# Patient Record
Sex: MALE | Race: WHITE | NOT HISPANIC OR LATINO | Employment: FULL TIME | URBAN - METROPOLITAN AREA
[De-identification: names, ages, dates, MRNs, and addresses within clinical notes are randomized per-mention and may not be internally consistent; named-entity substitution may affect disease eponyms.]

---

## 2019-08-11 ENCOUNTER — COMMUNICATION - HEALTHEAST (OUTPATIENT)
Dept: TELEHEALTH | Facility: CLINIC | Age: 69
End: 2019-08-11

## 2019-08-11 ENCOUNTER — OFFICE VISIT - HEALTHEAST (OUTPATIENT)
Dept: FAMILY MEDICINE | Facility: CLINIC | Age: 69
End: 2019-08-11

## 2019-08-11 DIAGNOSIS — R68.84 JAW PAIN: ICD-10-CM

## 2019-08-11 DIAGNOSIS — I10 ESSENTIAL HYPERTENSION: ICD-10-CM

## 2019-08-11 DIAGNOSIS — Z91.89 AT RISK FOR ACUTE ISCHEMIC CARDIAC EVENT: ICD-10-CM

## 2019-08-11 DIAGNOSIS — E66.9 OBESITY, UNSPECIFIED CLASSIFICATION, UNSPECIFIED OBESITY TYPE, UNSPECIFIED WHETHER SERIOUS COMORBIDITY PRESENT: ICD-10-CM

## 2019-08-11 DIAGNOSIS — E78.5 HYPERLIPIDEMIA, UNSPECIFIED HYPERLIPIDEMIA TYPE: ICD-10-CM

## 2019-08-11 LAB
ATRIAL RATE - MUSE: 80 BPM
DIASTOLIC BLOOD PRESSURE - MUSE: NORMAL MMHG
INTERPRETATION ECG - MUSE: NORMAL
P AXIS - MUSE: 27 DEGREES
PR INTERVAL - MUSE: 180 MS
QRS DURATION - MUSE: 88 MS
QT - MUSE: 376 MS
QTC - MUSE: 433 MS
R AXIS - MUSE: -3 DEGREES
SYSTOLIC BLOOD PRESSURE - MUSE: NORMAL MMHG
T AXIS - MUSE: 21 DEGREES
VENTRICULAR RATE- MUSE: 80 BPM

## 2020-06-19 ENCOUNTER — COMMUNICATION - HEALTHEAST (OUTPATIENT)
Dept: FAMILY MEDICINE | Facility: CLINIC | Age: 70
End: 2020-06-19

## 2020-06-22 ENCOUNTER — COMMUNICATION - HEALTHEAST (OUTPATIENT)
Dept: FAMILY MEDICINE | Facility: CLINIC | Age: 70
End: 2020-06-22

## 2020-06-22 ENCOUNTER — OFFICE VISIT - HEALTHEAST (OUTPATIENT)
Dept: FAMILY MEDICINE | Facility: CLINIC | Age: 70
End: 2020-06-22

## 2020-06-22 DIAGNOSIS — N30.01 ACUTE CYSTITIS WITH HEMATURIA: ICD-10-CM

## 2020-06-22 DIAGNOSIS — R39.11 BENIGN PROSTATIC HYPERPLASIA WITH URINARY HESITANCY: ICD-10-CM

## 2020-06-22 DIAGNOSIS — N40.1 BENIGN PROSTATIC HYPERPLASIA WITH URINARY HESITANCY: ICD-10-CM

## 2020-06-22 DIAGNOSIS — R30.0 DYSURIA: ICD-10-CM

## 2020-06-22 DIAGNOSIS — I21.29 ST ELEVATION MYOCARDIAL INFARCTION (STEMI) INVOLVING OTHER CORONARY ARTERY (H): ICD-10-CM

## 2020-06-22 LAB
ALBUMIN SERPL-MCNC: 3.5 G/DL (ref 3.5–5)
ALBUMIN UR-MCNC: ABNORMAL MG/DL
ALP SERPL-CCNC: 99 U/L (ref 45–120)
ALT SERPL W P-5'-P-CCNC: 19 U/L (ref 0–45)
ANION GAP SERPL CALCULATED.3IONS-SCNC: 11 MMOL/L (ref 5–18)
APPEARANCE UR: CLEAR
AST SERPL W P-5'-P-CCNC: 17 U/L (ref 0–40)
BACTERIA #/AREA URNS HPF: ABNORMAL HPF
BILIRUB SERPL-MCNC: 0.3 MG/DL (ref 0–1)
BILIRUB UR QL STRIP: NEGATIVE
BUN SERPL-MCNC: 22 MG/DL (ref 8–22)
CALCIUM SERPL-MCNC: 8.9 MG/DL (ref 8.5–10.5)
CHLORIDE BLD-SCNC: 105 MMOL/L (ref 98–107)
CHOLEST SERPL-MCNC: 136 MG/DL
CO2 SERPL-SCNC: 25 MMOL/L (ref 22–31)
COLOR UR AUTO: YELLOW
CREAT SERPL-MCNC: 1.3 MG/DL (ref 0.7–1.3)
FASTING STATUS PATIENT QL REPORTED: NO
GFR SERPL CREATININE-BSD FRML MDRD: 55 ML/MIN/1.73M2
GLUCOSE BLD-MCNC: 94 MG/DL (ref 70–125)
GLUCOSE UR STRIP-MCNC: NEGATIVE MG/DL
HDLC SERPL-MCNC: 28 MG/DL
HGB UR QL STRIP: ABNORMAL
KETONES UR STRIP-MCNC: NEGATIVE MG/DL
LDLC SERPL CALC-MCNC: 66 MG/DL
LEUKOCYTE ESTERASE UR QL STRIP: ABNORMAL
NITRATE UR QL: NEGATIVE
PH UR STRIP: 5.5 [PH] (ref 5–8)
POTASSIUM BLD-SCNC: 4.5 MMOL/L (ref 3.5–5)
PROT SERPL-MCNC: 6.5 G/DL (ref 6–8)
RBC #/AREA URNS AUTO: ABNORMAL HPF
SODIUM SERPL-SCNC: 141 MMOL/L (ref 136–145)
SP GR UR STRIP: 1.01 (ref 1–1.03)
SQUAMOUS #/AREA URNS AUTO: ABNORMAL LPF
TRIGL SERPL-MCNC: 211 MG/DL
UROBILINOGEN UR STRIP-ACNC: ABNORMAL
WBC #/AREA URNS AUTO: ABNORMAL HPF

## 2020-06-24 LAB — BACTERIA SPEC CULT: ABNORMAL

## 2021-06-03 VITALS — WEIGHT: 269.13 LBS

## 2021-06-04 VITALS
WEIGHT: 252 LBS | SYSTOLIC BLOOD PRESSURE: 114 MMHG | DIASTOLIC BLOOD PRESSURE: 60 MMHG | HEART RATE: 78 BPM | OXYGEN SATURATION: 97 %

## 2021-06-09 NOTE — TELEPHONE ENCOUNTER
Medication Question or Clarification  Who is calling: kar Lilly  What medication are you calling about (include dose and sig)?: sulfamethoxazole-trimethoprim (BACTRIM DS) 800-160 mg per tablet  Who prescribed the medication?: John Gloria MD  What is your question/concern?: interacts with lisinopril, would you like to prescribe something else or are the potassium levels being monitored? Please advise  Requested [facility]acy: [facility] to leave a detailed message?: Yes

## 2021-06-09 NOTE — PROGRESS NOTES
Assessment/Plan:        1. Dysuria  - Urinalysis-UC if Indicated  - Culture, Urine    2. Acute cystitis with hematuria  UA findings were discussed with patient  Plan:  - sulfamethoxazole-trimethoprim (BACTRIM DS) 800-160 mg per tablet; Take 1 tablet by mouth 2 (two) times a day for 7 days.  Dispense: 14 tablet; Refill: 0    3. Benign prostatic hyperplasia with urinary hesitancy  Discussed is the likely culprit urinary tract infection.     Start:  - tamsulosin (FLOMAX) 0.4 mg cap; Take 1 capsule (0.4 mg total) by mouth Daily after breakfast.  Dispense: 30 capsule; Refill: 0  Close follow up if no significant change or improvement as anticipated.     Consider referral to urology    4. ST elevation myocardial infarction (STEMI) involving other coronary artery (H)  Refill  - atenoloL (TENORMIN) 100 MG tablet; Take 1 tablet (100 mg total) by mouth daily.  Dispense: 90 tablet; Refill: 0  - atorvastatin (LIPITOR) 20 MG tablet; Take 1 tablet (20 mg total) by mouth daily.  Dispense: 90 tablet; Refill: 0    Recheck following labs:  - lisinopriL (PRINIVIL,ZESTRIL) 10 MG tablet; Take 1 tablet (10 mg total) by mouth daily.  Dispense: 90 tablet; Refill: 0  - Lipid Profile  - Comprehensive Metabolic Panel        At the conclusion of the encounter the plan of care, disposition and all questions were answered and reviewed, and the patient acknowledged understanding and was involved in the decision making regarding the overall care plan.           Subjective:    Patient ID:   Bret Fleming is a 69 y.o. male with a history of MI, and difficulty passing urine over the past several months presenting with a week of worsening symptoms with burning on urination the past 7 days.  Denies any fevers chills back or abdominal pain.  No previous history of BPH or evaluation of    Review of Systems  Allergy: reviewed  General : negative  A complete 5 point review of systems was obtained and is negative other than what is stated in the HPI.        The following patient's history were reviewed and updated as appropriate:   He  has no past medical history on file..      Outpatient Encounter Medications as of 6/22/2020   Medication Sig Dispense Refill     aspirin 81 mg chewable tablet Chew 81 mg.       atenoloL (TENORMIN) 100 MG tablet        atorvastatin (LIPITOR) 20 MG tablet Take 20 mg by mouth.       lisinopril (PRINIVIL,ZESTRIL) 10 MG tablet        atenolol (TENORMIN) 100 MG tablet Take 100 mg by mouth.       No facility-administered encounter medications on file as of 6/22/2020.          Objective:   /60 (Patient Site: Right Arm, Patient Position: Sitting, Cuff Size: Adult Regular)   Pulse 78   Wt (!) 252 lb (114.3 kg)   SpO2 97%       Physical Exam  General exam: Apparent distress and well-hydrated  Abdomen: Soft and nontender, normoactive bowel sounds

## 2021-06-09 NOTE — TELEPHONE ENCOUNTER
New Appointment Needed  What is the reason for the visit:    New patient to [facility] care. Requests to be seen in Family care, having some issues with urination. Would also be interested in a complete physical as it has been more than a year. Is requesting an in-office appointment. Does not have video capability. COVID-screened 6/19/20  Provider Preference: Any available Dr Gloria  How soon do you need to be seen?: as soon as can be arranged, regaring the issues with urination.  Waitlist offered?: No  Okay to leave a detailed message:  Yes

## 2021-06-16 PROBLEM — R07.9 CHEST PAIN: Status: ACTIVE | Noted: 2019-08-11

## 2021-06-16 PROBLEM — I21.3 ST ELEVATION MYOCARDIAL INFARCTION (STEMI) (H): Status: ACTIVE | Noted: 2020-06-22

## 2021-06-17 NOTE — PATIENT INSTRUCTIONS - HE
Patient Instructions by Beck Stinson PA-C at 8/11/2019 10:50 AM     Author: Beck Stinson PA-C Service: -- Author Type: Physician Assistant    Filed: 8/11/2019 11:29 AM Encounter Date: 8/11/2019 Status: Addendum    : Beck Stinson PA-C (Physician Assistant)    Related Notes: Original Note by Beck Stinson PA-C (Physician Assistant) filed at 8/11/2019 11:27 AM       Go to the ER for evaluation of your jaw and face pain      Patient Education     Uncertain Causes of Chest Pain    Chest pain can happen for a number of reasons. Sometimes the cause can't be determined. If your condition does not seem serious, and your pain does not appear to be coming from your heart, your healthcare provider may recommend watching it closely. Sometimes the signs of a serious problem take more time to appear. Many problems not related to your heart can cause chest pain.These include:    Musculoskeletal. Costochondritis, an inflammation of the tissues around the ribs that can occur from trauma or overuse injuries    Respiratory. Pneumonia, pneumothorax, or pneumonitis (inflammation of the lining of the chest and lungs)    Gastrointestinal. Esophageal reflux, heartburn, or gallbladder disease    Anxiety and panic disorders    Nerve compression and neuritis    Miscellaneous problems such as aortic aneurysm or pulmonary embolism (a blood clot in the lungs)  Home care  After your visit, follow these recommendations:    Rest today and avoid strenuous activity.    Take any prescribed medicine as directed.    Be aware of any recurrent chest pain and notice any changes  Follow-up care  Follow up with your healthcare provider if you do not start to feel better within 24 hours, or as advised.  Call 911  Call 911 if any of these occur:    A change in the type of pain: if it feels different, becomes more severe, lasts longer, or begins to spread into your shoulder, arm, neck, jaw or back    Shortness of breath or increased pain with  breathing    Weakness, dizziness, or fainting    Rapid heart beat    Crushing sensation in your chest  When to seek medical advice  Call your healthcare provider right away if any of the following occur:    Cough with dark colored sputum (phlegm) or blood    Fever of 100.4 F (38 C) or higher, or as directed by your healthcare provider    Swelling, pain or redness in one leg    Shortness of breath  Date Last Reviewed: 12/30/2015 2000-2017 [employer]. [address] [place] [address]. All rights reserved. This information is not intended as a substitute for professional medical care. Always follow your healthcare professional's instructions.

## 2021-07-03 NOTE — ADDENDUM NOTE
Addendum Note by John Gloria MD at 6/22/2020  1:15 PM     Author: John Gloria MD Service: -- Author Type: Physician    Filed: 6/24/2020  3:01 PM Encounter Date: 6/22/2020 Status: Signed    : John Gloria MD (Physician)    Addended by: JOHN GLORIA on: 6/24/2020 03:01 PM        Modules accepted: Orders

## 2021-08-08 ENCOUNTER — HEALTH MAINTENANCE LETTER (OUTPATIENT)
Age: 71
End: 2021-08-08

## 2021-08-12 RX ORDER — LISINOPRIL 10 MG/1
TABLET ORAL
Refills: 3 | Status: CANCELLED | OUTPATIENT
Start: 2021-08-12

## 2021-08-12 NOTE — TELEPHONE ENCOUNTER
Pending Prescriptions:                       Disp   Refills    lisinopril (ZESTRIL) 10 MG tablet                3            Last filled 6/8/2021

## 2021-08-15 NOTE — TELEPHONE ENCOUNTER
"Routing refill request to provider for review/approval because:  Labs not current:  Cr, K+  Patient needs to be seen because it has been more than 1 year since last office visit.  No established PCP      Last office visit provider:  6/22/20     Requested Prescriptions   Pending Prescriptions Disp Refills     lisinopril (ZESTRIL) 10 MG tablet  3       ACE Inhibitors (Including Combos) Protocol Failed - 8/12/2021  3:55 PM        Failed - Blood pressure under 140/90 in past 12 months     BP Readings from Last 3 Encounters:   06/22/20 114/60                 Failed - Recent (12 mo) or future (30 days) visit within the authorizing provider's specialty     Patient has had an office visit with the authorizing provider or a provider within the authorizing providers department within the previous 12 mos or has a future within next 30 days. See \"Patient Info\" tab in inbasket, or \"Choose Columns\" in Meds & Orders section of the refill encounter.              Failed - Normal serum creatinine on file in past 12 months     Recent Labs   Lab Test 06/22/20  1405   CR 1.30       Ok to refill medication if creatinine is low          Failed - Normal serum potassium on file in past 12 months     Recent Labs   Lab Test 06/22/20  1405   POTASSIUM 4.5             Passed - Medication is active on med list        Passed - Patient is age 18 or older             bravo eckert RN 08/14/21 9:07 PM  "

## 2021-08-18 ENCOUNTER — OFFICE VISIT (OUTPATIENT)
Dept: FAMILY MEDICINE | Facility: CLINIC | Age: 71
End: 2021-08-18
Payer: COMMERCIAL

## 2021-08-18 VITALS
BODY MASS INDEX: 35.16 KG/M2 | WEIGHT: 259.6 LBS | TEMPERATURE: 98.1 F | OXYGEN SATURATION: 97 % | SYSTOLIC BLOOD PRESSURE: 130 MMHG | HEIGHT: 72 IN | DIASTOLIC BLOOD PRESSURE: 80 MMHG | HEART RATE: 65 BPM | RESPIRATION RATE: 16 BRPM

## 2021-08-18 DIAGNOSIS — M65.30 TRIGGER FINGER, ACQUIRED: ICD-10-CM

## 2021-08-18 DIAGNOSIS — M25.50 MULTIPLE JOINT PAIN: ICD-10-CM

## 2021-08-18 DIAGNOSIS — E78.2 MIXED HYPERLIPIDEMIA: ICD-10-CM

## 2021-08-18 DIAGNOSIS — Z12.11 SPECIAL SCREENING FOR MALIGNANT NEOPLASMS, COLON: ICD-10-CM

## 2021-08-18 DIAGNOSIS — I10 ESSENTIAL HYPERTENSION: Primary | ICD-10-CM

## 2021-08-18 LAB
ALBUMIN SERPL-MCNC: 3.7 G/DL (ref 3.5–5)
ALP SERPL-CCNC: 98 U/L (ref 45–120)
ALT SERPL W P-5'-P-CCNC: 21 U/L (ref 0–45)
ANION GAP SERPL CALCULATED.3IONS-SCNC: 12 MMOL/L (ref 5–18)
AST SERPL W P-5'-P-CCNC: 20 U/L (ref 0–40)
BILIRUB SERPL-MCNC: 0.5 MG/DL (ref 0–1)
BUN SERPL-MCNC: 23 MG/DL (ref 8–28)
CALCIUM SERPL-MCNC: 9.2 MG/DL (ref 8.5–10.5)
CHLORIDE BLD-SCNC: 106 MMOL/L (ref 98–107)
CHOLEST SERPL-MCNC: 210 MG/DL
CO2 SERPL-SCNC: 23 MMOL/L (ref 22–31)
CREAT SERPL-MCNC: 1.19 MG/DL (ref 0.6–1.3)
FASTING STATUS PATIENT QL REPORTED: NO
GFR SERPL CREATININE-BSD FRML MDRD: 61 ML/MIN/1.73M2
GLUCOSE BLD-MCNC: 90 MG/DL (ref 70–125)
HDLC SERPL-MCNC: 32 MG/DL
LDLC SERPL CALC-MCNC: 135 MG/DL
POTASSIUM BLD-SCNC: 4.4 MMOL/L (ref 3.5–5)
PROT SERPL-MCNC: 6.9 G/DL (ref 6–8)
SODIUM SERPL-SCNC: 141 MMOL/L (ref 136–145)
TRIGL SERPL-MCNC: 217 MG/DL

## 2021-08-18 PROCEDURE — 36415 COLL VENOUS BLD VENIPUNCTURE: CPT | Performed by: FAMILY MEDICINE

## 2021-08-18 PROCEDURE — 80053 COMPREHEN METABOLIC PANEL: CPT | Performed by: FAMILY MEDICINE

## 2021-08-18 PROCEDURE — 99214 OFFICE O/P EST MOD 30 MIN: CPT | Performed by: FAMILY MEDICINE

## 2021-08-18 PROCEDURE — 80061 LIPID PANEL: CPT | Performed by: FAMILY MEDICINE

## 2021-08-18 RX ORDER — ATENOLOL 100 MG/1
100 TABLET ORAL DAILY
Qty: 90 TABLET | Refills: 3 | Status: SHIPPED | OUTPATIENT
Start: 2021-08-18 | End: 2022-08-25

## 2021-08-18 RX ORDER — LISINOPRIL 10 MG/1
10 TABLET ORAL DAILY
Qty: 90 TABLET | Refills: 3 | Status: SHIPPED | OUTPATIENT
Start: 2021-08-18 | End: 2022-05-27

## 2021-08-18 ASSESSMENT — MIFFLIN-ST. JEOR: SCORE: 1970.54

## 2021-08-18 NOTE — PROGRESS NOTES
Assessment & Plan     Essential hypertension  Normotensive  Continue current management  - atenolol (TENORMIN) 100 MG tablet; Take 1 tablet (100 mg) by mouth daily  - lisinopril (ZESTRIL) 10 MG tablet; Take 1 tablet (10 mg) by mouth daily    Recheck labs  - Comprehensive metabolic panel (BMP + Alb, Alk Phos, ALT, AST, Total. Bili, TP); Future    Mixed hyperlipidemia  Recheck lab    - Lipid panel reflex to direct LDL Fasting;  LDL Cholesterol Calculated <=129 mg/dL 135High     Higher statin dosage is recommended to mitigate the cardiovascular risk  Recommending atorvastatin 80 mg. If agreed will send a new prescription to pharmacy      Multiple joint pain  Exam findings were discussed  Patient rating the pain mild.   Discussed NSAIDs for intermittent use.       Trigger finger, acquired  Exam findings and treatment options were discussed  - Orthopedic  Referral; Future    Special screening for malignant neoplasms, colon  Negative Cologuard in 2019                     No follow-ups on file.        Subjective   Bret Fleming is a 71 year old adult here for med check and follow up of hypertension and hyperlipidemia, requesting refills on his meds.     He is also been experiencing joint pain, and motion restrictions particularly of the left index finger unable to making a fist with a pulling sensation across the palm.     Knee pain: started a year ago and worse in the past few months        Review of Systems         Objective    /80   Pulse 65   Temp 98.1  F (36.7  C)   Resp 16   Ht 1.829 m (6')   Wt 117.8 kg (259 lb 9.6 oz)   SpO2 97%   BMI 35.21 kg/m    Body mass index is 35.21 kg/m .     Physical Exam   GENERAL: healthy, alert and no distress  RESP: lungs clear to auscultation - no rales, rhonchi or wheezes  CV: regular rate and rhythm, normal S1 S2, no S3 or S4, no murmur, click or rub, no peripheral edema and peripheral pulses strong  MS: left hand trigger finger of the middle finger, mild  synovial thickening and osteoarthritic appearance of the hands            Alisha Gloria MD  [facility]

## 2021-08-23 ENCOUNTER — TRANSFERRED RECORDS (OUTPATIENT)
Dept: HEALTH INFORMATION MANAGEMENT | Facility: CLINIC | Age: 71
End: 2021-08-23

## 2021-08-24 ENCOUNTER — TELEPHONE (OUTPATIENT)
Dept: FAMILY MEDICINE | Facility: CLINIC | Age: 71
End: 2021-08-24

## 2021-08-24 DIAGNOSIS — E78.2 MIXED HYPERLIPIDEMIA: Primary | ICD-10-CM

## 2021-08-24 RX ORDER — ATORVASTATIN CALCIUM 80 MG/1
80 TABLET, FILM COATED ORAL DAILY
Qty: 90 TABLET | Refills: 0 | Status: SHIPPED | OUTPATIENT
Start: 2021-08-24 | End: 2022-08-26

## 2021-08-24 RX ORDER — ATORVASTATIN CALCIUM 80 MG/1
80 TABLET, FILM COATED ORAL EVERY EVENING
Qty: 90 TABLET | Refills: 0 | Status: CANCELLED | OUTPATIENT
Start: 2021-08-24

## 2021-08-24 NOTE — TELEPHONE ENCOUNTER
Higher statin dosage is recommended to mitigate the cardiovascular risk  Recommending atorvastatin 80 mg.  If agreed will send a new prescription to pharmacy   Written by Alisha Gloria MD on 8/22/2021  9:14 PM CDT

## 2021-08-24 NOTE — TELEPHONE ENCOUNTER
Contacted patient and relayed message.  He is willing to start increased statin dose.  T'd up below for provider review.

## 2021-08-25 DIAGNOSIS — E78.2 MIXED HYPERLIPIDEMIA: ICD-10-CM

## 2021-10-04 ENCOUNTER — HEALTH MAINTENANCE LETTER (OUTPATIENT)
Age: 71
End: 2021-10-04

## 2022-02-05 ENCOUNTER — HOSPITAL ENCOUNTER (EMERGENCY)
Facility: HOSPITAL | Age: 72
Discharge: HOME | End: 2022-02-05
Attending: EMERGENCY MEDICINE | Admitting: EMERGENCY MEDICINE
Payer: COMMERCIAL

## 2022-02-05 ENCOUNTER — OFFICE VISIT (OUTPATIENT)
Dept: FAMILY MEDICINE | Facility: CLINIC | Age: 72
End: 2022-02-05
Payer: COMMERCIAL

## 2022-02-05 ENCOUNTER — APPOINTMENT (OUTPATIENT)
Dept: CT IMAGING | Facility: HOSPITAL | Age: 72
End: 2022-02-05
Attending: EMERGENCY MEDICINE
Payer: COMMERCIAL

## 2022-02-05 VITALS
SYSTOLIC BLOOD PRESSURE: 105 MMHG | TEMPERATURE: 97.5 F | OXYGEN SATURATION: 98 % | HEART RATE: 95 BPM | BODY MASS INDEX: 31.8 KG/M2 | HEIGHT: 72 IN | RESPIRATION RATE: 18 BRPM | DIASTOLIC BLOOD PRESSURE: 54 MMHG | WEIGHT: 234.8 LBS

## 2022-02-05 VITALS
TEMPERATURE: 97.6 F | SYSTOLIC BLOOD PRESSURE: 99 MMHG | OXYGEN SATURATION: 97 % | DIASTOLIC BLOOD PRESSURE: 63 MMHG | HEART RATE: 86 BPM

## 2022-02-05 DIAGNOSIS — N28.9 RENAL INSUFFICIENCY: ICD-10-CM

## 2022-02-05 DIAGNOSIS — R19.7 DIARRHEA, UNSPECIFIED TYPE: Primary | ICD-10-CM

## 2022-02-05 DIAGNOSIS — N39.0 URINARY TRACT INFECTION WITHOUT HEMATURIA, SITE UNSPECIFIED: ICD-10-CM

## 2022-02-05 LAB
ALBUMIN SERPL-MCNC: 2.9 G/DL (ref 3.5–5)
ALBUMIN SERPL-MCNC: 3 G/DL (ref 3.5–5)
ALBUMIN UR-MCNC: 50 MG/DL
ALP SERPL-CCNC: 75 U/L (ref 45–120)
ALP SERPL-CCNC: 78 U/L (ref 45–120)
ALT SERPL W P-5'-P-CCNC: 15 U/L (ref 0–45)
ALT SERPL W P-5'-P-CCNC: 18 U/L (ref 0–45)
ANION GAP SERPL CALCULATED.3IONS-SCNC: 11 MMOL/L (ref 5–18)
ANION GAP SERPL CALCULATED.3IONS-SCNC: 11 MMOL/L (ref 5–18)
APPEARANCE UR: ABNORMAL
AST SERPL W P-5'-P-CCNC: 13 U/L (ref 0–40)
AST SERPL W P-5'-P-CCNC: 13 U/L (ref 0–40)
BACTERIA #/AREA URNS HPF: ABNORMAL /HPF
BASOPHILS # BLD AUTO: 0 10E3/UL (ref 0–0.2)
BASOPHILS # BLD AUTO: 0.1 10E3/UL (ref 0–0.2)
BASOPHILS NFR BLD AUTO: 1 %
BASOPHILS NFR BLD AUTO: 1 %
BILIRUB DIRECT SERPL-MCNC: 0.2 MG/DL
BILIRUB SERPL-MCNC: 0.4 MG/DL (ref 0–1)
BILIRUB SERPL-MCNC: 0.5 MG/DL (ref 0–1)
BILIRUB UR QL STRIP: NEGATIVE
BUN SERPL-MCNC: 43 MG/DL (ref 8–28)
BUN SERPL-MCNC: 43 MG/DL (ref 8–28)
C REACTIVE PROTEIN LHE: 13.7 MG/DL (ref 0–0.8)
CALCIUM SERPL-MCNC: 9.4 MG/DL (ref 8.5–10.5)
CALCIUM SERPL-MCNC: 9.4 MG/DL (ref 8.5–10.5)
CHLORIDE BLD-SCNC: 107 MMOL/L (ref 98–107)
CHLORIDE BLD-SCNC: 107 MMOL/L (ref 98–107)
CK SERPL-CCNC: 99 U/L (ref 30–190)
CO2 SERPL-SCNC: 20 MMOL/L (ref 22–31)
CO2 SERPL-SCNC: 22 MMOL/L (ref 22–31)
COLOR UR AUTO: YELLOW
CREAT SERPL-MCNC: 3.07 MG/DL (ref 0.6–1.3)
CREAT SERPL-MCNC: 3.22 MG/DL (ref 0.6–1.3)
EOSINOPHIL # BLD AUTO: 0.4 10E3/UL (ref 0–0.7)
EOSINOPHIL # BLD AUTO: 0.4 10E3/UL (ref 0–0.7)
EOSINOPHIL NFR BLD AUTO: 4 %
EOSINOPHIL NFR BLD AUTO: 5 %
ERYTHROCYTE [DISTWIDTH] IN BLOOD BY AUTOMATED COUNT: 12.4 % (ref 10–15)
ERYTHROCYTE [DISTWIDTH] IN BLOOD BY AUTOMATED COUNT: 12.4 % (ref 10–15)
ERYTHROCYTE [SEDIMENTATION RATE] IN BLOOD BY WESTERGREN METHOD: 109 MM/HR (ref 0–20)
GFR SERPL CREATININE-BSD FRML MDRD: 20 ML/MIN/1.73M2
GFR SERPL CREATININE-BSD FRML MDRD: 21 ML/MIN/1.73M2
GLUCOSE BLD-MCNC: 105 MG/DL (ref 70–125)
GLUCOSE BLD-MCNC: 106 MG/DL (ref 70–125)
GLUCOSE UR STRIP-MCNC: NEGATIVE MG/DL
HCT VFR BLD AUTO: 33.9 % (ref 35–53)
HCT VFR BLD AUTO: 34.1 % (ref 35–53)
HGB BLD-MCNC: 10.8 G/DL (ref 11.7–17.7)
HGB BLD-MCNC: 10.8 G/DL (ref 11.7–17.7)
HGB UR QL STRIP: ABNORMAL
HYALINE CASTS: 32 /LPF
IMM GRANULOCYTES # BLD: 0 10E3/UL
IMM GRANULOCYTES # BLD: 0 10E3/UL
IMM GRANULOCYTES NFR BLD: 0 %
IMM GRANULOCYTES NFR BLD: 0 %
KETONES UR STRIP-MCNC: NEGATIVE MG/DL
LEUKOCYTE ESTERASE UR QL STRIP: ABNORMAL
LIPASE SERPL-CCNC: 52 U/L (ref 0–52)
LYMPHOCYTES # BLD AUTO: 1.2 10E3/UL (ref 0.8–5.3)
LYMPHOCYTES # BLD AUTO: 1.2 10E3/UL (ref 0.8–5.3)
LYMPHOCYTES NFR BLD AUTO: 13 %
LYMPHOCYTES NFR BLD AUTO: 14 %
MAGNESIUM SERPL-MCNC: 2.3 MG/DL (ref 1.8–2.6)
MCH RBC QN AUTO: 31.4 PG (ref 26.5–33)
MCH RBC QN AUTO: 31.6 PG (ref 26.5–33)
MCHC RBC AUTO-ENTMCNC: 31.7 G/DL (ref 31.5–36.5)
MCHC RBC AUTO-ENTMCNC: 31.9 G/DL (ref 31.5–36.5)
MCV RBC AUTO: 99 FL (ref 78–100)
MCV RBC AUTO: 99 FL (ref 78–100)
MONOCYTES # BLD AUTO: 1 10E3/UL (ref 0–1.3)
MONOCYTES # BLD AUTO: 1.1 10E3/UL (ref 0–1.3)
MONOCYTES NFR BLD AUTO: 11 %
MONOCYTES NFR BLD AUTO: 14 %
MUCOUS THREADS #/AREA URNS LPF: PRESENT /LPF
NEUTROPHILS # BLD AUTO: 5.6 10E3/UL (ref 1.6–8.3)
NEUTROPHILS # BLD AUTO: 6.3 10E3/UL (ref 1.6–8.3)
NEUTROPHILS NFR BLD AUTO: 67 %
NEUTROPHILS NFR BLD AUTO: 71 %
NITRATE UR QL: POSITIVE
NRBC # BLD AUTO: 0 10E3/UL
NRBC BLD AUTO-RTO: 0 /100
PH UR STRIP: 6 [PH] (ref 5–7)
PLATELET # BLD AUTO: 252 10E3/UL (ref 150–450)
PLATELET # BLD AUTO: 268 10E3/UL (ref 150–450)
POTASSIUM BLD-SCNC: 4.8 MMOL/L (ref 3.5–5)
POTASSIUM BLD-SCNC: 5 MMOL/L (ref 3.5–5)
PROT SERPL-MCNC: 7.1 G/DL (ref 6–8)
PROT SERPL-MCNC: 7.3 G/DL (ref 6–8)
RBC # BLD AUTO: 3.42 10E6/UL (ref 3.8–5.9)
RBC # BLD AUTO: 3.44 10E6/UL (ref 3.8–5.9)
RBC URINE: 0 /HPF
SODIUM SERPL-SCNC: 138 MMOL/L (ref 136–145)
SODIUM SERPL-SCNC: 140 MMOL/L (ref 136–145)
SP GR UR STRIP: 1.01 (ref 1–1.03)
SQUAMOUS EPITHELIAL: 2 /HPF
UROBILINOGEN UR STRIP-MCNC: <2 MG/DL
WBC # BLD AUTO: 8.3 10E3/UL (ref 4–11)
WBC # BLD AUTO: 8.9 10E3/UL (ref 4–11)
WBC CLUMPS #/AREA URNS HPF: PRESENT /HPF
WBC URINE: >182 /HPF

## 2022-02-05 PROCEDURE — 87086 URINE CULTURE/COLONY COUNT: CPT | Performed by: EMERGENCY MEDICINE

## 2022-02-05 PROCEDURE — 99284 EMERGENCY DEPT VISIT MOD MDM: CPT | Mod: 25

## 2022-02-05 PROCEDURE — 82550 ASSAY OF CK (CPK): CPT | Performed by: EMERGENCY MEDICINE

## 2022-02-05 PROCEDURE — 36415 COLL VENOUS BLD VENIPUNCTURE: CPT | Performed by: EMERGENCY MEDICINE

## 2022-02-05 PROCEDURE — 85025 COMPLETE CBC W/AUTO DIFF WBC: CPT | Performed by: EMERGENCY MEDICINE

## 2022-02-05 PROCEDURE — 85025 COMPLETE CBC W/AUTO DIFF WBC: CPT | Performed by: FAMILY MEDICINE

## 2022-02-05 PROCEDURE — 83690 ASSAY OF LIPASE: CPT | Performed by: EMERGENCY MEDICINE

## 2022-02-05 PROCEDURE — 82248 BILIRUBIN DIRECT: CPT | Performed by: EMERGENCY MEDICINE

## 2022-02-05 PROCEDURE — 96365 THER/PROPH/DIAG IV INF INIT: CPT

## 2022-02-05 PROCEDURE — 85652 RBC SED RATE AUTOMATED: CPT | Performed by: FAMILY MEDICINE

## 2022-02-05 PROCEDURE — 250N000011 HC RX IP 250 OP 636: Performed by: EMERGENCY MEDICINE

## 2022-02-05 PROCEDURE — 80053 COMPREHEN METABOLIC PANEL: CPT | Performed by: FAMILY MEDICINE

## 2022-02-05 PROCEDURE — 258N000003 HC RX IP 258 OP 636: Performed by: EMERGENCY MEDICINE

## 2022-02-05 PROCEDURE — 86140 C-REACTIVE PROTEIN: CPT | Performed by: FAMILY MEDICINE

## 2022-02-05 PROCEDURE — 99214 OFFICE O/P EST MOD 30 MIN: CPT | Performed by: FAMILY MEDICINE

## 2022-02-05 PROCEDURE — 51798 US URINE CAPACITY MEASURE: CPT

## 2022-02-05 PROCEDURE — 84155 ASSAY OF PROTEIN SERUM: CPT | Performed by: EMERGENCY MEDICINE

## 2022-02-05 PROCEDURE — 81001 URINALYSIS AUTO W/SCOPE: CPT | Performed by: EMERGENCY MEDICINE

## 2022-02-05 PROCEDURE — 36415 COLL VENOUS BLD VENIPUNCTURE: CPT | Performed by: FAMILY MEDICINE

## 2022-02-05 PROCEDURE — 83735 ASSAY OF MAGNESIUM: CPT | Performed by: EMERGENCY MEDICINE

## 2022-02-05 PROCEDURE — 74176 CT ABD & PELVIS W/O CONTRAST: CPT

## 2022-02-05 PROCEDURE — 96361 HYDRATE IV INFUSION ADD-ON: CPT

## 2022-02-05 RX ORDER — CEPHALEXIN 500 MG/1
500 CAPSULE ORAL 2 TIMES DAILY
Qty: 28 CAPSULE | Refills: 0 | Status: SHIPPED | OUTPATIENT
Start: 2022-02-05 | End: 2022-02-16

## 2022-02-05 RX ORDER — CEFTRIAXONE 1 G/1
1 INJECTION, POWDER, FOR SOLUTION INTRAMUSCULAR; INTRAVENOUS ONCE
Status: COMPLETED | OUTPATIENT
Start: 2022-02-05 | End: 2022-02-05

## 2022-02-05 RX ADMIN — SODIUM CHLORIDE 1000 ML: 9 INJECTION, SOLUTION INTRAVENOUS at 19:05

## 2022-02-05 RX ADMIN — SODIUM CHLORIDE 1000 ML: 9 INJECTION, SOLUTION INTRAVENOUS at 20:36

## 2022-02-05 RX ADMIN — CEFTRIAXONE SODIUM 1 G: 1 INJECTION, POWDER, FOR SOLUTION INTRAMUSCULAR; INTRAVENOUS at 21:28

## 2022-02-05 ASSESSMENT — MIFFLIN-ST. JEOR: SCORE: 1858.05

## 2022-02-05 NOTE — PROGRESS NOTES
Assessment:       Diarrhea, unspecified type  - CBC with platelets differential  - Comprehensive metabolic panel  - ESR: Erythrocyte sedimentation rate  - CRP, inflammation  - Cryptosporidium and Giardia antigens  - Enteric Bacteria and Virus Panel by HORACIO Stool  - Clostridium difficile Toxin B PCR  - Ova and Parasite Exam Routine    Acute renal failure  Normocytic anemia         Plan:     Patient with 1 month history of nonbloody diarrhea of unknown etiology.  Labs ordered as noted above.  We will notify him of the results of his blood work and stool studies when we get it back.  Further evaluation and treatment pending the results of his labs.  If all labs are normal or unremarkable and symptoms are continuing recommend follow-up with his PCP as he may need further evaluation and/or referral to gastroenterology.  Patient is agreeable with this plan.    Addendum: Got results back regarding patient's CBC, CRP, and complete metabolic panel.  Patient with normocytic anemia with hemoglobin at 10.8, decreased from 13.9  2 years ago.  White blood count is otherwise normal.  His CRP significantly elevated at 13.7.  Patient with evidence of acute renal failure with creatinine increased significantly from 5 months ago to 3.07 with a GFR of 21.  Patient with previously normal kidney function.  Spoke with patient over the phone regarding these results and discussed need for IV fluids and further work-up.  Patient will go to [facility] emergency department this evening for further evaluation and treatment.      Subjective:       71 year old adult presents for evaluation of a 1 month history of watery diarrhea associated with a lot of gas.  He states that he usually has one bout of very watery diarrhea daily associated with a small amount of cramping.  He has not had any recent hospitalizations or antibiotics and has not traveled anywhere.  No other sick contacts.  He has not had any associated fevers, abdominal pain, blood in  his stool, nausea, or vomiting.  He did take a COVID-19 PCR test 2 days ago which was negative.  His atorvastatin was increased in 2021 but has not otherwise had any other medication changes.  He does not feel lightheaded or dizzy and has not had any significant weight loss.    Patient Active Problem List   Diagnosis     Chest pain     ST elevation myocardial infarction (STEMI) (H)     Special screening for malignant neoplasms, colon       Past Medical History:   Diagnosis Date     Hypertension        No past surgical history on file.    Current Outpatient Medications   Medication     ASPIRIN PO     atenolol (TENORMIN) 100 MG tablet     atorvastatin (LIPITOR) 80 MG tablet     lisinopril (ZESTRIL) 10 MG tablet     atorvastatin (LIPITOR) 20 MG tablet     No current facility-administered medications for this visit.       Allergies   Allergen Reactions     Ciprofloxacin Rash     Other Drug Allergy (See Comments)      Cough Syrup Abstracted from [facility] Chart( Hydrobromide)       Family History   Problem Relation Age of Onset     Cancer Father      Cancer Paternal Grandfather      Diabetes Paternal Grandfather      Hypertension No family hx of      Cerebrovascular Disease No family hx of      Thyroid Disease No family hx of      Glaucoma No family hx of      Macular Degeneration No family hx of        Social History     Socioeconomic History     Marital status:      Spouse name: None     Number of children: None     Years of education: None     Highest education level: None   Occupational History     None   Tobacco Use     Smoking status: Former Smoker     Quit date: 1995     Years since quittin.7     Smokeless tobacco: Never Used   Substance and Sexual Activity     Alcohol use: None     Drug use: None     Sexual activity: None   Other Topics Concern     None   Social History Narrative     None     Social Determinants of Health     Financial Resource Strain: Not on file   Food Insecurity: Not on  file   Transportation Needs: Not on file   Physical Activity: Not on file   Stress: Not on file   Social Connections: Not on file   Intimate Partner Violence: Not on file   Housing Stability: Not on file         Review of Systems  Pertinent items are noted in HPI.      Objective:     BP 99/63 (BP Location: Right arm, Patient Position: Sitting, Cuff Size: Adult Large)   Pulse 86   Temp 97.6  F (36.4  C) (Tympanic)   SpO2 97%      General appearance: alert, appears stated age and cooperative  Throat: lips, mucosa, and tongue normal; teeth and gums normal  Neck: no adenopathy  Lungs: clear to auscultation bilaterally  Heart: Regular rate and rhythm  Abdomen: soft, non-tender; bowel sounds normal; no masses,  no organomegaly  Extremities: Perfused  Skin: Skin color, texture, turgor normal. No rashes or lesions           This note has been dictated using voice recognition software. Any grammatical or context distortions are unintentional and inherent to the software

## 2022-02-05 NOTE — PATIENT INSTRUCTIONS
We will let you know the results of your blood work and stool studies when we get them back.  Given the duration of the diarrhea you have been having I do think you could try taking some Imodium to see if this will help with your symptoms.  You may get this over-the-counter.    If all of your lab work and stool studies come back normal and your symptoms are continuing, I would recommend follow-up with your primary care provider or with gastroenterology for further evaluation.

## 2022-02-06 NOTE — ED TRIAGE NOTES
"Pt arrives via private car- drove self. Pt states he has had 1 month of diarrhea. Pt to clinic today and found to have abnormal labs- \"Patient with evidence of acute renal failure with creatinine increased significantly from 5 months ago to 3.07 with a GFR of 21\". CRP was also elevated .  "

## 2022-02-06 NOTE — DISCHARGE INSTRUCTIONS
Please follow-up with your Primary Care Provider on Monday or Tuesday; call to arrange appointment.  You will need further tests to determine the cause of your renal failure.    Return to the ER for abdominal pain, back / flank pain, persistent nausea / vomiting, if you are unable to empty your bladder or stop making urine, fever or other concerns.    Complete the full course of antibiotics, unless otherwise guided by the results of your urine culture.

## 2022-02-06 NOTE — ED PROVIDER NOTES
"  Emergency Department Encounter     Evaluation Date & Time:   2022  6:38 PM    CHIEF COMPLAINT:  Diarrhea and Abnormal Labs      Triage Note:Pt states he has lost 20lbs over the last month.     Pt arrives via private car- drove self. Pt states he has had 1 month of diarrhea. Pt to clinic today and found to have abnormal labs- \"Patient with evidence of acute renal failure with creatinine increased significantly from 5 months ago to 3.07 with a GFR of 21\". CRP was also elevated .        Impression and Plan       FINAL IMPRESSION:    ICD-10-CM    1. Renal insufficiency  N28.9    2. Urinary tract infection without hematuria, site unspecified  N39.0          ED COURSE & MEDICAL DECISION MAKIN:51 PM I met with the patient, obtained history, performed an initial exam, and discussed options and plan for diagnostics and treatment here in the ED. PPE worn including N95 mask, surgical cap, surgical gloves, surgical gown.  8:13 PM I rechecked and updated the patient on results. Patient would rather not be admitted to the hospital if possible. Plan for a bladder scan and urology consult.  9:39 PM I spoke with Dr. Christianson from [facility] who recommends admission.  9:44 PM I rechecked and updated the patient. Patient is refusing admission.    71 year old male, history of CAD with STEMI (age 45 years), HTN and HLD, who presents for evaluation of acute renal insufficiency and diarrhea. He has been having an episode of non-bloody diarrhea daily for the past month without associated nausea / vomiting, fevers. He has lower abdominal pain with the BM, but otherwise denies pain. He reports decreased urine output. No recent antibiotic use, hospitalization or travel.    He was evaluated for this today at clinic and referred him to the ED for acute renal insufficiency with creatinine ~3.     On exam, abdomen is soft and non-tender to palpation.    IV access established, blood sent for labs and IV fluids " initiated.    Creatinine returned elevated to 3.22 with GFR of 20; no associated significant electrolyte derangements.    Total CK WNL (99).    Non-contrast CT abdomen / pelvis was performed to evaluate for any obstructive process and demonstrated:  1.  Abnormal bladder wall thickening with trabeculation diffusely and soft tissue stranding in the perivesical fat. Findings worrisome for cystitis. There are 2 small bladder stones in the dependent portion.  2.  Chronic cortical atrophy right kidney with bilateral renal cysts. No kidney or ureteral stones and no hydronephrosis.  3.  Cholelithiasis.    UA is consistent with infection with positive nitrites, 500 LE, >182 WBCs with WBC clumps.  Urine culture pending and patient given a dose of IV ceftriaxone.    Labs otherwise remarkable for no leukocytosis (WBC 8.9) with anemia (Hb 10.8).  No laboratory evidence of hepatitis, biliary obstruction or pancreatitis.    I recommended admission, however the patient prefers discharge to home.    I spoke with nephrology who agreed with admission, however if the patient is adamant about discharge, he will need close outpatient follow-up.    I informed the patient that nephrology also recommends admission, however he is still adamant about discharge stating finances as his primary concern.  I stressed the importance of close outpatient follow-up for further evaluation of his kidney failure.  He was given a prescription for Keflex to treat UTI; I discussed renal dosing with the pharmacist.  Return precautions provided.  Patient stable throughout ED course.        At the conclusion of the encounter I discussed the results of all the tests and the disposition. The questions were answered. The patient acknowledged understanding and was agreeable with the care plan.      MEDICATIONS GIVEN IN THE EMERGENCY DEPARTMENT:  Medications   0.9% sodium chloride BOLUS (0 mLs Intravenous Stopped 2/5/22 2004)   0.9% sodium chloride BOLUS (0 mLs  "Intravenous Stopped 2/5/22 2148)   cefTRIAXone (ROCEPHIN) 1 g vial to attach to  mL bag for ADULTS or NS 50 mL bag for PEDS (1 g Intravenous New Bag 2/5/22 2128)       NEW PRESCRIPTIONS STARTED AT TODAY'S ED VISIT:  New Prescriptions    No medications on file       HPI     HPI     Bret Fleming is a 71 year old male, history of CAD with STEMI (age 45 years), hypertension and hyperlipidemia, who presents to this ED by private vehicle for evaluation of acute renal insufficiency and diarrhea. Patient reports that he has been having an episode of diarrhea every 24 hours for the past month. He describes his diarrhea as very watery and \"orangish-brown\" in color. No hematochezia or melena. He notes associated lower abdominal pain while he is having the diarrhea, which resolves after BM. He also has had increased flatus. No associated nausea / vomiting, fevers. He reports decreased urine output. No recent antibiotic use, hospitalization or travel.    He was evaluated for this today at clinic and referred him to the ED for acute renal insufficiency.     He otherwise denies chest pain, SOB, cough or other concerns.    He just had a negative PCR COVID-19 test which was negative.    Per chart review, patient was seen at [facility] on 02/05/2021 (earlier today) for evaluation of diarrhea. Patient found to have normocytic anemia with a hgb of 10.8. Prior Hgb of 13.9 2 years ago. WBC count normal. CRP of 13.7. Creatinine 3.07, a significant increase from prior 5 months ago. GFR 21.    REVIEW OF SYSTEMS:  All other systems reviewed and are negative.      Medical History     Past Medical History:   Diagnosis Date     Hypertension        History reviewed. No pertinent surgical history.    Family History   Problem Relation Age of Onset     Cancer Father      Cancer Paternal Grandfather      Diabetes Paternal Grandfather      Hypertension No family hx of      Cerebrovascular Disease No family hx of      " Thyroid Disease No family hx of      Glaucoma No family hx of      Macular Degeneration No family hx of        Social History     Tobacco Use     Smoking status: Former Smoker     Quit date: 1995     Years since quittin.7     Smokeless tobacco: Never Used   Substance Use Topics     Alcohol use: None     Drug use: None       ASPIRIN PO  atenolol (TENORMIN) 100 MG tablet  atorvastatin (LIPITOR) 20 MG tablet  atorvastatin (LIPITOR) 80 MG tablet  lisinopril (ZESTRIL) 10 MG tablet        Physical Exam     First Vitals:  Patient Vitals for the past 24 hrs:   BP Temp Temp src Pulse Resp SpO2 Height Weight   22 105/58 -- -- 83 -- 96 % -- --   22 106/55 -- -- 88 -- 97 % -- --   22 (!) 82/51 -- -- 87 -- 97 % -- --   22 1930 96/54 -- -- 86 -- 96 % -- --   22 1900 102/54 -- -- 87 -- 96 % -- --   22 1837 101/56 97.5  F (36.4  C) Temporal 91 18 99 % 1.829 m (6') 106.5 kg (234 lb 12.8 oz)       PHYSICAL EXAM:   Physical Exam    GENERAL: Awake, alert.  In no acute distress.   HEENT: Normocephalic, atraumatic. Pupils equal, round and reactive. Conjunctiva normal.  NECK: No stridor.  PULMONARY: Symmetrical breath sounds without distress.  Lungs clear to auscultation bilaterally without wheezes, rhonchi or rales.  CARDIO: Regular rate and rhythm.  No significant murmur, rub or gallop.  Radial pulses strong and symmetrical.  ABDOMINAL: Abdomen soft, non-distended and non-tender to palpation.  No CVAT, BL.  EXTREMITIES: No lower extremity swelling or edema.      NEURO: Alert and oriented to person, place and time.  Cranial nerves grossly intact.  No focal motor deficit.  PSYCH: Normal mood and affect.  SKIN: No rashes.     Results     LAB:  All pertinent labs reviewed and interpreted  Labs Ordered and Resulted from Time of ED Arrival to Time of ED Departure   BASIC METABOLIC PANEL - Abnormal       Result Value    Sodium 140      Potassium 5.0      Chloride 107      Carbon  Dioxide (CO2) 22      Anion Gap 11      Urea Nitrogen 43 (*)     Creatinine 3.22 (*)     Calcium 9.4      Glucose 106      GFR Estimate 20 (*)    HEPATIC FUNCTION PANEL - Abnormal    Bilirubin Total 0.4      Bilirubin Direct 0.2      Protein Total 7.3      Albumin 3.0 (*)     Alkaline Phosphatase 75      AST 13      ALT 18     CBC WITH PLATELETS AND DIFFERENTIAL - Abnormal    WBC Count 8.9      RBC Count 3.44 (*)     Hemoglobin 10.8 (*)     Hematocrit 34.1 (*)     MCV 99      MCH 31.4      MCHC 31.7      RDW 12.4      Platelet Count 268      % Neutrophils 71      % Lymphocytes 13      % Monocytes 11      % Eosinophils 4      % Basophils 1      % Immature Granulocytes 0      NRBCs per 100 WBC 0      Absolute Neutrophils 6.3      Absolute Lymphocytes 1.2      Absolute Monocytes 1.0      Absolute Eosinophils 0.4      Absolute Basophils 0.1      Absolute Immature Granulocytes 0.0      Absolute NRBCs 0.0     ROUTINE UA WITH MICROSCOPIC REFLEX TO CULTURE - Abnormal    Color Urine Yellow      Appearance Urine Cloudy (*)     Glucose Urine Negative      Bilirubin Urine Negative      Ketones Urine Negative      Specific Gravity Urine 1.015      Blood Urine 0.1 mg/dL (*)     pH Urine 6.0      Protein Albumin Urine 50  (*)     Urobilinogen Urine <2.0      Nitrite Urine Positive (*)     Leukocyte Esterase Urine 500 Maria Alejandra/uL (*)     Bacteria Urine Few (*)     WBC Clumps Urine Present (*)     Mucus Urine Present (*)     RBC Urine 0      WBC Urine >182 (*)     Squamous Epithelials Urine 2 (*)     Hyaline Casts Urine 32 (*)    LIPASE - Normal    Lipase 52     MAGNESIUM - Normal    Magnesium 2.3     CK TOTAL - Normal    CK 99     URINE CULTURE       RADIOLOGY:  CT Abdomen Pelvis w/o Contrast   Final Result   IMPRESSION:    1.  Abnormal bladder wall thickening with trabeculation diffusely and soft tissue stranding in the perivesical fat. Findings worrisome for cystitis. There are 2 small bladder stones in the dependent portion.   2.   Chronic cortical atrophy right kidney with bilateral renal cysts. No kidney or ureteral stones and no hydronephrosis.   3.  Cholelithiasis.             I, Gerardo Parker, am serving as a scribe to document services personally performed by Maliha Christopher MD based on my observation and the provider's statements to me. I, Maliha Christopher MD attest that Gerardo Parker is acting in a scribe capacity, has observed my performance of the services and has documented them in accordance with my direction.    Maliha Christopher MD  Emergency Medicine  [facility] EMERGENCY DEPARTMENT         Maliha Christopher MD  02/06/22 0807

## 2022-02-07 ENCOUNTER — APPOINTMENT (OUTPATIENT)
Dept: LAB | Facility: CLINIC | Age: 72
End: 2022-02-07
Payer: COMMERCIAL

## 2022-02-07 LAB
BACTERIA UR CULT: ABNORMAL
C DIFF TOX B STL QL: NEGATIVE

## 2022-02-07 PROCEDURE — 87493 C DIFF AMPLIFIED PROBE: CPT | Mod: 59 | Performed by: FAMILY MEDICINE

## 2022-02-07 PROCEDURE — 87329 GIARDIA AG IA: CPT | Mod: 59 | Performed by: FAMILY MEDICINE

## 2022-02-07 PROCEDURE — 87506 IADNA-DNA/RNA PROBE TQ 6-11: CPT | Performed by: FAMILY MEDICINE

## 2022-02-07 PROCEDURE — 87328 CRYPTOSPORIDIUM AG IA: CPT | Performed by: FAMILY MEDICINE

## 2022-02-07 PROCEDURE — 87177 OVA AND PARASITES SMEARS: CPT | Performed by: FAMILY MEDICINE

## 2022-02-07 PROCEDURE — 87209 SMEAR COMPLEX STAIN: CPT | Performed by: FAMILY MEDICINE

## 2022-02-08 LAB
C COLI+JEJUNI+LARI FUSA STL QL NAA+PROBE: NOT DETECTED
C PARVUM AG STL QL IA: NEGATIVE
EC STX1 GENE STL QL NAA+PROBE: NOT DETECTED
EC STX2 GENE STL QL NAA+PROBE: NOT DETECTED
G LAMBLIA AG STL QL IA: NEGATIVE
NOROV GI+II ORF1-ORF2 JNC STL QL NAA+PR: NOT DETECTED
O+P STL MICRO: NEGATIVE
RVA NSP5 STL QL NAA+PROBE: NOT DETECTED
SALMONELLA SP RPOD STL QL NAA+PROBE: NOT DETECTED
SHIGELLA SP+EIEC IPAH STL QL NAA+PROBE: NOT DETECTED
V CHOL+PARA RFBL+TRKH+TNAA STL QL NAA+PR: NOT DETECTED
Y ENTERO RECN STL QL NAA+PROBE: NOT DETECTED

## 2022-02-08 RX ORDER — CEFDINIR 300 MG/1
300 CAPSULE ORAL DAILY
Qty: 5 CAPSULE | Refills: 0 | Status: SHIPPED | OUTPATIENT
Start: 2022-02-08 | End: 2022-02-13

## 2022-02-16 ENCOUNTER — VIRTUAL VISIT (OUTPATIENT)
Dept: FAMILY MEDICINE | Facility: CLINIC | Age: 72
End: 2022-02-16
Payer: COMMERCIAL

## 2022-02-16 DIAGNOSIS — N28.9 ACUTE RENAL INSUFFICIENCY: Primary | ICD-10-CM

## 2022-02-16 DIAGNOSIS — R79.89 ELEVATED SERUM CREATININE: ICD-10-CM

## 2022-02-16 PROCEDURE — 99214 OFFICE O/P EST MOD 30 MIN: CPT | Mod: 95 | Performed by: FAMILY MEDICINE

## 2022-02-16 NOTE — PROGRESS NOTES
Shilpi is a 71 year old who is being evaluated via a billable video visit.      How would you like to obtain your AVS? MyChart  If the video visit is dropped, the invitation should be resent by: Text to cell phone: [phone]   Will anyone else be joining your video visit? No      Video Start Time: 5:27 PM    Assessment & Plan     Acute renal insufficiency  Elevated serum creatinine    Plan:   > avoid nephrotoxic agents, D/C lisinopril   Refrain from the NSAIDs   Keep hydrated     Recheck level in a week   - Basic metabolic panel  (Ca, Cl, CO2, Creat, Gluc, K, Na, BUN); Future                   No follow-ups on file.    Alisha Gloria MD  [facility]   Shilpi is a 71 year old who is following up on abnormal labs and found to have renal insufficiency based on recent evaluation emergency room on 2/5/2022, with a creatinine of 3.22 GFR of 20.  Last evaluation 6 months ago showed a normal creatinine level of 1.19 and renal function.  He has recently been treated for a urinary tract infection and diarrhea given the oral antibiotics with improvement in symptoms.  Is also on atenolol 100 mg daily and lisinopril 10 mg daily for the management of hypertension with no other new medications were being prescribed    In review of his chart had abdominal CT which showed:  1. Abnormal bladder wall thickening with trabeculation diffusely and soft tissue stranding in the perivesical fat. Findings worrisome for cystitis. There are 2 small bladder stones in the dependent portion.  2. Chronic cortical atrophy right kidney with bilateral renal cysts. No kidney or ureteral stones and no hydronephrosis.  3. Cholelithiasis.        Review of Systems         Objective           Vitals:  No vitals were obtained today due to virtual visit.    Physical Exam   GENERAL: Healthy, alert and no distress                Video-Visit Details    Type of service:  Video Visit    Video End Time:5:33  PM    Originating Location (pt. Location): Home    [facility]     Platform used for Video Visit: Abraham  Answers for HPI/ROS submitted by the patient on 2/16/2022  Do you take any over the counter pain medicine?  : Yes  What over the counter medicine are you taking for your pain?  : Acetaminophen  How often do you take this medicine?: two times daily  How many servings of fruits and vegetables do you eat daily?: 2-3  On average, how many sweetened beverages do you drink each day (Examples: soda, juice, sweet tea, etc.  Do NOT count diet or artificially sweetened beverages)?: 4  How many minutes a day do you exercise enough to make your heart beat faster?: 9 or less  How many days a week do you exercise enough to make your heart beat faster?: 3 or less  How many days per week do you miss taking your medication?: 0

## 2022-03-03 ENCOUNTER — LAB (OUTPATIENT)
Dept: LAB | Facility: CLINIC | Age: 72
End: 2022-03-03
Payer: COMMERCIAL

## 2022-03-03 DIAGNOSIS — R79.89 ELEVATED SERUM CREATININE: ICD-10-CM

## 2022-03-03 LAB
ANION GAP SERPL CALCULATED.3IONS-SCNC: 12 MMOL/L (ref 5–18)
BUN SERPL-MCNC: 25 MG/DL (ref 8–28)
CALCIUM SERPL-MCNC: 8.9 MG/DL (ref 8.5–10.5)
CHLORIDE BLD-SCNC: 109 MMOL/L (ref 98–107)
CO2 SERPL-SCNC: 22 MMOL/L (ref 22–31)
CREAT SERPL-MCNC: 1.82 MG/DL (ref 0.6–1.3)
GFR SERPL CREATININE-BSD FRML MDRD: 39 ML/MIN/1.73M2
GLUCOSE BLD-MCNC: 99 MG/DL (ref 70–125)
POTASSIUM BLD-SCNC: 4.4 MMOL/L (ref 3.5–5)
SODIUM SERPL-SCNC: 143 MMOL/L (ref 136–145)

## 2022-03-03 PROCEDURE — 80048 BASIC METABOLIC PNL TOTAL CA: CPT

## 2022-03-03 PROCEDURE — 36415 COLL VENOUS BLD VENIPUNCTURE: CPT

## 2022-05-27 ENCOUNTER — OFFICE VISIT (OUTPATIENT)
Dept: FAMILY MEDICINE | Facility: CLINIC | Age: 72
End: 2022-05-27
Payer: COMMERCIAL

## 2022-05-27 VITALS
BODY MASS INDEX: 31.09 KG/M2 | DIASTOLIC BLOOD PRESSURE: 70 MMHG | WEIGHT: 229.2 LBS | SYSTOLIC BLOOD PRESSURE: 131 MMHG | HEART RATE: 109 BPM | OXYGEN SATURATION: 98 % | TEMPERATURE: 100.2 F | RESPIRATION RATE: 19 BRPM

## 2022-05-27 DIAGNOSIS — N39.0 URINARY TRACT INFECTION WITH HEMATURIA, SITE UNSPECIFIED: ICD-10-CM

## 2022-05-27 DIAGNOSIS — R09.82 POSTNASAL DRIP: ICD-10-CM

## 2022-05-27 DIAGNOSIS — R31.9 URINARY TRACT INFECTION WITH HEMATURIA, SITE UNSPECIFIED: ICD-10-CM

## 2022-05-27 DIAGNOSIS — R79.89 ELEVATED SERUM CREATININE: ICD-10-CM

## 2022-05-27 DIAGNOSIS — R82.90 CLOUDY URINE: Primary | ICD-10-CM

## 2022-05-27 LAB
ALBUMIN UR-MCNC: 100 MG/DL
ANION GAP SERPL CALCULATED.3IONS-SCNC: 12 MMOL/L (ref 5–18)
APPEARANCE UR: ABNORMAL
BACTERIA #/AREA URNS HPF: ABNORMAL /HPF
BILIRUB UR QL STRIP: NEGATIVE
BUN SERPL-MCNC: 49 MG/DL (ref 8–28)
CALCIUM SERPL-MCNC: 9.3 MG/DL (ref 8.5–10.5)
CHLORIDE BLD-SCNC: 107 MMOL/L (ref 98–107)
CO2 SERPL-SCNC: 21 MMOL/L (ref 22–31)
COLOR UR AUTO: YELLOW
CREAT SERPL-MCNC: 3.31 MG/DL (ref 0.6–1.3)
GFR SERPL CREATININE-BSD FRML MDRD: 19 ML/MIN/1.73M2
GLUCOSE BLD-MCNC: 129 MG/DL (ref 70–125)
GLUCOSE UR STRIP-MCNC: NEGATIVE MG/DL
HGB UR QL STRIP: ABNORMAL
KETONES UR STRIP-MCNC: NEGATIVE MG/DL
LEUKOCYTE ESTERASE UR QL STRIP: ABNORMAL
NITRATE UR QL: POSITIVE
PH UR STRIP: 5.5 [PH] (ref 5–8)
POTASSIUM BLD-SCNC: 4.4 MMOL/L (ref 3.5–5)
RBC #/AREA URNS AUTO: >100 /HPF
SODIUM SERPL-SCNC: 140 MMOL/L (ref 136–145)
SP GR UR STRIP: 1.01 (ref 1–1.03)
SQUAMOUS #/AREA URNS AUTO: ABNORMAL /LPF
UROBILINOGEN UR STRIP-ACNC: 0.2 E.U./DL
WBC #/AREA URNS AUTO: >100 /HPF

## 2022-05-27 PROCEDURE — 96372 THER/PROPH/DIAG INJ SC/IM: CPT | Performed by: STUDENT IN AN ORGANIZED HEALTH CARE EDUCATION/TRAINING PROGRAM

## 2022-05-27 PROCEDURE — 99214 OFFICE O/P EST MOD 30 MIN: CPT | Mod: 25 | Performed by: STUDENT IN AN ORGANIZED HEALTH CARE EDUCATION/TRAINING PROGRAM

## 2022-05-27 PROCEDURE — 87086 URINE CULTURE/COLONY COUNT: CPT | Performed by: STUDENT IN AN ORGANIZED HEALTH CARE EDUCATION/TRAINING PROGRAM

## 2022-05-27 PROCEDURE — 81001 URINALYSIS AUTO W/SCOPE: CPT | Performed by: STUDENT IN AN ORGANIZED HEALTH CARE EDUCATION/TRAINING PROGRAM

## 2022-05-27 PROCEDURE — 87186 SC STD MICRODIL/AGAR DIL: CPT | Performed by: STUDENT IN AN ORGANIZED HEALTH CARE EDUCATION/TRAINING PROGRAM

## 2022-05-27 PROCEDURE — 80048 BASIC METABOLIC PNL TOTAL CA: CPT | Performed by: STUDENT IN AN ORGANIZED HEALTH CARE EDUCATION/TRAINING PROGRAM

## 2022-05-27 PROCEDURE — 36415 COLL VENOUS BLD VENIPUNCTURE: CPT | Performed by: STUDENT IN AN ORGANIZED HEALTH CARE EDUCATION/TRAINING PROGRAM

## 2022-05-27 RX ORDER — CEFDINIR 300 MG/1
300 CAPSULE ORAL 2 TIMES DAILY
Qty: 20 CAPSULE | Refills: 0 | Status: SHIPPED | OUTPATIENT
Start: 2022-05-27 | End: 2022-06-06

## 2022-05-27 RX ORDER — CEFTRIAXONE SODIUM 1 G
1 VIAL (EA) INJECTION ONCE
Status: COMPLETED | OUTPATIENT
Start: 2022-05-27 | End: 2022-05-27

## 2022-05-27 RX ORDER — FLUTICASONE PROPIONATE 50 MCG
1 SPRAY, SUSPENSION (ML) NASAL DAILY
Qty: 15.8 ML | Refills: 0 | Status: ON HOLD | OUTPATIENT
Start: 2022-05-27 | End: 2023-10-13

## 2022-05-27 RX ADMIN — Medication 1 G: at 19:40

## 2022-05-28 NOTE — PROGRESS NOTES
ASSESSMENT/PLAN:  (N39.0,  R31.9) Urinary tract infection with hematuria, site unspecified  (R82.90) Cloudy urine  (primary encounter diagnosis)  Comment: UTI symptoms in setting of hypospadias.  Recently had E. coli UTI with resistance to several antibiotics.  Given resistance and allergies, recommended ceftriaxone IM with course of cefdinir followed.  Based on most recent GFR cefdinir by daily dosing is appropriate however if GFR returns less than 30 would need to have dose reduced to once daily.  No CVA tenderness or true fever but is having chills on and off also with pulse of 109. Discussed risks of potential for advancement of infection and if sx worsening at all that he should absolutely be reassessed.   Plan:   -UA macro with reflex to Microscopic and Culture - Clinc Collect, Urine Microscopic Exam, Urine Culture  -Ceftriaxone 1g IM now, followed by 10 day course cefdinir 300mg BID for 10 days  -Recommended telephone or in person reassessment in 48 hours to ensure improvement  -Counseled on strict follow-up precautions particularly if developing fevers or experiencing worsening symptoms     (R79.89) Elevated serum creatinine  Comment: Had RICHARD back in February in setting of UTI.  Repeat BMP a month later showed that creatinine is improving but still elevated 1.8.  Has since been taken off lisinopril and recommended not be on NSAIDs.  Would benefit from having BMP rechecked to ensure that he is not being overdosed with cefdinir.  Plan:   -BMP  -If GFR comes back as less than 30, recommend cefdinir dosing be decreased to just once daily    (R09.82) Postnasal drip  Comment: Symptoms of rhinorrhea with postnasal drip feeling and nasal congestion.  Suspect allergies versus viral,  doubt bacterial.  Exam fairly benign.  Breath sounds clear throughout.  Plan:   -fluticasone (FLONASE) 50 MCG/ACT nasal spray  -Counseled on supportive cares      Appropriate PPE worn.    Options for treatment and follow-up care were  reviewed with the patient and/or guardian. Bret Fleming and/or guardian engaged in the decision making process and verbalized understanding of the options discussed and agreed with the final plan.    See [facility] for orders, medications, letters, patient instructions  This note was dictated with Mitro.      Jassi Sanchez MD    SUBJECTIVE:  Bret Fleming is an 71 year old adult w/ history of CAD with STEMI (age 45 years), HTN and HLD, who presents for the below.    Sx started 1.5-2 days ago.  Issues with urgency and frequency. Last night had some urinary incontinence. Urine cloudy and having burning with urination. Hasn't noticed any blood in urine. Not endorsing any pelvic pain or flank pain.  Denies fevers at home. Did have short bout of shivering yesterday, resolved until this afternoon when it started again for a short period.  Endorses UTI 3 months ago, e coli. Got abx and fully resolved.     Feeling head congestion and slightly dizzy. Chest feels a little congested.  No difficulty breathing.   Drinking good amount of fluids last couple days.     On baby aspirin, atenolol, lipitor.  Had episode of elevated Cr during prior UTI, on followup Cr was coming down but not into normal range. Had lisinopril discontinued 2 months ago and since has not taken. Not taking any NSAIDs.  Denies hx of known nephrolithiasis.      PMH:   has a past medical history of Hypertension.  Patient Active Problem List   Diagnosis     Chest pain     ST elevation myocardial infarction (STEMI) (H)     Special screening for malignant neoplasms, colon     Social History     Socioeconomic History     Marital status:      Spouse name: None     Number of children: None     Years of education: None     Highest education level: None   Tobacco Use     Smoking status: Former Smoker     Quit date: 1995     Years since quittin.0     Smokeless tobacco: Never Used     Family History   Problem Relation Age of Onset      Cancer Father      Cancer Paternal Grandfather      Diabetes Paternal Grandfather      Hypertension No family hx of      Cerebrovascular Disease No family hx of      Thyroid Disease No family hx of      Glaucoma No family hx of      Macular Degeneration No family hx of        ALLERGIES:  Ciprofloxacin and Other drug allergy (see comments)    Current Outpatient Medications   Medication     ASPIRIN PO     atenolol (TENORMIN) 100 MG tablet     atorvastatin (LIPITOR) 80 MG tablet     cefdinir (OMNICEF) 300 MG capsule     fluticasone (FLONASE) 50 MCG/ACT nasal spray     lisinopril (ZESTRIL) 10 MG tablet     No current facility-administered medications for this visit.         ROS:  ROS is done and is negative for general/constitutional, eye, ENT, Respiratory, cardiovascular, GI, , Skin, musculoskeletal except as noted elsewhere.  All other review of systems negative except as noted elsewhere.    OBJECTIVE:  /70 (BP Location: Right arm, Patient Position: Sitting, Cuff Size: Adult Large)   Pulse 109   Temp 100.2  F (37.9  C) (Oral)   Resp 19   Wt 104 kg (229 lb 3.2 oz)   SpO2 98%   BMI 31.09 kg/m    General: Sitting comfortably. No acute distress and very nontoxic appearing.   HEENT: Conjunctivae are clear without discharge or erythema.  Clear rhinorrhea present in bilateral nares with some septal erythema.  Posterior pharynx unremarkable.  Neck: No masses. No obvious adenopathy.  Respiratory: No respiratory distress. Lung sounds are clear without rales, ronchi, or wheezes.   Cardiac: RRR.  Warm and well perfused. Brisk cap refill.  Abdominal: Abdomen is soft and non-tender without distention.  No CVA tenderness.  No suprapubic tenderness.   Extremities: Upper and lower extremities grossly normal.  Skin: Skin warm without rashes.  Neurological: Motor function is grossly normal.  Normal sensation throughout.  Normal gait.  Psychiatric: Good insight and judgement.      RESULTS  Results for orders placed or  performed in visit on 05/27/22   UA macro with reflex to Microscopic and Culture - Clinc Collect     Status: Abnormal    Specimen: Urine, Clean Catch   Result Value Ref Range    Color Urine Yellow Colorless, Straw, Light Yellow, Yellow    Appearance Urine Cloudy (A) Clear    Glucose Urine Negative Negative mg/dL    Bilirubin Urine Negative Negative    Ketones Urine Negative Negative mg/dL    Specific Gravity Urine 1.010 1.005 - 1.030    Blood Urine Moderate (A) Negative    pH Urine 5.5 5.0 - 8.0    Protein Albumin Urine 100  (A) Negative mg/dL    Urobilinogen Urine 0.2 0.2, 1.0 E.U./dL    Nitrite Urine Positive (A) Negative    Leukocyte Esterase Urine Large (A) Negative   Urine Microscopic Exam     Status: Abnormal   Result Value Ref Range    Bacteria Urine Many (A) None Seen /HPF    RBC Urine >100 (A) 0-2 /HPF /HPF    WBC Urine >100 (A) 0-5 /HPF /HPF    Squamous Epithelials Urine Few (A) None Seen /LPF     No results found for this or any previous visit (from the past 48 hour(s)).

## 2022-05-30 LAB — BACTERIA UR CULT: ABNORMAL

## 2022-08-23 DIAGNOSIS — I10 ESSENTIAL HYPERTENSION: ICD-10-CM

## 2022-08-23 DIAGNOSIS — E78.2 MIXED HYPERLIPIDEMIA: ICD-10-CM

## 2022-08-26 RX ORDER — ATENOLOL 100 MG/1
100 TABLET ORAL DAILY
Qty: 90 TABLET | Refills: 0 | Status: ON HOLD | OUTPATIENT
Start: 2022-08-26 | End: 2023-10-13

## 2022-08-26 RX ORDER — ATORVASTATIN CALCIUM 80 MG/1
80 TABLET, FILM COATED ORAL DAILY
Qty: 90 TABLET | Refills: 0 | Status: ON HOLD | OUTPATIENT
Start: 2022-08-26 | End: 2023-10-13

## 2022-09-11 ENCOUNTER — HEALTH MAINTENANCE LETTER (OUTPATIENT)
Age: 72
End: 2022-09-11

## 2023-09-27 ENCOUNTER — HOSPITAL ENCOUNTER (EMERGENCY)
Facility: HOSPITAL | Age: 73
Discharge: ANOTHER HOSPITAL | End: 2023-09-27
Attending: FAMILY MEDICINE | Admitting: FAMILY MEDICINE
Payer: COMMERCIAL

## 2023-09-27 ENCOUNTER — HOSPITAL ENCOUNTER (INPATIENT)
Facility: CLINIC | Age: 73
LOS: 16 days | Discharge: HOME HEALTH | DRG: 871 | End: 2023-10-13
Attending: INTERNAL MEDICINE | Admitting: INTERNAL MEDICINE
Payer: COMMERCIAL

## 2023-09-27 ENCOUNTER — APPOINTMENT (OUTPATIENT)
Dept: CT IMAGING | Facility: CLINIC | Age: 73
DRG: 871 | End: 2023-09-27
Attending: STUDENT IN AN ORGANIZED HEALTH CARE EDUCATION/TRAINING PROGRAM
Payer: COMMERCIAL

## 2023-09-27 ENCOUNTER — APPOINTMENT (OUTPATIENT)
Dept: INTERVENTIONAL RADIOLOGY/VASCULAR | Facility: HOSPITAL | Age: 73
End: 2023-09-27
Attending: RADIOLOGY
Payer: COMMERCIAL

## 2023-09-27 VITALS
HEART RATE: 106 BPM | DIASTOLIC BLOOD PRESSURE: 57 MMHG | WEIGHT: 200 LBS | RESPIRATION RATE: 26 BRPM | BODY MASS INDEX: 27.12 KG/M2 | SYSTOLIC BLOOD PRESSURE: 122 MMHG | OXYGEN SATURATION: 100 % | TEMPERATURE: 97.6 F

## 2023-09-27 DIAGNOSIS — E87.5 HYPERKALEMIA: ICD-10-CM

## 2023-09-27 DIAGNOSIS — A41.9 SEPSIS, DUE TO UNSPECIFIED ORGANISM, UNSPECIFIED WHETHER ACUTE ORGAN DYSFUNCTION PRESENT (H): ICD-10-CM

## 2023-09-27 DIAGNOSIS — N17.9 ACUTE RENAL FAILURE, UNSPECIFIED ACUTE RENAL FAILURE TYPE (H): ICD-10-CM

## 2023-09-27 DIAGNOSIS — R23.9 ALTERATION IN SKIN INTEGRITY: ICD-10-CM

## 2023-09-27 DIAGNOSIS — N12 PYELONEPHRITIS: ICD-10-CM

## 2023-09-27 DIAGNOSIS — G89.4 CHRONIC PAIN SYNDROME: ICD-10-CM

## 2023-09-27 DIAGNOSIS — E87.20 METABOLIC ACIDOSIS: ICD-10-CM

## 2023-09-27 DIAGNOSIS — S31.20XD OPEN WOUND OF PENIS, SUBSEQUENT ENCOUNTER: ICD-10-CM

## 2023-09-27 DIAGNOSIS — N17.0 ACUTE KIDNEY FAILURE WITH TUBULAR NECROSIS (H): ICD-10-CM

## 2023-09-27 DIAGNOSIS — I21.4 NSTEMI (NON-ST ELEVATED MYOCARDIAL INFARCTION) (H): ICD-10-CM

## 2023-09-27 DIAGNOSIS — I25.5 ISCHEMIC CARDIOMYOPATHY: Primary | ICD-10-CM

## 2023-09-27 LAB
ABO/RH(D): NORMAL
ALBUMIN SERPL BCG-MCNC: 4.5 G/DL (ref 3.5–5.2)
ALBUMIN UR-MCNC: 100 MG/DL
ALP SERPL-CCNC: 84 U/L (ref 35–129)
ALT SERPL W P-5'-P-CCNC: 100 U/L (ref 0–70)
ANION GAP SERPL CALCULATED.3IONS-SCNC: 33 MMOL/L (ref 7–15)
ANTIBODY SCREEN: NEGATIVE
APPEARANCE UR: ABNORMAL
AST SERPL W P-5'-P-CCNC: 182 U/L (ref 0–45)
BACTERIA #/AREA URNS HPF: ABNORMAL /HPF
BASE EXCESS BLDV CALC-SCNC: -12.3 MMOL/L (ref -7.7–1.9)
BASE EXCESS BLDV CALC-SCNC: -28.6 MMOL/L
BASOPHILS # BLD AUTO: 0 10E3/UL (ref 0–0.2)
BASOPHILS NFR BLD AUTO: 0 %
BILIRUB DIRECT SERPL-MCNC: 0.24 MG/DL (ref 0–0.3)
BILIRUB SERPL-MCNC: 0.5 MG/DL
BILIRUB UR QL STRIP: NEGATIVE
BUN SERPL-MCNC: 213.4 MG/DL (ref 8–23)
CALCIUM SERPL-MCNC: 8.5 MG/DL (ref 8.8–10.2)
CHLORIDE SERPL-SCNC: 90 MMOL/L (ref 98–107)
CK SERPL-CCNC: 7316 U/L (ref 26–308)
COLOR UR AUTO: ABNORMAL
CREAT SERPL-MCNC: 14.61 MG/DL (ref 0.51–1.17)
DEPRECATED HCO3 PLAS-SCNC: 4 MMOL/L (ref 22–29)
EGFRCR SERPLBLD CKD-EPI 2021: 3 ML/MIN/1.73M2
EOSINOPHIL # BLD AUTO: 0 10E3/UL (ref 0–0.7)
EOSINOPHIL NFR BLD AUTO: 0 %
ERYTHROCYTE [DISTWIDTH] IN BLOOD BY AUTOMATED COUNT: 12.8 % (ref 10–15)
FLUAV RNA SPEC QL NAA+PROBE: NEGATIVE
FLUBV RNA RESP QL NAA+PROBE: NEGATIVE
GLUCOSE BLDC GLUCOMTR-MCNC: 104 MG/DL (ref 70–99)
GLUCOSE BLDC GLUCOMTR-MCNC: 114 MG/DL (ref 70–99)
GLUCOSE BLDC GLUCOMTR-MCNC: 131 MG/DL (ref 70–99)
GLUCOSE BLDC GLUCOMTR-MCNC: 76 MG/DL (ref 70–99)
GLUCOSE SERPL-MCNC: 131 MG/DL (ref 70–99)
GLUCOSE UR STRIP-MCNC: NEGATIVE MG/DL
HCO3 BLDV-SCNC: 12 MMOL/L (ref 21–28)
HCO3 BLDV-SCNC: 4 MMOL/L (ref 24–30)
HCT VFR BLD AUTO: 26.8 % (ref 35–53)
HGB BLD-MCNC: 8.7 G/DL (ref 11.7–17.7)
HGB UR QL STRIP: ABNORMAL
HOLD SPECIMEN: NORMAL
IMM GRANULOCYTES # BLD: 0.2 10E3/UL
IMM GRANULOCYTES NFR BLD: 2 %
INR PPP: 1.81 (ref 0.85–1.15)
IRON BINDING CAPACITY (ROCHE): 226 UG/DL (ref 240–430)
IRON SATN MFR SERPL: 53 % (ref 15–46)
IRON SERPL-MCNC: 120 UG/DL (ref 37–157)
KETONES UR STRIP-MCNC: 5 MG/DL
LACTATE SERPL-SCNC: 2.6 MMOL/L (ref 0.7–2)
LACTATE SERPL-SCNC: 3.7 MMOL/L (ref 0.7–2)
LEUKOCYTE ESTERASE UR QL STRIP: ABNORMAL
LIPASE SERPL-CCNC: 405 U/L (ref 13–60)
LYMPHOCYTES # BLD AUTO: 0.2 10E3/UL (ref 0.8–5.3)
LYMPHOCYTES NFR BLD AUTO: 1 %
MAGNESIUM SERPL-MCNC: 2.8 MG/DL (ref 1.7–2.3)
MCH RBC QN AUTO: 32 PG (ref 26.5–33)
MCHC RBC AUTO-ENTMCNC: 32.5 G/DL (ref 31.5–36.5)
MCV RBC AUTO: 99 FL (ref 78–100)
MONOCYTES # BLD AUTO: 0.9 10E3/UL (ref 0–1.3)
MONOCYTES NFR BLD AUTO: 6 %
NEUTROPHILS # BLD AUTO: 13.3 10E3/UL (ref 1.6–8.3)
NEUTROPHILS NFR BLD AUTO: 91 %
NITRATE UR QL: NEGATIVE
NRBC # BLD AUTO: 0.5 10E3/UL
NRBC BLD AUTO-RTO: 3 /100
NT-PROBNP SERPL-MCNC: ABNORMAL PG/ML (ref 0–900)
O2/TOTAL GAS SETTING VFR VENT: 33 %
OXYHGB MFR BLDV: 60 % (ref 70–75)
PCO2 BLDV: 18 MM HG (ref 35–50)
PCO2 BLDV: 24 MM HG (ref 40–50)
PH BLDV: 6.93 [PH] (ref 7.35–7.45)
PH BLDV: 7.33 [PH] (ref 7.32–7.43)
PH UR STRIP: 6 [PH] (ref 5–7)
PHOSPHATE SERPL-MCNC: 12.3 MG/DL (ref 2.5–4.5)
PLATELET # BLD AUTO: 153 10E3/UL (ref 150–450)
PO2 BLDV: 37 MM HG (ref 25–47)
PO2 BLDV: 43 MM HG (ref 25–47)
POTASSIUM SERPL-SCNC: 3.1 MMOL/L (ref 3.4–5.3)
POTASSIUM SERPL-SCNC: 5.2 MMOL/L (ref 3.4–5.3)
POTASSIUM SERPL-SCNC: 7 MMOL/L (ref 3.4–5.3)
PROCALCITONIN SERPL IA-MCNC: 1.4 NG/ML
PROT SERPL-MCNC: 7.7 G/DL (ref 6.4–8.3)
RBC # BLD AUTO: 2.72 10E6/UL (ref 3.8–5.9)
RBC URINE: 0 /HPF
RSV RNA SPEC NAA+PROBE: NEGATIVE
SAO2 % BLDV: 61 % (ref 70–75)
SARS-COV-2 RNA RESP QL NAA+PROBE: NEGATIVE
SARS-COV-2 RNA RESP QL NAA+PROBE: NEGATIVE
SODIUM SERPL-SCNC: 127 MMOL/L (ref 135–145)
SP GR UR STRIP: 1.02 (ref 1–1.03)
SPECIMEN EXPIRATION DATE: NORMAL
TROPONIN T SERPL HS-MCNC: 1475 NG/L
TSH SERPL DL<=0.005 MIU/L-ACNC: 2.7 UIU/ML (ref 0.3–4.2)
UROBILINOGEN UR STRIP-MCNC: NORMAL MG/DL
WBC # BLD AUTO: 14.6 10E3/UL (ref 4–11)
WBC CLUMPS #/AREA URNS HPF: PRESENT /HPF
WBC URINE: >182 /HPF

## 2023-09-27 PROCEDURE — 83880 ASSAY OF NATRIURETIC PEPTIDE: CPT | Performed by: FAMILY MEDICINE

## 2023-09-27 PROCEDURE — 99223 1ST HOSP IP/OBS HIGH 75: CPT | Performed by: INTERNAL MEDICINE

## 2023-09-27 PROCEDURE — 85610 PROTHROMBIN TIME: CPT | Performed by: FAMILY MEDICINE

## 2023-09-27 PROCEDURE — 94640 AIRWAY INHALATION TREATMENT: CPT

## 2023-09-27 PROCEDURE — 83735 ASSAY OF MAGNESIUM: CPT | Performed by: STUDENT IN AN ORGANIZED HEALTH CARE EDUCATION/TRAINING PROGRAM

## 2023-09-27 PROCEDURE — 82330 ASSAY OF CALCIUM: CPT | Performed by: STUDENT IN AN ORGANIZED HEALTH CARE EDUCATION/TRAINING PROGRAM

## 2023-09-27 PROCEDURE — 82962 GLUCOSE BLOOD TEST: CPT

## 2023-09-27 PROCEDURE — 83605 ASSAY OF LACTIC ACID: CPT | Performed by: STUDENT IN AN ORGANIZED HEALTH CARE EDUCATION/TRAINING PROGRAM

## 2023-09-27 PROCEDURE — 250N000012 HC RX MED GY IP 250 OP 636 PS 637: Performed by: FAMILY MEDICINE

## 2023-09-27 PROCEDURE — 250N000009 HC RX 250: Performed by: RADIOLOGY

## 2023-09-27 PROCEDURE — 87086 URINE CULTURE/COLONY COUNT: CPT | Performed by: FAMILY MEDICINE

## 2023-09-27 PROCEDURE — 86901 BLOOD TYPING SEROLOGIC RH(D): CPT | Performed by: FAMILY MEDICINE

## 2023-09-27 PROCEDURE — 85730 THROMBOPLASTIN TIME PARTIAL: CPT | Performed by: STUDENT IN AN ORGANIZED HEALTH CARE EDUCATION/TRAINING PROGRAM

## 2023-09-27 PROCEDURE — 85045 AUTOMATED RETICULOCYTE COUNT: CPT | Performed by: INTERNAL MEDICINE

## 2023-09-27 PROCEDURE — 250N000009 HC RX 250: Performed by: FAMILY MEDICINE

## 2023-09-27 PROCEDURE — 272N000452 HC KIT SHRLOCK 5FR POWER PICC TRIPLE LUMEN

## 2023-09-27 PROCEDURE — 250N000011 HC RX IP 250 OP 636: Performed by: INTERNAL MEDICINE

## 2023-09-27 PROCEDURE — 87637 SARSCOV2&INF A&B&RSV AMP PRB: CPT | Performed by: FAMILY MEDICINE

## 2023-09-27 PROCEDURE — 96375 TX/PRO/DX INJ NEW DRUG ADDON: CPT | Mod: 59

## 2023-09-27 PROCEDURE — 82607 VITAMIN B-12: CPT | Performed by: INTERNAL MEDICINE

## 2023-09-27 PROCEDURE — C1752 CATH,HEMODIALYSIS,SHORT-TERM: HCPCS

## 2023-09-27 PROCEDURE — 82550 ASSAY OF CK (CPK): CPT | Performed by: INTERNAL MEDICINE

## 2023-09-27 PROCEDURE — 84166 PROTEIN E-PHORESIS/URINE/CSF: CPT | Performed by: PATHOLOGY

## 2023-09-27 PROCEDURE — G1010 CDSM STANSON: HCPCS

## 2023-09-27 PROCEDURE — 87040 BLOOD CULTURE FOR BACTERIA: CPT | Performed by: FAMILY MEDICINE

## 2023-09-27 PROCEDURE — 83605 ASSAY OF LACTIC ACID: CPT | Performed by: FAMILY MEDICINE

## 2023-09-27 PROCEDURE — 90935 HEMODIALYSIS ONE EVALUATION: CPT

## 2023-09-27 PROCEDURE — 93005 ELECTROCARDIOGRAM TRACING: CPT | Performed by: FAMILY MEDICINE

## 2023-09-27 PROCEDURE — 85025 COMPLETE CBC W/AUTO DIFF WBC: CPT | Performed by: FAMILY MEDICINE

## 2023-09-27 PROCEDURE — 84100 ASSAY OF PHOSPHORUS: CPT | Performed by: STUDENT IN AN ORGANIZED HEALTH CARE EDUCATION/TRAINING PROGRAM

## 2023-09-27 PROCEDURE — 258N000003 HC RX IP 258 OP 636: Performed by: FAMILY MEDICINE

## 2023-09-27 PROCEDURE — 84484 ASSAY OF TROPONIN QUANT: CPT | Performed by: STUDENT IN AN ORGANIZED HEALTH CARE EDUCATION/TRAINING PROGRAM

## 2023-09-27 PROCEDURE — 96366 THER/PROPH/DIAG IV INF ADDON: CPT

## 2023-09-27 PROCEDURE — 258N000001 HC RX 258: Performed by: FAMILY MEDICINE

## 2023-09-27 PROCEDURE — 82550 ASSAY OF CK (CPK): CPT | Performed by: STUDENT IN AN ORGANIZED HEALTH CARE EDUCATION/TRAINING PROGRAM

## 2023-09-27 PROCEDURE — 80053 COMPREHEN METABOLIC PANEL: CPT | Performed by: FAMILY MEDICINE

## 2023-09-27 PROCEDURE — 85610 PROTHROMBIN TIME: CPT | Performed by: STUDENT IN AN ORGANIZED HEALTH CARE EDUCATION/TRAINING PROGRAM

## 2023-09-27 PROCEDURE — 36569 INSJ PICC 5 YR+ W/O IMAGING: CPT | Mod: 59

## 2023-09-27 PROCEDURE — 36415 COLL VENOUS BLD VENIPUNCTURE: CPT | Performed by: FAMILY MEDICINE

## 2023-09-27 PROCEDURE — 83690 ASSAY OF LIPASE: CPT | Performed by: FAMILY MEDICINE

## 2023-09-27 PROCEDURE — 258N000003 HC RX IP 258 OP 636: Performed by: INTERNAL MEDICINE

## 2023-09-27 PROCEDURE — 999N000157 HC STATISTIC RCP TIME EA 10 MIN

## 2023-09-27 PROCEDURE — 87635 SARS-COV-2 COVID-19 AMP PRB: CPT | Performed by: STUDENT IN AN ORGANIZED HEALTH CARE EDUCATION/TRAINING PROGRAM

## 2023-09-27 PROCEDURE — 96368 THER/DIAG CONCURRENT INF: CPT | Mod: 59

## 2023-09-27 PROCEDURE — 83930 ASSAY OF BLOOD OSMOLALITY: CPT | Performed by: STUDENT IN AN ORGANIZED HEALTH CARE EDUCATION/TRAINING PROGRAM

## 2023-09-27 PROCEDURE — 96361 HYDRATE IV INFUSION ADD-ON: CPT

## 2023-09-27 PROCEDURE — 84484 ASSAY OF TROPONIN QUANT: CPT | Performed by: FAMILY MEDICINE

## 2023-09-27 PROCEDURE — 96365 THER/PROPH/DIAG IV INF INIT: CPT

## 2023-09-27 PROCEDURE — 85027 COMPLETE CBC AUTOMATED: CPT | Performed by: STUDENT IN AN ORGANIZED HEALTH CARE EDUCATION/TRAINING PROGRAM

## 2023-09-27 PROCEDURE — 84155 ASSAY OF PROTEIN SERUM: CPT | Performed by: INTERNAL MEDICINE

## 2023-09-27 PROCEDURE — C1769 GUIDE WIRE: HCPCS

## 2023-09-27 PROCEDURE — 96376 TX/PRO/DX INJ SAME DRUG ADON: CPT

## 2023-09-27 PROCEDURE — 85384 FIBRINOGEN ACTIVITY: CPT | Performed by: STUDENT IN AN ORGANIZED HEALTH CARE EDUCATION/TRAINING PROGRAM

## 2023-09-27 PROCEDURE — 84100 ASSAY OF PHOSPHORUS: CPT | Performed by: INTERNAL MEDICINE

## 2023-09-27 PROCEDURE — 82803 BLOOD GASES ANY COMBINATION: CPT | Performed by: STUDENT IN AN ORGANIZED HEALTH CARE EDUCATION/TRAINING PROGRAM

## 2023-09-27 PROCEDURE — 83550 IRON BINDING TEST: CPT | Performed by: INTERNAL MEDICINE

## 2023-09-27 PROCEDURE — 83970 ASSAY OF PARATHORMONE: CPT | Performed by: INTERNAL MEDICINE

## 2023-09-27 PROCEDURE — 84132 ASSAY OF SERUM POTASSIUM: CPT | Performed by: STUDENT IN AN ORGANIZED HEALTH CARE EDUCATION/TRAINING PROGRAM

## 2023-09-27 PROCEDURE — 99291 CRITICAL CARE FIRST HOUR: CPT | Mod: 25

## 2023-09-27 PROCEDURE — 272N000500 HC NEEDLE CR2

## 2023-09-27 PROCEDURE — 250N000011 HC RX IP 250 OP 636: Mod: JZ | Performed by: FAMILY MEDICINE

## 2023-09-27 PROCEDURE — 200N000001 HC R&B ICU

## 2023-09-27 PROCEDURE — 82805 BLOOD GASES W/O2 SATURATION: CPT | Performed by: FAMILY MEDICINE

## 2023-09-27 PROCEDURE — G0257 UNSCHED DIALYSIS ESRD PT HOS: HCPCS

## 2023-09-27 PROCEDURE — 99291 CRITICAL CARE FIRST HOUR: CPT | Performed by: INTERNAL MEDICINE

## 2023-09-27 PROCEDURE — 82728 ASSAY OF FERRITIN: CPT | Performed by: INTERNAL MEDICINE

## 2023-09-27 PROCEDURE — 84132 ASSAY OF SERUM POTASSIUM: CPT | Performed by: FAMILY MEDICINE

## 2023-09-27 PROCEDURE — 83735 ASSAY OF MAGNESIUM: CPT | Performed by: FAMILY MEDICINE

## 2023-09-27 PROCEDURE — 81001 URINALYSIS AUTO W/SCOPE: CPT | Performed by: FAMILY MEDICINE

## 2023-09-27 PROCEDURE — 250N000011 HC RX IP 250 OP 636: Performed by: RADIOLOGY

## 2023-09-27 PROCEDURE — 84443 ASSAY THYROID STIM HORMONE: CPT | Performed by: INTERNAL MEDICINE

## 2023-09-27 PROCEDURE — 51702 INSERT TEMP BLADDER CATH: CPT | Mod: 59

## 2023-09-27 PROCEDURE — 84145 PROCALCITONIN (PCT): CPT | Performed by: FAMILY MEDICINE

## 2023-09-27 PROCEDURE — 93005 ELECTROCARDIOGRAM TRACING: CPT | Performed by: STUDENT IN AN ORGANIZED HEALTH CARE EDUCATION/TRAINING PROGRAM

## 2023-09-27 PROCEDURE — 76937 US GUIDE VASCULAR ACCESS: CPT | Mod: XU

## 2023-09-27 RX ORDER — CALCIUM GLUCONATE 94 MG/ML
1 INJECTION, SOLUTION INTRAVENOUS ONCE
Status: COMPLETED | OUTPATIENT
Start: 2023-09-27 | End: 2023-09-27

## 2023-09-27 RX ORDER — HEPARIN SODIUM 10000 [USP'U]/100ML
0-5000 INJECTION, SOLUTION INTRAVENOUS CONTINUOUS
Status: DISCONTINUED | OUTPATIENT
Start: 2023-09-27 | End: 2023-09-27

## 2023-09-27 RX ORDER — LIDOCAINE 40 MG/G
CREAM TOPICAL
Status: DISCONTINUED | OUTPATIENT
Start: 2023-09-27 | End: 2023-09-27 | Stop reason: HOSPADM

## 2023-09-27 RX ORDER — NICOTINE POLACRILEX 4 MG
15-30 LOZENGE BUCCAL
Status: DISCONTINUED | OUTPATIENT
Start: 2023-09-27 | End: 2023-10-13 | Stop reason: HOSPADM

## 2023-09-27 RX ORDER — PIPERACILLIN SODIUM, TAZOBACTAM SODIUM 3; .375 G/15ML; G/15ML
3.38 INJECTION, POWDER, LYOPHILIZED, FOR SOLUTION INTRAVENOUS ONCE
Status: COMPLETED | OUTPATIENT
Start: 2023-09-27 | End: 2023-09-27

## 2023-09-27 RX ORDER — DEXTROSE MONOHYDRATE 25 G/50ML
25-50 INJECTION, SOLUTION INTRAVENOUS
Status: DISCONTINUED | OUTPATIENT
Start: 2023-09-27 | End: 2023-09-27 | Stop reason: HOSPADM

## 2023-09-27 RX ORDER — ALBUMIN (HUMAN) 12.5 G/50ML
50 SOLUTION INTRAVENOUS
Status: DISCONTINUED | OUTPATIENT
Start: 2023-09-27 | End: 2023-09-27 | Stop reason: HOSPADM

## 2023-09-27 RX ORDER — CEFAZOLIN SODIUM 1 G/50ML
2000 SOLUTION INTRAVENOUS ONCE
Status: COMPLETED | OUTPATIENT
Start: 2023-09-27 | End: 2023-09-27

## 2023-09-27 RX ORDER — DEXTROSE MONOHYDRATE 25 G/50ML
25-50 INJECTION, SOLUTION INTRAVENOUS
Status: DISCONTINUED | OUTPATIENT
Start: 2023-09-27 | End: 2023-10-13 | Stop reason: HOSPADM

## 2023-09-27 RX ORDER — NICOTINE POLACRILEX 4 MG
15-30 LOZENGE BUCCAL
Status: DISCONTINUED | OUTPATIENT
Start: 2023-09-27 | End: 2023-09-27 | Stop reason: HOSPADM

## 2023-09-27 RX ORDER — AMOXICILLIN 250 MG
1 CAPSULE ORAL 2 TIMES DAILY PRN
Status: DISCONTINUED | OUTPATIENT
Start: 2023-09-27 | End: 2023-10-13 | Stop reason: HOSPADM

## 2023-09-27 RX ORDER — AMOXICILLIN 250 MG
2 CAPSULE ORAL 2 TIMES DAILY PRN
Status: DISCONTINUED | OUTPATIENT
Start: 2023-09-27 | End: 2023-10-13 | Stop reason: HOSPADM

## 2023-09-27 RX ORDER — LIDOCAINE HYDROCHLORIDE 20 MG/ML
10 JELLY TOPICAL ONCE
Status: DISCONTINUED | OUTPATIENT
Start: 2023-09-27 | End: 2023-09-27 | Stop reason: HOSPADM

## 2023-09-27 RX ORDER — CEFTRIAXONE 2 G/1
2 INJECTION, POWDER, FOR SOLUTION INTRAMUSCULAR; INTRAVENOUS EVERY 24 HOURS
Status: DISCONTINUED | OUTPATIENT
Start: 2023-09-27 | End: 2023-09-30

## 2023-09-27 RX ORDER — HEPARIN SODIUM 1000 [USP'U]/ML
1000-5000 INJECTION, SOLUTION INTRAVENOUS; SUBCUTANEOUS ONCE
Status: COMPLETED | OUTPATIENT
Start: 2023-09-27 | End: 2023-09-27

## 2023-09-27 RX ORDER — ALBUTEROL SULFATE 5 MG/ML
10 SOLUTION RESPIRATORY (INHALATION) ONCE
Status: COMPLETED | OUTPATIENT
Start: 2023-09-27 | End: 2023-09-27

## 2023-09-27 RX ORDER — ALBUMIN (HUMAN) 12.5 G/50ML
50 SOLUTION INTRAVENOUS
Status: CANCELLED | OUTPATIENT
Start: 2023-09-27

## 2023-09-27 RX ADMIN — SODIUM CHLORIDE 1000 ML: 9 INJECTION, SOLUTION INTRAVENOUS at 17:36

## 2023-09-27 RX ADMIN — LIDOCAINE HYDROCHLORIDE 2.5 ML: 10 INJECTION, SOLUTION EPIDURAL; INFILTRATION; INTRACAUDAL; PERINEURAL at 15:03

## 2023-09-27 RX ADMIN — ALBUTEROL SULFATE 10 MG: 2.5 SOLUTION RESPIRATORY (INHALATION) at 17:07

## 2023-09-27 RX ADMIN — SODIUM CHLORIDE 1000 ML: 9 INJECTION, SOLUTION INTRAVENOUS at 14:50

## 2023-09-27 RX ADMIN — HEPARIN SODIUM 2500 UNITS: 1000 INJECTION, SOLUTION INTRAVENOUS; SUBCUTANEOUS at 16:49

## 2023-09-27 RX ADMIN — DEXTROSE MONOHYDRATE 25 ML: 25 INJECTION, SOLUTION INTRAVENOUS at 17:58

## 2023-09-27 RX ADMIN — VANCOMYCIN HYDROCHLORIDE 2000 MG: 500 INJECTION, POWDER, LYOPHILIZED, FOR SOLUTION INTRAVENOUS at 17:33

## 2023-09-27 RX ADMIN — SODIUM BICARBONATE 50 MEQ: 84 INJECTION, SOLUTION INTRAVENOUS at 15:50

## 2023-09-27 RX ADMIN — SODIUM CHLORIDE 300 ML: 9 INJECTION, SOLUTION INTRAVENOUS at 19:47

## 2023-09-27 RX ADMIN — HEPARIN SODIUM 1300 UNITS: 1000 INJECTION INTRAVENOUS; SUBCUTANEOUS at 21:09

## 2023-09-27 RX ADMIN — SODIUM CHLORIDE 250 ML: 9 INJECTION, SOLUTION INTRAVENOUS at 19:46

## 2023-09-27 RX ADMIN — HEPARIN SODIUM 1400 UNITS: 1000 INJECTION INTRAVENOUS; SUBCUTANEOUS at 21:08

## 2023-09-27 RX ADMIN — PIPERACILLIN AND TAZOBACTAM 3.38 G: 3; .375 INJECTION, POWDER, LYOPHILIZED, FOR SOLUTION INTRAVENOUS at 16:10

## 2023-09-27 RX ADMIN — LIDOCAINE HYDROCHLORIDE 5 ML: 10 INJECTION, SOLUTION INFILTRATION; PERINEURAL at 16:44

## 2023-09-27 RX ADMIN — CALCIUM GLUCONATE 1 G: 98 INJECTION, SOLUTION INTRAVENOUS at 14:49

## 2023-09-27 RX ADMIN — SODIUM CHLORIDE 9.1 UNITS: 9 INJECTION, SOLUTION INTRAVENOUS at 16:08

## 2023-09-27 RX ADMIN — SODIUM BICARBONATE 100 MEQ: 84 INJECTION, SOLUTION INTRAVENOUS at 17:24

## 2023-09-27 RX ADMIN — DEXTROSE MONOHYDRATE 300 ML: 100 INJECTION, SOLUTION INTRAVENOUS at 15:57

## 2023-09-27 ASSESSMENT — ACTIVITIES OF DAILY LIVING (ADL)
ADLS_ACUITY_SCORE: 37
ADLS_ACUITY_SCORE: 37
ADLS_ACUITY_SCORE: 35
ADLS_ACUITY_SCORE: 35

## 2023-09-27 ASSESSMENT — ENCOUNTER SYMPTOMS
DIARRHEA: 0
ABDOMINAL PAIN: 0
SHORTNESS OF BREATH: 1
COUGH: 0
NAUSEA: 1

## 2023-09-27 NOTE — IR NOTE
Patient Name: Bret Fleming  Medical Record Number: [id number]  Today's Date: 9/27/2023    Procedure: non-tunnelled CVC catheter placement  Proceduralist: Dr. Clemens      Sedation medications administered: none    Report given to: FRANCES Candelaria  : n/a    Other Notes: Pt arrived to IR room 1 from  ED18 . Consent reviewed. Pt denies any questions or concerns regarding procedure. Pt positioned supine and monitored per protocol. Pt tolerated procedure without any noted complications. Pt transferred back to  ED18 .    Successful placement of right internal jugular NON-tunnelled CVC.  Heparin locked.

## 2023-09-27 NOTE — PHARMACY-VANCOMYCIN DOSING SERVICE
Pharmacy Vancomycin Initial Note  Date of Service 2023  Patient's  1950  73 year old, adult    Indication: Sepsis    Current estimated CrCl = CrCl cannot be calculated (Unknown ideal weight.).    Creatinine for last 3 days  2023:  2:46 PM Creatinine 14.61 mg/dL    Recent Vancomycin Level(s) for last 3 days  No results found for requested labs within last 3 days.      Vancomycin IV Administrations (past 72 hours)        No vancomycin orders with administrations in past 72 hours.                    Nephrotoxins and other renal medications (From now, onward)      Start     Dose/Rate Route Frequency Ordered Stop    23 1530  piperacillin-tazobactam (ZOSYN) 3.375 g vial to attach to  mL bag         3.375 g  over 30 Minutes Intravenous ONCE 23 1522      23 1530  vancomycin (VANCOCIN) 2,000 mg in sodium chloride 0.9 % 500 mL intermittent infusion         2,000 mg  over 2 Hours Intravenous ONCE 23 1522              Contrast Orders - past 72 hours (72h ago, onward)      None                Plan:  Agree with initial loading dose of 2000 mg (~22 mg/kg) IV x1 dose.  Please reconsult pharmacy for continued vancomycin dosing if needed.      Nicollette McMann, [facility]

## 2023-09-27 NOTE — PROCEDURES
Interventional Radiology Post-Procedure Note   ?   Brief Procedure Note:   Patient name: Bret Fleming  Pt MRN:[id number]   Date of procedure: 9/27/2023     Procedure(s): Nontunneled hemodialysis catheter placement  Sedation method: Local lidocaine only. The patient was monitored by an interventional radiology nurse at all times throughout the procedure under my direct guidance.  Pre Procedure Diagnosis: Renal failure  Post Procedure Diagnosis: Same  Indications: Need for short-term central venous access for hemodialysis.   ?   Attending: Jassi Clemens M.D.  Specimen(s) removed: None   Additional studies ordered: None  Drains: None   Lines: 20 cm nontunneled hemodialysis catheter  Estimated Blood Loss: Minimal  Complications: None  Vascular closure method: N/A    Findings/Notes/Comments: Uncomplicated placement of right internal jugular hemodialysis catheter. Catheter tip lies in the mid right atrium. Catheter is ready for immediate use.   ?   Please see dictation in PACS or under the Imaging tab in [facility] for detailed procedure note.     Jassi Clemens M.D.   Vascular and Interventional Radiology   Pager: [phone]   After Hours / Scheduling: [phone]     9/27/2023  4:50 PM

## 2023-09-27 NOTE — CONSULTS
NEPHROLOGY CONSULTATION    CC:shortness of breath.    REASON FOR CONSULTATION: We are asked to see pt by .    HISTORY OF PRESENT ILLNESS:73 year old adult with PMHx significant for HTN, stage IV CKD and hyperlipidemia who presented for evaluation of shortness of breath, found to have hypothermia of 96.5F , elevated BP of 158/70 mmHg. Laboratory work up showed severe hyperkalemia of 7.0 with ECG changes, severe metabolic acidosis pH 6.98, elevated serum creatinine of 14.6 mg/dl, normocytic acnemia with HGb of 8.7 g/dl, elevated troponin of 1.475, elevated BNP of 70,000, elevated lipase of 405, elevated lactic acid of 3.4, leukocytosis of 14.6K, elevated ALT of 100, elevated AST of 182.  Nephrology service consulted.  At the time of physical exam the patient is very somnolent. He opens eyes to name but constantly falling asleep during my interview.Patient accompanied by spouse at the bedside who helps with history.  Patient spouse report, patient developed flulike symptoms last week Thursday.  He has been experiencing decreased appetite and generalized weakness.  He has been complaining of nausea.  He had a episode of vomiting.  He has been taking over-the-counter ibuprofen alternating with Tylenol for last 6 days.  He has been complaining of shortness of breath which got progressively worse.  He has been taking all of his prescribed medications.  He does have history of urinary retention.  The patient has been complaining of weak stream and hesitancy.  He has been incontinent of stool.  No family history of kidney disease.  Regarding patient's renal function. As per patient's chart review in care everywhere, patient's serum creatinine has been ranging between 1.0-1.2 mg/dl and correlating EGFR greater than 60 mils per minute until January 2019.  Follow-up labs on August 2019 shows serum creatinine of 1.22 milligrams per deciliter and correlating EGFR 59 mils per minute.  Follow-up labs in fibber 2022  "shows serum creatinine of 3.1 to 3.2 mg/dL, then follow-up labs in 2002 serum creatinine 1.8 mg/dL, then follow-up labs in May 2022 shows serum creatinine of 3.3 mg/dL.  Patient did not have any follow-up labs since May 2022.  He does have history of hematuria.  Urinalysis in 2021 showed 3-5 red blood cells per hpf and trace proteinuria.  UA in February and May 2020 was consistent with urinary tract infection, urine cultures grew greater than 100 colonies of E. coli.  CT abdomen pelvis without contrast on 2022 showed left kidney and a few small cyst in the right kidney.  Chronic focal cortical atrophy of right kidney or ureteral stones\" is.  Of note.  His to the bladder with diffuse bladder wall thickening and trabeculation there are 2 to 4 mm stones within the dependent bladder.    REVIEW OF SYSTEMS:  ROS was completely reviewed and otherwise negative and non-contributory    Past Medical History:   Diagnosis Date    Hypertension        Social History     Socioeconomic History    Marital status:      Spouse name: Not on file    Number of children: Not on file    Years of education: Not on file    Highest education level: Not on file   Occupational History    Not on file   Tobacco Use    Smoking status: Former     Types: Cigarettes     Quit date: 1995     Years since quittin.4    Smokeless tobacco: Never   Substance and Sexual Activity    Alcohol use: Not on file    Drug use: Not on file    Sexual activity: Not on file   Other Topics Concern    Not on file   Social History Narrative    Not on file     Social Determinants of Health     Financial Resource Strain: Not on file   Food Insecurity: Not on file   Transportation Needs: Not on file   Physical Activity: Not on file   Stress: Not on file   Social Connections: Not on file   Interpersonal Safety: Not on file   Housing Stability: Not on file       Family History   Problem Relation Age of Onset    Cancer Father     Cancer " Paternal Grandfather     Diabetes Paternal Grandfather     Hypertension No family hx of     Cerebrovascular Disease No family hx of     Thyroid Disease No family hx of     Glaucoma No family hx of     Macular Degeneration No family hx of        Allergies   Allergen Reactions    Ciprofloxacin Rash    Other Drug Allergy (See Comments)      Cough Syrup Abstracted from [facility] Chart( Hydrobromide)       MEDICATIONS:   albuterol  10 mg Nebulization Once    dextrose 10%  300 mL Intravenous Once    lidocaine  10 mL Urethral Once    piperacillin-tazobactam  3.375 g Intravenous Once    sodium bicarbonate  100 mEq Intravenous Once    sodium chloride (PF)  10-40 mL Intracatheter Q7 Days    vancomycin  2,000 mg Intravenous Once         PHYSICAL EXAM    BP (!) 158/70   Pulse 70   Temp (!) 96.5  F (35.8  C) (Tympanic)   Resp 30   Wt 90.7 kg (200 lb)   SpO2 100%   BMI 27.12 kg/m      No intake or output data in the 24 hours ending 09/27/23 1622    Gen: NAD, acutely ill looking.  HEENT NC/AT; perrla; OP clear without lesions; mmm, cyanotic lips, on 4L via NC.  Neck supple without LAD, TM  CV; RRR without rub or murmur  Lung: clear and equal; no extra sounds  Ab: soft ; not distended; normal bs, tender to palpation in suprapubic area.  Ext: no edema and well perfused, cyanotic digits of both feet.   Skin; no rash  Neuro; somnolent, opens eyes to name, moves all extremities spontaneously, + asterixis.     LABORATORIES    Recent Labs   Lab 09/27/23  1446   WBC 14.6*   HGB 8.7*   HCT 26.8*        Recent Labs   Lab 09/27/23  1446   *   CO2 4*   .4*   ALKPHOS 84   *   *     No results for input(s): INR, PTT in the last 168 hours.    Invalid input(s): APTT  Invalid input(s): FERRITIN  No results for input(s): IRON in the last 168 hours.    Invalid input(s): TIBC    I reviewed all labs    ASSESSMENT/PLAN:  73 year old adult with PMHx significant for HTN, stage IV CKD and hyperlipidemia who presented  for evaluation of shortness of breath, found to have hypothermia of 96.5F , elevated BP of 158/70 mmHg. Laboratory work up showed severe hyperkalemia of 7.0 with ECG changes, hyponatremia of 127, severe metabolic acidosis pH 6.98, elevated urea of 213, elevated serum creatinine of 14.6 mg/dl, normocytic anemia with HGb of 8.7 g/dl, elevated troponin of 1.475, elevated BNP of 70,000, elevated lipase of 405, elevated lactic acid of 3.4, elevated ALT of 100, elevated AST of 182.  Nephrology service consulted.    Renal failure-Acute vs progressive CKD. Time of onset unknown.  As per patient's chart review in care everywhere, patient's serum creatinine has been ranging between 1.0-1.2 mg/dl and correlating EGFR greater than 60 mils per minute until January 2019.  Follow-up labs on August 2019 shows serum creatinine of 1.22 milligrams per deciliter and correlating EGFR 59 mils per minute.  Follow-up labs in fibber 2022 shows serum creatinine of 3.1 to 3.2 mg/dL, then follow-up labs in March 2002 serum creatinine 1.8 mg/dL, then follow-up labs in May 2022 shows serum creatinine of 3.3 mg/dL.  Patient did not have any follow-up labs since May 2022.  He does have history of hematuria.  Urinalysis in June 2021 showed 3-5 red blood cells per hpf and trace proteinuria.  UA in February and May 2020 was consistent with urinary tract infection, urine cultures grew greater than 100 colonies of E. coli.  CT abdomen pelvis without contrast on February 5, 2022 showed left kidney and a few small cyst in the right kidney.  Chronic focal cortical atrophy of right kidney or ureteral stones. No hydronephrosis. Bladder with diffuse bladder wall thickening and trabeculation there are 2 to 4 mm stones within the dependent bladder.  Etiology unclear, needs further investigation. Could be secondary to obstructive uropathy given h/o urinary retention.  Recs:  The patient needs hemodialysis given presence of severe hyperkalemia, severe metabolic  acidosis, signs and symptoms of uremia. Risks and benefits of hemodialysis were discussed in length with the patient and his spouse. The patient is not decisional at the time of physical exam. The patient's spouse decided to proceed with hemodialysis. Consent was obtained.  We will do STAT dialysis tonight for 2.5 hrs,  ml/min,  ml/min. IR consulted by ED for urgent nontunneled dialysis catheter placement.   We will plan second dialysis treatment tomorrow for 3 hours,  ml/min,  ml/min.  I notified on-call dialysis RN Vida.  Check UA, SPEP/IF/UPEP, Total CK, TSH  Obtain Renal US to r/o obstruction  Agree with Culp catheter placement  Monitor renal panel daily  Monitor UOP  Avoid nephrotoxins  Renally dose medications    Severe Hyperkalemia with ECG changes-secondary to renal failure and severe AGMA  Run with K2 bath  Repeat serum potassium 2 hours after HD.  Renal diet  Monitor on telemetry    AGAMA-likely secondary to renal failure and lactic acidosis. LA 3.7. PH 6.93, bicarb 4.   Patient received 50mEQ of sodium bicarb IV  Recommend to give additional 100 mEQ of sodium bicarb IV push.  Should improve with dialysis. Run with Bicarb bath of 35.      Hyponatremia-Moderate, serum sodium is 127 likely due to hypervolemia. Should improve with dialysis.    Hypertension-patient's BP is elevated. PTA on Atenolol 100 mg daily. Hold Atenolol for now to allow UF with HD.  Monitor BP closely,a void hypotension    Volume status-no peripheral edema appreciated on exam. BNP is elevated at 70K. On 4L supplemental O2 via NC.   Obtain 2D ECHO.  UF with HD as tolerated  Daily weight  Strict I/O    Normocytic anemia  Patient's Hgb was 10.5 g/dl in 02/2022. Presented with HGb of 8.7 g/dl.  Check TSH, iron studies, SPEP/IF      BMD CKD  Mild hypocalcemia of 8.5  Mild hypermagnesemia of 2.8. Likely due to RICHARD.Should improve with HD  Check serum phosphorus and PTH    Sepsis-patient presented with hypothermia  and leukocytosis of 14.6K.  Blood cultures are pending  Started on broad spectrum antibiotics  Check  urine cultures give history of E.coli UTI.      Transaminates-hepatocellular pattern.  Check hepatitis panel.  Monitor LFTs daily  Hold PTA statin    Elevated Lipase of 405. I will defer further work up to primary team    HLD-hold PTA statin given elevated LFTs.        Discussed with emergency room physician .     Thank you for your consultation. We will follow.      Anastacia Demarco MD  [facility], [address], [address]  [place] [address]  Phone# [phone]  Fax# [phone]

## 2023-09-27 NOTE — ED TRIAGE NOTES
Pt c.o flu like symptoms since Wednesday. Pt apperse very SOB in triage bay, speaking one word at a time. Cyanotic lips and finger tips unable to get o2 in triage. Pt immediately roomed- cardiac hx      Triage Assessment       Row Name 09/27/23 1421       Triage Assessment (Adult)    Airway WDL WDL       Respiratory WDL    Respiratory WDL X;rhythm/pattern;nailbeds

## 2023-09-27 NOTE — PROGRESS NOTES
Attempted to give Albuterol tx x 3 at 1615, 1635, and 1653. Pt unavailable at each attempt. Pt is in IR, Will attempt to give tx when pt returns.

## 2023-09-27 NOTE — ED PROVIDER NOTES
EMERGENCY DEPARTMENT ENCOUNTER      NAME: Bret Fleming  AGE: 73 year old adult  YOB: 1950  MRN: [id number]  EVALUATION DATE & TIME: 9/27/2023  2:24 PM    PCP: Veronika Perry    ED PROVIDER: Juan Pablo Harris M.D.    Chief Complaint   Patient presents with    Shortness of Breath     FINAL IMPRESSION:  1. Hyperkalemia    2. Metabolic acidosis    3. Sepsis, due to unspecified organism, unspecified whether acute organ dysfunction present (H)    4. Acute renal failure, unspecified acute renal failure type (H)    5. Pyelonephritis      ED COURSE & MEDICAL DECISION MAKING:    Pertinent Labs & Imaging studies independently interpreted by me. (See chart for details)  2:29 PM Patient seen and examined, reviewed emergency department evaluation February 2022 for diarrhea and abnormal labs, at that time admission was commended the patient left AGAINST MEDICAL ADVICE.  Differential diagnosis includes but not limited to pneumonia, sepsis, urinary tract infection, intra-abdominal infection, medication reaction, electrolyte disturbance, anemia, dysrhythmia, myocardial infarction.  Patient presents today with 1 week of poor oral intake, generalized weakness, and complaint of shortness of breath today.  Patient is not able to provide a lot of history but there is been no diarrhea, abdominal pain, chest pain, or cough.  On exam here, patient appears critically ill with purplish discoloration of the lips and tongue, pale although vitals are stable with oxygen saturation 100%, heart rate in the 70s and blood pressure not hypotensive.  EKG demonstrates an intraventricular conduction block, prior EKG does not show this.  Concern for possible hyperkalemia given widening of the QRS and so calcium gluconate was ordered.  IV fluids initiated as well as oxygen for comfort.  PICC line requested due to concern for critical illness.  Plan for admission.  3:15 PM Lab reports VBG pH 6.93, pCO2 18.  Metabolic acidosis, patient  "has no history of diabetes and blood glucose was 114 on fingerstick.  Sodium bicarbonate push and infusion initiated.  Patient has no history of diabetes, DKA is unlikely.  Concern for possible ingestion, starvation acidosis or acute on chronic renal dysfunction possible as well.  3:26 PM Lab reports potassium 7 patient does have EKG changes, has already received calcium, sodium bicarb is also been ordered and will give potassium lowering agents.  Remaining basic panel is pending, will discuss with nephrology and plan for ICU admission.  3:42 PM labs reviewed and show creatinine of 14.6 with sodium bicarb of 4 and potassium of 7.  3:51 PM care discussed with Dr. Dias, dimensional radiology to discuss need for emergent dialysis catheter placement.  He is agreeable.  3:50 PM  Discussed with Dr. Demarco, nephrology who is personally present in the department.  Patient will need emergent dialysis and requests tunneled dialysis catheter.  4:17 PM Beck elevated at 1475, patient has no chest pain, BNP elevated at 70,000.  Will discuss with cardiology but unlikely that patient will need emergent intervention at this time.  4:47 PM care discussed with Dr. Adams, [facility] intensivist who accepts the patient for admission.  Recommends additional fluid, plan for transfer after dialysis.  5:19 PM Spoke with Dr. Bowers, Cardiology, regarding patient plan of care.   5:55 PM glucose 76, D50 given.  Repeat lactate is improved at 2.6.  Continue to monitor closely.  Dialysis been started, anticipate 3-hour run repeat potassium is improved and EKG is more regular with heart rate 95.  7:10 PM Spoke with Urology, Dr. Clayton regarding the retention and inability to pass catheter.  He will come and see the patient.  8:21 PM care discussed with Dr. Clayton, catheter was placed with \"milky\" urine obtained, 600 mL.  Recommend CT scan to evaluate for hydronephrosis.  8:52 PM repeat potassium is 3.1, heart rate is 110, blood pressure 108/59. "  Waiting for transfer.  9:14 PM urinalysis independently interpreted by me consistent with infection, patient is already received antibiotics.  Heart rate is improving, blood pressure is stable as well.  Waiting for transfer but generally improving.  10:11 PM medics are here to transfer patient, did not receive CT scan due to backup in CT.  Patient will be transferred to higher level of care in ICU versus being held in the emergency department for multiple hours for CT scan.    At the conclusion of the encounter I discussed the results of all of the tests and the disposition. The questions were answered. The patient or family acknowledged understanding and was agreeable with the care plan.     Medical Decision Making    History:  Supplemental history from: Documented in chart, if applicable and Family Member/Significant Other  External Record(s) reviewed: Documented in chart, if applicable.    Work Up:  Chart documentation includes differential considered and any EKGs or imaging independently interpreted by provider, where specified.  In additional to work up documented, I considered the following work up: Documented in chart, if applicable.    External consultation:  Discussion of management with another provider: Documented in chart, if applicable    Complicating factors:  Care impacted by chronic illness: Hyperlipidemia and Hypertension  Care affected by social determinants of health: Access to Medical Care and Medication Noncompliance    Disposition considerations: Admit.    EKG:    Performed at: 9/27/2023 14:30:29  Impression: Sinus rhythm. Left bundle branch block. Abnormal ECG.   Rate: 72 BPM  Rhythm: Sinus  Axis: Normal  MO Interval: 182ms  QRS Interval: 140 ms  QTc Interval: 490 ms  ST Changes: None  Comparison: Compared to prior of August 2019, intraventricular conduction block is now present    Performed at: 9/27/2023 14:50:29  Impression: Sinus rhythm.  Intraventricular conduction block, possible  hyperkalemia  Rate: 68 BPM  Rhythm: Sinus  Axis: Normal  ND Interval: 168 ms  QRS Interval: 146 ms  QTc Interval: 474 ms  ST Changes: None  Comparison: When compared with ECG of 9/27/2023 14:30, No significant change was found.     I have independently reviewed and interpreted the EKG(s) documented above.    PROCEDURES:     Critical Care   Performed by: Dr Juan Pablo Harris  Total critical care time: 400 minutes-prolonged due to critical capacity for inpatient units  Critical care was necessary to treat or prevent imminent or life-threatening deterioration of the following conditions: Severe metabolic acidosis, severe hyperkalemia with EKG changes, emergent dialysis  Critical care was time spent personally by me on the following activities: development of treatment plan with patient or surrogate, discussions with consultants, examination of patient, evaluation of patient's response to treatment, obtaining history from patient or surrogate, ordering and performing treatments and interventions, ordering and review of laboratory studies, ordering and review of radiographic studies, re-evaluation of patient's condition and monitoring for potential decompensation.  Critical care time was exclusive of separately billable procedures and treating other patients.     MEDICATIONS GIVEN IN THE EMERGENCY:  Medications   lidocaine 1 % 0.1-5 mL (2.5 mLs Other $Given 9/27/23 1503)   lidocaine (LMX4) cream (has no administration in time range)   sodium chloride (PF) 0.9% PF flush 10-40 mL (has no administration in time range)   glucose gel 15-30 g ( Oral See Alternative 9/27/23 1758)     Or   dextrose 50 % injection 25-50 mL (25 mLs Intravenous $Given 9/27/23 1758)     Or   glucagon injection 1 mg ( Subcutaneous See Alternative 9/27/23 1758)   sodium chloride (PF) 0.9% PF flush 10-20 mL (has no administration in time range)   sodium chloride (PF) 0.9% PF flush 10-40 mL ( Intracatheter Not Given 9/27/23 1610)   sodium chloride (PF)  0.9% PF flush 10-40 mL (has no administration in time range)   lidocaine (XYLOCAINE) 2 % external gel 10 mL (has no administration in time range)   sodium chloride 0.9% BOLUS 100-150 mL (has no administration in time range)   albumin human 25 % injection 50 mL (has no administration in time range)   sodium chloride (PF) 0.9% PF flush 9 mL (has no administration in time range)   sodium chloride (PF) 0.9% PF flush 9 mL (has no administration in time range)   sodium chloride (PF) 0.9% PF flush 10 mL (has no administration in time range)   sodium chloride (PF) 0.9% PF flush 10 mL (has no administration in time range)   alteplase (CATHFLO ACTIVASE) injection 2 mg (has no administration in time range)   alteplase (CATHFLO ACTIVASE) injection 2 mg (has no administration in time range)   sodium chloride 0.9% BOLUS 1,000 mL (0 mLs Intravenous Stopped 9/27/23 1805)   calcium gluconate 10 % injection 1 g (0 g Intravenous Stopped 9/27/23 1500)   sodium bicarbonate 8.4 % injection 50 mEq (50 mEq Intravenous $Given 9/27/23 1550)   piperacillin-tazobactam (ZOSYN) 3.375 g vial to attach to  mL bag (0 g Intravenous Stopped 9/27/23 1715)   vancomycin (VANCOCIN) 2,000 mg in sodium chloride 0.9 % 500 mL intermittent infusion (0 mg Intravenous Stopped 9/27/23 2012)   dextrose 10% BOLUS 300 mL (0 mLs Intravenous Stopped 9/27/23 2012)   insulin regular 1 unit/mL injection 9.1 Units (9.1 Units Intravenous $Given 9/27/23 1608)   albuterol (PROVENTIL) neb solution 10 mg (10 mg Nebulization $Given 9/27/23 1707)   sodium bicarbonate 8.4 % injection 100 mEq (100 mEq Intravenous $Given 9/27/23 1724)   sodium chloride 0.9% BOLUS 250 mL (0 mLs Intravenous Stopped 9/27/23 2143)   sodium chloride 0.9% BOLUS 300 mL (300 mLs Hemodialysis Machine $New Bag 9/27/23 1947)   No heparin via hemodialysis machine ( Does not apply Not Given 9/27/23 1946)   sodium chloride 0.9% DIALYSIS Cath LOCK - RED Lumen (10 mLs Intracatheter $Given 9/27/23 6815)      "Followed by   heparin 1000 unit/mL DIALYSIS Cath LOCK - RED Lumen (1,300 Units Intracatheter $Given 9/27/23 2109)   sodium chloride 0.9% DIALYSIS Cath LOCK - BLUE Lumen (10 mLs Intracatheter $Given 9/27/23 2108)     Followed by   heparin 1000 unit/mL DIALYSIS Cath LOCK -BLUE Lumen (1,400 Units Intracatheter $Given 9/27/23 2108)   lidocaine 1 % 1-20 mL (5 mLs Intradermal $Given 9/27/23 1644)   heparin (porcine) injection 1,000-5,000 Units (2,500 Units Intracatheter $Given 9/27/23 1649)   sodium chloride 0.9% BOLUS 1,000 mL (0 mLs Intravenous Stopped 9/27/23 2144)       NEW PRESCRIPTIONS STARTED AT TODAY'S ER VISIT  New Prescriptions    No medications on file       =================================================================    HPI    Patient information was obtained from: the patient and patient's spouse      Bret Fleming is a 73 year old adult with a pertinent history of hypertension and hyperlipidemia, who presents to this ED via private car for evaluation of shortness of breath.     Per patient's spouse, patient thought to have the flu and notes patient has been dehydrated recently. Patient hasn't been eating or drinking since last Thursday. For the past few days, patient has been \"gassy\" and nauseous, patient then ate soup and vomited right after.     Patient has been taking Tylenol and ibuprofen, the last time patient took Tylenol was 11 AM today and the last time patient took ibuprofen was 6 AM. Denies having a cough and diarrhea. Reports problems with breathing. Patient doesn't really feel lightheaded or dizzy, but has been unsteady on feet. Endorses pain in stomach. Denies leg swelling. No other reported complaints or concerns at this time.     REVIEW OF SYSTEMS   Review of Systems   Respiratory:  Positive for shortness of breath. Negative for cough.    Gastrointestinal:  Positive for nausea. Negative for abdominal pain and diarrhea.   All other systems reviewed and are negative.       PAST MEDICAL " HISTORY:  Past Medical History:   Diagnosis Date    Hypertension        PAST SURGICAL HISTORY:  Past Surgical History:   Procedure Laterality Date    PICC TRIPLE LUMEN PLACEMENT  2023       CURRENT MEDICATIONS:    Current Facility-Administered Medications   Medication    albumin human 25 % injection 50 mL    alteplase (CATHFLO ACTIVASE) injection 2 mg    alteplase (CATHFLO ACTIVASE) injection 2 mg    glucose gel 15-30 g    Or    dextrose 50 % injection 25-50 mL    Or    glucagon injection 1 mg    lidocaine (LMX4) cream    lidocaine (XYLOCAINE) 2 % external gel 10 mL    lidocaine 1 % 0.1-5 mL    sodium chloride (PF) 0.9% PF flush 10 mL    sodium chloride (PF) 0.9% PF flush 10 mL    sodium chloride (PF) 0.9% PF flush 10-20 mL    sodium chloride (PF) 0.9% PF flush 10-40 mL    sodium chloride (PF) 0.9% PF flush 10-40 mL    sodium chloride (PF) 0.9% PF flush 10-40 mL    sodium chloride (PF) 0.9% PF flush 9 mL    sodium chloride (PF) 0.9% PF flush 9 mL    sodium chloride 0.9% BOLUS 100-150 mL     Current Outpatient Medications   Medication    ASPIRIN PO    atenolol (TENORMIN) 100 MG tablet    atorvastatin (LIPITOR) 80 MG tablet    fluticasone (FLONASE) 50 MCG/ACT nasal spray       ALLERGIES:  Allergies   Allergen Reactions    Ciprofloxacin Rash    Other Drug Allergy (See Comments)      Cough Syrup Abstracted from [facility] Chart( Hydrobromide)       FAMILY HISTORY:  Family History   Problem Relation Age of Onset    Cancer Father     Cancer Paternal Grandfather     Diabetes Paternal Grandfather     Hypertension No family hx of     Cerebrovascular Disease No family hx of     Thyroid Disease No family hx of     Glaucoma No family hx of     Macular Degeneration No family hx of        SOCIAL HISTORY:   Social History     Socioeconomic History    Marital status:    Tobacco Use    Smoking status: Former     Types: Cigarettes     Quit date: 1995     Years since quittin.4    Smokeless tobacco: Never        VITALS:  /57   Pulse 106   Temp 97.6  F (36.4  C) (Axillary)   Resp 26   Wt 90.7 kg (200 lb)   SpO2 100%   BMI 27.12 kg/m      PHYSICAL EXAM:  Physical Exam  Vitals and nursing note reviewed.   Constitutional:       General: Shilpi Fleming is not in acute distress.     Appearance: Normal appearance. Shilpi Fleming is not diaphoretic.   HENT:      Head: Normocephalic and atraumatic.      Right Ear: External ear normal.      Left Ear: External ear normal.      Nose: Nose normal.      Mouth/Throat:      Mouth: Mucous membranes are moist.   Eyes:      General: No scleral icterus.     Extraocular Movements: Extraocular movements intact.      Conjunctiva/sclera: Conjunctivae normal.      Pupils: Pupils are equal, round, and reactive to light.   Cardiovascular:      Rate and Rhythm: Normal rate and regular rhythm.      Heart sounds: Normal heart sounds.   Pulmonary:      Effort: Pulmonary effort is normal. No respiratory distress.      Breath sounds: Normal breath sounds. No wheezing or rales.   Abdominal:      General: Abdomen is flat. There is no distension.      Palpations: Abdomen is soft.      Tenderness: There is no abdominal tenderness. There is no guarding.   Musculoskeletal:         General: Normal range of motion.      Cervical back: Normal range of motion and neck supple.      Right lower leg: No edema.      Left lower leg: No edema.      Comments: Lower extremities cool and slightly mottled   Lymphadenopathy:      Cervical: No cervical adenopathy.   Skin:     General: Skin is warm and dry.      Coloration: Skin is pale.      Findings: No rash.      Comments: Tongue and lips purplish.    Neurological:      General: No focal deficit present.      Mental Status: Shilpi Fleming is alert and oriented to person, place, and time. Mental status is at baseline.      Comments: No gross focal neurologic deficits   Psychiatric:         Mood and Affect: Mood normal.         Behavior: Behavior normal.          Thought Content: Thought content normal.          LAB:  All pertinent labs reviewed and interpreted.  Results for orders placed or performed during the hospital encounter of 09/27/23   Symptomatic COVID-19 Virus (Coronavirus) by PCR Nose    Specimen: Nose; Swab   Result Value Ref Range    SARS CoV2 PCR Negative Negative   Extra Blue Top Tube   Result Value Ref Range    Hold Specimen JIC    Extra Red Top Tube   Result Value Ref Range    Hold Specimen JIC    Extra Green Top (Lithium Heparin) Tube   Result Value Ref Range    Hold Specimen JIC    Extra Purple Top Tube   Result Value Ref Range    Hold Specimen JIC    Extra Green Top (Lithium Heparin) ON ICE   Result Value Ref Range    Hold Specimen JIC    Result Value Ref Range    INR 1.81 (H) 0.85 - 1.15   Basic metabolic panel   Result Value Ref Range    Sodium 127 (L) 135 - 145 mmol/L    Potassium 7.0 (HH) 3.4 - 5.3 mmol/L    Chloride 90 (L) 98 - 107 mmol/L    Carbon Dioxide (CO2) 4 (LL) 22 - 29 mmol/L    Anion Gap 33 (H) 7 - 15 mmol/L    Urea Nitrogen 213.4 (H) 8.0 - 23.0 mg/dL    Creatinine 14.61 (H) 0.51 - 1.17 mg/dL    GFR Estimate 3 (L) >60 mL/min/1.73m2    Calcium 8.5 (L) 8.8 - 10.2 mg/dL    Glucose 131 (H) 70 - 99 mg/dL   Hepatic function panel   Result Value Ref Range    Protein Total 7.7 6.4 - 8.3 g/dL    Albumin 4.5 3.5 - 5.2 g/dL    Bilirubin Total 0.5 <=1.2 mg/dL    Alkaline Phosphatase 84 35 - 129 U/L     (H) 0 - 45 U/L     (H) 0 - 70 U/L    Bilirubin Direct 0.24 0.00 - 0.30 mg/dL   Lactic acid whole blood   Result Value Ref Range    Lactic Acid 3.7 (H) 0.7 - 2.0 mmol/L   Result Value Ref Range    Lipase 405 (H) 13 - 60 U/L   Result Value Ref Range    Troponin T, High Sensitivity 1,475 (HH) <=22 ng/L   Result Value Ref Range    Magnesium 2.8 (H) 1.7 - 2.3 mg/dL   Nt probnp inpatient (BNP)   Result Value Ref Range    N terminal Pro BNP Inpatient 70,000 (H) 0 - 900 pg/mL   UA with Microscopic reflex to Culture    Specimen: Urine, Culp  Catheter   Result Value Ref Range    Color Urine Light Yellow Colorless, Straw, Light Yellow, Yellow    Appearance Urine Turbid (A) Clear    Glucose Urine Negative Negative mg/dL    Bilirubin Urine Negative Negative    Ketones Urine 5 (A) Negative mg/dL    Specific Gravity Urine 1.025 1.003 - 1.035    Blood Urine 1.0 mg/dL (A) Negative    pH Urine 6.0 5.0 - 7.0    Protein Albumin Urine 100 (A) Negative mg/dL    Urobilinogen Urine Normal Normal, 2.0 mg/dL    Nitrite Urine Negative Negative    Leukocyte Esterase Urine 500 Marai Alejandra/uL (A) Negative    Bacteria Urine Moderate (A) None Seen /HPF    WBC Clumps Urine Present (A) None Seen /HPF    RBC Urine 0 <=2 /HPF    WBC Urine >182 (H) <=5 /HPF   Glucose by meter   Result Value Ref Range    GLUCOSE BY METER POCT 114 (H) 70 - 99 mg/dL   Lactic acid whole blood   Result Value Ref Range    Lactic Acid 2.6 (H) 0.7 - 2.0 mmol/L   Extra Heparinized Syringe   Result Value Ref Range    Hold Specimen [facility]    Blood gas venous   Result Value Ref Range    pH Venous 6.93 (LL) 7.35 - 7.45    pCO2 Venous 18 (L) 35 - 50 mm Hg    pO2 Venous 43 25 - 47 mm Hg    Bicarbonate Venous 4 (L) 24 - 30 mmol/L    Base Excess/Deficit -28.6   mmol/L    Oxyhemoglobin Venous 60.0 (L) 70.0 - 75.0 %    O2 Sat, Venous 61.0 (L) 70.0 - 75.0 %   Result Value Ref Range    Procalcitonin 1.40 (H) <0.05 ng/mL   Result Value Ref Range    Potassium 5.2 3.4 - 5.3 mmol/L   Result Value Ref Range    Potassium 3.1 (L) 3.4 - 5.3 mmol/L   Symptomatic Influenza A/B, RSV, & SARS-CoV2 PCR (COVID-19) Nasopharyngeal    Specimen: Nasopharyngeal; Swab   Result Value Ref Range    Influenza A PCR Negative Negative    Influenza B PCR Negative Negative    RSV PCR Negative Negative    SARS CoV2 PCR Negative Negative   CBC with platelets and differential   Result Value Ref Range    WBC Count 14.6 (H) 4.0 - 11.0 10e3/uL    RBC Count 2.72 (L) 3.80 - 5.90 10e6/uL    Hemoglobin 8.7 (L) 11.7 - 17.7 g/dL    Hematocrit 26.8 (L) 35.0 - 53.0 %     MCV 99 78 - 100 fL    MCH 32.0 26.5 - 33.0 pg    MCHC 32.5 31.5 - 36.5 g/dL    RDW 12.8 10.0 - 15.0 %    Platelet Count 153 150 - 450 10e3/uL    % Neutrophils 91 %    % Lymphocytes 1 %    % Monocytes 6 %    % Eosinophils 0 %    % Basophils 0 %    % Immature Granulocytes 2 %    NRBCs per 100 WBC 3 (H) <1 /100    Absolute Neutrophils 13.3 (H) 1.6 - 8.3 10e3/uL    Absolute Lymphocytes 0.2 (L) 0.8 - 5.3 10e3/uL    Absolute Monocytes 0.9 0.0 - 1.3 10e3/uL    Absolute Eosinophils 0.0 0.0 - 0.7 10e3/uL    Absolute Basophils 0.0 0.0 - 0.2 10e3/uL    Absolute Immature Granulocytes 0.2 <=0.4 10e3/uL    Absolute NRBCs 0.5 10e3/uL   Result Value Ref Range    Phosphorus 12.3 (H) 2.5 - 4.5 mg/dL   TSH with free T4 reflex   Result Value Ref Range    TSH 2.70 0.30 - 4.20 uIU/mL   Result Value Ref Range    CK 7,316 (HH) 26 - 308 U/L   Iron and iron binding capacity   Result Value Ref Range    Iron 120 37 - 157 ug/dL    Iron Binding Capacity 226 (L) 240 - 430 ug/dL    Iron Sat Index 53 (H) 15 - 46 %   Glucose by meter   Result Value Ref Range    GLUCOSE BY METER POCT 76 70 - 99 mg/dL   Glucose by meter   Result Value Ref Range    GLUCOSE BY METER POCT 131 (H) 70 - 99 mg/dL   Glucose by meter   Result Value Ref Range    GLUCOSE BY METER POCT 104 (H) 70 - 99 mg/dL   Adult Type and Screen   Result Value Ref Range    ABO/RH(D) B POS     Antibody Screen Negative Negative    SPECIMEN EXPIRATION DATE [id number]        RADIOLOGY:  Reviewed all pertinent imaging. Please see official radiology report.  XR Chest Port 1 View    (Results Pending)   IR CVC Tunnel Placement > 5 Yrs of Age    (Results Pending)   US Renal Complete Non-Vascular    (Results Pending)   Echocardiogram Complete    (Results Pending)   CT Abdomen Pelvis w/o Contrast    (Results Pending)       I, Shell Werner, am serving as a scribe to document services personally performed by Dr. Harris based on my observation and the provider's statements to me. IJuan Pablo  MD Steven attest that Shelljavier Easleyan is acting in a scribe capacity, has observed my performance of the services and has documented them in accordance with my direction.    Juan Pablo Harris M.D.  Emergency Medicine  [facility] EMERGENCY DEPARTMENT  [address] [place] [id number] [phone]  Dept: [phone]       Juan Pablo Harris MD  09/27/23 2155       Juan Pablo Harris MD  09/27/23 2214

## 2023-09-27 NOTE — PROGRESS NOTES
Cardiology.  Called regarding this patient who is in acute on chronic renal failure with markedly elevated creatinine and potassium undergoing emergent dialysis. Significant elevation in proBNP and troponin of uncertain clinical significance.  Commended trending the laboratory studies and doing an echocardiogram this evening.  ECG with QRS widening consistent with hyperkalemia.  ER will update us pending the above.

## 2023-09-27 NOTE — PROCEDURES
"PICC Line Insertion Procedure Note    Pt. Name:   Bret Fleming  MRN:          [id number]    Procedure:     Insertion of a  TRIPLE Lumen  5 fr  Bard SOLO (valved) Power PICC, Lot number [id number]    Indications: IV Meds/Drips    Contraindications : None    Procedure Details:    Patient identified with 2 identifiers and \"Time Out\" conducted.    Central line insertion bundle followed: Hand hygiene performed prior to procedure, site cleansed with Cholraprep (CHG), hat, mask, sterile gloves, sterile gown worn, patient draped with maximum barrier head to toe drape, sterile field maintained.    The right upper arm BRACHIAL-Lat vein was assessed by ultrasound and found to be anechoic, compressible, patent, and of adequate size.     Lidocaine 1% 2.5 ml administered SQ to the insertion site.     Modified Seldinger Technique (MST) used for insertion, ONE attempt(s) required to access vein. Imaging during access shows the needle within the vein.     PICC was advanced through peel-away sheath.    Catheter threaded without difficulty. The tip of the catheter was positioned in the SVC. Good blood return noted.    Catheter was flushed with 20 cc normal saline.     Catheter secured with Statlock, tissue adhesive (on insertion site only), Biopatch (CHG), and Tegaderm dressing applied.    The sharps that are included in the PICC insertion kit were accounted for and disposed of in the sharps container prior to breakdown of the sterile field.    CLABSI prevention brochure left at bedside.    Patient  tolerated procedure well.     Patient's primary RN notified PICC is ready for use.      Findings:    Total catheter length  44 cm, with 0 cm exposed. Mid upper arm circumference is 28 cm.     Tip placement verified in the distal SVC by [employer]:        Comments:  None        Hector Mariscal, MSN, RN, [facility]   Vascular Access - [facility]      "

## 2023-09-27 NOTE — PROGRESS NOTES
Potassium   Date Value Ref Range Status   09/27/2023 5.2 3.4 - 5.3 mmol/L Final   05/27/2022 4.4 3.5 - 5.0 mmol/L Final     Hemoglobin   Date Value Ref Range Status   09/27/2023 8.7 (L) 11.7 - 17.7 g/dL Final     Comment:     Reference Range:                                                     Female 11.7-15.7 g/dL                                      Male 13.3-17.7 g/dL     Creatinine   Date Value Ref Range Status   09/27/2023 14.61 (H) 0.51 - 1.17 mg/dL Final     Comment:     Male and Female  0-2 Months    0.31-0.88 mg/dL  2-12 Months   0.16-0.39 mg/dL  1-2 Years     0.18-0.35 mg/dL  3-4 Years     0.26-0.42 mg/dL  5-6 Years     0.29-0.47 mg/dL  7-8 Years     0.34-0.53 mg/dL  9-10 Years    0.33-0.64 mg/dL  11-12 Years   0.44-0.68 mg/dL  13-14 Years   0.46-0.77 mg/dL    Female  15 Years and older  0.51-0.95 mg/dL    Male  15 Years and older  0.67-1.17 mg/dL         Urea Nitrogen   Date Value Ref Range Status   09/27/2023 213.4 (H) 8.0 - 23.0 mg/dL Final   05/27/2022 49 (H) 8 - 28 mg/dL Final     Sodium   Date Value Ref Range Status   09/27/2023 127 (L) 135 - 145 mmol/L Final     Comment:     Reference intervals for this test were updated on 09/26/2023 to more accurately reflect our healthy population. There may be differences in the flagging of prior results with similar values performed with this method. Interpretation of those prior results can be made in the context of the updated reference intervals.      INR   Date Value Ref Range Status   09/27/2023 1.81 (H) 0.85 - 1.15 Final       DIALYSIS PROCEDURE NOTE  Hepatitis status of previous patient on machine log was checked and verified ok to use with this patients hepatitis status.  Patient dialyzed for 2.5 hrs. on a K2 bath with a net fluid removal of  1L.  A BFR of 250 ml/min was obtained via a RIJ.     The treatment plan was discussed with Dr. Demarco during the treatment.    Total heparin received during the treatment: 0 units.   Line flushed, clamped and  capped with heparin 1:1000 1.1 mL and 14. (1100 units and 1400 units) per lumen    Meds  given: NONE   Complications: Pt became increasingly more lethargic during run RN aware.      Person educated: patient. Knowledge base none. Barriers to learning: lethargy. Educated on procedure via verbal mode.  Requires follow up.    ICEBOAT? Timeout performed pre-treatment  I: Patient was identified using 2 identifiers  C:  Consent Signed Yes  E: Equipment preventative maintenance is current and dialysis delivery system OK to use  B: Hepatitis B Surface Antigen: UNKNOWN; Draw Date: 9/27/23      Hepatitis B Surface Antibody: UNKNOWN; Draw Date: 9/27/23  O: Dialysis orders present and complete prior to treatment  A: Vascular access verified and assessed prior to treatment  T: Treatment was performed at a clinically appropriate time  ?: Patient was allowed to ask questions and address concerns prior to treatment  See Adult Hemodialysis flowsheet in [facility] for further details and post assessment.  Machine water alarm in place and functioning. Transducer pods intact and checked every 15min.   Pt received treatment in ED 18  Chlorine/Chloramine water system checked every 4 hours.  Outpatient Dialysis at D    Patient repositioned every 2 hours during the treatment.  Post treatment report given to PATRICIA Garg RN regarding 1L of fluid removed, last BP of 118/67, and patient pain rating of 0/10.

## 2023-09-27 NOTE — PROGRESS NOTES
O2 3 lpm/NC, SpO2 99 %. Albuterol 10 mg HHN tx given/mask. BS clear and equal bilat.     /59   Pulse 84   Temp (!) 96.5  F (35.8  C) (Tympanic)   Resp 19   Wt 90.7 kg (200 lb)   SpO2 99%   BMI 27.12 kg/m      BS after fine crackles RLL otherwise clear. RT available as needed.

## 2023-09-28 ENCOUNTER — APPOINTMENT (OUTPATIENT)
Dept: CARDIOLOGY | Facility: CLINIC | Age: 73
DRG: 871 | End: 2023-09-28
Attending: STUDENT IN AN ORGANIZED HEALTH CARE EDUCATION/TRAINING PROGRAM
Payer: COMMERCIAL

## 2023-09-28 ENCOUNTER — APPOINTMENT (OUTPATIENT)
Dept: GENERAL RADIOLOGY | Facility: CLINIC | Age: 73
DRG: 871 | End: 2023-09-28
Attending: STUDENT IN AN ORGANIZED HEALTH CARE EDUCATION/TRAINING PROGRAM
Payer: COMMERCIAL

## 2023-09-28 LAB
ABO/RH(D): NORMAL
ADV 40+41 DNA STL QL NAA+NON-PROBE: NEGATIVE
ALBUMIN MFR UR ELPH: 22.6 %
ALBUMIN SERPL BCG-MCNC: 3.3 G/DL (ref 3.5–5.2)
ALBUMIN SERPL BCG-MCNC: 3.5 G/DL (ref 3.5–5.2)
ALP SERPL-CCNC: 69 U/L (ref 35–129)
ALP SERPL-CCNC: 72 U/L (ref 35–129)
ALPHA1 GLOB MFR UR ELPH: 2.3 %
ALPHA2 GLOB MFR UR ELPH: 5.4 %
ALT SERPL W P-5'-P-CCNC: 100 U/L (ref 0–70)
ALT SERPL W P-5'-P-CCNC: 94 U/L (ref 0–70)
ANION GAP SERPL CALCULATED.3IONS-SCNC: 27 MMOL/L (ref 7–15)
ANION GAP SERPL CALCULATED.3IONS-SCNC: 30 MMOL/L (ref 7–15)
ANTIBODY SCREEN: NEGATIVE
APTT PPP: 34 SECONDS (ref 22–38)
AST SERPL W P-5'-P-CCNC: 166 U/L (ref 0–45)
AST SERPL W P-5'-P-CCNC: 182 U/L (ref 0–45)
ASTRO TYP 1-8 RNA STL QL NAA+NON-PROBE: NEGATIVE
B-GLOBULIN MFR UR ELPH: 23.3 %
B-OH-BUTYR SERPL-SCNC: 2.1 MMOL/L
BASE EXCESS BLDV CALC-SCNC: -9.7 MMOL/L (ref -7.7–1.9)
BILIRUB SERPL-MCNC: 0.4 MG/DL
BILIRUB SERPL-MCNC: 0.5 MG/DL
BLD PROD TYP BPU: NORMAL
BLOOD COMPONENT TYPE: NORMAL
BUN SERPL-MCNC: 115.6 MG/DL (ref 8–23)
BUN SERPL-MCNC: 123.6 MG/DL (ref 8–23)
C CAYETANENSIS DNA STL QL NAA+NON-PROBE: NEGATIVE
C DIFF TOX B STL QL: NEGATIVE
CA-I BLD-MCNC: 4 MG/DL (ref 4.4–5.2)
CA-I BLD-MCNC: 4.2 MG/DL (ref 4.4–5.2)
CA-I BLD-MCNC: 4.3 MG/DL (ref 4.4–5.2)
CALCIUM SERPL-MCNC: 7.6 MG/DL (ref 8.8–10.2)
CALCIUM SERPL-MCNC: 8.4 MG/DL (ref 8.8–10.2)
CAMPYLOBACTER DNA SPEC NAA+PROBE: NEGATIVE
CHLORIDE SERPL-SCNC: 96 MMOL/L (ref 98–107)
CHLORIDE SERPL-SCNC: 96 MMOL/L (ref 98–107)
CK SERPL-CCNC: 3505 U/L (ref 26–308)
CK SERPL-CCNC: 4726 U/L (ref 26–308)
CODING SYSTEM: NORMAL
CREAT SERPL-MCNC: 8.07 MG/DL (ref 0.51–1.17)
CREAT SERPL-MCNC: 8.48 MG/DL (ref 0.51–1.17)
CROSSMATCH: NORMAL
CRYPTOSP DNA STL QL NAA+NON-PROBE: NEGATIVE
E COLI O157 DNA STL QL NAA+NON-PROBE: NORMAL
E HISTOLYT DNA STL QL NAA+NON-PROBE: NEGATIVE
EAEC ASTA GENE ISLT QL NAA+PROBE: NEGATIVE
EC STX1+STX2 GENES STL QL NAA+NON-PROBE: NEGATIVE
EGFRCR SERPLBLD CKD-EPI 2021: 6 ML/MIN/1.73M2
EGFRCR SERPLBLD CKD-EPI 2021: 6 ML/MIN/1.73M2
EPEC EAE GENE STL QL NAA+NON-PROBE: NEGATIVE
ERYTHROCYTE [DISTWIDTH] IN BLOOD BY AUTOMATED COUNT: 11.9 % (ref 10–15)
ERYTHROCYTE [DISTWIDTH] IN BLOOD BY AUTOMATED COUNT: 12.1 % (ref 10–15)
ETEC LTA+ST1A+ST1B TOX ST NAA+NON-PROBE: NEGATIVE
FERRITIN SERPL-MCNC: 382 NG/ML (ref 11–409)
FIBRINOGEN PPP-MCNC: 335 MG/DL (ref 170–490)
FOLATE SERPL-MCNC: 12.9 NG/ML (ref 4.6–34.8)
G LAMBLIA DNA STL QL NAA+NON-PROBE: NEGATIVE
GAMMA GLOB MFR UR ELPH: 46.4 %
GLUCOSE BLDC GLUCOMTR-MCNC: 107 MG/DL (ref 70–99)
GLUCOSE BLDC GLUCOMTR-MCNC: 108 MG/DL (ref 70–99)
GLUCOSE BLDC GLUCOMTR-MCNC: 111 MG/DL (ref 70–99)
GLUCOSE BLDC GLUCOMTR-MCNC: 111 MG/DL (ref 70–99)
GLUCOSE BLDC GLUCOMTR-MCNC: 81 MG/DL (ref 70–99)
GLUCOSE SERPL-MCNC: 82 MG/DL (ref 70–99)
GLUCOSE SERPL-MCNC: 99 MG/DL (ref 70–99)
HCO3 BLDV-SCNC: 15 MMOL/L (ref 21–28)
HCO3 SERPL-SCNC: 11 MMOL/L (ref 22–29)
HCO3 SERPL-SCNC: 13 MMOL/L (ref 22–29)
HCT VFR BLD AUTO: 19.5 % (ref 35–53)
HCT VFR BLD AUTO: 19.9 % (ref 35–53)
HGB BLD-MCNC: 6.9 G/DL (ref 11.7–17.7)
HGB BLD-MCNC: 7.1 G/DL (ref 11.7–17.7)
INR PPP: 1.56 (ref 0.85–1.15)
INR PPP: 1.58 (ref 0.85–1.15)
ISSUE DATE AND TIME: NORMAL
L PNEUMO1 AG UR QL IA: NEGATIVE
LACTATE SERPL-SCNC: 0.9 MMOL/L (ref 0.7–2)
LACTATE SERPL-SCNC: 1.4 MMOL/L (ref 0.7–2)
LVEF ECHO: NORMAL
M PROTEIN MFR UR ELPH: 11.6 %
MAGNESIUM SERPL-MCNC: 1.7 MG/DL (ref 1.7–2.3)
MAGNESIUM SERPL-MCNC: 1.9 MG/DL (ref 1.7–2.3)
MCH RBC QN AUTO: 32.7 PG (ref 26.5–33)
MCH RBC QN AUTO: 33 PG (ref 26.5–33)
MCHC RBC AUTO-ENTMCNC: 35.4 G/DL (ref 31.5–36.5)
MCHC RBC AUTO-ENTMCNC: 35.7 G/DL (ref 31.5–36.5)
MCV RBC AUTO: 92 FL (ref 78–100)
MCV RBC AUTO: 93 FL (ref 78–100)
MRSA DNA SPEC QL NAA+PROBE: NEGATIVE
NOROVIRUS GI+II RNA STL QL NAA+NON-PROBE: NEGATIVE
O2/TOTAL GAS SETTING VFR VENT: 21 %
OSMOLALITY SERPL: 323 MMOL/KG (ref 280–301)
P SHIGELLOIDES DNA STL QL NAA+NON-PROBE: NEGATIVE
PCO2 BLDV: 26 MM HG (ref 40–50)
PH BLDV: 7.36 [PH] (ref 7.32–7.43)
PHOSPHATE SERPL-MCNC: 3.7 MG/DL (ref 2.5–4.5)
PHOSPHATE SERPL-MCNC: 5.2 MG/DL (ref 2.5–4.5)
PLATELET # BLD AUTO: 81 10E3/UL (ref 150–450)
PLATELET # BLD AUTO: 82 10E3/UL (ref 150–450)
PO2 BLDV: 37 MM HG (ref 25–47)
POTASSIUM SERPL-SCNC: 3.3 MMOL/L (ref 3.4–5.3)
POTASSIUM SERPL-SCNC: 3.7 MMOL/L (ref 3.4–5.3)
PROT PATTERN UR ELPH-IMP: ABNORMAL
PROT SERPL-MCNC: 5.7 G/DL (ref 6.4–8.3)
PROT SERPL-MCNC: 6 G/DL (ref 6.4–8.3)
PTH-INTACT SERPL-MCNC: 220 PG/ML (ref 15–65)
RBC # BLD AUTO: 2.11 10E6/UL (ref 3.8–5.9)
RBC # BLD AUTO: 2.15 10E6/UL (ref 3.8–5.9)
RETICS # AUTO: 0.1 10E6/UL (ref 0.03–0.1)
RETICS/RBC NFR AUTO: 4.5 % (ref 0.5–2)
RVA RNA STL QL NAA+NON-PROBE: NEGATIVE
S PNEUM AG SPEC QL: NEGATIVE
SA TARGET DNA: NEGATIVE
SALMONELLA SP RPOD STL QL NAA+PROBE: NEGATIVE
SAPO I+II+IV+V RNA STL QL NAA+NON-PROBE: NEGATIVE
SHIGELLA SP+EIEC IPAH ST NAA+NON-PROBE: NEGATIVE
SODIUM SERPL-SCNC: 136 MMOL/L (ref 135–145)
SODIUM SERPL-SCNC: 137 MMOL/L (ref 135–145)
SODIUM UR-SCNC: 74 MMOL/L
SPECIMEN EXPIRATION DATE: NORMAL
TOTAL PROTEIN SERUM FOR ELP: 7.2 G/DL (ref 6.4–8.3)
TROPONIN T SERPL HS-MCNC: 1162 NG/L
UNIT ABO/RH: NORMAL
UNIT NUMBER: NORMAL
UNIT STATUS: NORMAL
UNIT TYPE ISBT: 7300
V CHOLERAE DNA SPEC QL NAA+PROBE: NEGATIVE
VIBRIO DNA SPEC NAA+PROBE: NEGATIVE
VIT B12 SERPL-MCNC: 2943 PG/ML (ref 232–1245)
WBC # BLD AUTO: 13 10E3/UL (ref 4–11)
WBC # BLD AUTO: 9.3 10E3/UL (ref 4–11)
Y ENTEROCOL DNA STL QL NAA+PROBE: NEGATIVE

## 2023-09-28 PROCEDURE — 83735 ASSAY OF MAGNESIUM: CPT | Performed by: INTERNAL MEDICINE

## 2023-09-28 PROCEDURE — G0463 HOSPITAL OUTPT CLINIC VISIT: HCPCS | Mod: 25

## 2023-09-28 PROCEDURE — 71045 X-RAY EXAM CHEST 1 VIEW: CPT

## 2023-09-28 PROCEDURE — 36430 TRANSFUSION BLD/BLD COMPNT: CPT

## 2023-09-28 PROCEDURE — 84100 ASSAY OF PHOSPHORUS: CPT | Performed by: INTERNAL MEDICINE

## 2023-09-28 PROCEDURE — 86923 COMPATIBILITY TEST ELECTRIC: CPT | Performed by: INTERNAL MEDICINE

## 2023-09-28 PROCEDURE — 87507 IADNA-DNA/RNA PROBE TQ 12-25: CPT | Performed by: STUDENT IN AN ORGANIZED HEALTH CARE EDUCATION/TRAINING PROGRAM

## 2023-09-28 PROCEDURE — 250N000011 HC RX IP 250 OP 636: Mod: JZ | Performed by: INTERNAL MEDICINE

## 2023-09-28 PROCEDURE — 85610 PROTHROMBIN TIME: CPT | Performed by: STUDENT IN AN ORGANIZED HEALTH CARE EDUCATION/TRAINING PROGRAM

## 2023-09-28 PROCEDURE — 82330 ASSAY OF CALCIUM: CPT | Performed by: INTERNAL MEDICINE

## 2023-09-28 PROCEDURE — 85027 COMPLETE CBC AUTOMATED: CPT | Performed by: STUDENT IN AN ORGANIZED HEALTH CARE EDUCATION/TRAINING PROGRAM

## 2023-09-28 PROCEDURE — 99233 SBSQ HOSP IP/OBS HIGH 50: CPT | Performed by: INTERNAL MEDICINE

## 2023-09-28 PROCEDURE — 84166 PROTEIN E-PHORESIS/URINE/CSF: CPT | Mod: 26

## 2023-09-28 PROCEDURE — 80053 COMPREHEN METABOLIC PANEL: CPT | Performed by: STUDENT IN AN ORGANIZED HEALTH CARE EDUCATION/TRAINING PROGRAM

## 2023-09-28 PROCEDURE — 93010 ELECTROCARDIOGRAM REPORT: CPT | Performed by: INTERNAL MEDICINE

## 2023-09-28 PROCEDURE — 86901 BLOOD TYPING SEROLOGIC RH(D): CPT | Performed by: INTERNAL MEDICINE

## 2023-09-28 PROCEDURE — 82550 ASSAY OF CK (CPK): CPT | Performed by: INTERNAL MEDICINE

## 2023-09-28 PROCEDURE — C8929 TTE W OR WO FOL WCON,DOPPLER: HCPCS

## 2023-09-28 PROCEDURE — 82803 BLOOD GASES ANY COMBINATION: CPT | Performed by: INTERNAL MEDICINE

## 2023-09-28 PROCEDURE — 258N000003 HC RX IP 258 OP 636: Performed by: INTERNAL MEDICINE

## 2023-09-28 PROCEDURE — 87640 STAPH A DNA AMP PROBE: CPT | Performed by: STUDENT IN AN ORGANIZED HEALTH CARE EDUCATION/TRAINING PROGRAM

## 2023-09-28 PROCEDURE — 99222 1ST HOSP IP/OBS MODERATE 55: CPT | Performed by: STUDENT IN AN ORGANIZED HEALTH CARE EDUCATION/TRAINING PROGRAM

## 2023-09-28 PROCEDURE — 250N000009 HC RX 250: Performed by: INTERNAL MEDICINE

## 2023-09-28 PROCEDURE — 250N000011 HC RX IP 250 OP 636: Performed by: INTERNAL MEDICINE

## 2023-09-28 PROCEDURE — 87340 HEPATITIS B SURFACE AG IA: CPT | Performed by: INTERNAL MEDICINE

## 2023-09-28 PROCEDURE — 86706 HEP B SURFACE ANTIBODY: CPT | Performed by: INTERNAL MEDICINE

## 2023-09-28 PROCEDURE — 93005 ELECTROCARDIOGRAM TRACING: CPT

## 2023-09-28 PROCEDURE — 87899 AGENT NOS ASSAY W/OPTIC: CPT | Performed by: INTERNAL MEDICINE

## 2023-09-28 PROCEDURE — 84300 ASSAY OF URINE SODIUM: CPT | Performed by: STUDENT IN AN ORGANIZED HEALTH CARE EDUCATION/TRAINING PROGRAM

## 2023-09-28 PROCEDURE — 83605 ASSAY OF LACTIC ACID: CPT | Performed by: INTERNAL MEDICINE

## 2023-09-28 PROCEDURE — 87493 C DIFF AMPLIFIED PROBE: CPT | Performed by: STUDENT IN AN ORGANIZED HEALTH CARE EDUCATION/TRAINING PROGRAM

## 2023-09-28 PROCEDURE — 250N000013 HC RX MED GY IP 250 OP 250 PS 637: Performed by: INTERNAL MEDICINE

## 2023-09-28 PROCEDURE — 93306 TTE W/DOPPLER COMPLETE: CPT | Mod: 26 | Performed by: INTERNAL MEDICINE

## 2023-09-28 PROCEDURE — 255N000002 HC RX 255 OP 636: Performed by: INTERNAL MEDICINE

## 2023-09-28 PROCEDURE — P9016 RBC LEUKOCYTES REDUCED: HCPCS | Performed by: INTERNAL MEDICINE

## 2023-09-28 PROCEDURE — 120N000001 HC R&B MED SURG/OB

## 2023-09-28 PROCEDURE — 250N000011 HC RX IP 250 OP 636: Mod: JZ | Performed by: STUDENT IN AN ORGANIZED HEALTH CARE EDUCATION/TRAINING PROGRAM

## 2023-09-28 PROCEDURE — 82746 ASSAY OF FOLIC ACID SERUM: CPT | Performed by: INTERNAL MEDICINE

## 2023-09-28 PROCEDURE — 82010 KETONE BODYS QUAN: CPT | Performed by: INTERNAL MEDICINE

## 2023-09-28 RX ORDER — CALCIUM GLUCONATE 20 MG/ML
2 INJECTION, SOLUTION INTRAVENOUS ONCE
Status: COMPLETED | OUTPATIENT
Start: 2023-09-28 | End: 2023-09-28

## 2023-09-28 RX ORDER — HEPARIN SODIUM 5000 [USP'U]/.5ML
5000 INJECTION, SOLUTION INTRAVENOUS; SUBCUTANEOUS EVERY 12 HOURS
Status: DISCONTINUED | OUTPATIENT
Start: 2023-09-28 | End: 2023-10-13 | Stop reason: HOSPADM

## 2023-09-28 RX ORDER — HEPARIN SODIUM 5000 [USP'U]/.5ML
5000 INJECTION, SOLUTION INTRAVENOUS; SUBCUTANEOUS EVERY 8 HOURS
Status: DISCONTINUED | OUTPATIENT
Start: 2023-09-28 | End: 2023-09-28

## 2023-09-28 RX ORDER — ONDANSETRON 4 MG/1
4 TABLET, FILM COATED ORAL EVERY 6 HOURS PRN
Status: DISCONTINUED | OUTPATIENT
Start: 2023-09-28 | End: 2023-10-13 | Stop reason: HOSPADM

## 2023-09-28 RX ORDER — CALCIUM GLUCONATE 20 MG/ML
1 INJECTION, SOLUTION INTRAVENOUS ONCE
Status: COMPLETED | OUTPATIENT
Start: 2023-09-28 | End: 2023-09-28

## 2023-09-28 RX ORDER — ACETAMINOPHEN 650 MG/1
650 SUPPOSITORY RECTAL EVERY 4 HOURS PRN
Status: DISCONTINUED | OUTPATIENT
Start: 2023-09-28 | End: 2023-10-13 | Stop reason: HOSPADM

## 2023-09-28 RX ORDER — ASPIRIN 81 MG/1
81 TABLET ORAL DAILY
Status: DISCONTINUED | OUTPATIENT
Start: 2023-09-28 | End: 2023-10-13 | Stop reason: HOSPADM

## 2023-09-28 RX ORDER — SODIUM CHLORIDE 9 MG/ML
INJECTION, SOLUTION INTRAVENOUS CONTINUOUS
Status: DISCONTINUED | OUTPATIENT
Start: 2023-09-28 | End: 2023-10-02

## 2023-09-28 RX ORDER — ACETAMINOPHEN 325 MG/1
650 TABLET ORAL EVERY 4 HOURS PRN
Status: DISCONTINUED | OUTPATIENT
Start: 2023-09-28 | End: 2023-10-13 | Stop reason: HOSPADM

## 2023-09-28 RX ORDER — SODIUM CHLORIDE, SODIUM LACTATE, POTASSIUM CHLORIDE, CALCIUM CHLORIDE 600; 310; 30; 20 MG/100ML; MG/100ML; MG/100ML; MG/100ML
INJECTION, SOLUTION INTRAVENOUS CONTINUOUS
Status: DISCONTINUED | OUTPATIENT
Start: 2023-09-28 | End: 2023-09-28

## 2023-09-28 RX ADMIN — SODIUM CHLORIDE: 9 INJECTION, SOLUTION INTRAVENOUS at 22:04

## 2023-09-28 RX ADMIN — CEFTRIAXONE SODIUM 2 G: 2 INJECTION, POWDER, FOR SOLUTION INTRAMUSCULAR; INTRAVENOUS at 22:36

## 2023-09-28 RX ADMIN — SODIUM BICARBONATE: 84 INJECTION, SOLUTION INTRAVENOUS at 02:40

## 2023-09-28 RX ADMIN — HUMAN ALBUMIN MICROSPHERES AND PERFLUTREN 3 ML: 10; .22 INJECTION, SOLUTION INTRAVENOUS at 11:06

## 2023-09-28 RX ADMIN — ACETAMINOPHEN 650 MG: 325 TABLET, FILM COATED ORAL at 09:36

## 2023-09-28 RX ADMIN — SODIUM CHLORIDE: 9 INJECTION, SOLUTION INTRAVENOUS at 09:35

## 2023-09-28 RX ADMIN — ACETAMINOPHEN 650 MG: 325 TABLET, FILM COATED ORAL at 22:46

## 2023-09-28 RX ADMIN — SODIUM CHLORIDE, POTASSIUM CHLORIDE, SODIUM LACTATE AND CALCIUM CHLORIDE: 600; 310; 30; 20 INJECTION, SOLUTION INTRAVENOUS at 01:30

## 2023-09-28 RX ADMIN — CALCIUM GLUCONATE 1 G: 20 INJECTION, SOLUTION INTRAVENOUS at 06:43

## 2023-09-28 RX ADMIN — ASPIRIN 81 MG: 81 TABLET, COATED ORAL at 09:36

## 2023-09-28 RX ADMIN — CALCIUM GLUCONATE 2 G: 20 INJECTION, SOLUTION INTRAVENOUS at 00:50

## 2023-09-28 RX ADMIN — CALCIUM GLUCONATE 1 G: 20 INJECTION, SOLUTION INTRAVENOUS at 14:07

## 2023-09-28 RX ADMIN — CEFTRIAXONE SODIUM 2 G: 2 INJECTION, POWDER, FOR SOLUTION INTRAMUSCULAR; INTRAVENOUS at 00:39

## 2023-09-28 RX ADMIN — CALCIUM GLUCONATE 2 G: 20 INJECTION, SOLUTION INTRAVENOUS at 02:39

## 2023-09-28 RX ADMIN — ACETAMINOPHEN 650 MG: 325 TABLET, FILM COATED ORAL at 14:46

## 2023-09-28 ASSESSMENT — ACTIVITIES OF DAILY LIVING (ADL)
ADLS_ACUITY_SCORE: 45
ADLS_ACUITY_SCORE: 41
ADLS_ACUITY_SCORE: 45
ADLS_ACUITY_SCORE: 35
ADLS_ACUITY_SCORE: 41
ADLS_ACUITY_SCORE: 45
ADLS_ACUITY_SCORE: 48
ADLS_ACUITY_SCORE: 41
ADLS_ACUITY_SCORE: 45
ADLS_ACUITY_SCORE: 41
ADLS_ACUITY_SCORE: 45
ADLS_ACUITY_SCORE: 41

## 2023-09-28 NOTE — H&P
Critical Care  Note      09/27/2023    Name: Bret Fleming MRN#: [id number]   Age: 73 year old YOB: 1950     Hsptl Day# 0  ICU DAY #1                 Problem List:   Active Problems:    * No active hospital problems. *  Acute Renal Failure   Hyperkalemia  Rhabdomyolysis            Summary/Hospital Course:   History obtained via chart review.     72 yo male w/ hx of HTN, CAD who presented to OSH for SOB. Per chart review, patient thought he had flu ,and thus has had very limited PO intake for the past week. He also reports N/V as well as abdominal tenderness. He  has been taking Tylenol and ibuprofen, but unable to recall how much. No cough or diarrhea.     In the outside ED, he was given albuterol neb, 1 g calcium gluconate, and insulin for shifting. He was also started on  Zosyn and vanc, given 2.5 L NS, and 150 mg of bicarb.  Patient completed run of HD at OSH. Difficult guido placement and required urology consult who recommended STAT CT abdomen to assess for obstructive uropathy.     Transferred to [facility] for further care.     ROS - Unable due to assess given mental status.      Assessment and plan :     Bret Fleming IS a 73 year old adult admitted on 9/27/23 for acute renal failure w/ hyperkalemia s/p HD.     I have personally reviewed the daily labs, imaging studies, cultures and discussed the case with referring physician and consulting physicians.     My assessment and plan by system for this patient is as follows:    Neurology/Psychiatry:   1. Acute encephalopathy 2/2 metabolic derangements/uremia.   2. Pain/analgesia: Denies any pain.    Cardiovascular:   1.Hemodynamics: Not requiring pressors.   2. Elevated troponin: Likely demand ischemia in the setting of acute renal failure. EKG w/ changes consistent w/ hyperkalemia.   - Trend troponin  - Repeat EKG  - TTE    Pulmonary/Ventilator Management:   1. Oxygenation: On 3L.  - CXR    GI and Nutrition :   1. Elevated liver enzymes:  AST/ALT in the 100s w/ elevated INR. Plan to obtain imaging.   2. Elevated lipase will assess for pancreatitis w/ CT abd.   3. Diarrhea: Several episodes of liquid stool. Possible gastroenteritis, stool panel ordered.   3. Enteral feedings: NPO for tonight       Renal/Fluids/Electrolytes:   Acute renal failure on CKD3: Likely multifactorial 2/2 poor po intake and possible obstructive component (difficult guido placement 2/2 hypospadias) in the setting of progressive CKD. S/p HD run. Plan to obtain STAT CT abd to r/o obstruction.   Hyperkalemia and Hyperphosphatemia: 2/2 renal failure. Corrected w/ shifting and HD.  AGMA: ph 6.93 s/p bicarb and HD. Trend gases.   Elevated CK: CK of 7K. UA, electrolyte derangements, and elevated INR concerning for rhabdo vs 2/2 renal failure. Trend CK.   Hyponatremia: Na 127. Sodium studies ordered.     Infectious Disease:   1. Leukocytosis 2/2 UTI: UA w/ > 182 WBC and + leukocyte esterase, patient also with hx of drug resistant UTI.  COVID/Influenza/RSV negative. Having multiple liquid stools, consider gastroenteritis as additional source.  - Bcx2, UC, MRSA, stool panel pending  - CT abd/pelvis  - s/p vanc/zosyn, switch to Ceftriaxone daily and give dose now given resistance patterns    Endocrine:   1. BG checks Q4H, hypoglycemia protocol     Hematology/Oncology:   1. Coagulopathy: INR elevated at 1.81. Possibly 2/2 poor nutrition vs rhabdo. No evidence of acute bleeding.   - Trend INR  2. Acute on chronic anemia, no signs, symptoms of active blood loss. Baseline around 11 likely a component of  anemia of chronic disease.         IV/Access:   1. Venous access - HD catheter , PICC  2. Arterial access -  None  3. PIV  Plan  - central access required and necessary      ICU Prophylaxis:   1. DVT: Hold for now, consider starting in AM  2. VAP: HOB 30 degrees, chlorhexidine rinse  3. Stress Ulcer: Not indicated   4. Restraints: Not indicated  5. Wound care  as needed.   6. Feeding -  NPO  7. Family Update: Attempted to call wife x2 without response.   8. Disposition - MICU    Clinically Significant Risk Factors Present on Admission        # Hypokalemia: Lowest K = 3.1 mmol/L in last 2 days, will replace as needed  # Hyperkalemia: Highest K = 7 mmol/L in last 2 days, will monitor as appropriate  # Hyponatremia: Lowest Na = 127 mmol/L in last 2 days, will monitor as appropriate     # Anion Gap Metabolic Acidosis: Highest Anion Gap = 33 mmol/L in last 2 days, will monitor and treat as appropriate   # Coagulation Defect: INR = 1.81 (Ref range: 0.85 - 1.15) and/or PTT = N/A, will monitor for bleeding                 # Anemia: based on hgb <11                      Key Medications:     Ceftriaxone         Physical Examination:   Temp:  [96.5  F (35.8  C)-97.6  F (36.4  C)] 97.6  F (36.4  C)  Pulse:  [] 109  Resp:  [16-40] 26  BP: (108-158)/(57-70) 108/59  SpO2:  [98 %-100 %] 100 %  No intake or output data in the 24 hours ending 09/27/23 2057  Wt Readings from Last 4 Encounters:   09/27/23 90.7 kg (200 lb)   05/27/22 104 kg (229 lb 3.2 oz)   02/05/22 106.5 kg (234 lb 12.8 oz)   08/18/21 117.8 kg (259 lb 9.6 oz)     BP - Mean:  [] 80  Resp: 26    No lab results found in last 7 days.    GEN: no acute distress, intermittently answering questions  HEENT: head ncat, sclera anicteric  PULM: no wheezing, trace crackles on anterior ausculation   CV/COR: RRR  ABD: soft tender in all 4 quadrants, mild guarding, hypoactive bowel sounds  EXT:  warm and well perfused x4  NEURO: Pupils equal in size, no obvious deficits  SKIN: no obvious rash  LINES: clean, dry intact         Data:   All data and imaging reviewed     ROUTINE ICU LABS (Last four results)  CMP  Recent Labs   Lab 09/27/23 2031 09/27/23 2023 09/27/23  1836 09/27/23  1754 09/27/23  1752 09/27/23  1446   NA  --   --   --   --   --  127*   POTASSIUM 3.1*  --   --   --  5.2 7.0*   CHLORIDE  --   --   --   --   --  90*   CO2  --   --   --   --    --  4*   ANIONGAP  --   --   --   --   --  33*   GLC  --  104* 131* 76  --  131*   BUN  --   --   --   --   --  213.4*   CR  --   --   --   --   --  14.61*   GFRESTIMATED  --   --   --   --   --  3*   CHANA  --   --   --   --   --  8.5*   MAG  --   --   --   --   --  2.8*   PHOS  --   --   --   --   --  12.3*   PROTTOTAL  --   --   --   --   --  7.7   ALBUMIN  --   --   --   --   --  4.5   BILITOTAL  --   --   --   --   --  0.5   ALKPHOS  --   --   --   --   --  84   AST  --   --   --   --   --  182*   ALT  --   --   --   --   --  100*     CBC  Recent Labs   Lab 09/27/23  1446   WBC 14.6*   RBC 2.72*   HGB 8.7*   HCT 26.8*   MCV 99   MCH 32.0   MCHC 32.5   RDW 12.8        INR  Recent Labs   Lab 09/27/23  1446   INR 1.81*     Arterial Blood GasNo lab results found in last 7 days.    All cultures:  No results for input(s): CULT in the last 168 hours.  No results found for this or any previous visit (from the past 24 hour(s)).           Past Medical/surgical hx, meds, allergies, social history, family history     Past Medical History:   Diagnosis Date    Hypertension      Past Surgical History:   Procedure Laterality Date    PICC TRIPLE LUMEN PLACEMENT  9/27/2023     albumin human 25 % injection 50 mL  [COMPLETED] albuterol (PROVENTIL) neb solution 10 mg  alteplase (CATHFLO ACTIVASE) injection 2 mg  alteplase (CATHFLO ACTIVASE) injection 2 mg  [COMPLETED] calcium gluconate 10 % injection 1 g  [COMPLETED] dextrose 10% BOLUS 300 mL  glucose gel 15-30 g   Or  dextrose 50 % injection 25-50 mL   Or  glucagon injection 1 mg  [COMPLETED] heparin (porcine) injection 1,000-5,000 Units  sodium chloride 0.9% DIALYSIS Cath LOCK - RED Lumen   Followed by  heparin 1000 unit/mL DIALYSIS Cath LOCK - RED Lumen  sodium chloride 0.9% DIALYSIS Cath LOCK - BLUE Lumen   Followed by  heparin 1000 unit/mL DIALYSIS Cath LOCK -BLUE Lumen  [COMPLETED] insulin regular 1 unit/mL injection 9.1 Units  lidocaine (LMX4) cream  lidocaine  (XYLOCAINE) 2 % external gel 10 mL  lidocaine 1 % 0.1-5 mL  [COMPLETED] lidocaine 1 % 1-20 mL  [COMPLETED] No heparin via hemodialysis machine  [COMPLETED] piperacillin-tazobactam (ZOSYN) 3.375 g vial to attach to  mL bag  [COMPLETED] sodium bicarbonate 8.4 % injection 100 mEq  [COMPLETED] sodium bicarbonate 8.4 % injection 50 mEq  sodium chloride (PF) 0.9% PF flush 10 mL  sodium chloride (PF) 0.9% PF flush 10 mL  sodium chloride (PF) 0.9% PF flush 10-20 mL  sodium chloride (PF) 0.9% PF flush 10-40 mL  sodium chloride (PF) 0.9% PF flush 10-40 mL  sodium chloride (PF) 0.9% PF flush 10-40 mL  sodium chloride (PF) 0.9% PF flush 9 mL  sodium chloride (PF) 0.9% PF flush 9 mL  [COMPLETED] sodium chloride 0.9% BOLUS 1,000 mL  [COMPLETED] sodium chloride 0.9% BOLUS 1,000 mL  sodium chloride 0.9% BOLUS 100-150 mL  [COMPLETED] sodium chloride 0.9% BOLUS 250 mL  [COMPLETED] sodium chloride 0.9% BOLUS 300 mL  [COMPLETED] vancomycin (VANCOCIN) 2,000 mg in sodium chloride 0.9 % 500 mL intermittent infusion    ASPIRIN PO, Take 81 mg by mouth  atenolol (TENORMIN) 100 MG tablet, Take 1 tablet (100 mg) by mouth daily  atorvastatin (LIPITOR) 80 MG tablet, Take 1 tablet (80 mg) by mouth daily  fluticasone (FLONASE) 50 MCG/ACT nasal spray, Spray 1 spray into both nostrils daily         Allergies   Allergen Reactions    Ciprofloxacin Rash    Other Drug Allergy (See Comments)      Cough Syrup Abstracted from [facility] Chart( Hydrobromide)     Social History     Socioeconomic History    Marital status:      Spouse name: Not on file    Number of children: Not on file    Years of education: Not on file    Highest education level: Not on file   Occupational History    Not on file   Tobacco Use    Smoking status: Former     Types: Cigarettes     Quit date: 1995     Years since quittin.4    Smokeless tobacco: Never   Substance and Sexual Activity    Alcohol use: Not on file    Drug use: Not on file    Sexual activity: Not on  file   Other Topics Concern    Not on file   Social History Narrative    Not on file     Social Determinants of Health     Financial Resource Strain: Not on file   Food Insecurity: Not on file   Transportation Needs: Not on file   Physical Activity: Not on file   Stress: Not on file   Social Connections: Not on file   Interpersonal Safety: Not on file   Housing Stability: Not on file     Family History   Problem Relation Age of Onset    Cancer Father     Cancer Paternal Grandfather     Diabetes Paternal Grandfather     Hypertension No family hx of     Cerebrovascular Disease No family hx of     Thyroid Disease No family hx of     Glaucoma No family hx of     Macular Degeneration No family hx of

## 2023-09-28 NOTE — ED NOTES
Patient wife updated on patient. All questions and concerns addressed at this time. Informed on plan to transfer to [facility].

## 2023-09-28 NOTE — PLAN OF CARE
Neuro: Lethargic, somnolent. Difficult to assess orientation 2/2 altered LOC. Uses short 1 word responses, grunts. Slowly improving. Arouses to voice, GARCIA, PERRL.  CV: Normotensive  Respiratory: LS dim. Kussmaul resps at times. RA.  GI/: Culp placed per Urology 2/2 hypospadias. Urine pink, sediment, tissue present. Diarrhea. Dark brown, liquid.  Skin: Bruising across abdomen. Scattered otherwise.  Activity: BR.  Diet: NPO  Drips: D5+HCO3.  Other: PTA ulceration to hypospadias meatus.  Plan: Neph, Uro to consult. Possible HD run.

## 2023-09-28 NOTE — CONSULTS
[facility]    Nephrology Consultation     Date of Admission:  9/27/2023    Assessment & Plan     Bret Fleming is a 73 year old adult with PMH HTN, CAD, CKD IV, HLD, who was admitted on 9/27/2023 with acute renal failure, hyperkalemia.     Assessment:    RICHARD versus progression of CKD IV  Initial evaluation done by Dr. Demarco at [facility].  Creatinine prior to admission ranging between 1.8-3.3 over the course of 2022, no recent labs.  Creatinine on admission 14.61 in the setting of encephalopathy, recent NSAID use, sepsis, UTI, and obstructive uropathy.  Most likely significant prerenal RICHARD, probable ATN at this point.  He did undergo dialysis at [facility] primarily for acidosis and severe hyperkalemia causing EKG changes.  Not entirely clear how much renal recovery he will have yet though he is making a decent amount of urine on his own.  He continues to appear hypovolemic, agree with continuing IVF for today.  He has no absolute acute indication for dialysis today.  Will assess rate of rise of creatinine to determine if he will need dialysis tomorrow.  Hopefully he will only need dialysis temporarily going forward, however too soon to tell.  He did have some work-up for CKD done at outside hospital including [facility].  -Urine culture pending  -Empiric antibiotics per primary  -Strict I's/O's  -Daily BMP  -Avoid nephrotoxins as able  -Renally dose meds  -Agree with urology consultation  -We will assess tomorrow if further dialysis needed    Hyperkalemia, resolved  K7.0 on presentation with wide complexes noted on EKG at [facility].  Patient underwent emergent HD for 2 and half hours with resolution of K.  Did notably also have obstructive uropathy which contributed to his hyperkalemia in addition to his RICHARD.  He was also taking ibuprofen PTA.  -Daily BMP    Severe anion gap metabolic acidosis  Lactic acidosis, resolved  Starvation ketoacidosis  Initial pH 6.93, bicarb 4.   Lactate 3.7.  Ketones 2.1.  He underwent emergent HD with improvement.  Current pH normalized.  Agree with discontinuing bicarb containing fluids and instead continuing NS.    Obstructive uropathy  Moderate bilateral hydroureteronephrosis  Pyelonephritis  Hypospadias  History of urinary retention  Patient has history of urinary retention and UTIs in the past.  CT with bilateral hydroureteronephrosis and bladder wall thickening consistent with cystitis.  Culp placed by urology, initially yellow urine followed by milky cloudy urine consistent with UTI/pyelo-.  He is on empiric antibiotics.  Urology consulted.  -Please keep Culp in place for now, may need repeat imaging to ensure resolution of hydroureteronephrosis  -Strict I/os    Severe diarrhea  Colitis, possibly infectious  Severe diarrhea on admission, CT showing colitis.  C. difficile negative.  Management per primary.    Acute on chronic anemia  Hemoglobin 6.9 today, baseline appears to be around 10.  Iron studies adequate.  Getting unit of blood.    Sepsis    Hyperphosphatemia  Phos 12.3 on admission in the setting of acute renal failure.  5.2 today.  Could represent hyperphosphatemia related to CKD.  We will hold off on Phos binder for now and assess daily.    Plan/Recs:  1) continue NS at 100 mL/h  2) strict I/os  3) we will assess tomorrow for dialysis    Deshaun Franco MD   [facility] - Nephrology  [phone]  --------------------------------------------------------------------------------------------  Reason for Consult     I was asked to see the patient for RICHARD, hyperkalemia.    Primary Care Physician     Alisha Gloria    Chief Complaint     Shortness of breath    History is obtained from the patient and chart review.      History of Present Illness     Bret Fleming is a 73 year old adult who presents with shortness of breath.    Patient was initially seen at [facility] ED, presenting with shortness of breath.  He was found to  have low temp, labs notable for severe metabolic acidosis with a pH of 6.90, significant hyperkalemia with a K of 7, bicarb 4.  Creatinine was 14.6.  EKG had wide complexes.  Due to hyperkalemia with the EKG changes, patient was dialyzed for 2-1/2 hours with improvement in his K and his EKG.  He was then transferred to [facility].  He underwent CT abdomen pelvis showing significant bilateral hydroureteronephrosis with circumferential bladder wall thickening as well has colitis.    Patient seen this morning, unable to provide much history.  He denies shortness of breath currently.  Denies other complaints.  Appears to be in pain, cannot localize it.  Appears encephalopathic.  Nursing notes significant diarrhea and stool output.  Patient had Culp placed by urology due to hypospadia with 500 mL of yellow urine followed by 100 mL of milky appearing urine.  This morning making anywhere between 80 to 100 mL/h of urine.    Per chart review it appears patient was feeling flulike symptoms for roughly the last week with decreased appetite and generalized weakness.  He had nausea and vomiting as well.  He was taking over-the-counter ibuprofen and Tylenol around-the-clock.  He has a history of urinary retention and weak stream and hesitancy.  He was also incontinent of stool per notes.    Past Medical History   I have reviewed this patient's medical history and updated it with pertinent information if needed.   Past Medical History:   Diagnosis Date    Hypertension        Past Surgical History   I have reviewed this patient's surgical history and updated it with pertinent information if needed.  Past Surgical History:   Procedure Laterality Date    IR CVC TUNNEL PLACEMENT > 5 YRS OF AGE  9/27/2023    PICC TRIPLE LUMEN PLACEMENT  9/27/2023       Prior to Admission Medications   Prior to Admission Medications   Prescriptions Last Dose Informant Patient Reported? Taking?   ASPIRIN PO   Yes No   Sig: Take 81 mg by mouth   atenolol  (TENORMIN) 100 MG tablet   No No   Sig: Take 1 tablet (100 mg) by mouth daily   atorvastatin (LIPITOR) 80 MG tablet   No No   Sig: Take 1 tablet (80 mg) by mouth daily   fluticasone (FLONASE) 50 MCG/ACT nasal spray   No No   Sig: Spray 1 spray into both nostrils daily      Facility-Administered Medications: None     Allergies   Allergies   Allergen Reactions    Ciprofloxacin Rash    Other Drug Allergy (See Comments)      Cough Syrup Abstracted from [facility]( Hydrobromide)       Social History   I have reviewed this patient's social history and updated it with pertinent information if needed. Bret Fleming  reports that Shilpi Fleming quit smoking about 28 years ago. Shilpi Fleming has never used smokeless tobacco.    Family History   I have reviewed this patient's family history and updated it with pertinent information if needed.   Family History   Problem Relation Age of Onset    Cancer Father     Cancer Paternal Grandfather     Diabetes Paternal Grandfather     Hypertension No family hx of     Cerebrovascular Disease No family hx of     Thyroid Disease No family hx of     Glaucoma No family hx of     Macular Degeneration No family hx of        Review of Systems   Limited ROS due to patient's encephalopathy    Physical Exam   Temp: 98.1  F (36.7  C) Temp src: Oral BP: 112/57 Pulse: 105   Resp: 16 SpO2: 100 % O2 Device: None (Room air) Oxygen Delivery: 2 LPM  Vital Signs with Ranges  Temp:  [96.5  F (35.8  C)-99.3  F (37.4  C)] 98.1  F (36.7  C)  Pulse:  [] 105  Resp:  [14-40] 16  BP: (100-158)/() 112/57  SpO2:  [96 %-100 %] 100 %  182 lbs 1.6 oz    GENERAL: NAD, ill-appearing  HEENT:  Normocephalic.  Dry mucous membranes  CV: RRR, no murmurs  RESP: Clear anteriorly  GI: Abdomen soft/nt/nd.  Some suprapubic tenderness to palpation.  No CVA tenderness  MUSCULOSKELETAL: extremities nl - no gross deformities noted; no LE edema  SKIN: no suspicious lesions or rashes, dry to touch  NEURO: Alert  but disoriented, normal speech but difficulty answering questions  PSYCH: Confused  : Culp in place    Data   BMP  Recent Labs   Lab 09/28/23  0820 09/28/23 0449 09/28/23  0023 09/27/23 2344 09/27/23 2031 09/27/23 1754 09/27/23 1752 09/27/23  1446   NA  --  136  --  137  --   --   --  127*   POTASSIUM  --  3.7  --  3.3* 3.1*  --  5.2 7.0*   CHLORIDE  --  96*  --  96*  --   --   --  90*   CHANA  --  8.4*  --  7.6*  --   --   --  8.5*   CO2  --  13*  --  11*  --   --   --  4*   BUN  --  123.6*  --  115.6*  --   --   --  213.4*   CR  --  8.48*  --  8.07*  --   --   --  14.61*   * 99 81 82  --    < >  --  131*    < > = values in this interval not displayed.     Phos@[email or web address](phos:4)  CBC)  Recent Labs   Lab 09/28/23 0449 09/27/23 2344 09/27/23  1446   WBC 13.0* 9.3 14.6*   HGB 6.9* 7.1* 8.7*   HCT 19.5* 19.9* 26.8*   MCV 92 93 99   PLT 82* 81* 153     Recent Labs   Lab 09/28/23 0449   *   ALT 94*   ALKPHOS 69   BILITOTAL 0.4     Recent Labs   Lab 09/28/23 0449   INR 1.58*     No results found for: D2VIT, D3VIT, DTOT  Recent Labs   Lab 09/28/23 0449 09/27/23 2344 09/27/23 1752 09/27/23  1446   HGB 6.9* 7.1*  --  8.7*   HCT 19.5* 19.9*  --  26.8*   MCV 92 93  --  99   IRON  --   --   --  120   IRONSAT  --   --   --  53*   RETICABSCT  --  0.096*  --   --    RETP  --  4.5*  --   --    FEB  --   --   --  226*   JD  --   --  382  --    B12  --  2,943*  --   --    FOLIC 12.9  --   --   --      Recent Labs   Lab 09/27/23  1446   PTHI 220*     Color Urine (no units)   Date Value   09/27/2023 Light Yellow     Appearance Urine (no units)   Date Value   09/27/2023 Turbid (A)     Glucose Urine (mg/dL)   Date Value   09/27/2023 Negative     Bilirubin Urine (no units)   Date Value   09/27/2023 Negative     Ketones Urine (mg/dL)   Date Value   09/27/2023 5 (A)     Specific Gravity Urine (no units)   Date Value   09/27/2023 1.025     pH Urine (no units)   Date Value   09/27/2023 6.0     Protein Albumin  Urine (mg/dL)   Date Value   09/27/2023 100 (A)     Urobilinogen Urine (E.U./dL)   Date Value   05/27/2022 0.2     Nitrite Urine (no units)   Date Value   09/27/2023 Negative     Leukocyte Esterase Urine (no units)   Date Value   09/27/2023 500 Maria Alejandra/uL (A)     CT abd.pelvis  Narrative & Impression   EXAM: CT ABDOMEN PELVIS W/O CONTRAST  LOCATION: [facility]  DATE: 9/28/2023     INDICATION: acute renal failure, elevated lipase  COMPARISON: CT abdomen/pelvis without contrast 02/05/2022  TECHNIQUE: CT scan of the abdomen and pelvis was performed without IV contrast. Multiplanar reformats were obtained. Dose reduction techniques were used.  CONTRAST: None.     FINDINGS:   LOWER CHEST: Small to moderate sized right pleural effusion with compressive atelectasis.     HEPATOBILIARY: Unremarkable noncontrast CT appearance of the liver. Distended gallbladder with multiple layering tiny stones. No definite wall thickening or pericholecystic fluid. Gallbladder distention may relate to fasting state.     PANCREAS: Somewhat atrophic, otherwise unremarkable.     SPLEEN: Normal.     ADRENAL GLANDS: Unremarkable.     KIDNEYS/BLADDER: Bilateral kidney cysts which do not require dedicated follow-up. Moderate bilateral hydroureteronephrosis which extends down to the level of the urinary bladder which is diffusely thickened. Culp catheter within the urinary bladder.     BOWEL: Pancolonic wall thickening with pericolonic fat stranding most prominently involving the splenic flexure and descending colon. No bowel obstruction.     LYMPH NODES: Prominent retroperitoneal lymph nodes likely reactive.     VASCULATURE: Scattered atherosclerotic calcifications of the aortoiliac vessels without evidence of aneurysmal dilatation.     PELVIC ORGANS: Mildly prominent prostate with central calcifications.     MUSCULOSKELETAL: Multilevel degenerative changes of the thoracic and lumbosacral spine. No acute osseous abnormality  or suspicious bony lesion.                                                                      IMPRESSION:   1.  Pancolonic wall thickening with pericolonic fat stranding most prominently involving the splenic flexure and descending colon. Findings suspicious for infectious colitis with differential including pseudomembranous (C. difficile) colitis.  2.  Moderate bilateral hydroureteronephrosis which extends down to the level of the urinary bladder which is diffusely thickened. Bladder wall thickening highly suspicious for cystitis. Recommend correlation with urinalysis. Culp catheter within the   urinary bladder which is decompressed.  3.  Distended gallbladder with multiple layering tiny stones but without other secondary evidence of acute cholecystitis. Gallbladder distention may relate to fasting state. Pancreas is somewhat atrophic, otherwise unremarkable without CT evidence of   pancreatitis.  4.  Small to moderate sized right pleural effusion with compressive atelectasis.         Deshaun Franco MD   [facility] - Nephrology  [phone]

## 2023-09-28 NOTE — CONSULTS
[facility] CONSULTATION NOTE      Bret Fleming   [address]  [place] [address]  73 year old  adult  Admission Date/Time: 9/27/2023  2:24 PM  Primary Care Provider:  Alisha Gloria was asked to see this patient by Dr. Harris for evaluation of urinary retention and difficult guido placement.     HPI:   73 year old adult with PMHx significant for HTN, stage IV CKD and hyperlipidemia who presented for evaluation of shortness of breath, found to have hypothermia of 96.5 and acute renal failure with a Cr of 14.     His Cr was 3.3 in 5/2022 per nephrology and care everywhere. On evaluation he is confused and somnolent, unable to answer questions.     He had a bladder scan of 500 ml and they were unable to place a guido 2/2 altered anatomy from his hypospadias.     REVIEW OF SYSTEMS:    Unable to obtain 2/2 AMS      ALLERGIES/SENSITIVITIES:   Allergies   Allergen Reactions    Ciprofloxacin Rash    Other Drug Allergy (See Comments)      Cough Syrup Abstracted from [facility]( Hydrobromide)       Current inpatient medications reviewed    PHYSICAL EXAM:     General Appearance:   laying in bed, confused and appears uncomfortable   /59   Pulse 109   Temp 97.6  F (36.4  C) (Axillary)   Resp 26   Wt 90.7 kg (200 lb)   SpO2 100%   BMI 27.12 kg/m    Body mass index is 27.12 kg/m .  HEAD, EARS, NOSE, MOUTH, AND THROAT: Walsh/Moist  RESPIRATORY: tachypnea  CARDIOVASCULAR: NO JVD or visible edema  MUSCULOSKELETAL: Normal ROM observed  NEUROLOGIC: Grossly intact, symmetric fascial CN and voice.  PSYCHIATRIC: Affect appropriate, answers are clear and linear.  ABD: Soft, non-tender. No masses or rebound.  : Hypospadias to the mid penile shaft. Mild ulceration around hypospadic meatus       ADDITIONAL COMMENTS:    None    CONSULTATION ASSESSMENT AND PLAN:    73 year old adult with PMHx significant for HTN, stage IV CKD and hyperlipidemia who presented for evaluation of shortness of breath,  found to have hypothermia of 96.5 and acute renal failure with a Cr of 14. Urology consulted for difficult guido placement.     - 16 Fr guido placed without difficulty  - Immediate return of about 500 ml of yellow urine followed by 100 ml of milky/cloudy urine  - Patient unable to report prior Urological history given somnolence and confusion  - Given his acute renal failure would recommend STAT CT scan to rule out obstructive uropathy  - Continue antibiotics and follow-up culture  - Maintain guido to drainage and trend I/O    Thank you for the kind consult and allowing me to participate in the care of your patient. Feel free to call me with any questions.    Louie Clayton MD

## 2023-09-28 NOTE — PROGRESS NOTES
Critical Care  Note      09/28/2023    Name: Bret Fleming MRN#: [id number]   Age: 73 year old YOB: 1950     Hsptl Day# 1  ICU DAY #1                 Problem List:   Principal Problem:    Acute renal failure (ARF) (H)  Acute on chronic Renal Failure   Hyperkalemia--resolved  Rhabdomyolysis            Summary/Hospital Course:   History obtained via chart review.     74 yo male w/ hx of HTN, CAD who presented to OSH for SOB. Per chart review, patient thought he had flu ,and thus has had very limited PO intake for the past week. He also reports N/V as well as abdominal tenderness. He  has been taking Tylenol and ibuprofen, but unable to recall how much. No cough or diarrhea.     In the outside ED, he was found to have hyperkalemia and renal failure.  He was given albuterol neb, 1 g calcium gluconate, and insulin for shifting. He was also started on  Zosyn and vanc, given 2.5 L NS, and 150 mg of bicarb.  Patient completed run of HD at OSH. Difficult guido placement and   required urology consult who recommended STAT CT abdomen to assess for obstructive uropathy.     CT showed colitis (though c diff testing negative) and bladder wall thickening c/w cystitis with b/l moderate hydronephrosis, UA c/w UTI.    On my visit this morning he denies complaints but history is limited by mild confusion (uremia?).    ROS - Unable due to assess given mental status.      Assessment and plan :     Bret Fleming IS a 73 year old adult admitted on 9/27/23 for acute renal failure w/ hyperkalemia s/p HD.     I have personally reviewed the daily labs, imaging studies, cultures and discussed the case with referring physician and consulting physicians.     My assessment and plan by system for this patient is as follows:    Neurology/Psychiatry:   1. Acute encephalopathy 2/2 metabolic derangements/uremia.   2. Pain/analgesia: Denies any pain. Tylenol prn    Cardiovascular:   1.Hemodynamics: Not requiring pressors.   2.  Elevated troponin: Likely demand ischemia in the setting of acute renal failure. EKG w/ changes consistent w/ hyperkalemia.   3.  Sepsis:  in setting of UTI.  Continuing fluids.  On CTX. Lactic resolved to 1.4.  blood and urine cx pending.  4. H/o hypertension:  holding home atenolol  5.  H/o hld:  hold home atorva until lft's resolve  - Trend troponin  - Repeat EKG  - TTE    Pulmonary/Ventilator Management:   1. Oxygenation: On room air  - CXR    GI and Nutrition :   1. Elevated liver enzymes: AST/ALT in the 100s but downtrending.  Trend.  2. Diarrhea: Several episodes of liquid stool. Possible gastroenteritis, stool panel ordered. Colitis on CT but C diff negative  3. Enteral feedings: renal diet    Renal/Fluids/Electrolytes:   Acute renal failure on CKD3: Likely multifactorial 2/2 poor po intake, possible obstructive component (difficult guido placement 2/2 hypospadias) in the setting of UTI and progressive CKD. S/p HD run.   Moderate hydro on CT:  appreciate urology input.  He is making urine now so obstruction is not complete.  Hypokalemia: repleted  Hyperphosphatemia: 2/2 renal failure. Phos binders if nephro believes indicated  Ketoacidosis:  likely starvation induced.  Resolving now with po intake   Elevated CK: CK of 7K. Now downtrended to 3.5k.  continue trending.  Hyponatremia: resolved    Infectious Disease:   1. Leukocytosis 2/2 UTI: UA w/ > 182 WBC and + leukocyte esterase.  COVID/Influenza/RSV negative. Having multiple liquid stools, consider gastroenteritis as additional source.  - Bcx2, UC, MRSA, stool panel pending  - s/p vanc/zosyn, switch to Ceftriaxone daily and give dose now given resistance patterns    Endocrine:   1. BG checks Q4H, hypoglycemia protocol     Hematology/Oncology:   1. Coagulopathy: INR elevated at 1.81. Possibly 2/2 poor nutrition vs rhabdo. No evidence of acute bleeding.   - Trend INR  2. Acute on chronic anemia, no signs, symptoms of active blood loss. Baseline around 11  likely a component of  anemia of chronic disease.  6.9 today--will give 1 prbc.       IV/Access:   1. Venous access - HD catheter , PICC  2. Arterial access -  None  3. PIV  Plan  - central access required and necessary      ICU Prophylaxis:   1. DVT: hep subcutaneous        Clinically Significant Risk Factors Present on Admission        # Hypokalemia: Lowest K = 3.1 mmol/L in last 2 days, will replace as needed  # Hyperkalemia: Highest K = 7 mmol/L in last 2 days, will monitor as appropriate  # Hyponatremia: Lowest Na = 127 mmol/L in last 2 days, will monitor as appropriate  # Hypocalcemia: Lowest Ca = 7.6 mg/dL in last 2 days, will monitor and replace as appropriate    # Anion Gap Metabolic Acidosis: Highest Anion Gap = 33 mmol/L in last 2 days, will monitor and treat as appropriate  # Hypoalbuminemia: Lowest albumin = 3.3 g/dL at 9/28/2023  4:49 AM, will monitor as appropriate    # Coagulation Defect: INR = 1.58 (Ref range: 0.85 - 1.15) and/or PTT = 34 Seconds (Ref range: 22 - 38 Seconds), will monitor for bleeding  # Drug Induced Platelet Defect: home medication list includes an antiplatelet medication                # Anemia: based on hgb <11                 Physical Examination:   Temp:  [96.5  F (35.8  C)-99.3  F (37.4  C)] 98.1  F (36.7  C)  Pulse:  [] 111  Resp:  [14-40] 18  BP: (100-158)/() 119/64  SpO2:  [96 %-100 %] 99 %  No intake or output data in the 24 hours ending 09/27/23 2057  Wt Readings from Last 4 Encounters:   09/28/23 82.6 kg (182 lb 1.6 oz)   09/27/23 90.7 kg (200 lb)   05/27/22 104 kg (229 lb 3.2 oz)   02/05/22 106.5 kg (234 lb 12.8 oz)     BP - Mean:  [] 82  Resp: 18    Recent Labs   Lab 09/28/23  0449 09/27/23  2344   O2PER 21 33       GEN: no acute distress, intermittently answering questions  HEENT: head ncat, sclera anicteric  PULM: no wheezing, trace crackles on anterior ausculation   CV/COR: RRR  ABD: soft, hypoactive bowel sounds  EXT:  warm and well perfused  x4  NEURO: Pupils equal in size, no obvious deficits  SKIN: no obvious rash  LINES: clean, dry intact         Data:   All data and imaging reviewed     ROUTINE ICU LABS (Last four results)  CMP  Recent Labs   Lab 09/28/23  0820 09/28/23 0449 09/28/23  0023 09/27/23 2344 09/27/23 2031 09/27/23 1754 09/27/23 1752 09/27/23  1446   NA  --  136  --  137  --   --   --  127*   POTASSIUM  --  3.7  --  3.3* 3.1*  --  5.2 7.0*   CHLORIDE  --  96*  --  96*  --   --   --  90*   CO2  --  13*  --  11*  --   --   --  4*   ANIONGAP  --  27*  --  30*  --   --   --  33*   * 99 81 82  --    < >  --  131*   BUN  --  123.6*  --  115.6*  --   --   --  213.4*   CR  --  8.48*  --  8.07*  --   --   --  14.61*   GFRESTIMATED  --  6*  --  6*  --   --   --  3*   CHANA  --  8.4*  --  7.6*  --   --   --  8.5*   MAG  --  1.9  --  1.7  --   --   --  2.8*   PHOS  --  5.2*  --  3.7  --   --   --  12.3*   PROTTOTAL  --  5.7*  --  6.0*  --   --   --  7.7   ALBUMIN  --  3.3*  --  3.5  --   --   --  4.5   BILITOTAL  --  0.4  --  0.5  --   --   --  0.5   ALKPHOS  --  69  --  72  --   --   --  84   AST  --  166*  --  182*  --   --   --  182*   ALT  --  94*  --  100*  --   --   --  100*    < > = values in this interval not displayed.     CBC  Recent Labs   Lab 09/28/23 0449 09/27/23 2344 09/27/23  1446   WBC 13.0* 9.3 14.6*   RBC 2.11* 2.15* 2.72*   HGB 6.9* 7.1* 8.7*   HCT 19.5* 19.9* 26.8*   MCV 92 93 99   MCH 32.7 33.0 32.0   MCHC 35.4 35.7 32.5   RDW 12.1 11.9 12.8   PLT 82* 81* 153     INR  Recent Labs   Lab 09/28/23  0449 09/27/23  2344 09/27/23  1446   INR 1.58* 1.56* 1.81*     Arterial Blood Gas  Recent Labs   Lab 09/28/23  0449 09/27/23  2344   O2PER 21 33       All cultures:  No results for input(s): CULT in the last 168 hours.  No results found for this or any previous visit (from the past 24 hour(s)).    EKG:  sinus 111, nl axis, nl int, no acute ischemic changes       Past Medical/surgical hx, meds, allergies, social history,  family history     Past Medical History:   Diagnosis Date    Hypertension      Past Surgical History:   Procedure Laterality Date    IR CVC TUNNEL PLACEMENT > 5 YRS OF AGE  9/27/2023    PICC TRIPLE LUMEN PLACEMENT  9/27/2023     [COMPLETED] albuterol (PROVENTIL) neb solution 10 mg  [COMPLETED] calcium gluconate 10 % injection 1 g  [COMPLETED] dextrose 10% BOLUS 300 mL  [COMPLETED] heparin (porcine) injection 1,000-5,000 Units  [COMPLETED] sodium chloride 0.9% DIALYSIS Cath LOCK - RED Lumen   Followed by  [COMPLETED] heparin 1000 unit/mL DIALYSIS Cath LOCK - RED Lumen  [COMPLETED] sodium chloride 0.9% DIALYSIS Cath LOCK - BLUE Lumen   Followed by  [COMPLETED] heparin 1000 unit/mL DIALYSIS Cath LOCK -BLUE Lumen  [COMPLETED] insulin regular 1 unit/mL injection 9.1 Units  [COMPLETED] lidocaine 1 % 1-20 mL  [COMPLETED] No heparin via hemodialysis machine  [COMPLETED] piperacillin-tazobactam (ZOSYN) 3.375 g vial to attach to  mL bag  [COMPLETED] sodium bicarbonate 8.4 % injection 100 mEq  [COMPLETED] sodium bicarbonate 8.4 % injection 50 mEq  [COMPLETED] sodium chloride 0.9% BOLUS 1,000 mL  [COMPLETED] sodium chloride 0.9% BOLUS 1,000 mL  [COMPLETED] sodium chloride 0.9% BOLUS 250 mL  [COMPLETED] sodium chloride 0.9% BOLUS 300 mL  [COMPLETED] vancomycin (VANCOCIN) 2,000 mg in sodium chloride 0.9 % 500 mL intermittent infusion    ASPIRIN PO, Take 81 mg by mouth  atenolol (TENORMIN) 100 MG tablet, Take 1 tablet (100 mg) by mouth daily  atorvastatin (LIPITOR) 80 MG tablet, Take 1 tablet (80 mg) by mouth daily  fluticasone (FLONASE) 50 MCG/ACT nasal spray, Spray 1 spray into both nostrils daily         Allergies   Allergen Reactions    Ciprofloxacin Rash    Other Drug Allergy (See Comments)      Cough Syrup Abstracted from [facility] Chart( Hydrobromide)     Social History     Socioeconomic History    Marital status:      Spouse name: Not on file    Number of children: Not on file    Years of education: Not on file     Highest education level: Not on file   Occupational History    Not on file   Tobacco Use    Smoking status: Former     Types: Cigarettes     Quit date: 1995     Years since quittin.4    Smokeless tobacco: Never   Substance and Sexual Activity    Alcohol use: Not on file    Drug use: Not on file    Sexual activity: Not on file   Other Topics Concern    Not on file   Social History Narrative    Not on file     Social Determinants of Health     Financial Resource Strain: Not on file   Food Insecurity: Not on file   Transportation Needs: Not on file   Physical Activity: Not on file   Stress: Not on file   Social Connections: Not on file   Interpersonal Safety: Not on file   Housing Stability: Not on file     Family History   Problem Relation Age of Onset    Cancer Father     Cancer Paternal Grandfather     Diabetes Paternal Grandfather     Hypertension No family hx of     Cerebrovascular Disease No family hx of     Thyroid Disease No family hx of     Glaucoma No family hx of     Macular Degeneration No family hx of

## 2023-09-28 NOTE — ED NOTES
ER MD robles with pt transporting to [facility] ICU without CT abd/pelvis still needed and redraw of potassium

## 2023-09-28 NOTE — PLAN OF CARE
Neuro: Lethargic/somnolent. Oriented to self and place, disoriented to time and situation. Strength equal in all extremities.     Cardiac: Sinus tachycardia. Normotensive.     Respiratory: Room air, clear lungs.     GI: 2x loose/soft BMs today. Poor appetite, very little oral intake today.     : Culp catheter in place, adequate output. Cloudy, sediment.     Shift Events: Wound care consulted today. Orders placed. Tylenol given for generalized pain. 1 unit PRBC given.     Plan of Care: Plan to transfer out of ICU when bed available. Nephrology will reassess daily for HD needs. Recheck ionized calcium and hgb in AM.

## 2023-09-28 NOTE — ED NOTES
Handoff report called to receiving RN at [facility]. Transport to be arranged after completion of dialysis. All questions and concerns addressed at this time.

## 2023-09-28 NOTE — PROGRESS NOTES
[facility]    Medicine Progress Note - Hospitalist Service    Date of Admission:  9/27/2023    Assessment & Plan   Mr. Fleming is a 73-year-old gentleman with a past medical history of hypertension, coronary artery disease, CKD stage III who initially presented to an outside hospital for shortness of breath and poor p.o. intake with some nausea and vomiting.  He was found to have a potassium of 7 and a creatinine of 14.6and a pH of 6.98..  He was initially given albuterol, 1 g of calcium gluconate and insulin for shifting for his hyperkalemia.  Nephrology was consulted and he was initiated on an emergent run of hemodialysis at the outside hospital.  Due to very difficult Culp catheter placement a urology consult was placed and a CT abdomen and pelvis discovered obstructive uropathy with bilateral hydronephrosis.  The CT also displayed colitis with bladder wall thickening.  Urine analysis was positive for infection and he was started on broad-spectrum antibiotics with vancomycin and Zosyn.  Urine culture results are still pending.  He was transferred to [facility] on 9/27/2023 for ongoing care of worsening renal failure, electrolyte abnormalities and a urinary tract infection causing sepsis.    Overnight Mr. Fleming was labs have improved and Cr is down to 8.48 and he is making a small-moderate amount of urine per day. Potassium has returned to normal. He is transferring out of the ICU to the hospital medicine team ongoing.     # Acute on chronic renal failure requiring emergent dialsyis  # Hyperkalemia with ECG changes, resolved  # Starvation ketosis  # Severe metabolic acidosis, resolving  -Nephrology is following and Mr. Fleming currently is off dialysis.  We will continue with strict I's and O's, daily electrolyte monitoring, dose adjusting all medications accordingly  -Right IJ temporary dialysis catheter in place should he need future dialysis    # Troponemia  # New moderate  mitral valve regurgitation  # New HFrEF  Troponin levels are quite elevated secondary to demand ischemia and improving without any evidence of ischemia on his EKG.  He denies any current chest pain, palpitations   -TTE from today 9/28/2023 reveals worsening heart failure with an EF of 30 to 35% with a large area of hypokinesis and akinesis distal to the septum with only suspected large LAD infarct pattern, mitral valve regurgitation and elevated right ventricular systolic pressure  -He does not have a prior echocardiogram for comparison.  -Cardiology consult to evaluate new heart failure and provide updated recommendations.  Once kidneys improve patient may be a candidate for ACE/ARB but not at present    # Sepsis secondary to a urinary tract infection, resolving  # Bilateral hydronephrosis  CT abdomen and pelvis revealed bilateral hydro ureteral nephrosis without ureteral obstruction.  Culp catheter was placed on 9/27/2023 and is helping to decompress the bladder presently.  -Urology is consulted and per their recommendations we will continue the Culp catheter for bladder decompression  -Continue broad-spectrum antibiotics including ceftriaxone until susceptibilities return and then plan to treat for a total of 7 days    # Colitis with loose stool  # Transaminitis  He and his wife describe flu like symptoms starting last week with nausea, vomiting, decreased appetite, generalized fatigue and weakness.   INR was elevated at 1.8 and liver enzymes AST/ALT were elevated in the 100s but are now improving.  -Stool panel ordered and pending    # Generalized weakness and debility   -PT/OT consults to evaluate safety to return home and provide strength training while in the hospital       Diet: Renal Diet (dialysis)  Snacks/Supplements Adult: Nepro Oral Supplement; Between Meals    DVT Prophylaxis:   Culp Catheter: PRESENT, indication: Strict 1-2 Hour I&O;Insertion Difficulty  Lines: PRESENT      PICC 09/27/23 Triple  Lumen Right Brachial vein lateral Vascular Access-Site Assessment: WDL  CVC Double Lumen Right Internal jugular Non - tunneled-Site Assessment: WDL      Cardiac Monitoring: ACTIVE order. Indication: ICU  Code Status: Full Code      Disposition Plan  From home, will return to home once medically stable, still several days out       JOEL Gaston  Hospitalist Service  [facility]  Securely message with [employer] (more info)  Text page via [employer] Paging/Directory   ______________________________________________________________________    Interval History   Mr. Fleming was resting in bed, no acute distress, reported some mild pain all over his body but did not want any pain medication.  He denies any chest pain, shortness of breath, fevers, chills, lightheadedness, abdominal pain.     Physical Exam   Vital Signs: Temp: 97.4  F (36.3  C) Temp src: Oral BP: 108/59 Pulse: 103   Resp: 17 SpO2: 98 % O2 Device: None (Room air) Oxygen Delivery: 2 LPM  Weight: 182 lbs 1.6 oz    General Appearance: 73 year old gentleman resting in bed, very fatigued, able to answer questions and pleasant in conversation, no acute distress  Respiratory: breathing comfortably on room air, no adventitious sounds to bilateral auscultation  Cardiovascular: regular rate and rhythm, no appreciable murmurs, rubs or gallops  GI: tinkering bowel sounds present, soft, non-tender to palpation throughout, no rebound or guarding  Skin:   Psych: alert, oriented to name, date    Medical Decision Making             Data

## 2023-09-28 NOTE — ED NOTES
Report from Teagan DANIELS, assumed care. Teagan DANIELS called report to [facility]

## 2023-09-28 NOTE — CONSULTS
[facility] Nurse Inpatient Assessment     Consulted for: Penis    Summary: Pt with hypospadias and ulceration to ventral aspect. Unclear etiology of this wound as pt reports no catheter use until hospitalization. This is however friable tissue that can be damaged easily. Devitalized tissue covers the area, will apply a thin layer of triad paste to debride and protect. No acute s/sx of infection.     Patient History (according to provider note(s):      74 yo male w/ hx of HTN, CAD who presented to OSH for SOB. Per chart review, patient thought he had flu ,and thus has had very limited PO intake for the past week. He also reports N/V as well as abdominal tenderness. He  has been taking Tylenol and ibuprofen, but unable to recall how much. No cough or diarrhea.      In the outside ED, he was given albuterol neb, 1 g calcium gluconate, and insulin for shifting. He was also started on  Zosyn and vanc, given 2.5 L NS, and 150 mg of bicarb.  Patient completed run of HD at OSH. Difficult guido placement and required urology consult who recommended STAT CT abdomen to assess for obstructive uropathy.     Assessment:      Areas visualized during today's visit:  penis    Wound location: Ventral Penis    Last photo: 9/28/23    Wound due to: Unknown Etiology  Wound history/plan of care: Erosion of mucosa with thin layer of devitalized tissue  Wound base: 90 % grey slick adherent slough 10%red mucosa     Palpation of the wound bed: firm      Drainage: scant     Description of drainage: serous     Measurements (length x width x depth, in cm): 1  x 1.3  x  0.1 cm      Tunneling: N/A     Undermining: N/A  Periwound skin: Intact      Color: normal and consistent with surrounding tissue      Temperature: normal   Odor: none  Pain: mild, intermittent and tender  Pain interventions prior to dressing change: slow and gentle cares   Treatment goal: Heal  and Remove necrotic tissue  STATUS: initial  assessment  Supplies ordered: ordered Triad paste       Treatment Plan:     Ventral Penis wound(s): Daily   cleanse with cindy cleanse and protect and bob dry wipes/washcloths.   Ensure area is completely dry by blotting and using circular motions, do not wipe as this can cause trauma to the skin   Apply a thin layer (pea sized amt) of Triad paste (#[id number]) to wound base on underside of penis. Using a very small amount to the area will prevent it from caking and becoming messy.   Remove only soiled paste, then reapply thin layer. If complete removal is needed use baby/mineral oil (located in pharmacy).   *Avoid pre moisten wipes.   *Avoid use of brief  *Use single covidien pad and limit number of linens underneath patient. Covidien pad can be used as incontinence pad and lift pad       Orders: Written    RECOMMEND PRIMARY TEAM ORDER: None, at this time  Education provided: importance of repositioning, plan of care, and wound progress  Discussed plan of care with: Patient and Nurse  WOC nurse follow-up plan: weekly  Notify WOC if wound(s) deteriorate.  Nursing to notify the Provider(s) and re-consult the WOC Nurse if new skin concern.    DATA:     Current support surface: Standard  Standard gel/foam mattress (IsoFlex, Atmos air, etc)  Containment of urine/stool: Incontinence Protocol, Incontinent pad in bed, and Indwelling catheter  BMI: Body mass index is 24.7 kg/m .   Active diet order: Orders Placed This Encounter      Renal Diet (dialysis)     Output: I/O last 3 completed shifts:  In: 133.33 [I.V.:133.33]  Out: 430 [Urine:430]     Labs:   Recent Labs   Lab 09/28/23  0449   ALBUMIN 3.3*   HGB 6.9*   INR 1.58*   WBC 13.0*     Pressure injury risk assessment:   Sensory Perception: 4-->no impairment  Moisture: 3-->occasionally moist  Activity: 1-->bedfast  Mobility: 3-->slightly limited  Nutrition: 1-->very poor  Friction and Shear: 2-->potential problem  Milo Score: 14    Marc Vail RN CWOCN  -Securely  message with [employer] (more info) - can reach individually by name or search 'WOC Nurse' (Kehinde) to reach all current WOCs on duty.  WOC Office Phone: [phone]

## 2023-09-28 NOTE — CONSULTS
[facility] Inpatient Consultation Note    Bret Fleming MRN# [id number]   Age: 73 year old YOB: 1950     Date of Admission:  9/27/2023    Reason for consult: Hydronephrosis       Requesting physician: Dereck Boyer MD                 History of Present Illness:   Bret Fleming is a 73 year old male with PMHx of HTN, CAD who presented to OSH for shortness of breath. He was found to have acute on chronic renal failure (creatinine 14 - baseline around 3.3) with severe electrolyte abnormalities (hyperkalemia and uremia) and was transferred last night from [facility] to [facility] for further care.     Guido placement was requested due to need for strict I/O's. Dr. Louie Clayton of [facility] was consulted on patient last night (at [facility]) for difficult guido placement given patient's hypospadias. Guido successfully placed for 500 ml of yellow urine followed by 100 ml of milky/cloudy urine. UA concerning for UTI; Ucx pending.    CT a/p showed colitis, bilateral moderate hydroureteronephrosis extended to bladder, circumferential bladder wall thickening c/w cystitis, bladder decompressed around guido catheter.    Urine output via guido minimal since placement (90/430 ml). Patient remains confused this morning and is non-contributory towards HPI.    Creatinine 14.6 --> 8.48  PSA = 2.23 (2015)           Past Medical History:     Past Medical History:   Diagnosis Date    Hypertension              Past Surgical History:     Past Surgical History:   Procedure Laterality Date    IR CVC TUNNEL PLACEMENT > 5 YRS OF AGE  9/27/2023    PICC TRIPLE LUMEN PLACEMENT  9/27/2023             Social History:     Social History     Socioeconomic History    Marital status:      Spouse name: Not on file    Number of children: Not on file    Years of education: Not on file    Highest education level: Not on file   Occupational History    Not on file   Tobacco Use    Smoking  status: Former     Types: Cigarettes     Quit date: 1995     Years since quittin.4    Smokeless tobacco: Never   Substance and Sexual Activity    Alcohol use: Not on file    Drug use: Not on file    Sexual activity: Not on file   Other Topics Concern    Not on file   Social History Narrative    Not on file     Social Determinants of Health     Financial Resource Strain: Not on file   Food Insecurity: Not on file   Transportation Needs: Not on file   Physical Activity: Not on file   Stress: Not on file   Social Connections: Not on file   Interpersonal Safety: Not on file   Housing Stability: Not on file             Family History:     Family History   Problem Relation Age of Onset    Cancer Father     Cancer Paternal Grandfather     Diabetes Paternal Grandfather     Hypertension No family hx of     Cerebrovascular Disease No family hx of     Thyroid Disease No family hx of     Glaucoma No family hx of     Macular Degeneration No family hx of              Immunizations:     Immunization History   Administered Date(s) Administered    COVID-19 Bivalent 12+ (Pfizer) 10/02/2022    COVID-19 Monovalent 18+ (Moderna) 2021, 2021, 2021    FLU 6-35 months 2009, 2010, 12/15/2018    Flu, Unspecified 10/28/2013, 10/15/2017    Influenza (High Dose) 3 valent vaccine 10/15/2016    Influenza (IIV3) PF 2006, 2010, 2011, 2012    Pneumo Conj 13-V (2010&after) 11/15/2016    Pneumococcal 23 valent 11/10/2015    TDAP (Adacel,Boostrix) 2012    Td (Adult), Adsorbed 2003    Tetanus 06/15/2001             Allergies:     Allergies   Allergen Reactions    Ciprofloxacin Rash    Other Drug Allergy (See Comments)      Cough Syrup Abstracted from [facility] Chart( Hydrobromide)             Medications:     Current Facility-Administered Medications   Medication    acetaminophen (TYLENOL) tablet 650 mg    Or    acetaminophen (TYLENOL) Suppository 650 mg    aspirin EC tablet 81 mg     cefTRIAXone (ROCEPHIN) 2 g vial to attach to  ml bag for ADULTS or NS 50 ml bag for PEDS    glucose gel 15-30 g    Or    dextrose 50 % injection 25-50 mL    Or    glucagon injection 1 mg    heparin ANTICOAGULANT injection 5,000 Units    senna-docusate (SENOKOT-S/PERICOLACE) 8.6-50 MG per tablet 1 tablet    Or    senna-docusate (SENOKOT-S/PERICOLACE) 8.6-50 MG per tablet 2 tablet    sodium chloride 0.9% infusion             Review of Systems:   Comprehensive review of systems from the Admission note dated 9/27/23 at [facility] was reviewed with no changes except per HPI.     Examination:  /57   Pulse 105   Temp 98.1  F (36.7  C) (Oral)   Resp 14   Ht 1.829 m (6')   Wt 82.6 kg (182 lb 1.6 oz)   SpO2 99%   BMI 24.70 kg/m    General: Alert and oriented, no distress. Lying supine in bed.  HEENT: Face symmetric, mucous membranes moist and pink  Eyes: No scleral icterus  Neck: Symmetric  Chest wall: Symmetric  Cardiac: Extremities warm and well perfused, no edema  Abdomen: soft, non tender, non distended; + suprapubic tenderness  Back: No CVA or flank tenderness  : 16 Fr Culp draining minimal cloudy urine with sediment  Extremities: No evidence of deformities or trauma  Neuro:Grossly non focal  Pysch: Normal mood and affect  Skin: No evident rashes or lesions            Data:     Lab Results   Component Value Date    WBC 13.0 09/28/2023     Lab Results   Component Value Date    RBC 2.11 09/28/2023     Lab Results   Component Value Date    HGB 6.9 09/28/2023     Lab Results   Component Value Date    HCT 19.5 09/28/2023     Lab Results   Component Value Date    PLT 82 09/28/2023     Creatinine   Date Value Ref Range Status   09/28/2023 8.48 (H) 0.51 - 1.17 mg/dL Final     Comment:     Male and Female  0-2 Months    0.31-0.88 mg/dL  2-12 Months   0.16-0.39 mg/dL  1-2 Years     0.18-0.35 mg/dL  3-4 Years     0.26-0.42 mg/dL  5-6 Years     0.29-0.47 mg/dL  7-8 Years     0.34-0.53  mg/dL  9-10 Years    0.33-0.64 mg/dL  11-12 Years   0.44-0.68 mg/dL  13-14 Years   0.46-0.77 mg/dL    Female  15 Years and older  0.51-0.95 mg/dL    Male  15 Years and older  0.67-1.17 mg/dL       ]  Lab Results   Component Value Date    .6 09/28/2023    BUN 49 05/27/2022       Imaging:    CT ABDOMEN PELVIS WO CONTRAST (9/28/23)    IMPRESSION:   1.  Pancolonic wall thickening with pericolonic fat stranding most prominently involving the splenic flexure and descending colon. Findings suspicious for infectious colitis with differential including pseudomembranous (C. difficile) colitis.  2.  Moderate bilateral hydroureteronephrosis which extends down to the level of the urinary bladder which is diffusely thickened. Bladder wall thickening highly suspicious for cystitis. Recommend correlation with urinalysis. Guido catheter within the   urinary bladder which is decompressed.  3.  Distended gallbladder with multiple layering tiny stones but without other secondary evidence of acute cholecystitis. Gallbladder distention may relate to fasting state. Pancreas is somewhat atrophic, otherwise unremarkable without CT evidence of   pancreatitis.  4.  Small to moderate sized right pleural effusion with compressive atelectasis.    Impression:  73 year old male with renal failure and electrolyte derangements, cystitis with bilateral hydroureteronephrosis on CT without evidence of ureteral obstruction. Urinary retention s/p guido placement 9/27 by Dr. Clayton (uncomplicated other than hypospadias) for 600 ml.     Plan:  - Continue guido catheter for bladder decompression.   - Monitor creatinine  - Hydroureteronephrosis likely residual following recent guido placement and should improve with treatment of cystitis. If there is concern for poor improvement of renal function, could repeat imaging.   - Continue antibiotics and follow-up culture  - Maintain guido to drainage and trend I/O. Would be reasonable to attempt voiding  trial after completion of course of antibiotics and prior to discharge pending his improvement of renal function, functional status, etc. Defer timing to primary team.  - Recommend outpatient follow up with urology for PSA (once infection adequately treated) and PINA. AVS updated.    Urology will follow peripherally for now.  This patient was discussed with Dr. Choi.    Rhiannon Child PA-C  [facility]   [email or web address]  Text Page (7:30am to 4:30pm)

## 2023-09-29 ENCOUNTER — APPOINTMENT (OUTPATIENT)
Dept: SPEECH THERAPY | Facility: CLINIC | Age: 73
DRG: 871 | End: 2023-09-29
Attending: PHYSICIAN ASSISTANT
Payer: COMMERCIAL

## 2023-09-29 LAB
ALBUMIN SERPL BCG-MCNC: 3.1 G/DL (ref 3.5–5.2)
ALP SERPL-CCNC: 65 U/L (ref 35–129)
ALT SERPL W P-5'-P-CCNC: 97 U/L (ref 0–70)
ANION GAP SERPL CALCULATED.3IONS-SCNC: 26 MMOL/L (ref 7–15)
AST SERPL W P-5'-P-CCNC: 111 U/L (ref 0–45)
BACTERIA UR CULT: ABNORMAL
BILIRUB SERPL-MCNC: 0.3 MG/DL
BUN SERPL-MCNC: 134.5 MG/DL (ref 8–23)
CA-I BLD-MCNC: 4 MG/DL (ref 4.4–5.2)
CALCIUM SERPL-MCNC: 7.6 MG/DL (ref 8.8–10.2)
CHLORIDE SERPL-SCNC: 100 MMOL/L (ref 98–107)
CK SERPL-CCNC: 1137 U/L (ref 26–308)
CREAT SERPL-MCNC: 8.65 MG/DL (ref 0.51–1.17)
DEPRECATED HCO3 PLAS-SCNC: 12 MMOL/L (ref 22–29)
EGFRCR SERPLBLD CKD-EPI 2021: 6 ML/MIN/1.73M2
ERYTHROCYTE [DISTWIDTH] IN BLOOD BY AUTOMATED COUNT: 13.5 % (ref 10–15)
GLUCOSE BLDC GLUCOMTR-MCNC: 102 MG/DL (ref 70–99)
GLUCOSE BLDC GLUCOMTR-MCNC: 95 MG/DL (ref 70–99)
GLUCOSE BLDC GLUCOMTR-MCNC: 96 MG/DL (ref 70–99)
GLUCOSE SERPL-MCNC: 103 MG/DL (ref 70–99)
HBV SURFACE AB SERPL IA-ACNC: 0.39 M[IU]/ML
HBV SURFACE AB SERPL IA-ACNC: NONREACTIVE M[IU]/ML
HBV SURFACE AG SERPL QL IA: NONREACTIVE
HCT VFR BLD AUTO: 21.8 % (ref 35–53)
HGB BLD-MCNC: 7.6 G/DL (ref 11.7–17.7)
LACTATE SERPL-SCNC: 1.1 MMOL/L (ref 0.7–2)
MAGNESIUM SERPL-MCNC: 2.1 MG/DL (ref 1.7–2.3)
MCH RBC QN AUTO: 32.1 PG (ref 26.5–33)
MCHC RBC AUTO-ENTMCNC: 34.9 G/DL (ref 31.5–36.5)
MCV RBC AUTO: 92 FL (ref 78–100)
PHOSPHATE SERPL-MCNC: 5.5 MG/DL (ref 2.5–4.5)
PLATELET # BLD AUTO: 89 10E3/UL (ref 150–450)
POTASSIUM SERPL-SCNC: 3.4 MMOL/L (ref 3.4–5.3)
PROT SERPL-MCNC: 5.4 G/DL (ref 6.4–8.3)
RBC # BLD AUTO: 2.37 10E6/UL (ref 3.8–5.9)
SODIUM SERPL-SCNC: 138 MMOL/L (ref 135–145)
WBC # BLD AUTO: 19.3 10E3/UL (ref 4–11)

## 2023-09-29 PROCEDURE — 99223 1ST HOSP IP/OBS HIGH 75: CPT | Performed by: INTERNAL MEDICINE

## 2023-09-29 PROCEDURE — 85027 COMPLETE CBC AUTOMATED: CPT | Performed by: INTERNAL MEDICINE

## 2023-09-29 PROCEDURE — 258N000003 HC RX IP 258 OP 636: Performed by: INTERNAL MEDICINE

## 2023-09-29 PROCEDURE — 120N000001 HC R&B MED SURG/OB

## 2023-09-29 PROCEDURE — 250N000013 HC RX MED GY IP 250 OP 250 PS 637: Performed by: INTERNAL MEDICINE

## 2023-09-29 PROCEDURE — 90937 HEMODIALYSIS REPEATED EVAL: CPT

## 2023-09-29 PROCEDURE — 99233 SBSQ HOSP IP/OBS HIGH 50: CPT | Performed by: HOSPITALIST

## 2023-09-29 PROCEDURE — 90935 HEMODIALYSIS ONE EVALUATION: CPT | Performed by: INTERNAL MEDICINE

## 2023-09-29 PROCEDURE — 83605 ASSAY OF LACTIC ACID: CPT | Performed by: HOSPITALIST

## 2023-09-29 PROCEDURE — 82550 ASSAY OF CK (CPK): CPT | Performed by: INTERNAL MEDICINE

## 2023-09-29 PROCEDURE — 82040 ASSAY OF SERUM ALBUMIN: CPT | Performed by: INTERNAL MEDICINE

## 2023-09-29 PROCEDURE — 250N000011 HC RX IP 250 OP 636: Mod: JZ | Performed by: STUDENT IN AN ORGANIZED HEALTH CARE EDUCATION/TRAINING PROGRAM

## 2023-09-29 PROCEDURE — 250N000011 HC RX IP 250 OP 636: Performed by: INTERNAL MEDICINE

## 2023-09-29 PROCEDURE — 83735 ASSAY OF MAGNESIUM: CPT | Performed by: INTERNAL MEDICINE

## 2023-09-29 PROCEDURE — 92610 EVALUATE SWALLOWING FUNCTION: CPT | Mod: GN

## 2023-09-29 PROCEDURE — 84100 ASSAY OF PHOSPHORUS: CPT | Performed by: INTERNAL MEDICINE

## 2023-09-29 PROCEDURE — 82330 ASSAY OF CALCIUM: CPT | Performed by: PHYSICIAN ASSISTANT

## 2023-09-29 RX ORDER — GUAIFENESIN 600 MG/1
600 TABLET, EXTENDED RELEASE ORAL 2 TIMES DAILY PRN
Status: ON HOLD | COMMUNITY
End: 2023-10-13

## 2023-09-29 RX ORDER — HYDRALAZINE HYDROCHLORIDE 10 MG/1
10 TABLET, FILM COATED ORAL 3 TIMES DAILY
Status: DISCONTINUED | OUTPATIENT
Start: 2023-09-29 | End: 2023-10-13

## 2023-09-29 RX ORDER — ASPIRIN 81 MG/1
81 TABLET ORAL DAILY
Status: ON HOLD | COMMUNITY
End: 2023-10-13

## 2023-09-29 RX ORDER — IBUPROFEN 200 MG
400 TABLET ORAL EVERY 6 HOURS
Status: ON HOLD | COMMUNITY
End: 2023-10-13

## 2023-09-29 RX ORDER — ALBUMIN (HUMAN) 12.5 G/50ML
50 SOLUTION INTRAVENOUS
Status: DISCONTINUED | OUTPATIENT
Start: 2023-09-29 | End: 2023-09-29

## 2023-09-29 RX ORDER — ACETAMINOPHEN 500 MG
1000 TABLET ORAL EVERY 6 HOURS
Status: ON HOLD | COMMUNITY
End: 2023-10-13

## 2023-09-29 RX ADMIN — HEPARIN SODIUM 1100 UNITS: 1000 INJECTION INTRAVENOUS; SUBCUTANEOUS at 13:13

## 2023-09-29 RX ADMIN — SODIUM CHLORIDE 300 ML: 9 INJECTION, SOLUTION INTRAVENOUS at 13:09

## 2023-09-29 RX ADMIN — SODIUM CHLORIDE 250 ML: 9 INJECTION, SOLUTION INTRAVENOUS at 13:09

## 2023-09-29 RX ADMIN — HEPARIN SODIUM 5000 UNITS: 5000 INJECTION, SOLUTION INTRAVENOUS; SUBCUTANEOUS at 20:04

## 2023-09-29 RX ADMIN — ACETAMINOPHEN 650 MG: 325 TABLET, FILM COATED ORAL at 10:38

## 2023-09-29 RX ADMIN — Medication: at 13:13

## 2023-09-29 RX ADMIN — HYDRALAZINE HYDROCHLORIDE 10 MG: 10 TABLET ORAL at 21:58

## 2023-09-29 RX ADMIN — ASPIRIN 81 MG: 81 TABLET, COATED ORAL at 14:53

## 2023-09-29 RX ADMIN — ACETAMINOPHEN 650 MG: 325 TABLET, FILM COATED ORAL at 17:03

## 2023-09-29 RX ADMIN — SODIUM CHLORIDE: 9 INJECTION, SOLUTION INTRAVENOUS at 18:03

## 2023-09-29 RX ADMIN — HEPARIN SODIUM 1400 UNITS: 1000 INJECTION INTRAVENOUS; SUBCUTANEOUS at 13:12

## 2023-09-29 RX ADMIN — HYDRALAZINE HYDROCHLORIDE 10 MG: 10 TABLET ORAL at 16:44

## 2023-09-29 RX ADMIN — SODIUM CHLORIDE: 9 INJECTION, SOLUTION INTRAVENOUS at 07:38

## 2023-09-29 RX ADMIN — CEFTRIAXONE SODIUM 2 G: 2 INJECTION, POWDER, FOR SOLUTION INTRAMUSCULAR; INTRAVENOUS at 22:46

## 2023-09-29 ASSESSMENT — ACTIVITIES OF DAILY LIVING (ADL)
ADLS_ACUITY_SCORE: 48
ADLS_ACUITY_SCORE: 46
ADLS_ACUITY_SCORE: 48
ADLS_ACUITY_SCORE: 46
ADLS_ACUITY_SCORE: 48
ADLS_ACUITY_SCORE: 46
ADLS_ACUITY_SCORE: 48

## 2023-09-29 NOTE — PROVIDER NOTIFICATION
Notified provider about indwelling guido catheter discussed removal or continued need.    Did provider choose to remove indwelling guido catheter? No    Provider's guido indication for keeping indwelling guido catheter: Obstruction. Obstructive uropathy    Is there an order for indwelling guido catheter? Yes    *If there is a plan to keep guido catheter in place at discharge daily notification with provider is not necessary, but please add a notation in the treatment team sticky note that the patient will be discharging with the catheter.      To Dr Hawkins

## 2023-09-29 NOTE — PROGRESS NOTES
Renal Medicine Inpatient Dialysis Note                                Bret Fleming MRN# [id number]   Age: 73 year old YOB: 1950   Date of Admission: 9/27/2023 Hospital LOS: 2          Assessment/Plan:       RICHARD versus progression of CKD IV    Initial evaluation done by Dr. Demarco at [facility].  Creatinine prior to admission ranging between 1.8-3.3 over the course of 2022, no recent labs.  Creatinine on admission 14.61 in the setting of encephalopathy, recent NSAID use, sepsis, UTI, and obstructive uropathy.  Most likely significant prerenal RICHARD, probable ATN at this point.  He did undergo dialysis at [facility] primarily for acidosis and severe hyperkalemia causing EKG changes.  Not entirely clear how much renal recovery he will have yet though he is making a decent amount of urine on his own.  He continues to appear hypovolemic, agree with continuing IVF for today.  He has no absolute acute indication for dialysis today.  Will assess rate of rise of creatinine to determine if he will need dialysis tomorrow.  Hopefully he will only need dialysis temporarily going forward, however too soon to tell.  He did have some work-up for CKD done at outside hospital including [facility].       Hyperkalemia, resolved  K7.0 on presentation with wide complexes noted on EKG at [facility].  Patient underwent emergent HD for 2 and half hours with resolution of K.  Did notably also have obstructive uropathy which contributed to his hyperkalemia in addition to his RICHARD.  He was also taking ibuprofen PTA.     Severe anion gap metabolic acidosis  Lactic acidosis, resolved  Starvation ketoacidosis  Initial pH 6.93, bicarb 4.  Lactate 3.7.  Ketones 2.1.  He underwent emergent HD with improvement.  Current pH normalized.       Obstructive uropathy  Moderate bilateral hydroureteronephrosis  Pyelonephritis  Hypospadias  History of urinary retention  Patient has history of urinary retention and UTIs in the past.   CT with bilateral hydroureteronephrosis and bladder wall thickening consistent with cystitis.  Culp placed by urology, initially yellow urine followed by milky cloudy urine consistent with UTI/pyelo-.      Severe diarrhea  Colitis, possibly infectious     Acute on chronic anemia  Hemoglobin 6.9 today, baseline appears to be around 10.  Iron studies adequate.  Getting unit of blood.     Sepsis     Hyperphosphatemia      Creatinine increasing subsequent to initial emergent run 09/27/23  Modest UO   Azotemic    Course suggest requirement for ongoing dialysis   Next likely 10/02/23      Interval History:     Dialysis run parameters reviewed with dialysis RN at patient bedside.  Seen on run    Somewhat uncomfortable    3 hour  Right IJ Alli  2 liter UF  4 K bath     On NS at 100 ml/hr  UF to 2 liters    Will continue NS for now      ROS     GENERAL: NAD, No fever,chills  CV: NEGATIVE for chest pain, palpitations  EXT: no change edema    Dialysis Parameters:     Vitals were reviewed  Patient Vitals for the past 8 hrs:   BP Temp Temp src Pulse Resp SpO2 Weight   09/29/23 1015 113/67 -- -- 106 18 98 % --   09/29/23 1000 119/65 -- -- 102 17 99 % --   09/29/23 0945 121/70 -- -- 101 19 -- --   09/29/23 0938 120/66 -- -- 105 18 99 % --   09/29/23 0904 123/65 97.6  F (36.4  C) Oral 101 22 99 % --   09/29/23 0821 117/66 97.9  F (36.6  C) Oral 103 16 98 % --   09/29/23 0640 -- -- -- -- -- -- 81.2 kg (179 lb 0.2 oz)     Wt Readings from Last 4 Encounters:   09/29/23 81.2 kg (179 lb 0.2 oz)   09/27/23 90.7 kg (200 lb)   05/27/22 104 kg (229 lb 3.2 oz)   02/05/22 106.5 kg (234 lb 12.8 oz)     I/O last 3 completed shifts:  In: 1691.67 [P.O.:100; I.V.:1291.67]  Out: 740 [Urine:740]    Vitals:    09/28/23 0000 09/28/23 0700 09/29/23 0640   Weight: 81.6 kg (179 lb 14.3 oz) 82.6 kg (182 lb 1.6 oz) 81.2 kg (179 lb 0.2 oz)       Current Weight: 81.2  Dry Weight: 81 range   Dialysis Temp: 36.5  C  Access Device: IJ   Access Site:  right  Dialyzer: Revaclear  Dialysis Bath: 4  Sodium Profile: n  UF Goal: 2  Blood Flow Rate (mL/min): 400  Total Treatment Time (hrs): 3  Heparin: Low dose as required      EPO dose: n  Zemplar: n  IV Fe: n      Medications and Allergies:     Reviewed      Physical Exam:     Seen and examined during course of dialysis run    /67   Pulse 106   Temp 97.6  F (36.4  C) (Oral)   Resp 18   Ht 1.829 m (6')   Wt 81.2 kg (179 lb 0.2 oz)   SpO2 98%   BMI 24.28 kg/m      GENERAL: awake, alert, follows  RESP: clear  CV: RRR, normal S1 S2  ABDOMEN: S/NT, BS present  MS: no edema  SKIN: catheter site clean without drainage  NEURO: speech normal and cranial nerves 2-12 intact  PSYCH: affect flat and concentration poor    Data:       Recent Labs   Lab 09/29/23  0617 09/29/23  0538   NA  --  138   POTASSIUM  --  3.4   CHLORIDE  --  100   CO2  --  12*   ANIONGAP  --  26*   * 103*   BUN  --  134.5*   CR  --  8.65*   GFRESTIMATED  --  6*   CHANA  --  7.6*     Recent Labs   Lab 09/29/23  0538 09/28/23  0449 09/27/23  2344 09/27/23  1446   CR 8.65* 8.48* 8.07* 14.61*     Recent Labs   Lab 09/29/23  0538 09/28/23  0449 09/27/23  2344 09/27/23  1446   ALBUMIN 3.1* 3.3* 3.5 4.5         G Dat Chairez MD    [facility] - Nephrology  [phone]

## 2023-09-29 NOTE — PROVIDER NOTIFICATION
"Paged on call MD \"[phone] S.S.  Pt vomiting, can we get PRN zofran ordered?  Thank you  Fernanda DANIELS [phone]\"  "

## 2023-09-29 NOTE — PHARMACY-ADMISSION MEDICATION HISTORY
Pharmacist Admission Medication History    Admission medication history is complete. The information provided in this note is only as accurate as the sources available at the time of the update.    Medication reconciliation/reorder completed by provider prior to medication history? No    Information Source(s): [facility]    Pertinent Information:   -Wife indicated him having bottles of atenolol, atorvastatin, aspirin at home. She also indicated lisinopril but could not locate that bottle - she said he only took 1/4-1/2 of lisinopril tablet because he thought what was prescribed was too much. In [facility], appears he may have had lisinopril 10mg tablets from 2020. Wife also indicated that he has not been regular on refilling medications - atenolol last filled 12/14/23 & atorvastatin 4/29/23 both for 30 day supply.  -He regularly takes acetaminophen 2 every 6 hours and in last week also added ibuprofen 2 tablets every 6 hours due to knee pain.    Changes made to PTA medication list:  Added: lisinopril entry, acetaminophen, ibuprofen, guaifenesin, pseudoephedrine  Deleted: None  Changed: None    Medication History Completed By: Chana Ellis [facility] 9/29/2023 9:53 AM    Prior to Admission medications    Medication Sig Last Dose Taking? Auth Provider Long Term End Date   acetaminophen (TYLENOL) 500 MG tablet Take 1,000 mg by mouth every 6 hours Past Week Yes Unknown, Entered By History     aspirin 81 MG EC tablet Take 81 mg by mouth daily Unknown Yes Unknown, Entered By History     atenolol (TENORMIN) 100 MG tablet Take 1 tablet (100 mg) by mouth daily Unknown at Last fill [facility] 12/14/22 for 30 days Yes Alisha Gloria MD Yes    atorvastatin (LIPITOR) 80 MG tablet Take 1 tablet (80 mg) by mouth daily Unknown at Last fill [facility] 4/29/23 for 30 days Yes Alisha Gloria MD Yes    guaiFENesin (MUCINEX) 600 MG 12 hr tablet Take 600 mg by mouth 2 times daily  as needed for congestion Past Week Yes Unknown, Entered By History     ibuprofen (ADVIL/MOTRIN) 200 MG tablet Take 400 mg by mouth every 6 hours Past Week at New in past week Yes Unknown, Entered By History     LISINOPRIL PO Take 0.25-0.5 tablets by mouth daily Unknown at No fills in [facility] Yes Unknown, Entered By History No    Pseudoephedrine HCl (SUDAFED PO) Take 1 tablet by mouth every 8 hours as needed for congestion Past Week Yes Unknown, Entered By History     fluticasone (FLONASE) 50 MCG/ACT nasal spray Spray 1 spray into both nostrils daily  Patient not taking: Reported on 9/29/2023 Not Taking  Jassi Sanchez MD

## 2023-09-29 NOTE — PROGRESS NOTES
Potassium   Date Value Ref Range Status   09/29/2023 3.4 3.4 - 5.3 mmol/L Final   05/27/2022 4.4 3.5 - 5.0 mmol/L Final     Hemoglobin   Date Value Ref Range Status   09/29/2023 7.6 (L) 11.7 - 17.7 g/dL Final     Comment:     Sex Specific Reference Ranges:    Female  11.7-15.7  g/dL  Male    13.3-17.7   g/dL       Creatinine   Date Value Ref Range Status   09/29/2023 8.65 (H) 0.51 - 1.17 mg/dL Final     Comment:     Male and Female  0-2 Months    0.31-0.88 mg/dL  2-12 Months   0.16-0.39 mg/dL  1-2 Years     0.18-0.35 mg/dL  3-4 Years     0.26-0.42 mg/dL  5-6 Years     0.29-0.47 mg/dL  7-8 Years     0.34-0.53 mg/dL  9-10 Years    0.33-0.64 mg/dL  11-12 Years   0.44-0.68 mg/dL  13-14 Years   0.46-0.77 mg/dL    Female  15 Years and older  0.51-0.95 mg/dL    Male  15 Years and older  0.67-1.17 mg/dL         Urea Nitrogen   Date Value Ref Range Status   09/29/2023 134.5 (H) 8.0 - 23.0 mg/dL Final   05/27/2022 49 (H) 8 - 28 mg/dL Final     Sodium   Date Value Ref Range Status   09/29/2023 138 135 - 145 mmol/L Final     Comment:     Reference intervals for this test were updated on 09/26/2023 to more accurately reflect our healthy population. There may be differences in the flagging of prior results with similar values performed with this method. Interpretation of those prior results can be made in the context of the updated reference intervals.      INR   Date Value Ref Range Status   09/28/2023 1.58 (H) 0.85 - 1.15 Final       DIALYSIS PROCEDURE NOTE  Hepatitis status of previous patient on machine log was checked and verified ok to use with this patients hepatitis status.  Patient dialyzed for 3 hrs. on a K4 bath with a net fluid removal of 2L.  A BFR of 350 ml/min was obtained via a Right internal jugular non-tunnellled CVC .      The treatment plan was discussed with Dr. Chairez during the treatment.    Total heparin received during the treatment: 0 units.     Line flushed, clamped and capped with heparin 1:1000  1.1/1.4 mL (1100 and 1400 units) per arterial and venous lumen respectively    Meds given: none   Complications: none      Person educated: patient. Knowledge base basic. Barriers to learning: confusion. Educated on procedure and access care via oral mode. Patient verbalized understanding. Pt prefers verbal education style.     ICEBOAT? Timeout performed pre-treatment  I: Patient was identified using 2 identifiers  C:  Consent Signed Yes  E: Equipment preventative maintenance is current and dialysis delivery system OK to use  B: Hepatitis B Surface Antigen: Unknown; Draw Date: 09/28/23      Hepatitis B Surface Antibody: Unknown; Draw Date: 09/28/23  O: Dialysis orders present and complete prior to treatment  A: Vascular access verified and assessed prior to treatment  T: Treatment was performed at a clinically appropriate time  ?: Patient was allowed to ask questions and address concerns prior to treatment  See Adult Hemodialysis flowsheet in EPIC for further details and post assessment.  Machine water alarm in place and functioning. Transducer pods intact and checked every 15min.   Pt returned via bed transport .  Chlorine/Chloramine water system checked every 4 hours.  Outpatient Dialysis TBD, day #1 new initiate    Patient repositioned every 2 hours during the treatment.  Post treatment report given to ARTUR Horton RN regarding 2L of fluid removed, last BP of 123/70, and patient pain rating of 5/10 buttock pain.

## 2023-09-29 NOTE — PROGRESS NOTES
"Clinical Swallow Evaluation (CSE):     09/29/23 3404   Appointment Info   Signing Clinician's Name / Credentials (SLP) Susana Cintron MS CCC-SLP   General Information   Onset of Illness/Injury or Date of Surgery 09/27/23   Referring Physician Jeannine Ritter PA   Patient/Family Therapy Goal Statement (SLP) To have ice water   Pertinent History of Current Problem   Per hospitalist \"Mr. Fleming is a 73-year-old gentleman with a past medical history of hypertension, coronary artery disease, CKD stage III who initially presented to an outside hospital for shortness of breath and poor p.o. intake with some nausea and vomiting.  He was found to have a potassium of 7 and a creatinine of 14.6and a pH of 6.98..  He was initially given albuterol, 1 g of calcium gluconate and insulin for shifting for his hyperkalemia.  Nephrology was consulted and he was initiated on an emergent run of hemodialysis at the outside hospital.  Due to very difficult Culp catheter placement a urology consult was placed and a CT abdomen and pelvis discovered obstructive uropathy with bilateral hydronephrosis.  The CT also displayed colitis with bladder wall thickening.  Urine analysis was positive for infection and he was started on broad-spectrum antibiotics with vancomycin and Zosyn.  Urine culture results are still pending.  He was transferred to [facility] on 9/27/2023 for ongoing care of worsening renal failure, electrolyte abnormalities and a urinary tract infection causing sepsis.\"     SLP cosulted for \"dysphagia evaluation\"     General Observations Pt able to boost self upright/midline in bed IND, alert, IND with self-feeding   Pain Assessment   Patient Currently in Pain Yes, see Vital Sign flowsheet  (RN updated)   Type of Evaluation   Type of Evaluation Swallow Evaluation   Oral Motor   Oral Musculature generally intact   Structural Abnormalities none present   Mucosal Quality good   Dentition (Oral Motor)   Dentition (Oral Motor) " "natural dentition;adequate dentition   Facial Symmetry (Oral Motor)   Facial Symmetry (Oral Motor) WNL   Lip Function (Oral Motor)   Lip Range of Motion (Oral Motor) WNL   Tongue Function (Oral Motor)   Tongue ROM (Oral Motor) WNL   Jaw Function (Oral Motor)   Jaw Function (Oral Motor) WNL   Cough/Swallow/Gag Reflex (Oral Motor)   Soft Palate/Velum (Oral Motor) WNL   Vocal Quality/Secretion Management (Oral Motor)   Vocal Quality (Oral Motor) WNL   General Swallowing Observations   Past History of Dysphagia None per EMR. When asked pt reports liquids \"occassionally\" go down wrong pipe: denies occurance of daily, weekly, monthly. No difficulty with solid food when asked.   Respiratory Support (General Swallowing Observations) none   Current Diet/Method of Nutritional Intake (General Swallowing Observations, NIS) regular diet;thin liquids (level 0)  (renal diet)   Swallowing Evaluation Clinical swallow evaluation   Clinical Swallow Evaluation   Feeding Assistance set up only required   Clinical Swallow Evaluation Textures Trialed thin liquids;solid foods   Clinical Swallow Eval: Thin Liquid Texture Trial   Mode of Presentation, Thin Liquids cup;straw;self-fed   Volume of Liquid or Food Presented 5 oz   Oral Phase of Swallow WFL   Pharyngeal Phase of Swallow intact   Diagnostic Statement no overt clinical signssx aspiration noted   Clinical Swallow Evaluation: Solid Food Texture Trial   Mode of Presentation self-fed   Volume Presented x1 bite of deng cracker- no appetite, max encouragement for even 1 bite   Oral Phase WFL   Pharyngeal Phase intact   Diagnostic Statement no overt clinical signssx aspiration noted   Swallowing Recommendations   Diet Consistency Recommendations regular diet;thin liquids (level 0)   Supervision Level for Intake distant supervision needed   Medication Administration Recommendations, Swallowing (SLP) whole as tolerated   Instrumental Assessment Recommendations instrumental evaluation not " recommended at this time   Clinical Impression   Criteria for Skilled Therapeutic Interventions Met (SLP Eval) Yes, treatment indicated   SLP Diagnosis ?potential dysphagia   Risks & Benefits of therapy have been explained evaluation/treatment results reviewed;care plan/treatment goals reviewed;risks/benefits reviewed;current/potential barriers reviewed;participants voiced agreement with care plan;participants included;patient   Clinical Impression Comments   Clinical swallow evaluation completed with thin liquids, x1 bite of regular solids. Pt demonstrated WFL labial seal, oral containmet and no percievable oral holding wtih liquids. Limited trials with solids but mastication and oral clearance appear complete with pt IND liquid wash. Notable laryngeal ROM to palpation. No overt clinical signs/sx aspiration noted.     SLP Total Evaluation Time   Eval: oral/pharyngeal swallow function, clinical swallow Minutes (52850) 10   SLP Goals   Therapy Frequency (SLP Eval) 4 times/wk   SLP Predicted Duration/Target Date for Goal Attainment 10/06/23   SLP Goals Swallow   SLP: Safely tolerate diet without signs/symptoms of aspiration Regular diet;Thin liquids;With use of swallow precautions;Independently   SLP Discharge Planning   SLP Plan PO tolerance with increased amounts   SLP Discharge Recommendation home   SLP Rationale for DC Rec pt near baseline, anticipate will not require post-acute SLP follow up   SLP Brief overview of current status  regular/thin with general safe swallow precautions   Total Session Time   Total Session Time (sum of timed and untimed services) 10

## 2023-09-29 NOTE — PLAN OF CARE
Goal Outcome Evaluation:  Denies CP, Sob. All pt needs met at this time. RN encouraged intake pt verbalized understanding.

## 2023-09-29 NOTE — PROGRESS NOTES
VSS/RA. Disoriented to time and situation. Somnolent. Arouses to voice. Culp patent, cloudy/pink/sediment output. Incontinent of bowel. Tele sinus tachy. NS running @100ml/hr. Mild pain managed with tyelonol. Renal diet. A of 2, lift.

## 2023-09-29 NOTE — CONSULTS
[facility]    Cardiology Consultation     Date of Admission:  9/27/2023    Assessment & Plan   Bret Fleming is a 73 year old adult who was admitted on 9/27/2023.      Newly diagnosed cardiomyopathy with moderate to severe reduced LV systolic function with LVEF of 30 to 35%.  Large area of hypokinesis to akinesis noted in distal two third of the LV this could represent a large LAD infarct some of the valve appears little bit thinned out.  No anginal symptoms.  Patient has acute on chronic renal failure and is getting dialysis and it is uncertain how much kidney recovery will have however not to jeopardize his kidney functions further and jeopardize his kidney function recovery risk benefits do not favor coronary angiogram at this time and exposure to contrast.  Recommend medical therapy for cardiomyopathy and presumed coronary disease and ischemic cardiomyopathy.  If in future his kidney functions normalize or he is on permanent dialysis would recommend coronary angiogram.  Admitted with acute on chronic renal failure requiring emergent dialysis, nephrology following, at this time it is uncertain how much kidney function recovery he will have and whether dialysis will be temporary or permanent.  Bilateral hydronephrosis  Anemia, thrombocytopenia  Abnormal liver enzymes    Recommendations  Overall echocardiogram is concerning for possibility of ischemic cardiomyopathy.  Clinically no anginal symptoms.  Given acute on chronic renal failure uncertainty about how much kidney functions will recover, significant anemia and thrombocytopenia risk benefits do not favor coronary angiogram at this time.  Discussed with patient.  Rec medical therapy for presumed CAD and ischemic cardiomyopathy  Given worsening thrombocytopenia would not add baby aspirin at this time but this can be considered if platelets stabilize.  Also given abnormal liver enzymes would not add statin therapy but that can be  considered once liver functions improve  Recommend adding hydralazine 10 mg 3 times daily for afterload reduction and subsequently can add low-dose Toprol-XL at 25 mg daily.  Will not add ACE inhibitor or ARB or Entresto given acute renal failure.      High complexity     Israel Martinez MD, MD    Primary Care Physician   Alisha Gloria    Reason for Consult   Reason for consult: I was asked  to evaluate this patient for abnormal echocardiogram.    History of Present Illness   Bret Fleming is a 73 year old adult who presents with acute on chronic renal failure, hydronephrosis bilateral.  Cardiology is consulted because echocardiogram showed LVEF of 30 to 25% with hypokinesia of distal two thirds of the LV suspect large LAD territory infarct.  LVEF 30 to 25%.  Patient denies any chest discomfort or shortness of breath.  EKG shows a sinus rhythm with poor precordial R wave progression.  He is somewhat of a poor historian and when I ask him about any history of heart disease he said heart attack but he does not recall when.  I do not see any previous coronary angiogram echocardiogram was stress test.  He denies any chest discomfort or shortness of breath    Past Medical History   Past Medical History:   Diagnosis Date    Hypertension          Past Surgical History   Past Surgical History:   Procedure Laterality Date    IR CVC TUNNEL PLACEMENT > 5 YRS OF AGE  9/27/2023    PICC TRIPLE LUMEN PLACEMENT  9/27/2023         Prior to Admission Medications   Prior to Admission Medications   Prescriptions Last Dose Informant Patient Reported? Taking?   LISINOPRIL PO Unknown at No fills in SureScripts  Yes Yes   Sig: Take 0.25-0.5 tablets by mouth daily   Pseudoephedrine HCl (SUDAFED PO) Past Week  Yes Yes   Sig: Take 1 tablet by mouth every 8 hours as needed for congestion   acetaminophen (TYLENOL) 500 MG tablet Past Week  Yes Yes   Sig: Take 1,000 mg by mouth every 6 hours   aspirin 81 MG EC tablet   Yes Yes   Sig: Take 81  mg by mouth daily   atenolol (TENORMIN) 100 MG tablet Unknown at Last fill SureScripts 12/14/22 for 30 days  No Yes   Sig: Take 1 tablet (100 mg) by mouth daily   atorvastatin (LIPITOR) 80 MG tablet Unknown at Last fill SureScripts 4/29/23 for 30 days  No Yes   Sig: Take 1 tablet (80 mg) by mouth daily   fluticasone (FLONASE) 50 MCG/ACT nasal spray Not Taking  No No   Sig: Spray 1 spray into both nostrils daily   Patient not taking: Reported on 9/29/2023   guaiFENesin (MUCINEX) 600 MG 12 hr tablet Past Week  Yes Yes   Sig: Take 600 mg by mouth 2 times daily as needed for congestion   ibuprofen (ADVIL/MOTRIN) 200 MG tablet Past Week at New in past week  Yes Yes   Sig: Take 400 mg by mouth every 6 hours      Facility-Administered Medications: None     Current Facility-Administered Medications   Medication Dose Route Frequency    - MEDICATION INSTRUCTIONS for Dialysis Patients -   Does not apply See Admin Instructions    aspirin  81 mg Oral Daily    cefTRIAXone  2 g Intravenous Q24H    heparin ANTICOAGULANT  5,000 Units Subcutaneous Q12H     Current Facility-Administered Medications   Medication Last Rate    sodium chloride 100 mL/hr at 09/29/23 1343     Allergies   Allergies   Allergen Reactions    Ciprofloxacin Rash    Other Drug Allergy (See Comments)      Cough Syrup Abstracted from [facility] Chart( Hydrobromide)       Social History    reports that Shilpi Fleming quit smoking about 28 years ago. Shilpi Fleming has never used smokeless tobacco.      Family History   I have reviewed this patient's family history and updated it with pertinent information if needed.  Family History   Problem Relation Age of Onset    Cancer Father     Cancer Paternal Grandfather     Diabetes Paternal Grandfather     Hypertension No family hx of     Cerebrovascular Disease No family hx of     Thyroid Disease No family hx of     Glaucoma No family hx of     Macular Degeneration No family hx of           Review of Systems   A  comprehensive review of system was performed and is negative other than that noted in the HPI or here.     Physical Exam   Vital Signs with Ranges  Temp:  [97.5  F (36.4  C)-98.3  F (36.8  C)] 97.5  F (36.4  C)  Pulse:  [] 109  Resp:  [8-36] 20  BP: ()/(45-71) 122/69  SpO2:  [36 %-99 %] 97 %  Wt Readings from Last 4 Encounters:   09/29/23 81.2 kg (179 lb 0.2 oz)   09/27/23 90.7 kg (200 lb)   05/27/22 104 kg (229 lb 3.2 oz)   02/05/22 106.5 kg (234 lb 12.8 oz)     I/O last 3 completed shifts:  In: 700 [I.V.:400]  Out: 3050 [Urine:1050; Other:2000]      Vitals: /69 (BP Location: Left arm)   Pulse 109   Temp 97.5  F (36.4  C) (Oral)   Resp 20   Ht 1.829 m (6')   Wt 81.2 kg (179 lb 0.2 oz)   SpO2 97%   BMI 24.28 kg/m      Physical Exam:   General -complaining of hip discomfort  Eyes - No scleral icterus  HEENT - Neck supple, moist mucous membranes  Cardiovascular -S1-S2 normal no murmur rub or gallop  Extremities - There is no pitting pedal edema  Respiratory -clear to auscultation  Skin - No pallor or cyanosis  Gastrointestinal - Non tender and non distended without rebound or guarding  Psych - Appropriate affect   Neurological - No gross motor neurological focal deficits    No lab results found in last 7 days.    Invalid input(s): TROPONINIES    Recent Labs   Lab 09/29/23  0617 09/29/23  0538 09/29/23  0210 09/28/23  0820 09/28/23  0449 09/28/23  0023 09/27/23  2344 09/27/23  1752 09/27/23  1446   WBC  --  19.3*  --   --  13.0*  --  9.3  --  14.6*   HGB  --  7.6*  --   --  6.9*  --  7.1*  --  8.7*   MCV  --  92  --   --  92  --  93  --  99   PLT  --  89*  --   --  82*  --  81*  --  153   INR  --   --   --   --  1.58*  --  1.56*  --  1.81*   NA  --  138  --   --  136  --  137  --  127*   POTASSIUM  --  3.4  --   --  3.7  --  3.3*   < > 7.0*   CHLORIDE  --  100  --   --  96*  --  96*  --  90*   CO2  --  12*  --   --  13*  --  11*  --  4*   BUN  --  134.5*  --   --  123.6*  --  115.6*  --  213.4*    CR  --  8.65*  --   --  8.48*  --  8.07*  --  14.61*   GFRESTIMATED  --  6*  --   --  6*  --  6*  --  3*   ANIONGAP  --  26*  --   --  27*  --  30*  --  33*   CHANA  --  7.6*  --   --  8.4*  --  7.6*  --  8.5*   * 103* 95   < > 99   < > 82   < > 131*   ALBUMIN  --  3.1*  --   --  3.3*  --  3.5  --  4.5   PROTTOTAL  --  5.4*  --   --  5.7*  --  6.0*  --  7.7   BILITOTAL  --  0.3  --   --  0.4  --  0.5  --  0.5   ALKPHOS  --  65  --   --  69  --  72  --  84   ALT  --  97*  --   --  94*  --  100*  --  100*   AST  --  111*  --   --  166*  --  182*  --  182*   LIPASE  --   --   --   --   --   --   --   --  405*    < > = values in this interval not displayed.     Recent Labs   Lab Test 08/18/21  1719 06/22/20  1405   CHOL 210* 136   HDL 32* 28*   * 66   TRIG 217* 211*     Recent Labs   Lab 09/29/23  0538 09/28/23  0449 09/27/23  2344 09/27/23  1752 09/27/23  1446   WBC 19.3* 13.0* 9.3  --  14.6*   HGB 7.6* 6.9* 7.1*  --  8.7*   HCT 21.8* 19.5* 19.9*  --  26.8*   MCV 92 92 93  --  99   PLT 89* 82* 81*  --  153   IRON  --   --   --   --  120   IRONSAT  --   --   --   --  53*   RETICABSCT  --   --  0.096*  --   --    RETP  --   --  4.5*  --   --    FEB  --   --   --   --  226*   JD  --   --   --  382  --    B12  --   --  2,943*  --   --    FOLIC  --  12.9  --   --   --      Recent Labs   Lab 09/28/23  0449 09/27/23  2344 09/27/23  1502   PHV 7.36 7.33 6.93*   PO2V 37 37 43   PCO2V 26* 24* 18*   HCO3V 15* 12* 4*     Recent Labs   Lab 09/27/23  1446   NTBNPI 70,000*     No results for input(s): DD in the last 168 hours.  No results for input(s): SED, CRP in the last 168 hours.  Recent Labs   Lab 09/29/23  0538 09/28/23 0449 09/27/23  2344   PLT 89* 82* 81*     Recent Labs   Lab 09/27/23  1446   TSH 2.70     Recent Labs   Lab 09/27/23 2012   COLOR Light Yellow   APPEARANCE Turbid*   URINEGLC Negative   URINEBILI Negative   URINEKETONE 5*   SG 1.025   UBLD 1.0 mg/dL*   URINEPH 6.0   PROTEIN 100*   NITRITE  Negative   LEUKEST 500 Maria Alejandra/uL*   RBCU 0   WBCU >182*       Imaging:  Recent Results (from the past 48 hour(s))   IR CVC Tunnel Placement > 5 Yrs of Age    Narrative    [facility]  LOCATION: [facility]  DATE: 9/27/2023    PROCEDURE: NON-TUNNELED DIALYSIS CATHETER PLACEMENT  1.  Insertion of a non-tunneled central venous catheter.  2.  Ultrasound guidance for vascular access. A permanent image was stored.  3.  Fluoroscopic guidance for central venous access device placement.    INTERVENTIONAL RADIOLOGIST: Jassi Clemens MD    INDICATION: Patient is a 73-year-old male with a history of renal failure who presents for nontunneled dialysis catheter placement for emergent dialysis.    CONSENT: The risks, benefits and alternatives of nontunneled dialysis catheter were discussed with the patient  in detail. All questions were answered. Verbal informed consent was given to proceed with the procedure.    MODERATE SEDATION: None.    CONTRAST: None  ANTIBIOTICS: None.  ADDITIONAL MEDICATIONS: None.    FLUOROSCOPIC TIME: 0.2 minutes.  RADIATION DOSE: Air Kerma: 3 mGy.    COMPLICATIONS: No immediate complications.    STERILE BARRIER TECHNIQUE: Maximum sterile barrier technique was used. Cutaneous antisepsis was performed at the operative site with application of 2% chlorhexidine and large sterile drape. Prior to the procedure, the  and assistant performed   hand hygiene and wore hat, mask, sterile gown, and sterile gloves during the entire procedure.    PROCEDURE:    Using local anesthesia and real-time ultrasound guidance the right internal jugular vein was identified. An image of this was saved in the patient's permanent record. A 21-gauge micropuncture needle was used, under ultrasound guidance, the access into   this vessel. Through the needle, a 0.018 wire was placed. The needle was exchanged for 3/5 Italian coaxial dilator. A measurement was made. The inner 3 Italian dilator and wire  were removed. A 0.035 Amplatz wire was placed down into the IVC through the 5   English dilator. The 5 English dilator was then removed and the tract was serially dilated over the Amplatz wire. A 20 cm 14  English hemodialysis catheter was then advanced over the Amplatz wire the tip positioned in the mid/low right atrium. The wire was   removed. The lumens were tested and aspirated/flushed appropriately. Both lumens were locked with heparin. The catheter was sutured to the skin with 2-0 nylon sutures. A clean and sterile central line dressing was applied. The patient tolerated the   procedure well.    FINDINGS:  Ultrasound shows an anechoic and compressible right internal jugular vein.      Fluoroscopic spot film at completion of study show the nontunneled dialysis catheter tip lies in the mid/low right atrium.      Impression    IMPRESSION:    1.  Successful non-tunneled dialysis catheter placement.  2.  The catheter is ready for use.     CT Abdomen Pelvis w/o Contrast    Narrative    EXAM: CT ABDOMEN PELVIS W/O CONTRAST  LOCATION: [facility]  DATE: 9/28/2023    INDICATION: acute renal failure, elevated lipase  COMPARISON: CT abdomen/pelvis without contrast 02/05/2022  TECHNIQUE: CT scan of the abdomen and pelvis was performed without IV contrast. Multiplanar reformats were obtained. Dose reduction techniques were used.  CONTRAST: None.    FINDINGS:   LOWER CHEST: Small to moderate sized right pleural effusion with compressive atelectasis.    HEPATOBILIARY: Unremarkable noncontrast CT appearance of the liver. Distended gallbladder with multiple layering tiny stones. No definite wall thickening or pericholecystic fluid. Gallbladder distention may relate to fasting state.    PANCREAS: Somewhat atrophic, otherwise unremarkable.    SPLEEN: Normal.    ADRENAL GLANDS: Unremarkable.    KIDNEYS/BLADDER: Bilateral kidney cysts which do not require dedicated follow-up. Moderate bilateral  hydroureteronephrosis which extends down to the level of the urinary bladder which is diffusely thickened. Culp catheter within the urinary bladder.    BOWEL: Pancolonic wall thickening with pericolonic fat stranding most prominently involving the splenic flexure and descending colon. No bowel obstruction.    LYMPH NODES: Prominent retroperitoneal lymph nodes likely reactive.    VASCULATURE: Scattered atherosclerotic calcifications of the aortoiliac vessels without evidence of aneurysmal dilatation.    PELVIC ORGANS: Mildly prominent prostate with central calcifications.    MUSCULOSKELETAL: Multilevel degenerative changes of the thoracic and lumbosacral spine. No acute osseous abnormality or suspicious bony lesion.      Impression    IMPRESSION:   1.  Pancolonic wall thickening with pericolonic fat stranding most prominently involving the splenic flexure and descending colon. Findings suspicious for infectious colitis with differential including pseudomembranous (C. difficile) colitis.  2.  Moderate bilateral hydroureteronephrosis which extends down to the level of the urinary bladder which is diffusely thickened. Bladder wall thickening highly suspicious for cystitis. Recommend correlation with urinalysis. Culp catheter within the   urinary bladder which is decompressed.  3.  Distended gallbladder with multiple layering tiny stones but without other secondary evidence of acute cholecystitis. Gallbladder distention may relate to fasting state. Pancreas is somewhat atrophic, otherwise unremarkable without CT evidence of   pancreatitis.  4.  Small to moderate sized right pleural effusion with compressive atelectasis.     XR Chest Port 1 View    Narrative    EXAM: XR CHEST PORT 1 VIEW  LOCATION: [facility]  DATE: 9/28/2023    INDICATION: SOB.  COMPARISON: 08/11/2019.      Impression    IMPRESSION: Right IJ central venous catheter and right PICC line with overlapping tip seen in the low SVC  and near the cavoatrial junction. Heart size and pulmonary vascularity within normal limits. Mild hazy infiltrate right lower lung. Left lung is   clear. No pneumothorax.   Echocardiogram Complete   Result Value    LVEF  30-35%    [facility]    [id number]  [id number]  [id number]^BERNARDA^ANGEL^VANESSA     [facility]  Echocardiography Laboratory  [address] [place] [address]     Name: GUMARO CORONADO  MRN: [id number]  : 1950  Study Date: 2023 10:42 AM  Age: 73 yrs  Gender: Male  Patient Location: [facility]  Reason For Study: SOB  Ordering Physician: ANGEL MENCHACA  Referring Physician: RASTA MARS  Performed By: Brigette Man     BSA: 2.0 m2  Height: 72 in  Weight: 182 lb  HR: 106  BP: 112/57 mmHg  ______________________________________________________________________________  Procedure  Complete Echo Adult.  ______________________________________________________________________________  Interpretation Summary     The left ventricle is borderline dilated.  The visual ejection fraction is 30-35%.  Large area of hypokinesis and akinesis distal 2/3 septum/apex/infero-apex/lat  walls. Some wall thinning. Suspect large LAD infarct pattern but cannot  exclude stress cardiomyopathy  There is moderate (2+) mitral regurgitation.  Right ventricular systolic pressure is elevated, consistent with mild to  moderate pulmonary hypertension.  The rhythm was sinus tachycardia.  ______________________________________________________________________________  Left Ventricle  The left ventricle is borderline dilated. There is normal left ventricular  wall thickness. The visual ejection fraction is 30-35%. Left ventricular  diastolic function is indeterminate. There are regional wall motion  abnormalities as specified. Large area of hypokinesis and akinesis distal 2/3  septum/apex/infero-apex/lat walls. Some wall thinning. Suspect large LAD  infarct pattern but cannot exclude stress  cardiomyopathy. There is no thrombus  seen in the left ventricle.     Right Ventricle  The right ventricle is normal in size and function.     Atria  The left atrium is mildly dilated. Right atrial size is normal.     Mitral Valve  There is moderate (2+) mitral regurgitation.     Tricuspid Valve  The right ventricular systolic pressure is approximated at 45mmHg plus the  right atrial pressure. There is trace tricuspid regurgitation. Right  ventricular systolic pressure is elevated, consistent with mild to moderate  pulmonary hypertension. IVC diameter <2.1 cm collapsing >50% with sniff  suggests a normal RA pressure of 3 mmHg.     Aortic Valve  There is trivial trileaflet aortic sclerosis.     Pulmonic Valve  The pulmonic valve is not well seen, but is grossly normal.     Vessels  The aortic root is normal size.     Pericardium  The pericardium appears normal.     Rhythm  The rhythm was sinus tachycardia.  ______________________________________________________________________________  MMode/2D Measurements & Calculations  IVSd: 1.0 cm     LVIDd: 5.6 cm  LVIDs: 4.6 cm  LVPWd: 1.1 cm  FS: 17.6 %  LV mass(C)d: 234.9 grams  LV mass(C)dI: 114.8 grams/m2  Ao root diam: 3.5 cm  asc Aorta Diam: 3.6 cm  LVOT diam: 2.3 cm  LVOT area: 4.2 cm2  Ao root diam index Ht(cm/m): 1.9  Ao root diam index BSA (cm/m2): 1.7  Asc Ao diam index BSA (cm/m2): 1.8  Asc Ao diam index Ht(cm/m): 2.0  LA Volume (BP): 72.9 ml     LA Volume Index (BP): 35.6 ml/m2  RWT: 0.38     Doppler Measurements & Calculations  MV E max gwyn: 84.5 cm/sec  MV A max gwyn: 77.7 cm/sec  MV E/A: 1.1  MV dec time: 0.10 sec  Ao V2 max: 144.0 cm/sec  Ao max P.0 mmHg  Ao V2 mean: 98.8 cm/sec  Ao mean P.0 mmHg  Ao V2 VTI: 21.8 cm  JEREMIAH(I,D): 3.4 cm2  JEREMIAH(V,D): 3.5 cm2  LV V1 max P.6 mmHg  LV V1 max: 118.0 cm/sec  LV V1 VTI: 17.6 cm  MR PISA: 3.2 cm2  MR ERO: 0.21 cm2  MR volume: 24.4 ml  SV(LVOT): 74.3 ml  SI(LVOT): 36.3 ml/m2  PA V2 max: 105.3 cm/sec  PA max PG:  4.4 mmHg  PA acc time: 0.10 sec  AV Sae Ratio (DI): 0.82  JEREMIAH Index (cm2/m2): 1.7  E/E' av.2  Lateral E/e': 6.5     Medial E/e': 10.0     ______________________________________________________________________________  Report approved by: Ariel Lee 2023 11:35 AM             Echo:  No results found for this or any previous visit (from the past 4320 hour(s)).    Clinically Significant Risk Factors        # Hypokalemia: Lowest K = 3.1 mmol/L in last 2 days, will replace as needed   # Hypocalcemia: Lowest iCa = 4 mg/dL in last 2 days, will monitor and replace as appropriate    # Anion Gap Metabolic Acidosis: Highest Anion Gap = 30 mmol/L in last 2 days, will monitor and treat as appropriate  # Hypoalbuminemia: Lowest albumin = 3.1 g/dL at 2023  5:38 AM, will monitor as appropriate  # Coagulation Defect: INR = 1.58 (Ref range: 0.85 - 1.15) and/or PTT = 34 Seconds (Ref range: 22 - 38 Seconds), will monitor for bleeding  # Thrombocytopenia: Lowest platelets = 81 in last 2 days, will monitor for bleeding    # Chronic heart failure with reduced ejection fraction: last echo with EF <40%

## 2023-09-29 NOTE — PROVIDER NOTIFICATION
To Dr Hawkins via [employer] messaging at 0740    Good morning. Platelets 89 today, held heparin Subq, platelet 82 yesterday. Please advise, thanks.     MD responded that he will look into it    To Dr Hawkins via [employer] messaging at 1610  150 ml output since 0900, pt had 2 L removed via HD today. Thanks   MD read message

## 2023-09-29 NOTE — PROGRESS NOTES
[facility]    Medicine Progress Note - Hospitalist Service    Date of Admission:  9/27/2023    Assessment & Plan   Mr. Fleming is a 73-year-old gentleman with a past medical history of hypertension, coronary artery disease, CKD stage III who initially presented to an outside hospital for shortness of breath and poor p.o. intake with some nausea and vomiting.  He was found to have a potassium of 7 and a creatinine of 14.6and a pH of 6.98..  He was initially given albuterol, 1 g of calcium gluconate and insulin for shifting for his hyperkalemia.  Nephrology was consulted and he was initiated on an emergent run of hemodialysis at the outside hospital.  Due to very difficult Culp catheter placement a urology consult was placed and a CT abdomen and pelvis discovered obstructive uropathy with bilateral hydronephrosis.  The CT also displayed colitis with bladder wall thickening.  Urine analysis was positive for infection and he was started on broad-spectrum antibiotics with vancomycin and Zosyn.  He was transferred to [facility] on 9/27/2023 for ongoing care of worsening renal failure, electrolyte abnormalities and a urinary tract infection causing sepsis.    # Acute on chronic renal failure requiring emergent dialsyis  # Hyperkalemia with ECG changes, resolved  # Starvation ketosis  # Severe metabolic acidosis, resolving  He will have hemodialysis today. He has had 2700 ml urine output today. Creatinine stable at around 8.   -hemodialysis today per nephrology  -monitor urine output, renal function and electrolytes   -Right IJ temporary dialysis catheter in place should he need future dialysis    # Elevated troponin  # New moderate mitral valve regurgitation  # New HFrEF  Troponin levels are quite elevated secondary to demand ischemia and improving without any evidence of ischemia on his EKG.  He denies any current chest pain, palpitations   -TTE from today 9/28/2023 reveals worsening  heart failure with an EF of 30 to 35% with a large area of hypokinesis and akinesis distal to the septum with only suspected large LAD infarct pattern, mitral valve regurgitation and elevated right ventricular systolic pressure  -He does not have a prior echocardiogram for comparison.  -will ask cardiology to see him     # Sepsis secondary to a urinary tract infection, resolving  # Bilateral hydronephrosis  CT abdomen and pelvis revealed bilateral hydro ureteral nephrosis without ureteral obstruction.  Culp catheter was placed on 9/27/2023 and is helping to decompress the bladder presently.  -Urology is consulted and per their recommendations we will continue the Culp catheter for bladder decompression  -Continue broad-spectrum antibiotics including ceftriaxone until susceptibilities return and then plan to treat for a total of 7 days    # Colitis with loose stool  # Transaminitis  He and his wife describe flu like symptoms starting last week with nausea, vomiting, decreased appetite, generalized fatigue and weakness.   INR was elevated at 1.8 and liver enzymes AST/ALT were elevated in the 100s but are now improving.Stool panel/ clostridium difficile negative.  -Continue to monitor     # Generalized weakness and debility   -PT/OT consults to evaluate safety to return home and provide strength training while in the hospital     Diet: Renal Diet (dialysis)  Snacks/Supplements Adult: Nepro Oral Supplement; Between Meals  Room Service    DVT Prophylaxis: pneumatic compression device   Culp Catheter: PRESENT, indication: Strict 1-2 Hour I&O;Insertion Difficulty  Lines: PRESENT      PICC 09/27/23 Triple Lumen Right Brachial vein lateral Vascular Access-Site Assessment: WDL  CVC Double Lumen Right Internal jugular Non - tunneled-Site Assessment: WDL except;Red;Tender      Cardiac Monitoring: None  Code Status: Full Code      Disposition Plan  home versus transitional care unit        Franklin Hawkins  MD  Hospitalist Service  [facility]  Securely message with Alexander (more info)  Text page via [employer] Paging/Directory   ______________________________________________________________________    Interval History   No appetite, weak, no pain.     Physical Exam   Vital Signs: Temp: 97.6  F (36.4  C) Temp src: Oral BP: 110/70 Pulse: 109   Resp: 11 SpO2: 98 % O2 Device: None (Room air)    Weight: 179 lbs .22 oz  Constitutional: awake, alert, cooperative, no apparent distress  Respiratory: No increased work of breathing, good air exchange, clear to auscultation bilaterally, no crackles or wheezing  Cardiovascular: regular rate and rhythm, normal S1 and S2, no S3 or S4, and no murmur noted  Neuropsychiatric: General: normal, calm and normal eye contact  +Culp    Medical Decision Making             Data   Recent Results (from the past 24 hour(s))   Glucose by meter    Collection Time: 09/28/23 12:00 PM   Result Value Ref Range    GLUCOSE BY METER POCT 111 (H) 70 - 99 mg/dL   Prepare red blood cells (unit)    Collection Time: 09/28/23 12:50 PM   Result Value Ref Range    Blood Component Type Red Blood Cells     Product Code F1311K04     Unit Status Transfused     Unit Number [id number]     CROSSMATCH Compatible     CODING SYSTEM [id number]     ISSUE DATE AND TIME [id number]     UNIT ABO/RH B+     UNIT TYPE ISBT 7300    Glucose by meter    Collection Time: 09/28/23  4:40 PM   Result Value Ref Range    GLUCOSE BY METER POCT 108 (H) 70 - 99 mg/dL   Hepatitis B Surface Antibody    Collection Time: 09/28/23  4:59 PM   Result Value Ref Range    Hepatitis B Surface Antibody Instrument Value 0.39 <8.00 m[IU]/mL    Hepatitis B Surface Antibody Nonreactive    Hepatitis B surface antigen    Collection Time: 09/28/23  4:59 PM   Result Value Ref Range    Hepatitis B Surface Antigen Nonreactive Nonreactive   Lactic Acid STAT    Collection Time: 09/28/23  8:10 PM   Result Value Ref Range    Lactic Acid 0.9 0.7  - 2.0 mmol/L   Glucose by meter    Collection Time: 09/28/23  9:35 PM   Result Value Ref Range    GLUCOSE BY METER POCT 111 (H) 70 - 99 mg/dL   Glucose by meter    Collection Time: 09/29/23  2:10 AM   Result Value Ref Range    GLUCOSE BY METER POCT 95 70 - 99 mg/dL   Comprehensive metabolic panel    Collection Time: 09/29/23  5:38 AM   Result Value Ref Range    Sodium 138 135 - 145 mmol/L    Potassium 3.4 3.4 - 5.3 mmol/L    Carbon Dioxide (CO2) 12 (L) 22 - 29 mmol/L    Anion Gap 26 (H) 7 - 15 mmol/L    Urea Nitrogen 134.5 (H) 8.0 - 23.0 mg/dL    Creatinine 8.65 (H) 0.51 - 1.17 mg/dL    GFR Estimate 6 (L) >60 mL/min/1.73m2    Calcium 7.6 (L) 8.8 - 10.2 mg/dL    Chloride 100 98 - 107 mmol/L    Glucose 103 (H) 70 - 99 mg/dL    Alkaline Phosphatase 65 35 - 129 U/L     (H) 0 - 45 U/L    ALT 97 (H) 0 - 70 U/L    Protein Total 5.4 (L) 6.4 - 8.3 g/dL    Albumin 3.1 (L) 3.5 - 5.2 g/dL    Bilirubin Total 0.3 <=1.2 mg/dL   Magnesium    Collection Time: 09/29/23  5:38 AM   Result Value Ref Range    Magnesium 2.1 1.7 - 2.3 mg/dL   CBC with platelets    Collection Time: 09/29/23  5:38 AM   Result Value Ref Range    WBC Count 19.3 (H) 4.0 - 11.0 10e3/uL    RBC Count 2.37 (L) 3.80 - 5.90 10e6/uL    Hemoglobin 7.6 (L) 11.7 - 17.7 g/dL    Hematocrit 21.8 (L) 35.0 - 53.0 %    MCV 92 78 - 100 fL    MCH 32.1 26.5 - 33.0 pg    MCHC 34.9 31.5 - 36.5 g/dL    RDW 13.5 10.0 - 15.0 %    Platelet Count 89 (L) 150 - 450 10e3/uL   Phosphorus    Collection Time: 09/29/23  5:38 AM   Result Value Ref Range    Phosphorus 5.5 (H) 2.5 - 4.5 mg/dL   CK total    Collection Time: 09/29/23  5:38 AM   Result Value Ref Range    CK 1,137 (HH) 26 - 308 U/L   Ionized Calcium    Collection Time: 09/29/23  5:38 AM   Result Value Ref Range    Calcium Ionized Whole Blood 4.0 (L) 4.4 - 5.2 mg/dL   Glucose by meter    Collection Time: 09/29/23  6:17 AM   Result Value Ref Range    GLUCOSE BY METER POCT 102 (H) 70 - 99 mg/dL

## 2023-09-30 ENCOUNTER — APPOINTMENT (OUTPATIENT)
Dept: OCCUPATIONAL THERAPY | Facility: CLINIC | Age: 73
DRG: 871 | End: 2023-09-30
Attending: INTERNAL MEDICINE
Payer: COMMERCIAL

## 2023-09-30 LAB
ALBUMIN SERPL BCG-MCNC: 2.6 G/DL (ref 3.5–5.2)
ALBUMIN UR-MCNC: 100 MG/DL
ALP SERPL-CCNC: 67 U/L (ref 35–129)
ALT SERPL W P-5'-P-CCNC: 92 U/L (ref 0–70)
ANION GAP SERPL CALCULATED.3IONS-SCNC: 15 MMOL/L (ref 7–15)
APPEARANCE UR: ABNORMAL
AST SERPL W P-5'-P-CCNC: 90 U/L (ref 0–45)
ATRIAL RATE - MUSE: 111 BPM
ATRIAL RATE - MUSE: 68 BPM
ATRIAL RATE - MUSE: 72 BPM
BILIRUB SERPL-MCNC: 0.3 MG/DL
BILIRUB UR QL STRIP: NEGATIVE
BUN SERPL-MCNC: 58.6 MG/DL (ref 8–23)
CALCIUM SERPL-MCNC: 7 MG/DL (ref 8.8–10.2)
CHLORIDE SERPL-SCNC: 109 MMOL/L (ref 98–107)
CK SERPL-CCNC: 564 U/L (ref 26–308)
COLOR UR AUTO: ABNORMAL
CREAT SERPL-MCNC: 4.58 MG/DL (ref 0.51–1.17)
DEPRECATED HCO3 PLAS-SCNC: 17 MMOL/L (ref 22–29)
DIASTOLIC BLOOD PRESSURE - MUSE: 70 MMHG
DIASTOLIC BLOOD PRESSURE - MUSE: 70 MMHG
DIASTOLIC BLOOD PRESSURE - MUSE: NORMAL MMHG
EGFRCR SERPLBLD CKD-EPI 2021: 13 ML/MIN/1.73M2
ERYTHROCYTE [DISTWIDTH] IN BLOOD BY AUTOMATED COUNT: 14.6 % (ref 10–15)
GLUCOSE SERPL-MCNC: 83 MG/DL (ref 70–99)
GLUCOSE UR STRIP-MCNC: NEGATIVE MG/DL
HCT VFR BLD AUTO: 21 % (ref 35–53)
HGB BLD-MCNC: 7.3 G/DL (ref 11.7–17.7)
HGB UR QL STRIP: ABNORMAL
INTERPRETATION ECG - MUSE: NORMAL
KETONES UR STRIP-MCNC: ABNORMAL MG/DL
LACTATE SERPL-SCNC: 0.5 MMOL/L (ref 0.7–2)
LEUKOCYTE ESTERASE UR QL STRIP: ABNORMAL
MAGNESIUM SERPL-MCNC: 1.6 MG/DL (ref 1.7–2.3)
MCH RBC QN AUTO: 32.3 PG (ref 26.5–33)
MCHC RBC AUTO-ENTMCNC: 34.8 G/DL (ref 31.5–36.5)
MCV RBC AUTO: 93 FL (ref 78–100)
MUCOUS THREADS #/AREA URNS LPF: PRESENT /LPF
NITRATE UR QL: NEGATIVE
P AXIS - MUSE: 108 DEGREES
P AXIS - MUSE: 67 DEGREES
P AXIS - MUSE: NORMAL DEGREES
PH UR STRIP: 6.5 [PH] (ref 5–7)
PHOSPHATE SERPL-MCNC: 2.8 MG/DL (ref 2.5–4.5)
PLATELET # BLD AUTO: 88 10E3/UL (ref 150–450)
POTASSIUM SERPL-SCNC: 3.1 MMOL/L (ref 3.4–5.3)
PR INTERVAL - MUSE: 168 MS
PR INTERVAL - MUSE: 176 MS
PR INTERVAL - MUSE: 182 MS
PROT SERPL-MCNC: 4.9 G/DL (ref 6.4–8.3)
QRS DURATION - MUSE: 106 MS
QRS DURATION - MUSE: 140 MS
QRS DURATION - MUSE: 146 MS
QT - MUSE: 334 MS
QT - MUSE: 446 MS
QT - MUSE: 448 MS
QTC - MUSE: 454 MS
QTC - MUSE: 474 MS
QTC - MUSE: 490 MS
R AXIS - MUSE: 23 DEGREES
R AXIS - MUSE: 39 DEGREES
R AXIS - MUSE: 50 DEGREES
RBC # BLD AUTO: 2.26 10E6/UL (ref 3.8–5.9)
RBC URINE: 64 /HPF
SODIUM SERPL-SCNC: 141 MMOL/L (ref 135–145)
SP GR UR STRIP: 1.01 (ref 1–1.03)
SQUAMOUS EPITHELIAL: <1 /HPF
SYSTOLIC BLOOD PRESSURE - MUSE: 158 MMHG
SYSTOLIC BLOOD PRESSURE - MUSE: 158 MMHG
SYSTOLIC BLOOD PRESSURE - MUSE: NORMAL MMHG
T AXIS - MUSE: 110 DEGREES
T AXIS - MUSE: 87 DEGREES
T AXIS - MUSE: 88 DEGREES
UROBILINOGEN UR STRIP-MCNC: NORMAL MG/DL
VENTRICULAR RATE- MUSE: 111 BPM
VENTRICULAR RATE- MUSE: 68 BPM
VENTRICULAR RATE- MUSE: 72 BPM
WBC # BLD AUTO: 16.3 10E3/UL (ref 4–11)
WBC URINE: >182 /HPF

## 2023-09-30 PROCEDURE — 120N000001 HC R&B MED SURG/OB

## 2023-09-30 PROCEDURE — 99233 SBSQ HOSP IP/OBS HIGH 50: CPT | Performed by: INTERNAL MEDICINE

## 2023-09-30 PROCEDURE — 99233 SBSQ HOSP IP/OBS HIGH 50: CPT | Performed by: HOSPITALIST

## 2023-09-30 PROCEDURE — 84100 ASSAY OF PHOSPHORUS: CPT | Performed by: INTERNAL MEDICINE

## 2023-09-30 PROCEDURE — 82550 ASSAY OF CK (CPK): CPT | Performed by: INTERNAL MEDICINE

## 2023-09-30 PROCEDURE — 99232 SBSQ HOSP IP/OBS MODERATE 35: CPT | Performed by: INTERNAL MEDICINE

## 2023-09-30 PROCEDURE — 250N000013 HC RX MED GY IP 250 OP 250 PS 637: Performed by: INTERNAL MEDICINE

## 2023-09-30 PROCEDURE — 258N000003 HC RX IP 258 OP 636: Performed by: INTERNAL MEDICINE

## 2023-09-30 PROCEDURE — 87086 URINE CULTURE/COLONY COUNT: CPT | Performed by: HOSPITALIST

## 2023-09-30 PROCEDURE — 97166 OT EVAL MOD COMPLEX 45 MIN: CPT | Mod: GO | Performed by: OCCUPATIONAL THERAPIST

## 2023-09-30 PROCEDURE — 81001 URINALYSIS AUTO W/SCOPE: CPT | Performed by: HOSPITALIST

## 2023-09-30 PROCEDURE — 85014 HEMATOCRIT: CPT | Performed by: INTERNAL MEDICINE

## 2023-09-30 PROCEDURE — 83735 ASSAY OF MAGNESIUM: CPT | Performed by: INTERNAL MEDICINE

## 2023-09-30 PROCEDURE — 258N000003 HC RX IP 258 OP 636: Performed by: HOSPITALIST

## 2023-09-30 PROCEDURE — 83605 ASSAY OF LACTIC ACID: CPT | Performed by: HOSPITALIST

## 2023-09-30 PROCEDURE — 80053 COMPREHEN METABOLIC PANEL: CPT | Performed by: INTERNAL MEDICINE

## 2023-09-30 PROCEDURE — 97530 THERAPEUTIC ACTIVITIES: CPT | Mod: GO | Performed by: OCCUPATIONAL THERAPIST

## 2023-09-30 PROCEDURE — 250N000011 HC RX IP 250 OP 636: Performed by: HOSPITALIST

## 2023-09-30 RX ORDER — METOPROLOL SUCCINATE 25 MG/1
25 TABLET, EXTENDED RELEASE ORAL DAILY
Status: DISCONTINUED | OUTPATIENT
Start: 2023-09-30 | End: 2023-10-13 | Stop reason: HOSPADM

## 2023-09-30 RX ADMIN — SODIUM CHLORIDE: 9 INJECTION, SOLUTION INTRAVENOUS at 05:31

## 2023-09-30 RX ADMIN — ACETAMINOPHEN 650 MG: 325 TABLET, FILM COATED ORAL at 00:38

## 2023-09-30 RX ADMIN — ACETAMINOPHEN 650 MG: 325 TABLET, FILM COATED ORAL at 05:29

## 2023-09-30 RX ADMIN — METOPROLOL SUCCINATE 25 MG: 25 TABLET, EXTENDED RELEASE ORAL at 14:07

## 2023-09-30 RX ADMIN — ASPIRIN 81 MG: 81 TABLET, COATED ORAL at 12:18

## 2023-09-30 RX ADMIN — HYDRALAZINE HYDROCHLORIDE 10 MG: 10 TABLET ORAL at 17:28

## 2023-09-30 RX ADMIN — ACETAMINOPHEN 650 MG: 325 TABLET, FILM COATED ORAL at 16:04

## 2023-09-30 RX ADMIN — ACETAMINOPHEN 650 MG: 325 TABLET, FILM COATED ORAL at 21:16

## 2023-09-30 RX ADMIN — VANCOMYCIN HYDROCHLORIDE 1750 MG: 10 INJECTION, POWDER, LYOPHILIZED, FOR SOLUTION INTRAVENOUS at 21:50

## 2023-09-30 RX ADMIN — HYDRALAZINE HYDROCHLORIDE 10 MG: 10 TABLET ORAL at 12:07

## 2023-09-30 RX ADMIN — HYDRALAZINE HYDROCHLORIDE 10 MG: 10 TABLET ORAL at 21:47

## 2023-09-30 RX ADMIN — SODIUM CHLORIDE: 9 INJECTION, SOLUTION INTRAVENOUS at 15:52

## 2023-09-30 RX ADMIN — ACETAMINOPHEN 650 MG: 325 TABLET, FILM COATED ORAL at 12:07

## 2023-09-30 ASSESSMENT — ACTIVITIES OF DAILY LIVING (ADL)
ADLS_ACUITY_SCORE: 46
ADLS_ACUITY_SCORE: 48
ADLS_ACUITY_SCORE: 46
ADLS_ACUITY_SCORE: 48
ADLS_ACUITY_SCORE: 46
ADLS_ACUITY_SCORE: 48
ADLS_ACUITY_SCORE: 46
PREVIOUS_RESPONSIBILITIES: MEAL PREP;HOUSEKEEPING;LAUNDRY;SHOPPING;MEDICATION MANAGEMENT;FINANCES;DRIVING
ADLS_ACUITY_SCORE: 46
ADLS_ACUITY_SCORE: 46
ADLS_ACUITY_SCORE: 48

## 2023-09-30 NOTE — PROGRESS NOTES
Urology Progress NOte     Creatinine is improving with Culp in place  Plan: would favor keeping Culp till his creatinine at least close to normal    Melia Austin MD

## 2023-09-30 NOTE — PROGRESS NOTES
Renal Medicine Progress Note                                Bret Fleming MRN# [id number]   Age: 73 year old YOB: 1950   Date of Admission: 9/27/2023 Hospital LOS: 3                  Assessment/Plan:     RICHARD versus progression of CKD IV     Initial evaluation done by Dr. Demarco at [facility].  Creatinine prior to admission ranging between 1.8-3.3 over the course of 2022, no recent labs.  Creatinine on admission 14.61 in the setting of encephalopathy, recent NSAID use, sepsis, UTI, and obstructive uropathy.  Most likely significant prerenal RICHARD, probable ATN at this point.  He did undergo dialysis at [facility] primarily for acidosis and severe hyperkalemia causing EKG changes.  Not entirely clear how much renal recovery he will have yet though he is making a decent amount of urine on his own.  He continues to appear hypovolemic, agree with continuing IVF for today.  He has no absolute acute indication for dialysis today.  Will assess rate of rise of creatinine to determine if he will need dialysis tomorrow.  Hopefully he will only need dialysis temporarily going forward, however too soon to tell.  He did have some work-up for CKD done at outside hospital including [facility].        Hyperkalemia, resolved  K7.0 on presentation with wide complexes noted on EKG at [facility].  Patient underwent emergent HD for 2 and half hours with resolution of K.  Did notably also have obstructive uropathy which contributed to his hyperkalemia in addition to his RICHARD.  He was also taking ibuprofen PTA.     Severe anion gap metabolic acidosis  Lactic acidosis, resolved  Starvation ketoacidosis  Initial pH 6.93, bicarb 4.  Lactate 3.7.  Ketones 2.1.  He underwent emergent HD with improvement.  Current pH normalized.       Obstructive uropathy  Moderate bilateral hydroureteronephrosis  Pyelonephritis  Hypospadias  History of urinary retention  Patient has history of urinary retention and UTIs in the past.   CT with bilateral hydroureteronephrosis and bladder wall thickening consistent with cystitis.  Culp placed by urology, initially yellow urine followed by milky cloudy urine consistent with UTI/pyelo-.      Severe diarrhea  Colitis, possibly infectious     Acute on chronic anemia  Hemoglobin 6.9 today, baseline appears to be around 10.  Iron studies adequate.  Getting unit of blood.     Sepsis     Hyperphosphatemia      Follow labs and UO over the weekend  Re assess for additional dialysis 10/02/23      Interval History:     Dialyzed 09/29/23 without issue  Temporary right IJ access    No edema  No 02 requirement    Culp in place  Non oliguric       ROS:     GENERAL: NAD, No fever,chills  R: NEGATIVE for significant cough or SOB  CV: NEGATIVE for chest pain, palpitations  EXT: no change edema  ROS otherwise negative    Medications and Allergies:     Reviewed    Physical Exam:     Vitals were reviewed  Patient Vitals for the past 8 hrs:   BP Temp Temp src Pulse Resp SpO2   09/30/23 0928 123/65 97.6  F (36.4  C) Oral 104 17 98 %   09/30/23 0902 125/70 97.9  F (36.6  C) Oral 102 18 97 %   09/30/23 0818 119/70 97.5  F (36.4  C) Oral 103 17 100 %   09/30/23 0614 -- -- -- -- 16 --     I/O last 3 completed shifts:  In: 690 [P.O.:390; I.V.:300]  Out: 3870 [Urine:1870; Other:2000]    Vitals:    09/28/23 0000 09/28/23 0700 09/29/23 0640   Weight: 81.6 kg (179 lb 14.3 oz) 82.6 kg (182 lb 1.6 oz) 81.2 kg (179 lb 0.2 oz)         GENERAL: awake, alert, follows  HEENT: NC/AT, PERRLA, EOMI, non icteric, pharynx moist without lesion  RESP:  clear anteriorly  CV: RRR, normal S1 S2  MS: no clubbing, cyanosis   SKIN: clear without significant rashes or lesions  NEURO: speech normal and cranial nerves 2-12 intact  PSYCH: affect normal/bright  EXT: warm, no edema    Data:     Recent Labs   Lab 09/30/23  0704 09/29/23  1700 09/29/23  0617 09/29/23  0538 09/28/23  0820 09/28/23  0449 09/28/23  0023 09/27/23  2514     --   --  138   --  136  --  137   POTASSIUM 3.1*  --   --  3.4  --  3.7  --  3.3*   CHLORIDE 109*  --   --  100  --  96*  --  96*   CO2 17*  --   --  12*  --  13*  --  11*   ANIONGAP 15  --   --  26*  --  27*  --  30*   GLC 83 96 102* 103*   < > 99   < > 82   BUN 58.6*  --   --  134.5*  --  123.6*  --  115.6*   CR 4.58*  --   --  8.65*  --  8.48*  --  8.07*   GFRESTIMATED 13*  --   --  6*  --  6*  --  6*   CHANA 7.0*  --   --  7.6*  --  8.4*  --  7.6*    < > = values in this interval not displayed.         Recent Labs   Lab 09/30/23  0704      POTASSIUM 3.1*   CHLORIDE 109*   CO2 17*   ANIONGAP 15   GLC 83   BUN 58.6*   CR 4.58*   GFRESTIMATED 13*   CHANA 7.0*     Recent Labs   Lab 09/30/23  0704 09/29/23  0538 09/28/23 0449 09/27/23  2344 09/27/23  1446   CR 4.58* 8.65* 8.48* 8.07* 14.61*     Recent Labs   Lab 09/30/23  0704 09/29/23  0538 09/28/23 0449 09/27/23  2344   ALBUMIN 2.6* 3.1* 3.3* 3.5     Recent Labs   Lab 09/30/23  0906 09/29/23  1413 09/28/23 2010 09/27/23  2344   LACT 0.5* 1.1 0.9 1.4     Recent Labs   Lab 09/30/23  0704 09/29/23  0538 09/28/23 0449 09/27/23  2344   PHOS 2.8 5.5* 5.2* 3.7   HGB 7.3* 7.6* 6.9* 7.1*     Recent Labs   Lab 09/27/23  1752 09/27/23  1446   IRON  --  120   IRONSAT  --  53*   FEB  --  226*     --          G Dat Chairez MD    [facility] - Nephrology  [phone]

## 2023-09-30 NOTE — PROGRESS NOTES
[place] Progress Note     Israel Martinez MD  09/30/2023         Interval History:      Creatinine improving, denies any chest discomfort or shortness of breath.  Discussed in detail with patient patient tells me that he had heart attack when he was 40 years of age and he did not have any stent placement at that time.  Care Everywhere reviewed recent cardiology visit in 2006 but no details available.  No prior echo or angiogram or stress test noted in Care Everywhere.       Assessment and Plan:      Newly diagnosed cardiomyopathy with moderate to severe reduced LV systolic function with LVEF of 30 to 35%.  Large area of hypokinesis to akinesis noted in distal two third of the LV this could represent a large LAD infarct, some walls appeared thinned out .  No anginal symptoms.  Patient has acute on chronic renal failure and is getting dialysis and it is uncertain how much kidney recovery will have however not to jeopardize his kidney functions further and jeopardize his kidney function recovery risk benefits do not favor coronary angiogram at this time and exposure to contrast.  Recommend medical therapy for cardiomyopathy and presumed coronary disease and ischemic cardiomyopathy.  If in future his kidney functions normalize or he is on permanent dialysis would recommend coronary angiogram.  Hydralazine added yesterday.  Admitted with acute on chronic renal failure requiring emergent dialysis, nephrology following, at this time it is uncertain how much kidney function recovery he will have and whether dialysis will be temporary or permanent.  Bilateral hydronephrosis  Anemia, thrombocytopenia, stable  Abnormal liver enzymes    Recommendations  Continue baby aspirin, hydralazine  Add Toprol-XL 25 mg daily.  In future depending upon kidney functions recovery can consider further evaluation of coronary disease with coronary angiogram or cardiac MRI stress perfusion.  Follow-up in cardiology clinic in 2 to 3  weeks  Cardiology will sign off.  Please feel free to call with any questions           Physical Exam:       , Blood pressure (P) 121/77, pulse 104, temperature 97.6  F (36.4  C), temperature source Oral, resp. rate 17, height 1.829 m (6'), weight 81.2 kg (179 lb 0.2 oz), SpO2 98 %.  Vitals:    09/28/23 0000 09/28/23 0700 09/29/23 0640   Weight: 81.6 kg (179 lb 14.3 oz) 82.6 kg (182 lb 1.6 oz) 81.2 kg (179 lb 0.2 oz)     Vital Signs with Ranges  Temp:  [97.5  F (36.4  C)-97.9  F (36.6  C)] 97.6  F (36.4  C)  Pulse:  [102-113] 104  Resp:  [16-26] 17  BP: (100-125)/(52-71) (P) 121/77  SpO2:  [97 %-100 %] 98 %  I/O's Last 24 hours  I/O last 3 completed shifts:  In: 690 [P.O.:390; I.V.:300]  Out: 3870 [Urine:1870; Other:2000]  General patient appears comfortable  Neck normal JVP  Cardiovascular system S1-S2 normal no murmur rub or gallop  Respiration clear to auscultation  Extremities no edema               Medications:         - MEDICATION INSTRUCTIONS for Dialysis Patients -   Does not apply See Admin Instructions    aspirin  81 mg Oral Daily    cefTRIAXone  2 g Intravenous Q24H    heparin ANTICOAGULANT  5,000 Units Subcutaneous Q12H    hydrALAZINE  10 mg Oral TID    metoprolol succinate ER  25 mg Oral Daily     PRN Meds: acetaminophen **OR** acetaminophen, alteplase, alteplase, glucose **OR** dextrose **OR** glucagon, ondansetron, senna-docusate **OR** senna-docusate         Data:      All new lab and imaging data was reviewed.   Recent Labs   Lab Test 09/30/23  0704 09/29/23  0538 09/28/23  0449 09/27/23  2344 09/27/23  1446   WBC 16.3* 19.3* 13.0* 9.3 14.6*   HGB 7.3* 7.6* 6.9* 7.1* 8.7*   MCV 93 92 92 93 99   PLT 88* 89* 82* 81* 153   INR  --   --  1.58* 1.56* 1.81*      Recent Labs   Lab Test 09/30/23  0704 09/29/23  1700 09/29/23  0617 09/29/23  0538 09/28/23  0820 09/28/23  0449     --   --  138  --  136   POTASSIUM 3.1*  --   --  3.4  --  3.7   CHLORIDE 109*  --   --  100  --  96*   CO2 17*  --   --  12*   --  13*   BUN 58.6*  --   --  134.5*  --  123.6*   CR 4.58*  --   --  8.65*  --  8.48*   ANIONGAP 15  --   --  26*  --  27*   CHANA 7.0*  --   --  7.6*  --  8.4*   GLC 83 96 102* 103*   < > 99    < > = values in this interval not displayed.     No lab results found.    Invalid input(s): TROP, TROPONINIES     Israel Martinez MD  9/30/2023  Pager:  [phone]

## 2023-09-30 NOTE — PLAN OF CARE
Goal Outcome Evaluation:  Alert and oriented X3, disoriented to time. Renal diet. Assist of two with lift. Culp in place with adequate urine output. Pain managed with prn tylenol.

## 2023-09-30 NOTE — PROVIDER NOTIFICATION
RN got approval from hospitalist to ask nephro about the IVF, called on call Nephro at 1600 to ask if they still wanted the continuous IVF at the 100 ml/hour NS, RN told him that pt was 97% RA, he said to keep the current order.

## 2023-09-30 NOTE — PLAN OF CARE
"Goal Outcome Evaluation:  Denies CP, SOB. Pt seems more alert today than yesterday. FRANCES marte messaged Dr Hawkins this AM about his K, MG, Albumin and he said to let nephro know, Nephro called back and said no intervention needed. All pt needs met at this time. Unable to collect sputum culture due to pt not being able to cough up sputum, he says he has nothing to cough up. No coughing during shift. MD aware of UA. MD aware of pt having 3 loose dark brown BM, continue to monitor for now since can't do a \"c diff test again since tested on 9/28\" per MD.       Notified provider about indwelling guido catheter discussed removal or continued need.    Did provider choose to remove indwelling guido catheter? No    Provider's guido indication for keeping indwelling guido catheter: Obstruction.    Is there an order for indwelling guido catheter? Yes    *If there is a plan to keep guido catheter in place at discharge daily notification with provider is not necessary, but please add a notation in the treatment team sticky note that the patient will be discharging with the catheter.        "

## 2023-09-30 NOTE — PROGRESS NOTES
09/30/23 1436   Appointment Info   Signing Clinician's Name / Credentials (OT) Belinda Wyatt OTR/L   Living Environment   People in Home spouse   Current Living Arrangements house   Home Accessibility stairs to enter home   Transportation Anticipated car, drives self   Living Environment Comments Pt lives with wife, whom he is primary caregiver for. 2 NICHOLAS at back, 4 + 7 at front.   Self-Care   Usual Activity Tolerance moderate   Current Activity Tolerance poor   Equipment Currently Used at Home grab bar, toilet;grab bar, tub/shower;raised toilet seat;tub bench   Activity/Exercise/Self-Care Comment Pt reports walking 3-4 blocks without AD; pt reports indep with I/ADLs   Instrumental Activities of Daily Living (IADL)   Previous Responsibilities meal prep;housekeeping;laundry;shopping;medication management;finances;driving   IADL Comments pt also assists wife with her shower/meds/dressing   General Information   Onset of Illness/Injury or Date of Surgery 09/28/23   Referring Physician Jenanine Ritter PA   Patient/Family Therapy Goal Statement (OT) prefers home, open to tcu   Additional Occupational Profile Info/Pertinent History of Current Problem 73-year-old gentleman with a past medical history of hypertension, coronary artery disease, CKD stage III who initially presented to an outside hospital for shortness of breath and poor p.o. intake with some nausea and vomiting. He was found to have a potassium of 7 and a creatinine of 14.6and a pH of 6.98.. He was initially given albuterol, 1 g of calcium gluconate and insulin for shifting for his hyperkalemia. Nephrology was consulted and he was initiated on an emergent run of hemodialysis at the outside hospital. Due to very difficult Culp catheter placement a urology consult was placed and a CT abdomen and pelvis discovered obstructive uropathy with bilateral hydronephrosis. The CT also displayed colitis with bladder wall thickening. Urine analysis was positive for  infection and he was started on broad-spectrum antibiotics with vancomycin and Zosyn. He was transferred to [facility] on 9/27/2023 for ongoing care of worsening renal failure, electrolyte abnormalities and a urinary tract infection causing sepsis.   Existing Precautions/Restrictions fall   Left Upper Extremity (Weight-bearing Status) full weight-bearing (FWB)   Right Upper Extremity (Weight-bearing Status) full weight-bearing (FWB)   Left Lower Extremity (Weight-bearing Status) full weight-bearing (FWB)   Right Lower Extremity (Weight-bearing Status) full weight-bearing (FWB)   General Observations and Info wife/dtr present for session   Cognitive Status Examination   Orientation Status orientation to person, place and time  (stated 1923 initially, able to correct w/cue)   Affect/Mental Status (Cognitive) WNL   Follows Commands follows two-step commands;over 90% accuracy   Cognitive Status Comments pt engages in interview appropriately, and in dc planning conversation with appropriate comments   Pain Assessment   Patient Currently in Pain Yes, see Vital Sign flowsheet  (R hip, baseline but worse now)   Posture   Posture forward head position;protracted shoulders   Range of Motion Comprehensive   Comment, General Range of Motion BUE/BLE WFL   Strength Comprehensive (MMT)   Comment, General Manual Muscle Testing (MMT) Assessment generalized weakness, but at least 3+/5 BUE/BLE   Coordination   Upper Extremity Coordination No deficits were identified   Bed Mobility   Bed Mobility supine-sit;sit-supine   Supine-Sit [place] (Bed Mobility) minimum assist (75% patient effort)   Sit-Supine [place] (Bed Mobility) minimum assist (75% patient effort)   Assistive Device (Bed Mobility) bed rails   Transfers   Transfers sit-stand transfer;toilet transfer;shower transfer   Sit-Stand Transfer   Sit-Stand [place] (Transfers) contact guard;verbal cues;2 person assist   Assistive Device (Sit-Stand Transfers)  walker, front-wheeled   Sit/Stand Transfer Comments CGA x2   Shower Transfer   Shower Transfer Comments tub/shower with tub bench; ModA per clinical judgement   Toilet Transfer   [place] (Toilet Transfer) minimum assist (75% patient effort)   Toilet Transfer Comments grab bars and raised toilet (for spouse); Amber per clinical judgement   Balance   Balance Comments good seated, mildly unsteady standing with 2WW, no LOB   Activities of Daily Living   BADL Assessment/Intervention lower body dressing;grooming;toileting   Lower Body Dressing Assessment/Training   [place] (Lower Body Dressing) moderate assist (50% patient effort)   Grooming Assessment/Training   [place] (Grooming) minimum assist (75% patient effort)   Toileting   Comment, (Toileting) per clinical judgement   [place] (Toileting) moderate assist (50% patient effort)   Clinical Impression   Criteria for Skilled Therapeutic Interventions Met (OT) Yes, treatment indicated   OT Diagnosis impaired ADLs   OT Problem List-Impairments impacting ADL problems related to;activity tolerance impaired;balance;strength;pain   Assessment of Occupational Performance 3-5 Performance Deficits   Identified Performance Deficits dressing, toilet transfer, toileting, tub/shower transfer, home chores   Planned Therapy Interventions (OT) ADL retraining;IADL retraining;strengthening;transfer training;home program guidelines   Clinical Decision Making Complexity (OT) moderate complexity   Risk & Benefits of therapy have been explained evaluation/treatment results reviewed;patient;spouse/significant other;daughter   OT Total Evaluation Time   OT Eval, Moderate Complexity Minutes (82230) 8   OT Goals   Therapy Frequency (OT) Daily   OT Predicted Duration/Target Date for Goal Attainment 10/07/23   OT Goals Hygiene/Grooming;Lower Body Dressing;Bed Mobility;Toilet Transfer/Toileting;Transfers;OT Goal 1   OT: Hygiene/Grooming modified independent    OT: Lower Body Dressing Modified independent   OT: Bed Mobility Modified independent;supine to/from sitting   OT: Transfer Modified independent   OT: Toilet Transfer/Toileting Modified independent;toilet transfer;cleaning and garment management;using adaptive equipment   OT: Goal 1 SBA functional mobility for household distances, including 2 stairs   Interventions   Interventions Quick Adds Self-Care/Home Management;Therapeutic Activity   OT Discharge Planning   OT Plan LE dressing (reacher?), toilet transfer, g/h at sink w/chair behind   OT Discharge Recommendation (DC Rec) Transitional Care Facility;home with assist;home with home care occupational therapy   OT Rationale for DC Rec Pt below baseline, limited by weakness and R hip pain.  He would benefit from continued therapy to maximize safety/independence with mobility/ADLs, as he is primary caregiver for his wife.  Pt moved well in today's session but limited by dizziness; pending LOS anticipate he may progress to home with assist and HHOT/PT.   OT Brief overview of current status Amber supine > sit, CGAx2 STS and take a few steps w/2WW   Total Session Time   Timed Code Treatment Minutes 26   Total Session Time (sum of timed and untimed services) 34

## 2023-09-30 NOTE — PROGRESS NOTES
[facility]    Medicine Progress Note - Hospitalist Service    Date of Admission:  9/27/2023    Assessment & Plan   Mr. Fleming is a 73-year-old gentleman with a past medical history of hypertension, coronary artery disease, CKD stage III who initially presented to an outside hospital for shortness of breath and poor p.o. intake with some nausea and vomiting.  He was found to have a potassium of 7 and a creatinine of 14.6and a pH of 6.98..  He was initially given albuterol, 1 g of calcium gluconate and insulin for shifting for his hyperkalemia.  Nephrology was consulted and he was initiated on an emergent run of hemodialysis at the outside hospital.  Due to very difficult Culp catheter placement a urology consult was placed and a CT abdomen and pelvis discovered obstructive uropathy with bilateral hydronephrosis.  The CT also displayed colitis with bladder wall thickening.  Urine analysis was positive for infection and he was started on broad-spectrum antibiotics with vancomycin and Zosyn.  He was transferred to [facility] on 9/27/2023 for ongoing care of worsening renal failure, electrolyte abnormalities and a urinary tract infection causing sepsis.    Acute on chronic renal failure requiring emergent dialsyis  Hyperkalemia with ECG changes, resolved  Starvation ketosis, resolved  Severe metabolic acidosis, resolved  Creatinine   Date Value Ref Range Status   09/30/2023 4.58 (H) 0.51 - 1.17 mg/dL Final   Had last hemodialysis 9/29. Good urine output.   Monitor urine output, renal function and electrolytes   Right IJ temporary dialysis catheter in place should he need future dialysis    Non-ST-elevation myocardial infarction   New moderate mitral valve regurgitation  Cardiomyopathy EF 30%  TTE from 9/28/2023 reveals worsening heart failure with an EF of 30 to 35% with a large area of hypokinesis and akinesis distal to the septum with only suspected large LAD infarct pattern, mitral  valve regurgitation and elevated right ventricular systolic pressure  Continue aspirin, metoprolol and hydralazine   Cardiology consult appreciated- follow up in clinic     Sepsis secondary to a urinary tract infection, resolving  Bilateral hydronephrosis/obstructive uropathy   CT abdomen and pelvis revealed bilateral hydro ureteral nephrosis without ureteral obstruction.  Culp catheter was placed on 9/27/2023 and is helping to decompress the bladder presently.  Urine culture grew corynebacterium striatum.   Urology is consulted and per their recommendations we will continue the Culp catheter for bladder decompression  Continue ceftriaxone for now   Repeat urinalysis and urine culture     Colitis with loose stool  Elevated liver transaminases   He and his wife describe flu like symptoms starting last week with nausea, vomiting, decreased appetite, generalized fatigue and weakness.   INR was elevated at 1.8 and liver enzymes AST/ALT were elevated in the 100s but are now improving.Stool panel/ clostridium difficile negative.  Continue to monitor     Generalized weakness and debility   Continue physcial therapy and occupational therapy      Diet: Renal Diet (dialysis)  Snacks/Supplements Adult: Nepro Oral Supplement; Between Meals  Room Service    DVT Prophylaxis: pneumatic compression device   Culp Catheter: PRESENT, indication: Strict 1-2 Hour I&O;Insertion Difficulty  Lines: PRESENT      PICC 09/27/23 Triple Lumen Right Brachial vein lateral Vascular Access-Site Assessment: WDL  CVC Double Lumen Right Internal jugular Non - tunneled-Site Assessment: WDL      Cardiac Monitoring: None  Code Status: Full Code      Disposition Plan  home versus transitional care unit        Franklin Hawkins MD  Hospitalist Service  [facility]  Securely message with CLEAR (more info)  Text page via [employer] Paging/Directory    ______________________________________________________________________    Interval History   Still weak and poor appetite     Physical Exam   Vital Signs: Temp: 97.6  F (36.4  C) Temp src: Oral BP: (P) 121/77 Pulse: 104   Resp: 17 SpO2: 98 % O2 Device: None (Room air)    Weight: 179 lbs .22 oz  Constitutional: awake, alert, cooperative, no apparent distress  Respiratory: No increased work of breathing, good air exchange, clear to auscultation bilaterally, no crackles or wheezing  Cardiovascular: regular rate and rhythm, normal S1 and S2, no S3 or S4, and no murmur noted  Neuropsychiatric: General: normal, calm and normal eye contact  +Culp    Medical Decision Making             Data   Recent Results (from the past 24 hour(s))   Lactic Acid STAT    Collection Time: 09/29/23  2:13 PM   Result Value Ref Range    Lactic Acid 1.1 0.7 - 2.0 mmol/L   Glucose by meter    Collection Time: 09/29/23  5:00 PM   Result Value Ref Range    GLUCOSE BY METER POCT 96 70 - 99 mg/dL   Comprehensive metabolic panel    Collection Time: 09/30/23  7:04 AM   Result Value Ref Range    Sodium 141 135 - 145 mmol/L    Potassium 3.1 (L) 3.4 - 5.3 mmol/L    Carbon Dioxide (CO2) 17 (L) 22 - 29 mmol/L    Anion Gap 15 7 - 15 mmol/L    Urea Nitrogen 58.6 (H) 8.0 - 23.0 mg/dL    Creatinine 4.58 (H) 0.51 - 1.17 mg/dL    GFR Estimate 13 (L) >60 mL/min/1.73m2    Calcium 7.0 (L) 8.8 - 10.2 mg/dL    Chloride 109 (H) 98 - 107 mmol/L    Glucose 83 70 - 99 mg/dL    Alkaline Phosphatase 67 35 - 129 U/L    AST 90 (H) 0 - 45 U/L    ALT 92 (H) 0 - 70 U/L    Protein Total 4.9 (L) 6.4 - 8.3 g/dL    Albumin 2.6 (L) 3.5 - 5.2 g/dL    Bilirubin Total 0.3 <=1.2 mg/dL   Magnesium    Collection Time: 09/30/23  7:04 AM   Result Value Ref Range    Magnesium 1.6 (L) 1.7 - 2.3 mg/dL   CBC with platelets    Collection Time: 09/30/23  7:04 AM   Result Value Ref Range    WBC Count 16.3 (H) 4.0 - 11.0 10e3/uL    RBC Count 2.26 (L) 3.80 - 5.90 10e6/uL    Hemoglobin 7.3 (L)  11.7 - 17.7 g/dL    Hematocrit 21.0 (L) 35.0 - 53.0 %    MCV 93 78 - 100 fL    MCH 32.3 26.5 - 33.0 pg    MCHC 34.8 31.5 - 36.5 g/dL    RDW 14.6 10.0 - 15.0 %    Platelet Count 88 (L) 150 - 450 10e3/uL   Phosphorus    Collection Time: 09/30/23  7:04 AM   Result Value Ref Range    Phosphorus 2.8 2.5 - 4.5 mg/dL   CK total    Collection Time: 09/30/23  7:04 AM   Result Value Ref Range     (H) 26 - 308 U/L   Lactic Acid STAT    Collection Time: 09/30/23  9:06 AM   Result Value Ref Range    Lactic Acid 0.5 (L) 0.7 - 2.0 mmol/L

## 2023-09-30 NOTE — PROGRESS NOTES
Care Management Initial Consult    General Information  Assessment completed with: Patient, pt  Type of CM/SW Visit: Initial Assessment    Primary Care Provider verified and updated as needed:     Readmission within the last 30 days: unable to assess      Reason for Consult: discharge planning  Advance Care Planning:            Communication Assessment  Patient's communication style: spoken language (English or Bilingual)             Cognitive  Cognitive/Neuro/Behavioral: .WDL except  Level of Consciousness: lethargic  Arousal Level: opens eyes spontaneously  Orientation: disoriented to, time  Mood/Behavior: calm, cooperative  Best Language: 0 - No aphasia  Speech: clear, spontaneous    Living Environment:   People in home: spouse     Current living Arrangements: house      Able to return to prior arrangements: no       Family/Social Support:  Care provided by: self  Provides care for: no one  Marital Status:   Wife          Description of Support System: Supportive         Current Resources:   Patient receiving home care services: No     Community Resources: None  Equipment currently used at home: none  Supplies currently used at home: None    Employment/Financial:  Employment Status:          Financial Concerns: No concerns identified   Referral to Financial Worker: No       Does the patient's insurance plan have a 3 day qualifying hospital stay waiver?  No    Lifestyle & Psychosocial Needs:  Social Determinants of Health     Food Insecurity: Not on file   Depression: Not at risk (2/16/2022)    PHQ-2     PHQ-2 Score: 0   Housing Stability: Not on file   Tobacco Use: Medium Risk (9/28/2023)    Patient History     Smoking Tobacco Use: Former     Smokeless Tobacco Use: Never     Passive Exposure: Not on file   Financial Resource Strain: Not on file   Alcohol Use: Not on file   Transportation Needs: Not on file   Physical Activity: Not on file   Interpersonal Safety: Not on file   Stress: Not on file   Social  Connections: Not on file       Functional Status:  Prior to admission patient needed assistance:              Mental Health Status:          Chemical Dependency Status:                Values/Beliefs:  Spiritual, Cultural Beliefs, Gnosticism Practices, Values that affect care:                 Additional Information:  Pt is a 73 year old male who was admitted to the hospital for concerns regarding shortness of breath per Dr. Adams note on 9/28.    Writer met with pt at bedside. Introduced self and role. Pt states he lives at home with his wife. Pt states prior to hospitalization he was independent in ADL's. Pt states he was not using any assistive devices or home care services. Discussed TCU with pt. Pt is not in agreement. Pt states he plans on returning home at time of discharge. Discussed home care with pt. Pt states he would be agreeable to Home care. Pt states he would be homebound. Pt states that he does have a friend who is receiving home care currently and he would like to talk to his friend to obtain the name of the company that his friend is using as he feels this has been beneficial for his friend. Pt states that he would like care management to stop by in the next day or two as he nears discharge so that he may have time to ask his friend for the name of home care agency. Writer understanding. Pt states he has no further questions or concerns at this time.    Writer updated care coordinator of pt wanting to return home with home care.     ZORAIDA Buenrostro  Social Work  [facility]

## 2023-10-01 ENCOUNTER — APPOINTMENT (OUTPATIENT)
Dept: SPEECH THERAPY | Facility: CLINIC | Age: 73
DRG: 871 | End: 2023-10-01
Attending: INTERNAL MEDICINE
Payer: COMMERCIAL

## 2023-10-01 ENCOUNTER — APPOINTMENT (OUTPATIENT)
Dept: OCCUPATIONAL THERAPY | Facility: CLINIC | Age: 73
DRG: 871 | End: 2023-10-01
Attending: INTERNAL MEDICINE
Payer: COMMERCIAL

## 2023-10-01 LAB
ALBUMIN SERPL BCG-MCNC: 2.7 G/DL (ref 3.5–5.2)
ALP SERPL-CCNC: 124 U/L (ref 35–129)
ALT SERPL W P-5'-P-CCNC: 87 U/L (ref 0–70)
ANION GAP SERPL CALCULATED.3IONS-SCNC: 17 MMOL/L (ref 7–15)
AST SERPL W P-5'-P-CCNC: 72 U/L (ref 0–45)
BILIRUB SERPL-MCNC: 0.3 MG/DL
BUN SERPL-MCNC: 65.9 MG/DL (ref 8–23)
CALCIUM SERPL-MCNC: 7.2 MG/DL (ref 8.8–10.2)
CHLORIDE SERPL-SCNC: 103 MMOL/L (ref 98–107)
CK SERPL-CCNC: 414 U/L (ref 26–308)
CREAT SERPL-MCNC: 5.24 MG/DL (ref 0.51–1.17)
DEPRECATED HCO3 PLAS-SCNC: 17 MMOL/L (ref 22–29)
EGFRCR SERPLBLD CKD-EPI 2021: 11 ML/MIN/1.73M2
ERYTHROCYTE [DISTWIDTH] IN BLOOD BY AUTOMATED COUNT: 14.6 % (ref 10–15)
GLUCOSE BLDC GLUCOMTR-MCNC: 95 MG/DL (ref 70–99)
GLUCOSE SERPL-MCNC: 83 MG/DL (ref 70–99)
HCT VFR BLD AUTO: 22.2 % (ref 35–53)
HGB BLD-MCNC: 7.5 G/DL (ref 11.7–17.7)
MAGNESIUM SERPL-MCNC: 1.8 MG/DL (ref 1.7–2.3)
MCH RBC QN AUTO: 31.8 PG (ref 26.5–33)
MCHC RBC AUTO-ENTMCNC: 33.8 G/DL (ref 31.5–36.5)
MCV RBC AUTO: 94 FL (ref 78–100)
PF4 HEPARIN CMPLX AB SER QL: NEGATIVE
PHOSPHATE SERPL-MCNC: 3.7 MG/DL (ref 2.5–4.5)
PLATELET # BLD AUTO: 110 10E3/UL (ref 150–450)
POTASSIUM SERPL-SCNC: 3.4 MMOL/L (ref 3.4–5.3)
PROT SERPL-MCNC: 4.9 G/DL (ref 6.4–8.3)
RBC # BLD AUTO: 2.36 10E6/UL (ref 3.8–5.9)
SODIUM SERPL-SCNC: 137 MMOL/L (ref 135–145)
WBC # BLD AUTO: 15 10E3/UL (ref 4–11)

## 2023-10-01 PROCEDURE — 92526 ORAL FUNCTION THERAPY: CPT | Mod: GN

## 2023-10-01 PROCEDURE — 84100 ASSAY OF PHOSPHORUS: CPT | Performed by: INTERNAL MEDICINE

## 2023-10-01 PROCEDURE — 97535 SELF CARE MNGMENT TRAINING: CPT | Mod: GO | Performed by: OCCUPATIONAL THERAPIST

## 2023-10-01 PROCEDURE — 250N000013 HC RX MED GY IP 250 OP 250 PS 637: Performed by: INTERNAL MEDICINE

## 2023-10-01 PROCEDURE — 83735 ASSAY OF MAGNESIUM: CPT | Performed by: INTERNAL MEDICINE

## 2023-10-01 PROCEDURE — 120N000001 HC R&B MED SURG/OB

## 2023-10-01 PROCEDURE — 99232 SBSQ HOSP IP/OBS MODERATE 35: CPT | Performed by: INTERNAL MEDICINE

## 2023-10-01 PROCEDURE — 99233 SBSQ HOSP IP/OBS HIGH 50: CPT | Performed by: HOSPITALIST

## 2023-10-01 PROCEDURE — 258N000003 HC RX IP 258 OP 636: Performed by: INTERNAL MEDICINE

## 2023-10-01 PROCEDURE — 85014 HEMATOCRIT: CPT | Performed by: INTERNAL MEDICINE

## 2023-10-01 PROCEDURE — 97530 THERAPEUTIC ACTIVITIES: CPT | Mod: GO | Performed by: OCCUPATIONAL THERAPIST

## 2023-10-01 PROCEDURE — 82550 ASSAY OF CK (CPK): CPT | Performed by: INTERNAL MEDICINE

## 2023-10-01 PROCEDURE — 250N000013 HC RX MED GY IP 250 OP 250 PS 637: Performed by: HOSPITALIST

## 2023-10-01 PROCEDURE — 250N000011 HC RX IP 250 OP 636: Mod: JZ | Performed by: HOSPITALIST

## 2023-10-01 PROCEDURE — 86022 PLATELET ANTIBODIES: CPT | Performed by: HOSPITALIST

## 2023-10-01 PROCEDURE — 80053 COMPREHEN METABOLIC PANEL: CPT | Performed by: INTERNAL MEDICINE

## 2023-10-01 RX ORDER — PIPERACILLIN SODIUM, TAZOBACTAM SODIUM 2; .25 G/10ML; G/10ML
2.25 INJECTION, POWDER, LYOPHILIZED, FOR SOLUTION INTRAVENOUS EVERY 8 HOURS
Status: DISCONTINUED | OUTPATIENT
Start: 2023-10-01 | End: 2023-10-03

## 2023-10-01 RX ORDER — B COMPLEX C NO.10/FOLIC ACID 900MCG/5ML
5 LIQUID (ML) ORAL DAILY
Status: DISCONTINUED | OUTPATIENT
Start: 2023-10-01 | End: 2023-10-03

## 2023-10-01 RX ADMIN — ACETAMINOPHEN 650 MG: 325 TABLET, FILM COATED ORAL at 06:43

## 2023-10-01 RX ADMIN — SODIUM CHLORIDE: 9 INJECTION, SOLUTION INTRAVENOUS at 15:03

## 2023-10-01 RX ADMIN — SODIUM CHLORIDE: 9 INJECTION, SOLUTION INTRAVENOUS at 04:59

## 2023-10-01 RX ADMIN — HYDRALAZINE HYDROCHLORIDE 10 MG: 10 TABLET ORAL at 23:08

## 2023-10-01 RX ADMIN — PIPERACILLIN AND TAZOBACTAM 2.25 G: 2; .25 INJECTION, POWDER, FOR SOLUTION INTRAVENOUS at 18:41

## 2023-10-01 RX ADMIN — ACETAMINOPHEN 650 MG: 325 TABLET, FILM COATED ORAL at 14:03

## 2023-10-01 RX ADMIN — HYDRALAZINE HYDROCHLORIDE 10 MG: 10 TABLET ORAL at 08:31

## 2023-10-01 RX ADMIN — Medication 5 ML: at 15:03

## 2023-10-01 RX ADMIN — ASPIRIN 81 MG: 81 TABLET, COATED ORAL at 08:31

## 2023-10-01 RX ADMIN — PIPERACILLIN AND TAZOBACTAM 2.25 G: 2; .25 INJECTION, POWDER, FOR SOLUTION INTRAVENOUS at 11:16

## 2023-10-01 RX ADMIN — METOPROLOL SUCCINATE 25 MG: 25 TABLET, EXTENDED RELEASE ORAL at 11:16

## 2023-10-01 RX ADMIN — ACETAMINOPHEN 650 MG: 325 TABLET, FILM COATED ORAL at 23:08

## 2023-10-01 RX ADMIN — ACETAMINOPHEN 650 MG: 325 TABLET, FILM COATED ORAL at 18:48

## 2023-10-01 RX ADMIN — HYDRALAZINE HYDROCHLORIDE 10 MG: 10 TABLET ORAL at 15:03

## 2023-10-01 RX ADMIN — ACETAMINOPHEN 650 MG: 325 TABLET, FILM COATED ORAL at 02:34

## 2023-10-01 ASSESSMENT — ACTIVITIES OF DAILY LIVING (ADL)
ADLS_ACUITY_SCORE: 46

## 2023-10-01 NOTE — PLAN OF CARE
Goal Outcome Evaluation:         Pt A&Ox4, forgetful at times. Renal diet. A2 pivot to chair, not oob this shift. PRN tylenol given for pain. Culp in place w/ adequate output. 1 BM this shift. NS running @100ml/hr.

## 2023-10-01 NOTE — PROVIDER NOTIFICATION
MD Notification    Notified Person: MD    Notified Person Name:Vicki Mohan MD    Notification Date/Time:9:34 PM    Notification Interaction:[employer] Web page    Purpose of Notification:Heparin administration with low platelets.     Orders Received: Heparin held    Comments:

## 2023-10-01 NOTE — PROGRESS NOTES
Care Management Follow Up    Length of Stay (days): 4    Expected Discharge Date: 10/02/2023     Concerns to be Addressed: discharge planning     Patient plan of care discussed at interdisciplinary rounds: Yes    Anticipated Discharge Disposition: Other (Comments) (Pt would like to return home with home care; he is not interested in TCU)     Anticipated Discharge Services:    Anticipated Discharge DME:      Patient/family educated on Medicare website which has current facility and service quality ratings:    Education Provided on the Discharge Plan:    Patient/Family in Agreement with the Plan: other (see comments) (Pt would like to return home with home care; he is not interested in TCU)    Referrals Placed by CM/SW:    Private pay costs discussed: Not applicable    Additional Information:  Patient had expressed desire with  to go home with his preferred home care (from a friend of his).  Followed up with patient today to inquire in that agencies name.  He states they will not be able to help him as they have no openings until the end of October.  He is open to other home care agency so referral was placed to [facility] hub for OT/PT/RN.    Will are also following for possible dialysis initiation.  Will continue to follow for discharge planning.    Elis Navarro RN  Inpatient Float Care Coordinator

## 2023-10-01 NOTE — CONSULTS
"CLINICAL NUTRITION SERVICES  -  ASSESSMENT NOTE      Recommendations Ordered by Registered Dietitian (RD):   Recommend liberalize to Regular diet given ongoing poor PO   Discontinue Nepro, pt declines other supplements today   Nephronex daily    Malnutrition:   % Weight Loss:  Up to 20% in 1 year (moderate malnutrition)  % Intake:  </= 50% for >/= 5 days (severe malnutrition)  Subcutaneous Fat Loss:  Orbital region mild-moderate depletion and Upper arm region mild-moderate depletion depletion  Muscle Loss:  Temporal region mild-moderate depletion depletion and Clavicle bone region mild-moderate depletion depletion  Fluid Retention:  Trace     Malnutrition Diagnosis: Moderate malnutrition  In Context of:  Acute on Chronic illness or disease        REASON FOR ASSESSMENT  Bret Fleming is a 73 year old adult seen by Registered Dietitian for Provider Order - poor appetite       NUTRITION HISTORY  Chart reviewed and discussed with patient   Limited PO for past week PTA, c/o nausea, vomiting   PMH includes HTN, CAD, CDK 3  Required emergent HD at OSH    Pr resides home with wife who share cooking roles  At baseline appetite and intake are stable and good. Does not follow a renal diet   He dislikes sweets, including protein supplements   Reports he had been unintentionally losing weight in the past year, \"my metabolism used to be better\"   No known food allergies     CURRENT NUTRITION ORDERS  Diet Order:     Dialysis diet   Nepro BID  Room service w/ assist    Current Intake/Tolerance:  Reports appetite has been improving actually  Had 50% breakfast (eggs, toast) today and now has oatmeal at bedside for lunch, however, untouched     Strongly dislikes Nepro and asks for them to be removed. Declines other supplements given his distaste of sweet items     BECCA from 9/28 --> worsening HF, new mitral valve regurgitation      NUTRITION FOCUSED PHYSICAL ASSESSMENT FOR DIAGNOSING MALNUTRITION)  Yes         Observed:    Muscle " "wasting (refer to documentation in Malnutrition section) and Subcutaneous fat loss (refer to documentation in Malnutrition section) acute on chronic     Obtained from Chart/Interdisciplinary Team:  Trace edema     ANTHROPOMETRICS  Height: 6' 0\"  Weight: 179 lbs .22 oz  Body mass index is 24.28 kg/m .  Weight Status:  Normal BMI  IBW: 81 kg   % IBW: 100%  Weight History: 50 lbs down in 16 months (22% body wt). More recent wt trending unavailable   Wt Readings from Last 10 Encounters:   09/29/23 81.2 kg (179 lb 0.2 oz)   09/27/23 90.7 kg (200 lb)   05/27/22 104 kg (229 lb 3.2 oz)   02/05/22 106.5 kg (234 lb 12.8 oz)   08/18/21 117.8 kg (259 lb 9.6 oz)   06/22/20 114.3 kg (252 lb)   08/11/19 122.1 kg (269 lb 2 oz)       LABS  Labs reviewed  BUN 65.9 (H), Cr 5.24 (H)  K/Mg/Phos WNL  Recent Labs   Lab 10/01/23  0635 09/30/23  0704 09/29/23  1700 09/29/23  0617 09/29/23  0538 09/29/23  0210   GLC 83 83 96 102* 103* 95     Lab Results   Component Value Date    URINEKETONE Trace 09/30/2023       MEDICATIONS  Medications reviewed      ASSESSED NUTRITION NEEDS PER APPROVED PRACTICE GUIDELINES:    Dosing Weight 81 kg   Estimated Energy Needs: 3792-1248 kcals (25-30 Kcal/Kg)  Justification: maintenance  Estimated Protein Needs: 81-97 grams protein (1-1.2 g pro/Kg)  Justification: CKD and preservation of lean body mass  Estimated Fluid Needs: per MD pending fluid status        MALNUTRITION:  % Weight Loss:  Up to 20% in 1 year (moderate malnutrition)  % Intake:  </= 50% for >/= 5 days (severe malnutrition)  Subcutaneous Fat Loss:  Orbital region mild-moderate depletion and Upper arm region mild-moderate depletion depletion  Muscle Loss:  Temporal region mild-moderate depletion depletion and Clavicle bone region mild-moderate depletion depletion  Fluid Retention:  Trace     Malnutrition Diagnosis: Moderate malnutrition  In Context of:  Acute on Chronic illness or disease    NUTRITION DIAGNOSIS:  Inadequate oral intake related to " decreased appetite, prior N/V as evidenced by intakes </=25% for the past 1+ week       NUTRITION INTERVENTIONS  Recommendations / Nutrition Prescription  Continue diet  Discontinue Nepro, pt declines other supplements today   Nephronex daily       Implementation  Nutrition education: Provided brief verbal teaching on renal friendly protein sources, encouraged consumption of additional calories for weight management   Medical Food Supplement: discontinued       Nutrition Goals  Patient will consume >/=50% nutritionally adequate meals TID on average       MONITORING AND EVALUATION:  Progress towards goals will be monitored and evaluated per protocol and Practice Guidelines        Lucero Mireles RD, LD  Clinical Dietitian   Weekend pager [phone]

## 2023-10-01 NOTE — PROGRESS NOTES
Renal Medicine Progress Note                                Bret Fleming MRN# [id number]   Age: 73 year old YOB: 1950   Date of Admission: 9/27/2023 Hospital LOS: 4                  Assessment/Plan:     RICHARD versus progression of CKD IV     Initial evaluation done by Dr. Demarco at [facility].  Creatinine prior to admission ranging between 1.8-3.3 over the course of 2022, no recent labs.  Creatinine on admission 14.61 in the setting of encephalopathy, recent NSAID use, sepsis, UTI, and obstructive uropathy.  Most likely significant prerenal RICHRAD, probable ATN at this point.  He did undergo dialysis at [facility] primarily for acidosis and severe hyperkalemia causing EKG changes.  Not entirely clear how much renal recovery he will have yet though he is making a decent amount of urine on his own.  He continues to appear hypovolemic, agree with continuing IVF for today.  He has no absolute acute indication for dialysis today.  Will assess rate of rise of creatinine to determine if he will need dialysis tomorrow.  Hopefully he will only need dialysis temporarily going forward, however too soon to tell.  He did have some work-up for CKD done at outside hospital including [facility].        Hyperkalemia, resolved  K7.0 on presentation with wide complexes noted on EKG at [facility].  Patient underwent emergent HD for 2 and half hours with resolution of K.  Did notably also have obstructive uropathy which contributed to his hyperkalemia in addition to his RICHARD.  He was also taking ibuprofen PTA.     Severe anion gap metabolic acidosis  Lactic acidosis, resolved  Starvation ketoacidosis  Initial pH 6.93, bicarb 4.  Lactate 3.7.  Ketones 2.1.  He underwent emergent HD with improvement.  Current pH normalized.       Obstructive uropathy  Moderate bilateral hydroureteronephrosis  Pyelonephritis  Hypospadias  History of urinary retention  Patient has history of urinary retention and UTIs in the past.   CT with bilateral hydroureteronephrosis and bladder wall thickening consistent with cystitis.  Culp placed by urology, initially yellow urine followed by milky cloudy urine consistent with UTI/pyelo-.      Severe diarrhea  Colitis, possibly infectious     Acute on chronic anemia  Hemoglobin 6.9 today, baseline appears to be around 10.  Iron studies adequate.  Getting unit of blood.     Sepsis     Hyperphosphatemia      Creatinine slightly over past 24 hours  Lytes OK  Persistent AGMA     Continue IVF  Assess in am for dialysis          Interval History:     Dialyzed 09/29/23 without issue  Temporary right IJ access    No edema  No 02 requirement    Culp in place  Non oliguric     Reviewed labs with patient       ROS:     GENERAL: NAD, No fever,chills  R: NEGATIVE for significant cough or SOB  CV: NEGATIVE for chest pain, palpitations  EXT: no change edema  ROS otherwise negative    Medications and Allergies:     Reviewed    Physical Exam:     Vitals were reviewed  Patient Vitals for the past 8 hrs:   BP Temp Temp src Pulse Resp SpO2   10/01/23 0746 103/57 98.5  F (36.9  C) Oral 91 16 98 %     I/O last 3 completed shifts:  In: 660 [P.O.:660]  Out: 1325 [Urine:1325]    Vitals:    09/28/23 0000 09/28/23 0700 09/29/23 0640   Weight: 81.6 kg (179 lb 14.3 oz) 82.6 kg (182 lb 1.6 oz) 81.2 kg (179 lb 0.2 oz)         GENERAL: awake, alert, follows  HEENT: NC/AT, PERRLA, EOMI, non icteric, pharynx moist without lesion  RESP:  clear anteriorly  CV: RRR, normal S1 S2  MS: no clubbing, cyanosis   SKIN: clear without significant rashes or lesions  NEURO: speech normal and cranial nerves 2-12 intact  PSYCH: affect normal/bright  EXT: warm, no edema    Data:     Recent Labs   Lab 10/01/23  0635 09/30/23  0704 09/29/23  1700 09/29/23  0617 09/29/23  0538 09/28/23  0820 09/28/23  0449    141  --   --  138  --  136   POTASSIUM 3.4 3.1*  --   --  3.4  --  3.7   CHLORIDE 103 109*  --   --  100  --  96*   CO2 17* 17*  --   --   12*  --  13*   ANIONGAP 17* 15  --   --  26*  --  27*   GLC 83 83 96 102* 103*   < > 99   BUN 65.9* 58.6*  --   --  134.5*  --  123.6*   CR 5.24* 4.58*  --   --  8.65*  --  8.48*   GFRESTIMATED 11* 13*  --   --  6*  --  6*   CHANA 7.2* 7.0*  --   --  7.6*  --  8.4*    < > = values in this interval not displayed.         Recent Labs   Lab 10/01/23  0635      POTASSIUM 3.4   CHLORIDE 103   CO2 17*   ANIONGAP 17*   GLC 83   BUN 65.9*   CR 5.24*   GFRESTIMATED 11*   CHANA 7.2*     Recent Labs   Lab 10/01/23  0635 09/30/23  0704 09/29/23  0538 09/28/23  0449 09/27/23  2344 09/27/23  1446   CR 5.24* 4.58* 8.65* 8.48* 8.07* 14.61*     Recent Labs   Lab 10/01/23  0635 09/30/23  0704 09/29/23  0538 09/28/23  0449   ALBUMIN 2.7* 2.6* 3.1* 3.3*     Recent Labs   Lab 09/30/23  0906 09/29/23  1413 09/28/23 2010 09/27/23  2344   LACT 0.5* 1.1 0.9 1.4     Recent Labs   Lab 10/01/23  0635 09/30/23  0704 09/29/23  0538 09/28/23  0449   PHOS 3.7 2.8 5.5* 5.2*   HGB 7.5* 7.3* 7.6* 6.9*     Recent Labs   Lab 09/27/23  1752 09/27/23  1446   IRON  --  120   IRONSAT  --  53*   FEB  --  226*     --          G Dat Chairez MD    [facility] - Nephrology  [phone]

## 2023-10-01 NOTE — PLAN OF CARE
Goal Outcome Evaluation:  Denies CP, SOB. Pt walked in room today. Pt reported eating some eggs this AM for breakfast, oatmaeal for lunch, yogurt for dinner, improved appetite since Friday . All pt needs met at this time. Continuous pulse ox on. Still unable to collect sputum culture, pt denies cough and verbalizes that he cannot cough up sputum.

## 2023-10-01 NOTE — PROGRESS NOTES
[facility]    Medicine Progress Note - Hospitalist Service    Date of Admission:  9/27/2023    Assessment & Plan   Mr. Fleming is a 73-year-old gentleman with a past medical history of hypertension, coronary artery disease, CKD stage III who initially presented to an outside hospital for shortness of breath and poor p.o. intake with some nausea and vomiting.  He was found to have a potassium of 7 and a creatinine of 14.6and a pH of 6.98..  He was initially given albuterol, 1 g of calcium gluconate and insulin for shifting for his hyperkalemia.  Nephrology was consulted and he was initiated on an emergent run of hemodialysis at the outside hospital.  Due to very difficult Culp catheter placement a urology consult was placed and a CT abdomen and pelvis discovered obstructive uropathy with bilateral hydronephrosis.  The CT also displayed colitis with bladder wall thickening.  Urine analysis was positive for infection and he was started on broad-spectrum antibiotics with vancomycin and Zosyn.  He was transferred to [facility] on 9/27/2023 for ongoing care of worsening renal failure, electrolyte abnormalities and a urinary tract infection causing sepsis.    Acute on chronic renal failure requiring emergent hemodialysis- likely acute tubular necrosis   Hyperkalemia with ECG changes, resolved  Starvation ketoacidosis, resolved  Severe lactic acidosis, resolved  Metabolic acidosis due to renal failure   Creatinine   Date Value Ref Range Status   09/30/2023 4.58 (H) 0.51 - 1.17 mg/dL Final   Had last hemodialysis 9/29. Good urine output npw  Monitor urine output, renal function and electrolytes   Right IJ temporary dialysis catheter in place should he need future dialysis    Obstructive uropathy  Moderate bilateral hydroureteronephrosis  Sepsis due to pyelonephritis  Hypospadias  History of urinary retention  CT abdomen and pelvis revealed bilateral hydro ureteral nephrosis without ureteral  obstruction.  Guido catheter was placed on 9/27/2023 and is helping to decompress the bladder presently.  Urine culture grew corynebacterium striatum.   Urology is consulted and per their recommendations we will continue the Guido catheter for bladder decompression.  Urinalysis and urine culture repeated- still positive.   Antibiotic changed from ceftriaxone to vancomycin to cover corynebacterium striatum- if same organism is growing will need to ask lab to do sensitivities  Add Zosyn for gram negative coverage- has allergy to ciprofloxacin   Follow up urine culture   Continue guido     Non-ST-elevation myocardial infarction   New moderate mitral valve regurgitation  Cardiomyopathy EF 30%  Suspected coronary artery disease   TTE from 9/28/2023 reveals worsening heart failure with an EF of 30 to 35% with a large area of hypokinesis and akinesis distal to the septum with only suspected large LAD infarct pattern, mitral valve regurgitation and elevated right ventricular systolic pressure.  Continue aspirin, metoprolol and hydralazine   Cardiology consult appreciated- follow up in clinic     Colitis with loose stool  Elevated liver transaminases   He and his wife describe flu like symptoms starting last week with nausea, vomiting, decreased appetite, generalized fatigue and weakness.   INR was elevated at 1.8 and liver enzymes AST/ALT were elevated in the 100s but are now improving. Stool panel/ clostridium difficile negative.  Continue to monitor     Anemia status post packed red blood cells  Thrombocytopenia, acute   Anemia likely due to renal disease and sepsis.  Iron, B12 and folate ok.   Thrombocytopenia could also be due to sepsis but heparin held.   Check HIT Ab  Continue to monitor H3    Generalized weakness and debility   Continue physcial therapy and occupational therapy      Diet: Renal Diet (dialysis)  Snacks/Supplements Adult: Nepro Oral Supplement; Between Meals  Room Service    DVT Prophylaxis: pneumatic  compression device   Culp Catheter: PRESENT, indication: Strict 1-2 Hour I&O;Insertion Difficulty  Lines: PRESENT      PICC 09/27/23 Triple Lumen Right Brachial vein lateral Vascular Access-Site Assessment: WDL  CVC Double Lumen Right Internal jugular Non - tunneled-Site Assessment: WDL      Cardiac Monitoring: None  Code Status: Full Code      Disposition Plan  home versus transitional care unit        Franklin Hawkins MD  Hospitalist Service  [facility]  Securely message with [employer] (more info)  Text page via [employer] Paging/Directory   ______________________________________________________________________    Interval History   No new complaints.    Physical Exam   Vital Signs: Temp: 98.5  F (36.9  C) Temp src: Oral BP: 103/57 Pulse: 91   Resp: 16 SpO2: 98 % O2 Device: None (Room air)    Weight: 179 lbs .22 oz  Constitutional: awake, alert, cooperative, no apparent distress  Respiratory: No increased work of breathing, good air exchange, clear to auscultation bilaterally, no crackles or wheezing  Cardiovascular: regular rate and rhythm, normal S1 and S2, no S3 or S4, and no murmur noted  Neuropsychiatric: General: normal, calm and normal eye contact  +Culp and right internal jugular hemodialysis catheter    Medical Decision Making             Data   Recent Results (from the past 24 hour(s))   UA with Microscopic reflex to Culture    Collection Time: 09/30/23  3:50 PM    Specimen: Urine, Culp Catheter   Result Value Ref Range    Color Urine Straw Colorless, Straw, Light Yellow, Yellow    Appearance Urine Slightly Cloudy (A) Clear    Glucose Urine Negative Negative mg/dL    Bilirubin Urine Negative Negative    Ketones Urine Trace (A) Negative mg/dL    Specific Gravity Urine 1.010 1.003 - 1.035    Blood Urine Large (A) Negative    pH Urine 6.5 5.0 - 7.0    Protein Albumin Urine 100 (A) Negative mg/dL    Urobilinogen Urine Normal Normal, 2.0 mg/dL    Nitrite Urine Negative Negative     Leukocyte Esterase Urine Large (A) Negative    Mucus Urine Present (A) None Seen /LPF    RBC Urine 64 (H) <=2 /HPF    WBC Urine >182 (H) <=5 /HPF    Squamous Epithelials Urine <1 <=1 /HPF   Comprehensive metabolic panel    Collection Time: 10/01/23  6:35 AM   Result Value Ref Range    Sodium 137 135 - 145 mmol/L    Potassium 3.4 3.4 - 5.3 mmol/L    Carbon Dioxide (CO2) 17 (L) 22 - 29 mmol/L    Anion Gap 17 (H) 7 - 15 mmol/L    Urea Nitrogen 65.9 (H) 8.0 - 23.0 mg/dL    Creatinine 5.24 (H) 0.51 - 1.17 mg/dL    GFR Estimate 11 (L) >60 mL/min/1.73m2    Calcium 7.2 (L) 8.8 - 10.2 mg/dL    Chloride 103 98 - 107 mmol/L    Glucose 83 70 - 99 mg/dL    Alkaline Phosphatase 124 35 - 129 U/L    AST 72 (H) 0 - 45 U/L    ALT 87 (H) 0 - 70 U/L    Protein Total 4.9 (L) 6.4 - 8.3 g/dL    Albumin 2.7 (L) 3.5 - 5.2 g/dL    Bilirubin Total 0.3 <=1.2 mg/dL   Magnesium    Collection Time: 10/01/23  6:35 AM   Result Value Ref Range    Magnesium 1.8 1.7 - 2.3 mg/dL   CBC with platelets    Collection Time: 10/01/23  6:35 AM   Result Value Ref Range    WBC Count 15.0 (H) 4.0 - 11.0 10e3/uL    RBC Count 2.36 (L) 3.80 - 5.90 10e6/uL    Hemoglobin 7.5 (L) 11.7 - 17.7 g/dL    Hematocrit 22.2 (L) 35.0 - 53.0 %    MCV 94 78 - 100 fL    MCH 31.8 26.5 - 33.0 pg    MCHC 33.8 31.5 - 36.5 g/dL    RDW 14.6 10.0 - 15.0 %    Platelet Count 110 (L) 150 - 450 10e3/uL   Phosphorus    Collection Time: 10/01/23  6:35 AM   Result Value Ref Range    Phosphorus 3.7 2.5 - 4.5 mg/dL   CK total    Collection Time: 10/01/23  6:35 AM   Result Value Ref Range     (H) 26 - 308 U/L

## 2023-10-01 NOTE — PLAN OF CARE
Speech Language Therapy Discharge Summary    Reason for therapy discharge:    All goals and outcomes met, no further needs identified.    Progress towards therapy goal(s). See goals on Care Plan in [facility] electronic health record for goal details.  Goals met    Therapy recommendation(s):    No further therapy is recommended. Pt clinically tolerating regular/thin diet, IND with self-feeding.

## 2023-10-01 NOTE — PHARMACY-VANCOMYCIN DOSING SERVICE
Pharmacy Vancomycin Initial Note  Date of Service 2023  Patient's  1950  73 year old, adult    Indication: Urinary Tract Infection    Current estimated CrCl = Estimated Creatinine Clearance (based on SCr of 4.58 mg/dL (H))  Female: 14 mL/min (A)  Male: 16.5 mL/min (A)    Creatinine for last 3 days  2023: 11:44 PM Creatinine 8.07 mg/dL  2023:  4:49 AM Creatinine 8.48 mg/dL  2023:  5:38 AM Creatinine 8.65 mg/dL  2023:  7:04 AM Creatinine 4.58 mg/dL    Recent Vancomycin Level(s) for last 3 days  No results found for requested labs within last 3 days.      Vancomycin IV Administrations (past 72 hours)        No vancomycin orders with administrations in past 72 hours.                    Nephrotoxins and other renal medications (From now, onward)      Start     Dose/Rate Route Frequency Ordered Stop    23  vancomycin (VANCOCIN) 1,750 mg in 0.9% NaCl 500 mL intermittent infusion         1,750 mg  over 2 Hours Intravenous ONCE 23 194              Contrast Orders - past 72 hours (72h ago, onward)      Start     Dose/Rate Route Frequency Stop    23 1130  perflutren diluted 1mL to 2mL with saline (OPTISON) diluted injection 3 mL         3 mL Intravenous ONCE 23 1106          Plan:  Start vancomycin 1750mg x 1. Goal trough 15-20 Dialysis patient. Dosing based on level   Vancomycin monitoring method: Trough (Method 2 = manual dose calculation)  Vancomycin therapeutic monitoring goal: 15-20 mg/L  Pharmacy will check vancomycin levels as appropriate in 1-3 Days.    Serum creatinine levels will be ordered daily for the first week of therapy and at least twice weekly for subsequent weeks.      Marie Rai [facility]

## 2023-10-01 NOTE — PROVIDER NOTIFICATION
Dr Hawkins -Notified provider about indwelling guido catheter discussed removal or continued need.    Did provider choose to remove indwelling guido catheter? No    Provider's guido indication for keeping indwelling guido catheter: Obstruction.    Is there an order for indwelling guido catheter? Yes    *If there is a plan to keep guido catheter in place at discharge daily notification with provider is not necessary, but please add a notation in the treatment team sticky note that the patient will be discharging with the catheter.

## 2023-10-01 NOTE — SIGNIFICANT EVENT
Significant Event Note    Time of event: 9:34 PM September 30, 2023    Description of event:  Heparin administration with low platelets.         Plan:  Given the acute drop of platelets, will hold heparin ppx for now and use mnemonic compressions.  Further evaluation of low platelets by the day hospitalist.      Jennifer Mohan MD

## 2023-10-02 ENCOUNTER — APPOINTMENT (OUTPATIENT)
Dept: OCCUPATIONAL THERAPY | Facility: CLINIC | Age: 73
DRG: 871 | End: 2023-10-02
Attending: INTERNAL MEDICINE
Payer: COMMERCIAL

## 2023-10-02 ENCOUNTER — PATIENT OUTREACH (OUTPATIENT)
Dept: CARE COORDINATION | Facility: CLINIC | Age: 73
End: 2023-10-02
Payer: COMMERCIAL

## 2023-10-02 LAB
ALBUMIN SERPL BCG-MCNC: 2.7 G/DL (ref 3.5–5.2)
ALP SERPL-CCNC: 54 U/L (ref 35–129)
ALT SERPL W P-5'-P-CCNC: 76 U/L (ref 0–70)
ANION GAP SERPL CALCULATED.3IONS-SCNC: 15 MMOL/L (ref 7–15)
AST SERPL W P-5'-P-CCNC: 56 U/L (ref 0–45)
BACTERIA BLD CULT: NO GROWTH
BACTERIA BLD CULT: NO GROWTH
BACTERIA UR CULT: NO GROWTH
BILIRUB SERPL-MCNC: 0.3 MG/DL
BUN SERPL-MCNC: 71.2 MG/DL (ref 8–23)
CALCIUM SERPL-MCNC: 6.9 MG/DL (ref 8.8–10.2)
CHLORIDE SERPL-SCNC: 104 MMOL/L (ref 98–107)
CK SERPL-CCNC: 345 U/L (ref 26–308)
CREAT SERPL-MCNC: 5.26 MG/DL (ref 0.51–1.17)
DEPRECATED HCO3 PLAS-SCNC: 16 MMOL/L (ref 22–29)
EGFRCR SERPLBLD CKD-EPI 2021: 11 ML/MIN/1.73M2
ERYTHROCYTE [DISTWIDTH] IN BLOOD BY AUTOMATED COUNT: 14.6 % (ref 10–15)
GLUCOSE BLDC GLUCOMTR-MCNC: 85 MG/DL (ref 70–99)
GLUCOSE SERPL-MCNC: 88 MG/DL (ref 70–99)
HCT VFR BLD AUTO: 22 % (ref 35–53)
HGB BLD-MCNC: 7.3 G/DL (ref 11.7–17.7)
MAGNESIUM SERPL-MCNC: 1.7 MG/DL (ref 1.7–2.3)
MCH RBC QN AUTO: 31.9 PG (ref 26.5–33)
MCHC RBC AUTO-ENTMCNC: 33.2 G/DL (ref 31.5–36.5)
MCV RBC AUTO: 96 FL (ref 78–100)
PHOSPHATE SERPL-MCNC: 4.2 MG/DL (ref 2.5–4.5)
PLATELET # BLD AUTO: 101 10E3/UL (ref 150–450)
POTASSIUM SERPL-SCNC: 3.2 MMOL/L (ref 3.4–5.3)
POTASSIUM SERPL-SCNC: 3.4 MMOL/L (ref 3.4–5.3)
POTASSIUM SERPL-SCNC: 3.5 MMOL/L (ref 3.4–5.3)
PROT SERPL-MCNC: 4.8 G/DL (ref 6.4–8.3)
RBC # BLD AUTO: 2.29 10E6/UL (ref 3.8–5.9)
SODIUM SERPL-SCNC: 135 MMOL/L (ref 135–145)
VANCOMYCIN SERPL-MCNC: 20.2 UG/ML
WBC # BLD AUTO: 9.5 10E3/UL (ref 4–11)

## 2023-10-02 PROCEDURE — 80053 COMPREHEN METABOLIC PANEL: CPT | Performed by: INTERNAL MEDICINE

## 2023-10-02 PROCEDURE — 250N000011 HC RX IP 250 OP 636: Mod: JZ | Performed by: HOSPITALIST

## 2023-10-02 PROCEDURE — 84132 ASSAY OF SERUM POTASSIUM: CPT | Performed by: HOSPITALIST

## 2023-10-02 PROCEDURE — 250N000009 HC RX 250: Performed by: INTERNAL MEDICINE

## 2023-10-02 PROCEDURE — 120N000001 HC R&B MED SURG/OB

## 2023-10-02 PROCEDURE — 97535 SELF CARE MNGMENT TRAINING: CPT | Mod: GO

## 2023-10-02 PROCEDURE — 250N000011 HC RX IP 250 OP 636: Mod: JZ | Performed by: INTERNAL MEDICINE

## 2023-10-02 PROCEDURE — 84100 ASSAY OF PHOSPHORUS: CPT | Performed by: INTERNAL MEDICINE

## 2023-10-02 PROCEDURE — 83735 ASSAY OF MAGNESIUM: CPT | Performed by: INTERNAL MEDICINE

## 2023-10-02 PROCEDURE — 250N000013 HC RX MED GY IP 250 OP 250 PS 637: Performed by: HOSPITALIST

## 2023-10-02 PROCEDURE — 258N000003 HC RX IP 258 OP 636: Performed by: INTERNAL MEDICINE

## 2023-10-02 PROCEDURE — 250N000013 HC RX MED GY IP 250 OP 250 PS 637: Performed by: INTERNAL MEDICINE

## 2023-10-02 PROCEDURE — 80202 ASSAY OF VANCOMYCIN: CPT | Performed by: HOSPITALIST

## 2023-10-02 PROCEDURE — 99232 SBSQ HOSP IP/OBS MODERATE 35: CPT | Performed by: HOSPITALIST

## 2023-10-02 PROCEDURE — 99232 SBSQ HOSP IP/OBS MODERATE 35: CPT | Performed by: INTERNAL MEDICINE

## 2023-10-02 PROCEDURE — 250N000009 HC RX 250

## 2023-10-02 PROCEDURE — 258N000002 HC RX IP 258 OP 250: Performed by: INTERNAL MEDICINE

## 2023-10-02 PROCEDURE — 85027 COMPLETE CBC AUTOMATED: CPT | Performed by: INTERNAL MEDICINE

## 2023-10-02 PROCEDURE — 82550 ASSAY OF CK (CPK): CPT | Performed by: INTERNAL MEDICINE

## 2023-10-02 RX ORDER — POTASSIUM CHLORIDE 1500 MG/1
20 TABLET, EXTENDED RELEASE ORAL ONCE
Status: COMPLETED | OUTPATIENT
Start: 2023-10-02 | End: 2023-10-02

## 2023-10-02 RX ORDER — WATER 10 ML/10ML
INJECTION INTRAMUSCULAR; INTRAVENOUS; SUBCUTANEOUS
Status: COMPLETED
Start: 2023-10-02 | End: 2023-10-02

## 2023-10-02 RX ADMIN — ACETAMINOPHEN 650 MG: 325 TABLET, FILM COATED ORAL at 03:56

## 2023-10-02 RX ADMIN — ACETAMINOPHEN 650 MG: 325 TABLET, FILM COATED ORAL at 19:12

## 2023-10-02 RX ADMIN — POTASSIUM CHLORIDE 20 MEQ: 1500 TABLET, EXTENDED RELEASE ORAL at 16:08

## 2023-10-02 RX ADMIN — METOPROLOL SUCCINATE 25 MG: 25 TABLET, EXTENDED RELEASE ORAL at 08:51

## 2023-10-02 RX ADMIN — SODIUM CHLORIDE: 9 INJECTION, SOLUTION INTRAVENOUS at 01:44

## 2023-10-02 RX ADMIN — POTASSIUM CHLORIDE 20 MEQ: 1500 TABLET, EXTENDED RELEASE ORAL at 08:51

## 2023-10-02 RX ADMIN — PIPERACILLIN AND TAZOBACTAM 2.25 G: 2; .25 INJECTION, POWDER, FOR SOLUTION INTRAVENOUS at 03:47

## 2023-10-02 RX ADMIN — Medication 5 ML: at 08:51

## 2023-10-02 RX ADMIN — PIPERACILLIN AND TAZOBACTAM 2.25 G: 2; .25 INJECTION, POWDER, FOR SOLUTION INTRAVENOUS at 19:07

## 2023-10-02 RX ADMIN — DICLOFENAC SODIUM 2 G: 10 GEL TOPICAL at 21:05

## 2023-10-02 RX ADMIN — PIPERACILLIN AND TAZOBACTAM 2.25 G: 2; .25 INJECTION, POWDER, FOR SOLUTION INTRAVENOUS at 11:44

## 2023-10-02 RX ADMIN — HYDRALAZINE HYDROCHLORIDE 10 MG: 10 TABLET ORAL at 08:51

## 2023-10-02 RX ADMIN — ACETAMINOPHEN 650 MG: 325 TABLET, FILM COATED ORAL at 08:56

## 2023-10-02 RX ADMIN — DICLOFENAC SODIUM 2 G: 10 GEL TOPICAL at 16:20

## 2023-10-02 RX ADMIN — ASPIRIN 81 MG: 81 TABLET, COATED ORAL at 08:51

## 2023-10-02 RX ADMIN — POTASSIUM CHLORIDE: 2 INJECTION, SOLUTION, CONCENTRATE INTRAVENOUS at 11:44

## 2023-10-02 RX ADMIN — ALTEPLASE 2 MG: 2.2 INJECTION, POWDER, LYOPHILIZED, FOR SOLUTION INTRAVENOUS at 18:25

## 2023-10-02 RX ADMIN — DICLOFENAC SODIUM 2 G: 10 GEL TOPICAL at 18:26

## 2023-10-02 RX ADMIN — HYDRALAZINE HYDROCHLORIDE 10 MG: 10 TABLET ORAL at 21:04

## 2023-10-02 RX ADMIN — ACETAMINOPHEN 650 MG: 325 TABLET, FILM COATED ORAL at 13:57

## 2023-10-02 RX ADMIN — WATER 10 ML: 1 INJECTION INTRAMUSCULAR; INTRAVENOUS; SUBCUTANEOUS at 18:26

## 2023-10-02 ASSESSMENT — ACTIVITIES OF DAILY LIVING (ADL)
ADLS_ACUITY_SCORE: 46
ADLS_ACUITY_SCORE: 46
ADLS_ACUITY_SCORE: 40
ADLS_ACUITY_SCORE: 46

## 2023-10-02 NOTE — PLAN OF CARE
Alert/ oriented. Tylenol/ Voltaren gel for pearl knee/ R hip pain. PICC infusing; alteplase given for notable resistance. Culp present w/ good output.  K replaced x2 per protocol, recheck sched @ 2000. Wound care provided per orders. Up 1A gb/w/. Nephrology following. Plan pending.

## 2023-10-02 NOTE — PROGRESS NOTES
"Dr. Dominguez paged-   \"I think Zosyn likely infiltrated. IV flushed fine, but appears cold and swollen after infusion. Pt did not call and report any discomfort and denies tenderness at the site. I will remove PIV, but do you want to make any changes to abx schedule? Or just give next dose as scheduled?     Also, pt is requesting muscle cream for his knees and R hip. Voltaren gel?   TY~\"      Provider response: Give next dose as scheduled.   "

## 2023-10-02 NOTE — PLAN OF CARE
Goal Outcome Evaluation:         Pt A&Ox4. Renal diet. A1GB/W, not oob this shift. 1 loose BM this shift. PRN tylenol given for chronic R hip pain. Culp in place w/ adequate output. NS @100ml/hr.

## 2023-10-02 NOTE — PROGRESS NOTES
Notified provider about indwelling guido catheter discussed removal or continued need.    Did provider choose to remove indwelling guido catheter? No    Provider's guido indication for keeping indwelling guido catheter: Strict output/ RICHARD.     Is there an order for indwelling guido catheter? Yes    *If there is a plan to keep guido catheter in place at discharge daily notification with provider is not necessary, but please add a notation in the treatment team sticky note that the patient will be discharging with the catheter.

## 2023-10-02 NOTE — PROGRESS NOTES
[facility]    Hospitalist Progress Note    Interval History     Patient awake and alert.  No acute events overnight.  Endorses some swelling with the right arm.  Denies any significant shortness of breath or chest pain.  Having good urine output.  Continues to be pale and fatigued.    -Data reviewed today: I reviewed all new labs and imaging results over the last 24 hours. I personally reviewed no images or EKG's today.    Physical Exam   Temp: 97.6  F (36.4  C) Temp src: Oral BP: 95/58 Pulse: 93   Resp: 18 SpO2: 97 % O2 Device: None (Room air)    Vitals:    09/28/23 0000 09/28/23 0700 09/29/23 0640   Weight: 81.6 kg (179 lb 14.3 oz) 82.6 kg (182 lb 1.6 oz) 81.2 kg (179 lb 0.2 oz)     Vital Signs with Ranges  Temp:  [97.4  F (36.3  C)-98  F (36.7  C)] 97.6  F (36.4  C)  Pulse:  [82-95] 93  Resp:  [15-18] 18  BP: ()/(55-62) 95/58  SpO2:  [97 %-99 %] 97 %  I/O last 3 completed shifts:  In: 600 [P.O.:600]  Out: 1925 [Urine:1925]    Physical Exam  Constitutional:       Appearance: Normal appearance. Shilpi Fleming is ill-appearing.   Cardiovascular:      Rate and Rhythm: Normal rate and regular rhythm.      Pulses: Normal pulses.      Heart sounds: Normal heart sounds.   Pulmonary:      Effort: Pulmonary effort is normal. No respiratory distress.      Breath sounds: Normal breath sounds.   Abdominal:      General: Bowel sounds are normal. There is distension.      Tenderness: There is no abdominal tenderness. There is no guarding.   Musculoskeletal:      Right lower leg: Edema present.      Left lower leg: Edema present.   Skin:     General: Skin is warm and dry.      Coloration: Skin is pale.   Neurological:      General: No focal deficit present.           Medications    NaCl 0.45 % 1,000 mL with potassium chloride 20 mEq/L, sodium bicarbonate 75 mEq/L infusion 50 mL/hr at 10/02/23 1144      - MEDICATION INSTRUCTIONS for Dialysis Patients -   Does not apply See Admin Instructions     alteplase  2 mg Intravenous Q2H    aspirin  81 mg Oral Daily    B and C vitamin Complex with folic acid  5 mL Oral Daily    diclofenac  2 g Topical 4x Daily    [Held by provider] heparin ANTICOAGULANT  5,000 Units Subcutaneous Q12H    hydrALAZINE  10 mg Oral TID    metoprolol succinate ER  25 mg Oral Daily    piperacillin-tazobactam  2.25 g Intravenous Q8H    vancomycin place mccann - receiving intermittent dosing  1 each Intravenous See Admin Instructions       Data   Recent Labs   Lab 10/02/23  1400 10/02/23  0818 10/02/23  0653 10/01/23  2121 10/01/23  0635 09/30/23  0704 09/28/23  0820 09/28/23  0449 09/28/23  0023 09/27/23  2344 09/27/23  1752 09/27/23  1446   WBC  --   --  9.5  --  15.0* 16.3*   < > 13.0*  --  9.3  --  14.6*   HGB  --   --  7.3*  --  7.5* 7.3*   < > 6.9*  --  7.1*  --  8.7*   MCV  --   --  96  --  94 93   < > 92  --  93  --  99   PLT  --   --  101*  --  110* 88*   < > 82*  --  81*  --  153   INR  --   --   --   --   --   --   --  1.58*  --  1.56*  --  1.81*   NA  --   --  135  --  137 141   < > 136  --  137  --  127*   POTASSIUM 3.4  --  3.2*  --  3.4 3.1*   < > 3.7  --  3.3*   < > 7.0*   CHLORIDE  --   --  104  --  103 109*   < > 96*  --  96*  --  90*   CO2  --   --  16*  --  17* 17*   < > 13*  --  11*  --  4*   BUN  --   --  71.2*  --  65.9* 58.6*   < > 123.6*  --  115.6*  --  213.4*   CR  --   --  5.26*  --  5.24* 4.58*   < > 8.48*  --  8.07*  --  14.61*   ANIONGAP  --   --  15  --  17* 15   < > 27*  --  30*  --  33*   CHANA  --   --  6.9*  --  7.2* 7.0*   < > 8.4*  --  7.6*  --  8.5*   GLC  --  85 88 95 83 83   < > 99   < > 82   < > 131*   ALBUMIN  --   --  2.7*  --  2.7* 2.6*   < > 3.3*  --  3.5  --  4.5   PROTTOTAL  --   --  4.8*  --  4.9* 4.9*   < > 5.7*  --  6.0*  --  7.7   BILITOTAL  --   --  0.3  --  0.3 0.3   < > 0.4  --  0.5  --  0.5   ALKPHOS  --   --  54  --  124 67   < > 69  --  72  --  84   ALT  --   --  76*  --  87* 92*   < > 94*  --  100*  --  100*   AST  --   --  56*  --  72*  90*   < > 166*  --  182*  --  182*   LIPASE  --   --   --   --   --   --   --   --   --   --   --  405*    < > = values in this interval not displayed.       No results found for this or any previous visit (from the past 24 hour(s)).      Assessment & Plan     Mr. Fleming is a 73-year-old gentleman with a past medical history of hypertension, coronary artery disease, CKD stage III who initially presented to an outside hospital for shortness of breath and poor p.o. intake with some nausea and vomiting.  He was found to have a potassium of 7 and a creatinine of 14.6and a pH of 6.98..  He was initially given albuterol, 1 g of calcium gluconate and insulin for shifting for his hyperkalemia.  Nephrology was consulted and he was initiated on an emergent run of hemodialysis at the outside hospital.  Due to very difficult Culp catheter placement a urology consult was placed and a CT abdomen and pelvis discovered obstructive uropathy with bilateral hydronephrosis.  The CT also displayed colitis with bladder wall thickening.  Urine analysis was positive for infection and he was started on broad-spectrum antibiotics with vancomycin and Zosyn.  He was transferred to [facility] on 9/27/2023 for ongoing care of worsening renal failure, electrolyte abnormalities and a urinary tract infection causing sepsis.     Acute on chronic renal failure requiring emergent hemodialysis- likely acute tubular necrosis   Hyperkalemia with ECG changes, resolved  Starvation ketoacidosis, resolved  Severe lactic acidosis, resolved  Metabolic acidosis due to renal failure   Had last hemodialysis 9/29. Good urine output npw  Monitor urine output, renal function and electrolytes   Right IJ temporary dialysis catheter in place should he need future dialysis  Nephrology plans to continue to hold dialysis for now and if he does not need anymore dialysis by the middle of the week then will remove the right IJ temporary catheter.     Obstructive  uropathy  Moderate bilateral hydroureteronephrosis  Sepsis due to pyelonephritis  Hypospadias  History of urinary retention  CT abdomen and pelvis revealed bilateral hydro ureteral nephrosis without ureteral obstruction.  Guido catheter was placed on 9/27/2023 and is helping to decompress the bladder presently.  Urine culture grew corynebacterium striatum.   Urology is consulted and per their recommendations we will continue the Guido catheter for bladder decompression.  Urinalysis and urine culture repeated- still positive.   Antibiotic changed from ceftriaxone to vancomycin to cover corynebacterium striatum- if same organism is growing will need to ask lab to do sensitivities  Add Zosyn for gram negative coverage- has allergy to ciprofloxacin   Follow up urine culture on 9/30/2023 shows no growth so far.  Will discontinue antibiotics if leukocytosis completely resolves.  Patient has received 5 days of antibiotics over.  Continue guido      Non-ST-elevation myocardial infarction   New moderate mitral valve regurgitation  Cardiomyopathy EF 30%  Suspected coronary artery disease   TTE from 9/28/2023 reveals worsening heart failure with an EF of 30 to 35% with a large area of hypokinesis and akinesis distal to the septum with only suspected large LAD infarct pattern, mitral valve regurgitation and elevated right ventricular systolic pressure.  Continue aspirin, metoprolol and hydralazine   Cardiology consult appreciated- follow up in clinic      Colitis with loose stool  Elevated liver transaminases   He and his wife describe flu like symptoms starting last week with nausea, vomiting, decreased appetite, generalized fatigue and weakness.   INR was elevated at 1.8 and liver enzymes AST/ALT were elevated in the 100s but are now improving. Stool panel/ clostridium difficile negative.  Continue to monitor      Anemia status post packed red blood cells  Thrombocytopenia, acute   Anemia likely due to renal disease and  sepsis.  Iron, B12 and folate ok.   Thrombocytopenia could also be due to sepsis but heparin held.   HIT work-up negative.  Continue to monitor H3     Generalized weakness and debility   Continue physcial therapy and occupational therapy         Diet: Renal Diet (dialysis)  Snacks/Supplements Adult: Nepro Oral Supplement; Between Meals  Room Service    DVT Prophylaxis: pneumatic compression device   Culp Catheter: PRESENT, indication: Strict 1-2 Hour I&O;Insertion Difficulty  Lines: PRESENT      PICC 09/27/23 Triple Lumen Right Brachial vein lateral Vascular Access-Site Assessment: WDL  CVC Double Lumen Right Internal jugular Non - tunneled-Site Assessment: WDL      Cardiac Monitoring: None  Code Status: Full Code         Clinically Significant Risk Factors        # Hypokalemia: Lowest K = 3.2 mmol/L in last 2 days, will replace as needed       # Hypoalbuminemia: Lowest albumin = 2.6 g/dL at 9/30/2023  7:04 AM, will monitor as appropriate   # Thrombocytopenia: Lowest platelets = 101 in last 2 days, will monitor for bleeding    # Chronic heart failure with reduced ejection fraction: last echo with EF <40%        # Moderate Malnutrition: based on nutrition assessment             Andie Dominguez MD, MD  [phone](p)

## 2023-10-02 NOTE — PROGRESS NOTES
Dr. Alberto mcdonnell- Pt's PICC line is meeting resistance upon flushing; request for alteplase. TY~

## 2023-10-02 NOTE — PROGRESS NOTES
[facility]    Nephrology Progress Note     Assessment & Plan       RICHARD versus progression of CKD IV     Creatinine prior to admission ranging between 1.8-3.3 over the course of 2022, no recent labs.     Creatinine on admission 14.61 in the setting of encephalopathy, recent NSAID use, sepsis, UTI, and obstructive uropathy.  Most likely significant prerenal RICHARD, probable ATN at this point.  He did undergo dialysis at [facility] primarily for acidosis and severe hyperkalemia causing EKG changes.      Not entirely clear how much renal recovery he will have yet though he is making a decent amount of urine on his own. He has no absolute acute indication for dialysis today.        Hyperkalemia, resolved  K7.0 on presentation with wide complexes noted on EKG at [facility].  Patient underwent emergent HD for 2 and half hours with resolution of K.  Did notably also have obstructive uropathy which contributed to his hyperkalemia in addition to his RICHARD.  He was also taking ibuprofen PTA.     Severe anion gap metabolic acidosis  Lactic acidosis, resolved  Starvation ketoacidosis  Initial pH 6.93, bicarb 4.  Lactate 3.7.  Ketones 2.1.  He underwent emergent HD with improvement.  Current pH normalized.  Bicarb still 16.  AG normal.       Obstructive uropathy  Moderate bilateral hydroureteronephrosis  Pyelonephritis  Hypospadias  History of urinary retention  Patient has history of urinary retention and UTIs in the past.  CT with bilateral hydroureteronephrosis and bladder wall thickening consistent with cystitis.  Culp placed by urology, initially yellow urine followed by milky cloudy urine consistent with UTI/pyelo-.      Severe diarrhea  Colitis, possibly infectious     Acute on chronic anemia  Hemoglobin 7.3 today    Cardiomyopathy with reduced EF     Plan:    No HD today  If HD not needed by mid week then HD line will come out.   Modify IV fluid - include K and bicarb and reduce rate.  Hold  hydralazine for SBP < 100    Following.    Oz Higgins MD        Interval History     Feeling OK.  Main complaint is pain in knees and hips.  He has been up out of bed he says.    Pt denies f/c/n/v.    No cp/sob/cough.    No dysuria/hematuria/abd or flank pain.  No dizziness, lightheadedness, headaches, or focal neuro sx.      Physical Exam   Temp: 98  F (36.7  C) Temp src: Oral BP: 113/62 Pulse: 82   Resp: 15 SpO2: 98 % O2 Device: None (Room air)    Vitals:    09/28/23 0000 09/28/23 0700 09/29/23 0640   Weight: 81.6 kg (179 lb 14.3 oz) 82.6 kg (182 lb 1.6 oz) 81.2 kg (179 lb 0.2 oz)     Vital Signs with Ranges  Temp:  [97  F (36.1  C)-98  F (36.7  C)] 98  F (36.7  C)  Pulse:  [82-95] 82  Resp:  [15-16] 15  BP: (104-114)/(55-62) 113/62  SpO2:  [97 %-99 %] 98 %  I/O last 3 completed shifts:  In: 840 [P.O.:840]  Out: 1725 [Urine:1725]    GENERAL APPEARANCE: pleasant, NAD, a & o  HEENT:  Eyes/ears/nose/neck grossly normal  RESP: lungs cta b c good efforts, no crackles, rhonchi or wheezes  CV: RRR, nl S1/S2, no m/r/g   ABDOMEN: o/s/nt/nd, bs present  EXTREMITIES/SKIN: no rashes/lesions; trace BLE edema    Medications    NaCl 0.45 % 1,000 mL with potassium chloride 20 mEq/L, sodium bicarbonate 75 mEq/L infusion        - MEDICATION INSTRUCTIONS for Dialysis Patients -   Does not apply See Admin Instructions    aspirin  81 mg Oral Daily    B and C vitamin Complex with folic acid  5 mL Oral Daily    [Held by provider] heparin ANTICOAGULANT  5,000 Units Subcutaneous Q12H    hydrALAZINE  10 mg Oral TID    metoprolol succinate ER  25 mg Oral Daily    piperacillin-tazobactam  2.25 g Intravenous Q8H    vancomycin place mccann - receiving intermittent dosing  1 each Intravenous See Admin Instructions       Data   BMP  Recent Labs   Lab 10/02/23  0818 10/02/23  0653 10/01/23  2121 10/01/23  0635 09/30/23  0704 09/29/23  0617 09/29/23  0538   NA  --  135  --  137 141  --  138   POTASSIUM  --  3.2*  --  3.4 3.1*  --  3.4    CHLORIDE  --  104  --  103 109*  --  100   CHANA  --  6.9*  --  7.2* 7.0*  --  7.6*   CO2  --  16*  --  17* 17*  --  12*   BUN  --  71.2*  --  65.9* 58.6*  --  134.5*   CR  --  5.26*  --  5.24* 4.58*  --  8.65*   GLC 85 88 95 83 83   < > 103*    < > = values in this interval not displayed.     [email or web address](phos:4)  CBC)  Recent Labs   Lab 10/02/23  0653 10/01/23  0635 09/30/23  0704 09/29/23  0538   WBC 9.5 15.0* 16.3* 19.3*   HGB 7.3* 7.5* 7.3* 7.6*   HCT 22.0* 22.2* 21.0* 21.8*   MCV 96 94 93 92   * 110* 88* 89*     Recent Labs   Lab 10/02/23  0653   AST 56*   ALT 76*   ALKPHOS 54   BILITOTAL 0.3     Recent Labs   Lab 09/28/23  0449   INR 1.58*     No results found for: D2VIT, D3VIT, DTOT  Recent Labs   Lab 10/02/23  0653 09/29/23  0538 09/28/23  0449 09/27/23  2344 09/27/23  1752 09/27/23  1446   HGB 7.3*   < > 6.9* 7.1*  --  8.7*   HCT 22.0*   < > 19.5* 19.9*  --  26.8*   MCV 96   < > 92 93  --  99   IRON  --   --   --   --   --  120   IRONSAT  --   --   --   --   --  53*   RETICABSCT  --   --   --  0.096*  --   --    RETP  --   --   --  4.5*  --   --    FEB  --   --   --   --   --  226*   JD  --   --   --   --  382  --    B12  --   --   --  2,943*  --   --    FOLIC  --   --  12.9  --   --   --     < > = values in this interval not displayed.     Recent Labs   Lab 09/27/23  1446   PTHI 220*       Attestation:   I have reviewed today's relevant vital signs, notes, medications, labs and imaging.

## 2023-10-02 NOTE — PROGRESS NOTES
Clinical Product Navigator RN reviewed chart; patient on payer product coverage.  Review results:   CPN Initial Information Gathering  Referral Source: Health Plan    Patient identified by health Formerly named [facility] for care management.  Patient currently admitted at [facility] as of 9/27/23 due to acute on chronic renal failure requiring emergent hemodialysis, starvation ketoacidosis, severe lactic acidosis, obstructive uropathy, moderate bilateral hydroureteronephrosis, sepsis due to pyelonephritis, hypospadias, NSTEMI, new moderate mitral valve regurgitation, Cardiomyopathy EF 30%.    RN Clinical Product Navigator will monitor for discharge disposition to ensure patient is referred to appropriate support services; care coordination, MTM, etc.    Melissa Behl BSN, RN, PHN, CCM  RN Clinical Product Navigator  Ph: [phone]

## 2023-10-03 ENCOUNTER — APPOINTMENT (OUTPATIENT)
Dept: PHYSICAL THERAPY | Facility: CLINIC | Age: 73
DRG: 871 | End: 2023-10-03
Attending: INTERNAL MEDICINE
Payer: COMMERCIAL

## 2023-10-03 ENCOUNTER — APPOINTMENT (OUTPATIENT)
Dept: OCCUPATIONAL THERAPY | Facility: CLINIC | Age: 73
DRG: 871 | End: 2023-10-03
Attending: INTERNAL MEDICINE
Payer: COMMERCIAL

## 2023-10-03 LAB
ALBUMIN SERPL BCG-MCNC: 2.8 G/DL (ref 3.5–5.2)
ALP SERPL-CCNC: 55 U/L (ref 35–129)
ALT SERPL W P-5'-P-CCNC: 79 U/L (ref 0–70)
ANION GAP SERPL CALCULATED.3IONS-SCNC: 18 MMOL/L (ref 7–15)
AST SERPL W P-5'-P-CCNC: 64 U/L (ref 0–45)
BILIRUB SERPL-MCNC: 0.3 MG/DL
BUN SERPL-MCNC: 74.6 MG/DL (ref 8–23)
CALCIUM SERPL-MCNC: 7 MG/DL (ref 8.8–10.2)
CHLORIDE SERPL-SCNC: 104 MMOL/L (ref 98–107)
CREAT SERPL-MCNC: 5.62 MG/DL (ref 0.51–1.17)
DEPRECATED HCO3 PLAS-SCNC: 15 MMOL/L (ref 22–29)
EGFRCR SERPLBLD CKD-EPI 2021: 10 ML/MIN/1.73M2
ERYTHROCYTE [DISTWIDTH] IN BLOOD BY AUTOMATED COUNT: 14.8 % (ref 10–15)
GLUCOSE SERPL-MCNC: 99 MG/DL (ref 70–99)
HCT VFR BLD AUTO: 21.7 % (ref 35–53)
HGB BLD-MCNC: 7.3 G/DL (ref 11.7–17.7)
MAGNESIUM SERPL-MCNC: 1.7 MG/DL (ref 1.7–2.3)
MCH RBC QN AUTO: 32 PG (ref 26.5–33)
MCHC RBC AUTO-ENTMCNC: 33.6 G/DL (ref 31.5–36.5)
MCV RBC AUTO: 95 FL (ref 78–100)
PHOSPHATE SERPL-MCNC: 4.5 MG/DL (ref 2.5–4.5)
PLATELET # BLD AUTO: 120 10E3/UL (ref 150–450)
POTASSIUM SERPL-SCNC: 3.8 MMOL/L (ref 3.4–5.3)
PROT SERPL-MCNC: 5 G/DL (ref 6.4–8.3)
RBC # BLD AUTO: 2.28 10E6/UL (ref 3.8–5.9)
SODIUM SERPL-SCNC: 137 MMOL/L (ref 135–145)
VANCOMYCIN SERPL-MCNC: 17.3 UG/ML
WBC # BLD AUTO: 9.6 10E3/UL (ref 4–11)

## 2023-10-03 PROCEDURE — 99232 SBSQ HOSP IP/OBS MODERATE 35: CPT | Performed by: INTERNAL MEDICINE

## 2023-10-03 PROCEDURE — 84100 ASSAY OF PHOSPHORUS: CPT | Performed by: INTERNAL MEDICINE

## 2023-10-03 PROCEDURE — 80202 ASSAY OF VANCOMYCIN: CPT | Performed by: HOSPITALIST

## 2023-10-03 PROCEDURE — 258N000002 HC RX IP 258 OP 250: Performed by: INTERNAL MEDICINE

## 2023-10-03 PROCEDURE — 80053 COMPREHEN METABOLIC PANEL: CPT | Performed by: INTERNAL MEDICINE

## 2023-10-03 PROCEDURE — 258N000003 HC RX IP 258 OP 636: Performed by: HOSPITALIST

## 2023-10-03 PROCEDURE — 250N000013 HC RX MED GY IP 250 OP 250 PS 637: Performed by: INTERNAL MEDICINE

## 2023-10-03 PROCEDURE — 999N000040 HC STATISTIC CONSULT NO CHARGE VASC ACCESS

## 2023-10-03 PROCEDURE — 85014 HEMATOCRIT: CPT | Performed by: INTERNAL MEDICINE

## 2023-10-03 PROCEDURE — 99233 SBSQ HOSP IP/OBS HIGH 50: CPT | Performed by: HOSPITALIST

## 2023-10-03 PROCEDURE — 97116 GAIT TRAINING THERAPY: CPT | Mod: GP | Performed by: PHYSICAL THERAPIST

## 2023-10-03 PROCEDURE — 250N000011 HC RX IP 250 OP 636: Mod: JZ | Performed by: INTERNAL MEDICINE

## 2023-10-03 PROCEDURE — 97530 THERAPEUTIC ACTIVITIES: CPT | Mod: GP | Performed by: PHYSICAL THERAPIST

## 2023-10-03 PROCEDURE — 83735 ASSAY OF MAGNESIUM: CPT | Performed by: INTERNAL MEDICINE

## 2023-10-03 PROCEDURE — 97161 PT EVAL LOW COMPLEX 20 MIN: CPT | Mod: GP | Performed by: PHYSICAL THERAPIST

## 2023-10-03 PROCEDURE — 250N000011 HC RX IP 250 OP 636: Performed by: HOSPITALIST

## 2023-10-03 PROCEDURE — 250N000013 HC RX MED GY IP 250 OP 250 PS 637: Performed by: HOSPITALIST

## 2023-10-03 PROCEDURE — 120N000001 HC R&B MED SURG/OB

## 2023-10-03 PROCEDURE — 97535 SELF CARE MNGMENT TRAINING: CPT | Mod: GO

## 2023-10-03 PROCEDURE — 250N000009 HC RX 250: Performed by: INTERNAL MEDICINE

## 2023-10-03 RX ORDER — VIT B COMP NO.3/FOLIC/C/BIOTIN 1 MG-60 MG
1 TABLET ORAL DAILY
Status: DISCONTINUED | OUTPATIENT
Start: 2023-10-03 | End: 2023-10-05

## 2023-10-03 RX ADMIN — VANCOMYCIN HYDROCHLORIDE 1250 MG: 10 INJECTION, POWDER, LYOPHILIZED, FOR SOLUTION INTRAVENOUS at 09:33

## 2023-10-03 RX ADMIN — METOPROLOL SUCCINATE 25 MG: 25 TABLET, EXTENDED RELEASE ORAL at 08:16

## 2023-10-03 RX ADMIN — POTASSIUM CHLORIDE: 2 INJECTION, SOLUTION, CONCENTRATE INTRAVENOUS at 11:24

## 2023-10-03 RX ADMIN — PIPERACILLIN AND TAZOBACTAM 2.25 G: 2; .25 INJECTION, POWDER, FOR SOLUTION INTRAVENOUS at 04:35

## 2023-10-03 RX ADMIN — DICLOFENAC SODIUM 2 G: 10 GEL TOPICAL at 08:17

## 2023-10-03 RX ADMIN — ACETAMINOPHEN 650 MG: 325 TABLET, FILM COATED ORAL at 21:06

## 2023-10-03 RX ADMIN — ACETAMINOPHEN 650 MG: 325 TABLET, FILM COATED ORAL at 04:55

## 2023-10-03 RX ADMIN — HYDRALAZINE HYDROCHLORIDE 10 MG: 10 TABLET ORAL at 08:16

## 2023-10-03 RX ADMIN — ASPIRIN 81 MG: 81 TABLET, COATED ORAL at 08:17

## 2023-10-03 RX ADMIN — ACETAMINOPHEN 650 MG: 325 TABLET, FILM COATED ORAL at 13:29

## 2023-10-03 RX ADMIN — Medication 1 TABLET: at 12:14

## 2023-10-03 RX ADMIN — DICLOFENAC SODIUM 2 G: 10 GEL TOPICAL at 17:22

## 2023-10-03 RX ADMIN — DICLOFENAC SODIUM 2 G: 10 GEL TOPICAL at 12:14

## 2023-10-03 RX ADMIN — DICLOFENAC SODIUM 2 G: 10 GEL TOPICAL at 21:06

## 2023-10-03 ASSESSMENT — ACTIVITIES OF DAILY LIVING (ADL)
ADLS_ACUITY_SCORE: 42
ADLS_ACUITY_SCORE: 42
ADLS_ACUITY_SCORE: 45
ADLS_ACUITY_SCORE: 42
ADLS_ACUITY_SCORE: 46
ADLS_ACUITY_SCORE: 46
ADLS_ACUITY_SCORE: 42
ADLS_ACUITY_SCORE: 45
ADLS_ACUITY_SCORE: 45
ADLS_ACUITY_SCORE: 46

## 2023-10-03 NOTE — CONSULTS
Care Management Follow Up    Length of Stay (days): 6    Expected Discharge Date: 10/03/2023     Concerns to be Addressed: discharge planning     Patient plan of care discussed at interdisciplinary rounds: Yes    Anticipated Discharge Disposition: Other (Comments) (Pt would like to return home with home care; he is not interested in TCU)     Anticipated Discharge Services:    Anticipated Discharge DME:      Patient/family educated on Medicare website which has current facility and service quality ratings:    Education Provided on the Discharge Plan:    Patient/Family in Agreement with the Plan: other (see comments) (Pt would like to return home with home care; he is not interested in TCU)    Referrals Placed by CM/KALEB:    Private pay costs discussed: Not applicable    Additional Information:  SEE INITIAL CONSULT BY SW ON 9/30    [facility] has accepted him; however PT encourages TCU.        Elis Navarro RN  Inpatient Float Care Coordinator

## 2023-10-03 NOTE — PROGRESS NOTES
[facility]    Hospitalist Progress Note    Interval History     Patient awake and alert.  No acute events overnight.  Endorses some swelling with the right arm.  Denies any significant shortness of breath or chest pain.  Having good urine output.  Continues to be pale and fatigued.    -Data reviewed today: I reviewed all new labs and imaging results over the last 24 hours. I personally reviewed no images or EKG's today.    Physical Exam   Temp: 97.1  F (36.2  C) Temp src: Oral BP: 105/59 Pulse: 98   Resp: 16 SpO2: 95 % O2 Device: None (Room air)    Vitals:    09/28/23 0700 09/29/23 0640 10/03/23 0857   Weight: 82.6 kg (182 lb 1.6 oz) 81.2 kg (179 lb 0.2 oz) 87.5 kg (192 lb 14.4 oz)     Vital Signs with Ranges  Temp:  [97.1  F (36.2  C)-98  F (36.7  C)] 97.1  F (36.2  C)  Pulse:  [] 98  Resp:  [16-18] 16  BP: ()/(56-63) 105/59  SpO2:  [95 %-97 %] 95 %  I/O last 3 completed shifts:  In: 240 [P.O.:240]  Out: 1750 [Urine:1750]    Physical Exam  Constitutional:       Appearance: Normal appearance. Shilpi Fleming is ill-appearing.   Cardiovascular:      Rate and Rhythm: Normal rate and regular rhythm.      Pulses: Normal pulses.      Heart sounds: Normal heart sounds.   Pulmonary:      Effort: Pulmonary effort is normal. No respiratory distress.      Breath sounds: Normal breath sounds.   Abdominal:      General: Bowel sounds are normal. There is distension.      Tenderness: There is no abdominal tenderness. There is no guarding.   Musculoskeletal:      Right lower leg: Edema present.      Left lower leg: Edema present.   Skin:     General: Skin is warm and dry.      Coloration: Skin is pale.   Neurological:      General: No focal deficit present.           Medications    NaCl 0.45 % 1,000 mL with potassium chloride 20 mEq/L, sodium bicarbonate 75 mEq/L infusion 50 mL/hr at 10/03/23 1124      - MEDICATION INSTRUCTIONS for Dialysis Patients -   Does not apply See Admin Instructions     aspirin  81 mg Oral Daily    diclofenac  2 g Topical 4x Daily    [Held by provider] heparin ANTICOAGULANT  5,000 Units Subcutaneous Q12H    hydrALAZINE  10 mg Oral TID    metoprolol succinate ER  25 mg Oral Daily    multivitamin RENAL  1 tablet Oral Daily    sodium chloride (PF)  10-40 mL Intracatheter Q8H       Data   Recent Labs   Lab 10/03/23  0444 10/02/23  2051 10/02/23  1400 10/02/23  0818 10/02/23  0653 10/01/23  2121 10/01/23  0635 09/28/23  0820 09/28/23  0449 09/28/23  0023 09/27/23  2344 09/27/23  1752 09/27/23  1446   WBC 9.6  --   --   --  9.5  --  15.0*   < > 13.0*  --  9.3  --  14.6*   HGB 7.3*  --   --   --  7.3*  --  7.5*   < > 6.9*  --  7.1*  --  8.7*   MCV 95  --   --   --  96  --  94   < > 92  --  93  --  99   *  --   --   --  101*  --  110*   < > 82*  --  81*  --  153   INR  --   --   --   --   --   --   --   --  1.58*  --  1.56*  --  1.81*     --   --   --  135  --  137   < > 136  --  137  --  127*   POTASSIUM 3.8 3.5 3.4  --  3.2*  --  3.4   < > 3.7  --  3.3*   < > 7.0*   CHLORIDE 104  --   --   --  104  --  103   < > 96*  --  96*  --  90*   CO2 15*  --   --   --  16*  --  17*   < > 13*  --  11*  --  4*   BUN 74.6*  --   --   --  71.2*  --  65.9*   < > 123.6*  --  115.6*  --  213.4*   CR 5.62*  --   --   --  5.26*  --  5.24*   < > 8.48*  --  8.07*  --  14.61*   ANIONGAP 18*  --   --   --  15  --  17*   < > 27*  --  30*  --  33*   CHANA 7.0*  --   --   --  6.9*  --  7.2*   < > 8.4*  --  7.6*  --  8.5*   GLC 99  --   --  85 88   < > 83   < > 99   < > 82   < > 131*   ALBUMIN 2.8*  --   --   --  2.7*  --  2.7*   < > 3.3*  --  3.5  --  4.5   PROTTOTAL 5.0*  --   --   --  4.8*  --  4.9*   < > 5.7*  --  6.0*  --  7.7   BILITOTAL 0.3  --   --   --  0.3  --  0.3   < > 0.4  --  0.5  --  0.5   ALKPHOS 55  --   --   --  54  --  124   < > 69  --  72  --  84   ALT 79*  --   --   --  76*  --  87*   < > 94*  --  100*  --  100*   AST 64*  --   --   --  56*  --  72*   < > 166*  --  182*  --  182*    LIPASE  --   --   --   --   --   --   --   --   --   --   --   --  405*    < > = values in this interval not displayed.         No results found for this or any previous visit (from the past 24 hour(s)).      Assessment & Plan     Mr. Fleming is a 73-year-old gentleman with a past medical history of hypertension, coronary artery disease, CKD stage III who initially presented to an outside hospital for shortness of breath and poor p.o. intake with some nausea and vomiting.  He was found to have a potassium of 7 and a creatinine of 14.6and a pH of 6.98..  He was initially given albuterol, 1 g of calcium gluconate and insulin for shifting for his hyperkalemia.  Nephrology was consulted and he was initiated on an emergent run of hemodialysis at the outside hospital.  Due to very difficult Culp catheter placement a urology consult was placed and a CT abdomen and pelvis discovered obstructive uropathy with bilateral hydronephrosis.  The CT also displayed colitis with bladder wall thickening.  Urine analysis was positive for infection and he was started on broad-spectrum antibiotics with vancomycin and Zosyn.  He was transferred to [facility] on 9/27/2023 for ongoing care of worsening renal failure, electrolyte abnormalities and a urinary tract infection causing sepsis.     Acute on chronic renal failure requiring emergent hemodialysis- likely acute tubular necrosis   Hyperkalemia with ECG changes, resolved  Starvation ketoacidosis, resolved  Severe lactic acidosis, resolved  Metabolic acidosis due to renal failure   Had last hemodialysis 9/29. Good urine output npw  Monitor urine output, renal function and electrolytes   Right IJ temporary dialysis catheter in place should he need future dialysis  Nephrology plans to continue to hold dialysis for now and if he does not need anymore dialysis by the middle of the week then will remove the right IJ temporary catheter.     Obstructive uropathy  Moderate bilateral  hydroureteronephrosis  Sepsis due to pyelonephritis  Hypospadias  History of urinary retention  CT abdomen and pelvis revealed bilateral hydro ureteral nephrosis without ureteral obstruction.  Guido catheter was placed on 9/27/2023 and is helping to decompress the bladder presently.  Urine culture grew corynebacterium striatum.   Urology is consulted and per their recommendations we will continue the Guido catheter for bladder decompression.    Patient received 6 days of antibiotics with IV Zosyn and vancomycin.  So far the only positive culture was corny bacterium stratum and urine which is a regular skin pathogen.  Discussed with infectious disease.  No indication to continue IV antibiotics at this time.  Discontinued both Zosyn and vancomycin.  Follow up urine culture on 9/30/2023 shows no growth so far.  Continue guido      Non-ST-elevation myocardial infarction   New moderate mitral valve regurgitation  Cardiomyopathy EF 30%  Suspected coronary artery disease   TTE from 9/28/2023 reveals worsening heart failure with an EF of 30 to 35% with a large area of hypokinesis and akinesis distal to the septum with only suspected large LAD infarct pattern, mitral valve regurgitation and elevated right ventricular systolic pressure.  Continue aspirin, metoprolol and hydralazine   Cardiology consult appreciated- follow up in clinic      Colitis with loose stool  Elevated liver transaminases   He and his wife describe flu like symptoms starting last week with nausea, vomiting, decreased appetite, generalized fatigue and weakness.   INR was elevated at 1.8 and liver enzymes AST/ALT were elevated in the 100s but are now improving. Stool panel/ clostridium difficile negative.  Continue to monitor      Anemia status post packed red blood cells  Thrombocytopenia, acute   Anemia likely due to renal disease and sepsis.  Iron, B12 and folate ok.   Thrombocytopenia could also be due to sepsis but heparin held.   HIT work-up  negative.  Continue to monitor H3  Hemoglobin stable at 7.3 today.  Transfuse for hemoglobin less than 7.  Patient received a unit of packed RBCs on 9/28/2023 when hemoglobin fell to 6.9.     Generalized weakness and debility   Continue physcial therapy and occupational therapy         Diet: Renal Diet (dialysis)  Snacks/Supplements Adult: Nepro Oral Supplement; Between Meals  Room Service    DVT Prophylaxis: pneumatic compression device   Culp Catheter: PRESENT, indication: Strict 1-2 Hour I&O;Insertion Difficulty  Lines: PRESENT      PICC 09/27/23 Triple Lumen Right Brachial vein lateral Vascular Access-Site Assessment: WDL  CVC Double Lumen Right Internal jugular Non - tunneled-Site Assessment: WDL      Cardiac Monitoring: None  Code Status: Full Code         Clinically Significant Risk Factors        # Hypokalemia: Lowest K = 3.2 mmol/L in last 2 days, will replace as needed       # Hypoalbuminemia: Lowest albumin = 2.6 g/dL at 9/30/2023  7:04 AM, will monitor as appropriate     # Thrombocytopenia: Lowest platelets = 101 in last 2 days, will monitor for bleeding      # Chronic heart failure with reduced ejection fraction: last echo with EF <40%       # Overweight: Estimated body mass index is 26.16 kg/m  as calculated from the following:    Height as of this encounter: 1.829 m (6').    Weight as of this encounter: 87.5 kg (192 lb 14.4 oz).     # Moderate Malnutrition: based on nutrition assessment               Andie Dominguez MD, MD  [phone](p)

## 2023-10-03 NOTE — PROGRESS NOTES
AO X4. VVS. Blood return with PICC. PICC infusing with intermittent ABX and NS @ 50 ml/hr. Had 1 loose stool. Culp in place with adequate output. C/o R hip and knee pain. PRN Tylenol administered for pain. Ventral Penis site pink with a small amount of slough.

## 2023-10-03 NOTE — PROGRESS NOTES
Dr. Dominguez paged- request for alteplase order for PICC.     Order placed. VAT consult placed for eval. TY~

## 2023-10-03 NOTE — PROGRESS NOTES
Notified provider about indwelling guido catheter discussed removal or continued need.    Did provider choose to remove indwelling guido catheter? No    Provider's guido indication for keeping indwelling guido catheter: Other - Strict output .    Is there an order for indwelling guido catheter? Yes    *If there is a plan to keep guido catheter in place at discharge daily notification with provider is not necessary, but please add a notation in the treatment team sticky note that the patient will be discharging with the catheter.

## 2023-10-03 NOTE — PHARMACY-VANCOMYCIN DOSING SERVICE
"Pharmacy Vancomycin Note  Date of Service October 3, 2023  Patient's  1950   73 year old, adult    Indication: Urinary Tract Infection  Day of Therapy: 4  Current vancomycin regimen: intermittent based on levels   Current vancomycin monitoring method:  HD   Current vancomycin therapeutic monitoring goal: 15-20 mg/L    Current estimated CrCl = Estimated Creatinine Clearance (based on SCr of 5.62 mg/dL (H))  Female: 11.4 mL/min (A)  Male: 13.4 mL/min (A)    Creatinine for last 3 days  10/1/2023:  6:35 AM Creatinine 5.24 mg/dL  10/2/2023:  6:53 AM Creatinine 5.26 mg/dL  10/3/2023:  4:44 AM Creatinine 5.62 mg/dL    Recent Vancomycin Levels (past 3 days)  10/2/2023:  6:53 AM Vancomycin 20.2 ug/mL  10/3/2023:  4:44 AM Vancomycin 17.3 ug/mL    Vancomycin IV Administrations (past 72 hours)                     vancomycin (VANCOCIN) 1,750 mg in 0.9% NaCl 500 mL intermittent infusion (mg) 1,750 mg Given 23 2150                    Nephrotoxins and other renal medications (From now, onward)      Start     Dose/Rate Route Frequency Ordered Stop    10/03/23 0900  vancomycin (VANCOCIN) 1,250 mg in 0.9% NaCl 250 mL intermittent infusion         1,250 mg  over 90 Minutes Intravenous ONCE 10/03/23 0748      10/01/23 1130  piperacillin-tazobactam (ZOSYN) 2.25 g vial to attach to  ml bag        Note to Pharmacy: For [facility], [facility] and [facility]: For Zosyn-naive patients, use the \"Zosyn initial dose + extended infusion\" order panel.    2.25 g  over 30 Minutes Intravenous EVERY 8 HOURS 10/01/23 1102      23  vancomycin place mccann - receiving intermittent dosing         1 each Intravenous SEE ADMIN INSTRUCTIONS 23                 Contrast Orders - past 72 hours (72h ago, onward)      None            Interpretation of levels and current regimen:  Vancomycin level is reflective of therapeutic level    Has serum creatinine changed greater than 50% in last 72 hours: No    Urine output:  unable to " determine    Renal Function: ESRD on Dialysis    [employer] Prediction of Planned New Vancomycin Regimen      Plan:  Random level this am is 17.3. will give a one time dose of 1250mg x 1   Vancomycin therapeutic monitoring goal: 15-20 mg/L  Pharmacy will check vancomycin levels as appropriate in 1-3 Days.    Marie Rai, [facility]

## 2023-10-03 NOTE — PROGRESS NOTES
[facility]    Nephrology Progress Note     Assessment & Plan     RICHARD versus progression of CKD IV     Creatinine prior to admission ranging between 1.8-3.3 over the course of 2022, no recent labs.      Creatinine on admission 14.61 in the setting of encephalopathy, recent NSAID use, sepsis, UTI, and obstructive uropathy.  Most likely significant prerenal RICHARD, probable ATN at this point.  He did undergo dialysis at [facility] primarily for acidosis and severe hyperkalemia causing EKG changes.       Not entirely clear how much renal recovery he will have yet though he is making a decent amount of urine on his own. He has no absolute acute indication for dialysis today.  Cr up some but I had reduced his IV rate yesterday.  He seems to be in diuretic phase of ATN.          Hyperkalemia, resolved  K7.0 on presentation with wide complexes noted on EKG at [facility].  Patient underwent emergent HD for 2 and half hours with resolution of K.  Did notably also have obstructive uropathy which contributed to his hyperkalemia in addition to his RICHARD.  He was also taking ibuprofen PTA.     Severe anion gap metabolic acidosis  Lactic acidosis, resolved  Starvation ketoacidosis  Initial pH 6.93, bicarb 4.  Lactate 3.7.  Ketones 2.1.  He underwent emergent HD with improvement.  Current pH normalized.  Bicarb still 16.  AG normal.       Obstructive uropathy  Moderate bilateral hydroureteronephrosis  Pyelonephritis  Hypospadias  History of urinary retention  Patient has history of urinary retention and UTIs in the past.  CT with bilateral hydroureteronephrosis and bladder wall thickening consistent with cystitis.  Culp placed by urology, initially yellow urine followed by milky cloudy urine consistent with UTI/pyelo-.      Severe diarrhea  Colitis, possibly infectious     Acute on chronic anemia  Hemoglobin 7.3 today     Cardiomyopathy with reduced EF     Plan:     No HD today  If HD not needed by mid week  then HD line will come out.   Modify IV fluid - same solution at higher rate.    Hold hydralazine for SBP < 100     Following.          Oz Higgins MD  [facility] - Nephrology  [phone]    Interval History     He says he is feeling better.    He has not slept well.  He still has loose stool.  Urine output has been good. 1800 mL so far today.       Physical Exam   Temp: 97.1  F (36.2  C) Temp src: Oral BP: 105/59 Pulse: 98   Resp: 16 SpO2: 95 % O2 Device: None (Room air)    Vitals:    09/28/23 0700 09/29/23 0640 10/03/23 0857   Weight: 82.6 kg (182 lb 1.6 oz) 81.2 kg (179 lb 0.2 oz) 87.5 kg (192 lb 14.4 oz)     Vital Signs with Ranges  Temp:  [97.1  F (36.2  C)-98  F (36.7  C)] 97.1  F (36.2  C)  Pulse:  [] 98  Resp:  [16-18] 16  BP: ()/(56-63) 105/59  SpO2:  [95 %-97 %] 95 %  I/O last 3 completed shifts:  In: 240 [P.O.:240]  Out: 1750 [Urine:1750]    GENERAL APPEARANCE: pleasant, NAD, a & o  HEENT:  Eyes/ears/nose/neck grossly normal  RESP: lungs cta b c good efforts, no crackles, rhonchi or wheezes  CV: RRR, nl S1/S2, no m/r/g   ABDOMEN: o/s/nt/nd, bs present  EXTREMITIES/SKIN: no rashes/lesions; no edema    Medications    NaCl 0.45 % 1,000 mL with potassium chloride 20 mEq/L, sodium bicarbonate 75 mEq/L infusion 50 mL/hr at 10/03/23 1124      - MEDICATION INSTRUCTIONS for Dialysis Patients -   Does not apply See Admin Instructions    aspirin  81 mg Oral Daily    diclofenac  2 g Topical 4x Daily    [Held by provider] heparin ANTICOAGULANT  5,000 Units Subcutaneous Q12H    hydrALAZINE  10 mg Oral TID    metoprolol succinate ER  25 mg Oral Daily    multivitamin RENAL  1 tablet Oral Daily    sodium chloride (PF)  10-40 mL Intracatheter Q8H       Data   BMP  Recent Labs   Lab 10/03/23  0444 10/02/23  2051 10/02/23  1400 10/02/23  0818 10/02/23  0653 10/01/23  2121 10/01/23  0635 09/30/23  0704     --   --   --  135  --  137 141   POTASSIUM 3.8 3.5 3.4  --  3.2*  --  3.4 3.1*    CHLORIDE 104  --   --   --  104  --  103 109*   CHANA 7.0*  --   --   --  6.9*  --  7.2* 7.0*   CO2 15*  --   --   --  16*  --  17* 17*   BUN 74.6*  --   --   --  71.2*  --  65.9* 58.6*   CR 5.62*  --   --   --  5.26*  --  5.24* 4.58*   GLC 99  --   --  85 88 95 83 83     Phos@LABNTIPR(phos:4)  CBC)  Recent Labs   Lab 10/03/23  0444 10/02/23  0653 10/01/23  0635 09/30/23  0704   WBC 9.6 9.5 15.0* 16.3*   HGB 7.3* 7.3* 7.5* 7.3*   HCT 21.7* 22.0* 22.2* 21.0*   MCV 95 96 94 93   * 101* 110* 88*     Recent Labs   Lab 10/03/23  0444   AST 64*   ALT 79*   ALKPHOS 55   BILITOTAL 0.3     Recent Labs   Lab 09/28/23 0449   INR 1.58*     No results found for: D2VIT, D3VIT, DTOT  Recent Labs   Lab 10/03/23  0444 09/29/23  0538 09/28/23  0449 09/27/23  2344 09/27/23  1752 09/27/23  1446   HGB 7.3*   < > 6.9* 7.1*  --  8.7*   HCT 21.7*   < > 19.5* 19.9*  --  26.8*   MCV 95   < > 92 93  --  99   IRON  --   --   --   --   --  120   IRONSAT  --   --   --   --   --  53*   RETICABSCT  --   --   --  0.096*  --   --    RETP  --   --   --  4.5*  --   --    FEB  --   --   --   --   --  226*   JD  --   --   --   --  382  --    B12  --   --   --  2,943*  --   --    FOLIC  --   --  12.9  --   --   --     < > = values in this interval not displayed.     Recent Labs   Lab 09/27/23  1446   PTHI 220*       Attestation:   I have reviewed today's relevant vital signs, notes, medications, labs and imaging.

## 2023-10-03 NOTE — PROGRESS NOTES
No acute change. K wdl. Robib present w/ good output. Tylenol PRN for chronic knee/ hip pain. Up 1A gb/w. Wound care provided per POC. PICC infusing. ID following. Discharge pending.

## 2023-10-03 NOTE — PROGRESS NOTES
Summary: 10/02/23. [id number]   Patient is alert and oriented X4 forgetful.  VSS on RA with good sat. On renal diet. Up with assist of 2 with pivot to chair. On renal diet.PICC on RUE with triple  lumen with good blood  return.  Dialysis CVC Double Lumen Right Internal jugular IV NaCl 0.45 % 1,000 mL with potassium chloride 20 mEq/L, sodium bicarbonate 75 mEq/L infusing 50ml/hr. Knee pain managed well with prn acetaminophen, warm packed and  schedule Voltaren cream. Potassium lab value of 3.5.  incontinent of bowel and guido cath in place with moderate output.  Continue to monitor.

## 2023-10-03 NOTE — PROGRESS NOTES
Care Management Follow Up    Length of Stay (days): 6    Expected Discharge Date: 10/04/2023     Concerns to be Addressed: discharge planning     Patient plan of care discussed at interdisciplinary rounds: Yes    Anticipated Discharge Disposition: Other (Comments) (Pt would like to return home with home care; he is not interested in TCU)     Anticipated Discharge Services:    Anticipated Discharge DME:      Patient/family educated on Medicare website which has current facility and service quality ratings:    Education Provided on the Discharge Plan:    Patient/Family in Agreement with the Plan: other (see comments) (Pt would like to return home with home care; he is not interested in TCU)    Referrals Placed by CM/SW:    Private pay costs discussed: private room/amenity fees and insurance costs out of pocket expenses and co-pays    Additional Information:  Writer was updated by PT that the patient was considering TCU.    Writer met with the patient to discuss and educate on TCU and what coverage and services he would receive. Patient stated his only worry was the beds at a TCU since he stated was only getting 4 hours of sleep during his stay. Writer left TCU list from [facility] and will follow up with any questions.         ALIX Dickson  [facility]  Social Work

## 2023-10-03 NOTE — PROGRESS NOTES
10/03/23 1053   Appointment Info   Signing Clinician's Name / Credentials (PT) Ritu Barkley DPT   Living Environment   People in Home spouse   Current Living Arrangements house  (3 level home)   Home Accessibility stairs within home;stairs to enter home   Number of Stairs, Main Entrance 2   Stair Railings, Main Entrance none   Number of Stairs, Within Home, Primary   (multiple flights of stairs within home)   Stair Railings, Within Home, Primary railing on left side (ascending)   Transportation Anticipated car, drives self   Living Environment Comments Pt is the primary caregiver for spouse.   Self-Care   Usual Activity Tolerance good   Current Activity Tolerance moderate   Equipment Currently Used at Home cane, straight   Fall history within last six months yes   Number of times patient has fallen within last six months 1   Activity/Exercise/Self-Care Comment Pt reports independence with mobility, utilizes SEC for mobility outside of house.   General Information   Onset of Illness/Injury or Date of Surgery 09/27/23   Referring Physician Franklin Hawkins MD   Patient/Family Therapy Goals Statement (PT) Pt is hopeful to discharge home, yet agreeable to TCU.   Pertinent History of Current Problem (include personal factors and/or comorbidities that impact the POC) 72 y/o male admitted with SOB And nausea/vomiting. Noted to have acute on chronic renal failure and started on emergent hemodialysis at an outside hospital. PMH including hypertension, coronary artery disease, CKD stage III.   Existing Precautions/Restrictions fall   General Observations Pt in supine upon arrival of therapist.   Cognition   Affect/Mental Status (Cognition) WFL   Orientation Status (Cognition) oriented x 3   Follows Commands (Cognition) WFL   Pain Assessment   Patient Currently in Pain   (B knee and R hip pain at rest: 2-4/10)   Posture    Posture Comments Noted forward head and shoulder posture sitting EOB and standing at  FWW.   Range of Motion (ROM)   ROM Comment B LEs WFL.   Strength (Manual Muscle Testing)   Strength Comments Not formally assessed, pt demonstrates B LE weakness with functional mobility.   Bed Mobility   Comment, (Bed Mobility) Supine-sit with SBA.   Transfers   Comment, (Transfers) Sit <> stand from EOB with FWW and CGA.   Gait/Stairs (Locomotion)   Distance in Feet (Gait) 20' and 75'   Comment, (Gait/Stairs) Pt amb 10' with FWW and CGA.   Balance   Balance Comments Noted good seated and standing balance at FWW.   Sensory Examination   Sensory Perception Comments Pt reports numbness in plantar aspect of B feet.   Clinical Impression   Criteria for Skilled Therapeutic Intervention Yes, treatment indicated   PT Diagnosis (PT) Difficulty with functional mobility.   Influenced by the following impairments Generalized weakness, Decreased activity tolerance, Impaired balance   Functional limitations due to impairments Limited funcitonal mobility requiring AD and assist.   Clinical Presentation (PT Evaluation Complexity) Stable/Uncomplicated   Clinical Presentation Rationale Based on PMH, current presentation, and social support.   Clinical Decision Making (Complexity) low complexity   Planned Therapy Interventions (PT) balance training;bed mobility training;gait training;stair training;strengthening;transfer training   Risk & Benefits of therapy have been explained patient   PT Total Evaluation Time   PT Eval, Low Complexity Minutes (72227) 12   Physical Therapy Goals   PT Frequency 5x/week   PT Predicted Duration/Target Date for Goal Attainment 10/06/23   PT Goals Bed Mobility;Transfers;Gait;Stairs   PT: Bed Mobility Modified independent;Supine to/from sit   PT: Transfers Modified independent;Sit to/from stand;Assistive device   PT: Gait Supervision/stand-by assist;Rolling walker;100 feet   PT: Stairs Supervision/stand-by assist;2 stairs;Assistive device   Interventions   Interventions Quick Adds Gait  Training;Therapeutic Activity   Therapeutic Activity   Therapeutic Activities: dynamic activities to improve functional performance Minutes (28424) 9   Symptoms Noted During/After Treatment Fatigue   Treatment Detail/Skilled Intervention PT: Pt in supine upon arrival of therapist, agreeable to session. Sit <> stand from EOB And chair with FWW and CGA, cues provided for upright posture and forward gaze upon standing and appropriate hand placement. Pt demonstrated ability to stand x 5 mins at FWW for therapist to complete pericares as pt incontient of bowel, noted no unsteadiness/LOB. Pt sitting up in chair at end of session, chair alarm on and needs within reach. Disussed current disch rec of TCU, pt in agreement after education.   Gait Training   Gait Training Minutes (04308) 11   Symptoms Noted During/After Treatment (Gait Training) fatigue;dizziness   Treatment Detail/Skilled Intervention PT: Pt amb 20' and 75' with FWW and CGA, cues provided for upright posture and keeping FWW close to body. Adjusted FWW height to improve posture.   PT Discharge Planning   PT Plan Progress transfers, gait tolerance, stairs as able   PT Discharge Recommendation (DC Rec) Transitional Care Facility   PT Rationale for DC Rec Pt is below baseline, pt will benefit from continued skilled PT intervention at TCU to improve strength, activity tolerance and independence with functional mobility prior to returning home with spouse. Pt is the main caregiver for his spouse at baseline. If pt were to discharge home pt would require assist to provide cares for wife, assist of 1 with mobility and use of FWW, and home PT.   PT Brief overview of current status Assist of 1 with use of FWW   Total Session Time   Timed Code Treatment Minutes 20   Total Session Time (sum of timed and untimed services) 32

## 2023-10-04 LAB
ABO/RH(D): NORMAL
ALBUMIN SERPL BCG-MCNC: 2.3 G/DL (ref 3.5–5.2)
ALP SERPL-CCNC: 42 U/L (ref 35–129)
ALT SERPL W P-5'-P-CCNC: 59 U/L (ref 0–70)
ANION GAP SERPL CALCULATED.3IONS-SCNC: 11 MMOL/L (ref 7–15)
ANTIBODY SCREEN: NEGATIVE
AST SERPL W P-5'-P-CCNC: 46 U/L (ref 0–45)
BILIRUB SERPL-MCNC: 0.2 MG/DL
BLD PROD TYP BPU: NORMAL
BLD PROD TYP BPU: NORMAL
BLOOD COMPONENT TYPE: NORMAL
BLOOD COMPONENT TYPE: NORMAL
BUN SERPL-MCNC: 64.3 MG/DL (ref 8–23)
CALCIUM SERPL-MCNC: 5.4 MG/DL (ref 8.8–10.2)
CHLORIDE SERPL-SCNC: 102 MMOL/L (ref 98–107)
CODING SYSTEM: NORMAL
CODING SYSTEM: NORMAL
CREAT SERPL-MCNC: 4.69 MG/DL (ref 0.51–1.17)
CROSSMATCH: NORMAL
CROSSMATCH: NORMAL
DEPRECATED HCO3 PLAS-SCNC: 23 MMOL/L (ref 22–29)
EGFRCR SERPLBLD CKD-EPI 2021: 12 ML/MIN/1.73M2
ERYTHROCYTE [DISTWIDTH] IN BLOOD BY AUTOMATED COUNT: 14.6 % (ref 10–15)
ERYTHROCYTE [DISTWIDTH] IN BLOOD BY AUTOMATED COUNT: NORMAL %
GLUCOSE BLDC GLUCOMTR-MCNC: 96 MG/DL (ref 70–99)
GLUCOSE SERPL-MCNC: 81 MG/DL (ref 70–99)
HCT VFR BLD AUTO: 17.4 % (ref 35–53)
HCT VFR BLD AUTO: NORMAL %
HGB BLD-MCNC: 5.7 G/DL (ref 11.7–17.7)
HGB BLD-MCNC: 8.5 G/DL (ref 11.7–17.7)
HGB BLD-MCNC: NORMAL G/DL
ISSUE DATE AND TIME: NORMAL
ISSUE DATE AND TIME: NORMAL
MAGNESIUM SERPL-MCNC: 1.3 MG/DL (ref 1.7–2.3)
MCH RBC QN AUTO: 30.8 PG (ref 26.5–33)
MCH RBC QN AUTO: NORMAL PG
MCHC RBC AUTO-ENTMCNC: 32.8 G/DL (ref 31.5–36.5)
MCHC RBC AUTO-ENTMCNC: NORMAL G/DL
MCV RBC AUTO: 94 FL (ref 78–100)
MCV RBC AUTO: NORMAL FL
PHOSPHATE SERPL-MCNC: 3.3 MG/DL (ref 2.5–4.5)
PLATELET # BLD AUTO: 100 10E3/UL (ref 150–450)
PLATELET # BLD AUTO: NORMAL 10*3/UL
POTASSIUM SERPL-SCNC: 3.4 MMOL/L (ref 3.4–5.3)
POTASSIUM SERPL-SCNC: 6.6 MMOL/L (ref 3.4–5.3)
PROT SERPL-MCNC: 4.1 G/DL (ref 6.4–8.3)
RBC # BLD AUTO: 1.85 10E6/UL (ref 3.8–5.9)
RBC # BLD AUTO: NORMAL 10*6/UL
SODIUM SERPL-SCNC: 136 MMOL/L (ref 135–145)
SPECIMEN EXPIRATION DATE: NORMAL
UNIT ABO/RH: NORMAL
UNIT ABO/RH: NORMAL
UNIT NUMBER: NORMAL
UNIT NUMBER: NORMAL
UNIT STATUS: NORMAL
UNIT STATUS: NORMAL
UNIT TYPE ISBT: 7300
UNIT TYPE ISBT: 7300
WBC # BLD AUTO: 7.1 10E3/UL (ref 4–11)
WBC # BLD AUTO: NORMAL 10*3/UL

## 2023-10-04 PROCEDURE — 90937 HEMODIALYSIS REPEATED EVAL: CPT

## 2023-10-04 PROCEDURE — 99233 SBSQ HOSP IP/OBS HIGH 50: CPT | Performed by: HOSPITALIST

## 2023-10-04 PROCEDURE — 99233 SBSQ HOSP IP/OBS HIGH 50: CPT | Performed by: INTERNAL MEDICINE

## 2023-10-04 PROCEDURE — 250N000013 HC RX MED GY IP 250 OP 250 PS 637: Performed by: HOSPITALIST

## 2023-10-04 PROCEDURE — 250N000013 HC RX MED GY IP 250 OP 250 PS 637: Performed by: INTERNAL MEDICINE

## 2023-10-04 PROCEDURE — 86923 COMPATIBILITY TEST ELECTRIC: CPT | Performed by: HOSPITALIST

## 2023-10-04 PROCEDURE — 258N000002 HC RX IP 258 OP 250: Performed by: INTERNAL MEDICINE

## 2023-10-04 PROCEDURE — 250N000011 HC RX IP 250 OP 636: Mod: JZ | Performed by: INTERNAL MEDICINE

## 2023-10-04 PROCEDURE — 86923 COMPATIBILITY TEST ELECTRIC: CPT | Performed by: INTERNAL MEDICINE

## 2023-10-04 PROCEDURE — 999N000040 HC STATISTIC CONSULT NO CHARGE VASC ACCESS

## 2023-10-04 PROCEDURE — 250N000011 HC RX IP 250 OP 636: Mod: JZ | Performed by: HOSPITALIST

## 2023-10-04 PROCEDURE — 36430 TRANSFUSION BLD/BLD COMPNT: CPT

## 2023-10-04 PROCEDURE — 85014 HEMATOCRIT: CPT | Performed by: HOSPITALIST

## 2023-10-04 PROCEDURE — 84100 ASSAY OF PHOSPHORUS: CPT | Performed by: INTERNAL MEDICINE

## 2023-10-04 PROCEDURE — 84132 ASSAY OF SERUM POTASSIUM: CPT | Performed by: INTERNAL MEDICINE

## 2023-10-04 PROCEDURE — 80053 COMPREHEN METABOLIC PANEL: CPT | Performed by: INTERNAL MEDICINE

## 2023-10-04 PROCEDURE — 85027 COMPLETE CBC AUTOMATED: CPT | Performed by: INTERNAL MEDICINE

## 2023-10-04 PROCEDURE — P9016 RBC LEUKOCYTES REDUCED: HCPCS | Performed by: INTERNAL MEDICINE

## 2023-10-04 PROCEDURE — 85018 HEMOGLOBIN: CPT | Performed by: INTERNAL MEDICINE

## 2023-10-04 PROCEDURE — 250N000009 HC RX 250: Performed by: INTERNAL MEDICINE

## 2023-10-04 PROCEDURE — 120N000001 HC R&B MED SURG/OB

## 2023-10-04 PROCEDURE — 83735 ASSAY OF MAGNESIUM: CPT | Performed by: INTERNAL MEDICINE

## 2023-10-04 PROCEDURE — 86901 BLOOD TYPING SEROLOGIC RH(D): CPT | Performed by: HOSPITALIST

## 2023-10-04 RX ORDER — DEXTROSE MONOHYDRATE 25 G/50ML
25 INJECTION, SOLUTION INTRAVENOUS ONCE
Status: COMPLETED | OUTPATIENT
Start: 2023-10-04 | End: 2023-10-04

## 2023-10-04 RX ORDER — MAGNESIUM SULFATE HEPTAHYDRATE 40 MG/ML
2 INJECTION, SOLUTION INTRAVENOUS ONCE
Status: COMPLETED | OUTPATIENT
Start: 2023-10-04 | End: 2023-10-04

## 2023-10-04 RX ORDER — FUROSEMIDE 10 MG/ML
20 INJECTION INTRAMUSCULAR; INTRAVENOUS ONCE
Status: COMPLETED | OUTPATIENT
Start: 2023-10-04 | End: 2023-10-04

## 2023-10-04 RX ORDER — SODIUM POLYSTYRENE SULFONATE 15 G/60ML
30 SUSPENSION ORAL; RECTAL ONCE
Status: COMPLETED | OUTPATIENT
Start: 2023-10-04 | End: 2023-10-04

## 2023-10-04 RX ORDER — CALCIUM GLUCONATE 20 MG/ML
1 INJECTION, SOLUTION INTRAVENOUS ONCE
Status: COMPLETED | OUTPATIENT
Start: 2023-10-04 | End: 2023-10-04

## 2023-10-04 RX ORDER — NICOTINE POLACRILEX 4 MG
15-30 LOZENGE BUCCAL
Status: DISCONTINUED | OUTPATIENT
Start: 2023-10-04 | End: 2023-10-04

## 2023-10-04 RX ORDER — DEXTROSE MONOHYDRATE 25 G/50ML
25-50 INJECTION, SOLUTION INTRAVENOUS
Status: DISCONTINUED | OUTPATIENT
Start: 2023-10-04 | End: 2023-10-04

## 2023-10-04 RX ADMIN — POTASSIUM CHLORIDE: 2 INJECTION, SOLUTION, CONCENTRATE INTRAVENOUS at 00:43

## 2023-10-04 RX ADMIN — SODIUM POLYSTYRENE SULFONATE 30 G: 15 SUSPENSION ORAL; RECTAL at 08:54

## 2023-10-04 RX ADMIN — MAGNESIUM SULFATE HEPTAHYDRATE 2 G: 40 INJECTION, SOLUTION INTRAVENOUS at 18:18

## 2023-10-04 RX ADMIN — DICLOFENAC SODIUM 2 G: 10 GEL TOPICAL at 09:57

## 2023-10-04 RX ADMIN — FUROSEMIDE 20 MG: 10 INJECTION, SOLUTION INTRAMUSCULAR; INTRAVENOUS at 09:16

## 2023-10-04 RX ADMIN — DICLOFENAC SODIUM 2 G: 10 GEL TOPICAL at 18:25

## 2023-10-04 RX ADMIN — ACETAMINOPHEN 650 MG: 325 TABLET, FILM COATED ORAL at 05:05

## 2023-10-04 RX ADMIN — DICLOFENAC SODIUM 2 G: 10 GEL TOPICAL at 21:40

## 2023-10-04 RX ADMIN — CALCIUM GLUCONATE 1 G: 20 INJECTION, SOLUTION INTRAVENOUS at 08:48

## 2023-10-04 RX ADMIN — ACETAMINOPHEN 650 MG: 325 TABLET, FILM COATED ORAL at 21:39

## 2023-10-04 RX ADMIN — HYDRALAZINE HYDROCHLORIDE 10 MG: 10 TABLET ORAL at 21:39

## 2023-10-04 ASSESSMENT — ACTIVITIES OF DAILY LIVING (ADL)
ADLS_ACUITY_SCORE: 46

## 2023-10-04 NOTE — PROGRESS NOTES
Potassium   Date Value Ref Range Status   10/04/2023 6.6 (HH) 3.4 - 5.3 mmol/L Final   05/27/2022 4.4 3.5 - 5.0 mmol/L Final     Hemoglobin   Date Value Ref Range Status   10/04/2023 5.7 (LL) 11.7 - 17.7 g/dL Final     Comment:     Sex Specific Reference Ranges:    Female  11.7-15.7  g/dL  Male    13.3-17.7   g/dL       Creatinine   Date Value Ref Range Status   10/04/2023 4.69 (H) 0.51 - 1.17 mg/dL Final     Comment:     Male and Female  0-2 Months    0.31-0.88 mg/dL  2-12 Months   0.16-0.39 mg/dL  1-2 Years     0.18-0.35 mg/dL  3-4 Years     0.26-0.42 mg/dL  5-6 Years     0.29-0.47 mg/dL  7-8 Years     0.34-0.53 mg/dL  9-10 Years    0.33-0.64 mg/dL  11-12 Years   0.44-0.68 mg/dL  13-14 Years   0.46-0.77 mg/dL    Female  15 Years and older  0.51-0.95 mg/dL    Male  15 Years and older  0.67-1.17 mg/dL         Urea Nitrogen   Date Value Ref Range Status   10/04/2023 64.3 (H) 8.0 - 23.0 mg/dL Final   05/27/2022 49 (H) 8 - 28 mg/dL Final     Sodium   Date Value Ref Range Status   10/04/2023 136 135 - 145 mmol/L Final     Comment:     Reference intervals for this test were updated on 09/26/2023 to more accurately reflect our healthy population. There may be differences in the flagging of prior results with similar values performed with this method. Interpretation of those prior results can be made in the context of the updated reference intervals.      INR   Date Value Ref Range Status   09/28/2023 1.58 (H) 0.85 - 1.15 Final       DIALYSIS PROCEDURE NOTE  Hepatitis status of previous patient on machine log was checked and verified ok to use with this patients hepatitis status.  Patient dialyzed for 3 hrs. on a K2 bath with a net fluid removal of 1.1L.  A BFR of 400 ml/min was obtained via a Right internal jugular non-tunnellled CVC .      The treatment plan was discussed with Dr. Higgins during the treatment.    Total heparin received during the treatment: 0 units.      Line flushed, clamped and capped with heparin  1:1000 1.1/1.4 mL (1100 and 1400 units) per arterial and venous lumen respectively     Meds given: 1 unit PRBC infused during dialysis  Complications: none       Person educated: patient. Knowledge base basic. Barriers to learning: confusion. Educated on procedure and access care via oral mode. Patient verbalized understanding. Pt prefers verbal education style.      ICEBOAT? Timeout performed pre-treatment  I: Patient was identified using 2 identifiers  C:  Consent Signed Yes  E: Equipment preventative maintenance is current and dialysis delivery system OK to use  B: Hepatitis B Surface Antigen: Unknown; Draw Date: 09/28/23      Hepatitis B Surface Antibody: Unknown; Draw Date: 09/28/23  O: Dialysis orders present and complete prior to treatment  A: Vascular access verified and assessed prior to treatment  T: Treatment was performed at a clinically appropriate time  ?: Patient was allowed to ask questions and address concerns prior to treatment  See Adult Hemodialysis flowsheet in [facility] for further details and post assessment.  Machine water alarm in place and functioning. Transducer pods intact and checked every 15min.   Pt returned via bed transport .  Chlorine/Chloramine water system checked every 4 hours.  Outpatient Dialysis TBD       Patient repositioned every 2 hours during the treatment.  Post treatment report given to FRANCES HARPER regarding 1.1L of fluid removed, last BP of 137/69, and patient pain rating of 4/10 knee pain.

## 2023-10-04 NOTE — PROVIDER NOTIFICATION
Paged regarding critical hemoglobin of 5.6.  Most recently 7.3.  Received 1 unit packed red blood cells on September 28.    Add on type and screen    Please recheck vitals.  Discussed with nursing.  Conditional orders for packed red blood cell transfusion for hemoglobin less than 7 when blood transfusion consent confirmed on file.    Blood was drawn off PICC running saline, so could be artifactual. CMP still pending.    Nathaniel Castro MD  6:05 AM    Vitals stable (still mildly tachycardic at 107).     Repeat CBC pending as lab concern for artifactual result.    Nathaniel Castro MD  7:18 AM

## 2023-10-04 NOTE — PROGRESS NOTES
AO x4. Baseline tingling in lower extremities. Culp in place with adequate output. PICC infusing with NaCl @ 100 ml/hr. Gray line occluded, red and white line sluggish-may need Alteplase; blood return noted. Pt c/o of pain in knee and hip. Managed with scheduled Tylenol. Critical labs-Hgb 5.7, Ca 5.4, Potassium- 6.6. VSS, slightly tachy. 1 loose BM.

## 2023-10-04 NOTE — PLAN OF CARE
Goal Outcome Evaluation:       A&Ox4. VSS on room air ex tachy, no complaint of chest pain/palpitations and appears to be in no distress. Pain rated at 3/10 managing with scheduled medications, pain mostly in knees and shin. Pt had critical morning labs of low HGB, high K+, and low Ca. Hospitalist and nephrology notified. Gave kayexalate and calcium gluconate. Held insulin and dextrose per nephrology as he was going down to dialysis early in am. Went down at around 1030. 1 unit of blood transfused at dialysis. Rechecks for K+ and hgb ordered. Culp intact and patent, q2h I&O's. Returned to floor at around 1500. Vitals stable on room air. Discharge pending.

## 2023-10-04 NOTE — PLAN OF CARE
Summary:  10/04/23. 6460-0945  Patient is alert and oriented X4 forgetful.  VSS on RA with good sat. On renal diet. Up with assist of 2 with pivot to chair. On renal diet.PICC on RUE with triple  lumen with good blood  return.  Dialysis CVC Double Lumen Right Internal jugular IV NaCl 0.45 % 1,000 mL with potassium chloride 20 mEq/L, sodium bicarbonate 75 mEq/L infusing 50ml/hr. Knee pain managed well with prn acetaminophen, warm packed and  schedule Voltaren cream.  incontinent of bowel and guido cath in place with moderate output.  Continue to monitor.

## 2023-10-04 NOTE — PROGRESS NOTES
Hyperkalemia and fall in hgb noted.  I will plan on dialysis today.  HD staff notified.  Ordered entered.    Oz Higgins MD

## 2023-10-04 NOTE — PROGRESS NOTES
Inpatient Dialysis Progress Note            Assessment and Plan:     RICHARD versus progression of CKD IV     Creatinine prior to admission ranging between 1.8-3.3 over the course of 2022, no recent labs.      Creatinine on admission 14.61 in the setting of encephalopathy, recent NSAID use, sepsis, UTI, and obstructive uropathy.  Most likely significant prerenal RICHARD, probable ATN at this point.  He did undergo dialysis at [facility] for acidosis and severe hyperkalemia causing EKG changes.      Still good urine output and ct is lower.   But K up to 6.6 and hgb down to 5.7.   GI bleed ?  I will plan HD today for correction of high K.          Hyperkalemia:    Now worse again.  Unclear cause. GI bleed can do this if upper.       Severe anion gap metabolic acidosis  Lactic acidosis, resolved  Starvation ketoacidosis  Bicarb up to 23 with IV bicarb.       Obstructive uropathy  Moderate bilateral hydroureteronephrosis  Pyelonephritis  Hypospadias  History of urinary retention  Patient has history of urinary retention and UTIs in the past.  CT with bilateral hydroureteronephrosis and bladder wall thickening consistent with cystitis.  Culp placed by urology, initially yellow urine followed by milky cloudy urine consistent with UTI/pyelo-.      Severe diarrhea  Colitis, possibly infectious     Acute on chronic anemia  Hemoglobin 5.7 today.  GI bleed ?     Cardiomyopathy with reduced EF     Plan:     HD today to correct K  Transfuse for anemia  Serial K and serial HGB       Following.     I called his wife Yudith Fleming with an update.            Interval History:      Eating ok.  Denies n/v/f/c.    No dizziness/lightheadedness/cramping.    No abd pain/cp/sob.        Dialysis Parameters:     Wt Readings from Last 4 Encounters:   10/04/23 91 kg (200 lb 9.9 oz)   09/27/23 90.7 kg (200 lb)   05/27/22 104 kg (229 lb 3.2 oz)   02/05/22 106.5 kg (234 lb 12.8 oz)     I/O last 3 completed shifts:  In: 300 [P.O.:300]  Out:  2375 [Urine:2375]  BP Readings from Last 3 Encounters:   10/04/23 100/64   09/27/23 122/57   05/27/22 131/70       Routine, ONE TIME, Starting today For 1 Occurrences  Weight Loss (kg): keep even  Dialysis Temp: 36.5  C  Access Device: temp CVC  Access Site: R IJ  Dialyzer: Revaclear  Dialysis Bath: K 2  Blood Flow Rate (mL/min): 300  Total Treatment Time (hrs): 3  Heparin: no         Medications and Allergies:   Reviewed in EPIC     - MEDICATION INSTRUCTIONS for Dialysis Patients -   Does not apply See Admin Instructions    aspirin  81 mg Oral Daily    dextrose 10%  300 mL Intravenous Once    diclofenac  2 g Topical 4x Daily    sodium chloride (PF) 0.9%  10 mL Intracatheter Once in dialysis/CRRT    Followed by    heparin  1.3-2.6 mL Intracatheter Once in dialysis/CRRT    sodium chloride (PF) 0.9%  10 mL Intracatheter Once in dialysis/CRRT    Followed by    heparin  1.3-2.6 mL Intracatheter Once in dialysis/CRRT    [Held by provider] heparin ANTICOAGULANT  5,000 Units Subcutaneous Q12H    hydrALAZINE  10 mg Oral TID    metoprolol succinate ER  25 mg Oral Daily    multivitamin RENAL  1 tablet Oral Daily    - MEDICATION INSTRUCTIONS -   Does not apply Once    sodium chloride (PF)  10-40 mL Intracatheter Q8H    sodium chloride (PF)  9 mL Intracatheter During Dialysis/CRRT (from stock)    sodium chloride (PF)  9 mL Intracatheter During Dialysis/CRRT (from stock)    sodium chloride 0.9%  250 mL Intravenous Once in dialysis/CRRT    sodium chloride 0.9%  300 mL Hemodialysis Machine Once     acetaminophen **OR** acetaminophen, alteplase, alteplase, glucose **OR** dextrose **OR** glucagon, ondansetron, senna-docusate **OR** senna-docusate, sodium chloride (PF), sodium chloride (PF), sodium chloride (PF), sodium chloride 0.9%, sodium chloride 0.9%     Allergies   Allergen Reactions    Ciprofloxacin Rash    Other Drug Allergy (See Comments)      Cough Syrup Abstracted from [facility] Chart( Hydrobromide)              Labs:      BMP  Recent Labs   Lab 10/04/23  0827 10/04/23  0652 10/03/23  0444 10/02/23  2051 10/02/23  1400 10/02/23  0818 10/02/23  0653 10/01/23  2121 10/01/23  0635   NA  --  136 137  --   --   --  135  --  137   POTASSIUM  --  6.6* 3.8 3.5 3.4  --  3.2*  --  3.4   CHLORIDE  --  102 104  --   --   --  104  --  103   CHANA  --  5.4* 7.0*  --   --   --  6.9*  --  7.2*   CO2  --  23 15*  --   --   --  16*  --  17*   BUN  --  64.3* 74.6*  --   --   --  71.2*  --  65.9*   CR  --  4.69* 5.62*  --   --   --  5.26*  --  5.24*   GLC 96 81 99  --   --  85 88   < > 83    < > = values in this interval not displayed.     CBC  Recent Labs   Lab 10/04/23  0652 10/03/23  0444 10/02/23  0653 10/01/23  0635   WBC 7.1 9.6 9.5 15.0*   HGB 5.7* 7.3* 7.3* 7.5*   HCT 17.4* 21.7* 22.0* 22.2*   MCV 94 95 96 94   * 120* 101* 110*     Lab Results   Component Value Date    AST 46 (H) 10/04/2023    ALT 59 10/04/2023    ALKPHOS 42 10/04/2023    BILITOTAL 0.2 10/04/2023            Physical Exam:   Vitals were reviewed in EPIC    Wt Readings from Last 3 Encounters:   10/04/23 91 kg (200 lb 9.9 oz)   09/27/23 90.7 kg (200 lb)   05/27/22 104 kg (229 lb 3.2 oz)       Intake/Output Summary (Last 24 hours) at 10/4/2023 1051  Last data filed at 10/4/2023 0638  Gross per 24 hour   Intake 300 ml   Output 2050 ml   Net -1750 ml       GENERAL APPEARANCE: pleasant, no distress, a & o  HEENT:  Eyes/ears/nose grossly normal, neck supple  RESP: lungs clear to auscultation with good efforts, no crackles, rhonchi or wheezes  CV: regular rate and rhythm, normal S1 S2, no murmur, click or rub   ABDOMEN: soft, nontender, bowel sounds normal  EXTREMITIES/SKIN: no edema, no rashes or lesions       Pt seen on dialysis.  Stable run.  Good BFR.      Attestation:  I have reviewed today's vital signs, notes, medications, labs and imaging.     Oz Higgins MD  [facility] - Nephrology  [phone]

## 2023-10-05 ENCOUNTER — APPOINTMENT (OUTPATIENT)
Dept: OCCUPATIONAL THERAPY | Facility: CLINIC | Age: 73
DRG: 871 | End: 2023-10-05
Attending: INTERNAL MEDICINE
Payer: COMMERCIAL

## 2023-10-05 ENCOUNTER — APPOINTMENT (OUTPATIENT)
Dept: PHYSICAL THERAPY | Facility: CLINIC | Age: 73
DRG: 871 | End: 2023-10-05
Attending: INTERNAL MEDICINE
Payer: COMMERCIAL

## 2023-10-05 LAB
ALBUMIN SERPL BCG-MCNC: 2.8 G/DL (ref 3.5–5.2)
ALP SERPL-CCNC: 51 U/L (ref 35–129)
ALT SERPL W P-5'-P-CCNC: 61 U/L (ref 0–70)
ANION GAP SERPL CALCULATED.3IONS-SCNC: 12 MMOL/L (ref 7–15)
AST SERPL W P-5'-P-CCNC: 44 U/L (ref 0–45)
BILIRUB SERPL-MCNC: 0.4 MG/DL
BUN SERPL-MCNC: 43.2 MG/DL (ref 8–23)
CALCIUM SERPL-MCNC: 7.5 MG/DL (ref 8.8–10.2)
CHLORIDE SERPL-SCNC: 103 MMOL/L (ref 98–107)
CREAT SERPL-MCNC: 3.93 MG/DL (ref 0.51–1.17)
DEPRECATED HCO3 PLAS-SCNC: 25 MMOL/L (ref 22–29)
EGFRCR SERPLBLD CKD-EPI 2021: 15 ML/MIN/1.73M2
ERYTHROCYTE [DISTWIDTH] IN BLOOD BY AUTOMATED COUNT: 15.2 % (ref 10–15)
GLUCOSE SERPL-MCNC: 98 MG/DL (ref 70–99)
HCT VFR BLD AUTO: 23.5 % (ref 35–53)
HGB BLD-MCNC: 7.6 G/DL (ref 11.7–17.7)
INR PPP: 1.18 (ref 0.85–1.15)
MAGNESIUM SERPL-MCNC: 1.9 MG/DL (ref 1.7–2.3)
MCH RBC QN AUTO: 30.9 PG (ref 26.5–33)
MCHC RBC AUTO-ENTMCNC: 32.3 G/DL (ref 31.5–36.5)
MCV RBC AUTO: 96 FL (ref 78–100)
PHOSPHATE SERPL-MCNC: 3.3 MG/DL (ref 2.5–4.5)
PLATELET # BLD AUTO: 142 10E3/UL (ref 150–450)
POTASSIUM SERPL-SCNC: 3.4 MMOL/L (ref 3.4–5.3)
POTASSIUM SERPL-SCNC: 3.7 MMOL/L (ref 3.4–5.3)
PROT SERPL-MCNC: 5.3 G/DL (ref 6.4–8.3)
RBC # BLD AUTO: 2.46 10E6/UL (ref 3.8–5.9)
SODIUM SERPL-SCNC: 140 MMOL/L (ref 135–145)
WBC # BLD AUTO: 7.8 10E3/UL (ref 4–11)

## 2023-10-05 PROCEDURE — 84132 ASSAY OF SERUM POTASSIUM: CPT | Performed by: HOSPITALIST

## 2023-10-05 PROCEDURE — 250N000013 HC RX MED GY IP 250 OP 250 PS 637: Performed by: INTERNAL MEDICINE

## 2023-10-05 PROCEDURE — 97116 GAIT TRAINING THERAPY: CPT | Mod: GP

## 2023-10-05 PROCEDURE — 84100 ASSAY OF PHOSPHORUS: CPT | Performed by: INTERNAL MEDICINE

## 2023-10-05 PROCEDURE — 85027 COMPLETE CBC AUTOMATED: CPT | Performed by: INTERNAL MEDICINE

## 2023-10-05 PROCEDURE — 97530 THERAPEUTIC ACTIVITIES: CPT | Mod: GP

## 2023-10-05 PROCEDURE — 250N000013 HC RX MED GY IP 250 OP 250 PS 637: Performed by: HOSPITALIST

## 2023-10-05 PROCEDURE — 85610 PROTHROMBIN TIME: CPT | Performed by: HOSPITALIST

## 2023-10-05 PROCEDURE — 80053 COMPREHEN METABOLIC PANEL: CPT | Performed by: INTERNAL MEDICINE

## 2023-10-05 PROCEDURE — 120N000001 HC R&B MED SURG/OB

## 2023-10-05 PROCEDURE — 99233 SBSQ HOSP IP/OBS HIGH 50: CPT | Performed by: HOSPITALIST

## 2023-10-05 PROCEDURE — G0463 HOSPITAL OUTPT CLINIC VISIT: HCPCS

## 2023-10-05 PROCEDURE — 83735 ASSAY OF MAGNESIUM: CPT | Performed by: INTERNAL MEDICINE

## 2023-10-05 PROCEDURE — 97535 SELF CARE MNGMENT TRAINING: CPT | Mod: GO

## 2023-10-05 PROCEDURE — 99232 SBSQ HOSP IP/OBS MODERATE 35: CPT | Performed by: INTERNAL MEDICINE

## 2023-10-05 RX ORDER — LIDOCAINE 40 MG/G
CREAM TOPICAL
Status: CANCELLED | OUTPATIENT
Start: 2023-10-05

## 2023-10-05 RX ORDER — PANTOPRAZOLE SODIUM 40 MG/1
40 TABLET, DELAYED RELEASE ORAL
Status: DISCONTINUED | OUTPATIENT
Start: 2023-10-06 | End: 2023-10-06

## 2023-10-05 RX ORDER — B COMPLEX, C NO.20/FOLIC ACID 1 MG
1 CAPSULE ORAL DAILY
Status: DISCONTINUED | OUTPATIENT
Start: 2023-10-05 | End: 2023-10-13 | Stop reason: HOSPADM

## 2023-10-05 RX ORDER — POTASSIUM CHLORIDE 1500 MG/1
20 TABLET, EXTENDED RELEASE ORAL ONCE
Status: COMPLETED | OUTPATIENT
Start: 2023-10-05 | End: 2023-10-05

## 2023-10-05 RX ADMIN — ACETAMINOPHEN 650 MG: 325 TABLET, FILM COATED ORAL at 21:16

## 2023-10-05 RX ADMIN — ACETAMINOPHEN 650 MG: 325 TABLET, FILM COATED ORAL at 06:35

## 2023-10-05 RX ADMIN — HYDRALAZINE HYDROCHLORIDE 10 MG: 10 TABLET ORAL at 09:24

## 2023-10-05 RX ADMIN — DICLOFENAC SODIUM 2 G: 10 GEL TOPICAL at 21:24

## 2023-10-05 RX ADMIN — DICLOFENAC SODIUM 2 G: 10 GEL TOPICAL at 09:27

## 2023-10-05 RX ADMIN — DICLOFENAC SODIUM 2 G: 10 GEL TOPICAL at 17:50

## 2023-10-05 RX ADMIN — Medication 1 MG: at 09:24

## 2023-10-05 RX ADMIN — METOPROLOL SUCCINATE 25 MG: 25 TABLET, EXTENDED RELEASE ORAL at 09:24

## 2023-10-05 RX ADMIN — DICLOFENAC SODIUM 2 G: 10 GEL TOPICAL at 14:35

## 2023-10-05 RX ADMIN — POTASSIUM CHLORIDE 20 MEQ: 1500 TABLET, EXTENDED RELEASE ORAL at 09:24

## 2023-10-05 RX ADMIN — ASPIRIN 81 MG: 81 TABLET, COATED ORAL at 09:26

## 2023-10-05 RX ADMIN — HYDRALAZINE HYDROCHLORIDE 10 MG: 10 TABLET ORAL at 21:17

## 2023-10-05 ASSESSMENT — ACTIVITIES OF DAILY LIVING (ADL)
ADLS_ACUITY_SCORE: 44
ADLS_ACUITY_SCORE: 40
ADLS_ACUITY_SCORE: 44
ADLS_ACUITY_SCORE: 36
ADLS_ACUITY_SCORE: 46
ADLS_ACUITY_SCORE: 37
ADLS_ACUITY_SCORE: 44
ADLS_ACUITY_SCORE: 44
ADLS_ACUITY_SCORE: 40
ADLS_ACUITY_SCORE: 46

## 2023-10-05 NOTE — PLAN OF CARE
Goal Outcome Evaluation: 10/06/23. [id number]    Summary:  10/04/23. [id number]  Patient is alert and oriented X4 forgetful.  VSS on RA with good sat. On renal diet with good  Up with assist of 1 with gait belt and walker. Strict 1-2hour  intake and output. On renal diet with good appetite.  NPO after midnight,On daily weight check note plan to start dialysis se  PICC on RUE with triple  lumenn. Dialysis CVC Double Lumen Right Internal jugular . Knee pain managed well with prn acetaminophen, warm packed and  schedule Voltaren cream.

## 2023-10-05 NOTE — PROVIDER NOTIFICATION
On call hospitalist paged regarding potasium lab value  of 3.4, potassium was elevated earlier today  with value of 6.6  corrected with HD  and recheck with value of 3.4. Awaiting responses.

## 2023-10-05 NOTE — PROGRESS NOTES
[facility]    Hospitalist Progress Note    Interval History     Patient awake and alert.  No acute events overnight.  Underwent 1 session of dialysis yesterday and tolerated this well.  Electrolytes stable this morning.    -Data reviewed today: I reviewed all new labs and imaging results over the last 24 hours. I personally reviewed no images or EKG's today.    Physical Exam   Temp: 98.1  F (36.7  C) Temp src: Oral BP: 101/57 Pulse: 109   Resp: 16 SpO2: 97 % O2 Device: None (Room air)    Vitals:    10/04/23 0347 10/05/23 0615 10/05/23 0924   Weight: 91 kg (200 lb 9.9 oz) (!) 192 kg (423 lb 4.5 oz) 87.6 kg (193 lb 2 oz)     Vital Signs with Ranges  Temp:  [97  F (36.1  C)-99.1  F (37.3  C)] 98.1  F (36.7  C)  Pulse:  [] 109  Resp:  [16-25] 16  BP: ()/(51-92) 101/57  SpO2:  [92 %-100 %] 97 %  I/O last 3 completed shifts:  In: 1040 [P.O.:390; Other:300]  Out: 3280 [Urine:2180; Other:1100]    Physical Exam  Constitutional:       Appearance: Normal appearance. Shilpi Fleming is ill-appearing.   Cardiovascular:      Rate and Rhythm: Normal rate and regular rhythm.      Pulses: Normal pulses.      Heart sounds: Normal heart sounds.   Pulmonary:      Effort: Pulmonary effort is normal. No respiratory distress.      Breath sounds: Normal breath sounds.   Abdominal:      General: Bowel sounds are normal. There is distension.      Tenderness: There is no abdominal tenderness. There is no guarding.   Musculoskeletal:      Right lower leg: Edema present.      Left lower leg: Edema present.   Skin:     General: Skin is warm and dry.      Coloration: Skin is pale.   Neurological:      General: No focal deficit present.           Medications        - MEDICATION INSTRUCTIONS for Dialysis Patients -   Does not apply See Admin Instructions    aspirin  81 mg Oral Daily    diclofenac  2 g Topical 4x Daily    [Held by provider] heparin ANTICOAGULANT  5,000 Units Subcutaneous Q12H    hydrALAZINE  10  mg Oral TID    metoprolol succinate ER  25 mg Oral Daily    multivitamin RENAL  1 mg Oral Daily    sodium chloride (PF)  10-40 mL Intracatheter Q8H       Data   Recent Labs   Lab 10/05/23  0650 10/04/23  1809 10/04/23  0827 10/04/23  0652 10/03/23  0444   WBC 7.8  --   --  7.1 9.6   HGB 7.6* 8.5*  --  5.7* 7.3*   MCV 96  --   --  94 95   *  --   --  100* 120*     --   --  136 137   POTASSIUM 3.4 3.4  --  6.6* 3.8   CHLORIDE 103  --   --  102 104   CO2 25  --   --  23 15*   BUN 43.2*  --   --  64.3* 74.6*   CR 3.93*  --   --  4.69* 5.62*   ANIONGAP 12  --   --  11 18*   CHANA 7.5*  --   --  5.4* 7.0*   GLC 98  --  96 81 99   ALBUMIN 2.8*  --   --  2.3* 2.8*   PROTTOTAL 5.3*  --   --  4.1* 5.0*   BILITOTAL 0.4  --   --  0.2 0.3   ALKPHOS 51  --   --  42 55   ALT 61  --   --  59 79*   AST 44  --   --  46* 64*         No results found for this or any previous visit (from the past 24 hour(s)).      Assessment & Plan     Mr. Fleming is a 73-year-old gentleman with a past medical history of hypertension, coronary artery disease, CKD stage III who initially presented to an outside hospital for shortness of breath and poor p.o. intake with some nausea and vomiting.  He was found to have a potassium of 7 and a creatinine of 14.6and a pH of 6.98..  He was initially given albuterol, 1 g of calcium gluconate and insulin for shifting for his hyperkalemia.  Nephrology was consulted and he was initiated on an emergent run of hemodialysis at the outside hospital.  Due to very difficult Culp catheter placement a urology consult was placed and a CT abdomen and pelvis discovered obstructive uropathy with bilateral hydronephrosis.  The CT also displayed colitis with bladder wall thickening.  Urine analysis was positive for infection and he was started on broad-spectrum antibiotics with vancomycin and Zosyn.  He was transferred to [facility] on 9/27/2023 for ongoing care of worsening renal failure, electrolyte  abnormalities and a urinary tract infection causing sepsis.     Acute on chronic renal failure requiring emergent hemodialysis- likely acute tubular necrosis   Hyperkalemia with ECG changes, resolved  Starvation ketoacidosis, resolved  Severe lactic acidosis, resolved  Metabolic acidosis due to renal failure   Hypocalcemia  Had last hemodialysis 9/29. Good urine output npw  Monitor urine output, renal function and electrolytes   Right IJ temporary dialysis catheter in place should he need future dialysis  Nephrology initially plan to hold dialysis until middle of the week and evaluated patient regarding this.  However on on 10/4/2023 patient did develop progressively worsening anemia with hyperkalemia and had to undergo a session of dialysis.  Also received 1 unit packed RBCs for severe anemia with hemoglobin at 5.7.  We will start dialysis potassium did improve to 3 and hemoglobin improved to 8.5  Received IV calcium gluconate 1 g for hypocalcemia.  Repeat BMP postdialysis showed resolved hyperkalemia with improved creatinine.  Continues to have good urine output that appears cleared for Culp catheter.  Remove Culp catheter today with trial of voiding.     Obstructive uropathy  Moderate bilateral hydroureteronephrosis  Sepsis due to pyelonephritis  Hypospadias  History of urinary retention  CT abdomen and pelvis revealed bilateral hydro ureteral nephrosis without ureteral obstruction.  Culp catheter was placed on 9/27/2023 and is helping to decompress the bladder presently.  Urine culture grew corynebacterium striatum.   Urology is consulted and per their recommendations we will continue the Culp catheter for bladder decompression.    Patient received 6 days of antibiotics with IV Zosyn and vancomycin.  So far the only positive culture was cornebacterium stratum in urine which is a regular skin pathogen.  Discussed with infectious disease.  No indication to continue IV antibiotics at this time.  Discontinued  both Zosyn and vancomycin.  Follow up urine culture on 9/30/2023 shows no growth so far.  Culp was placed by urology for obstruction.  Will remove it on 10/5/2023 and proceed with trial of voiding.     Non-ST-elevation myocardial infarction   New moderate mitral valve regurgitation  Cardiomyopathy EF 30%  Suspected coronary artery disease   TTE from 9/28/2023 reveals worsening heart failure with an EF of 30 to 35% with a large area of hypokinesis and akinesis distal to the septum with only suspected large LAD infarct pattern, mitral valve regurgitation and elevated right ventricular systolic pressure.  Continue aspirin, metoprolol and hydralazine   Cardiology consult appreciated- follow up in clinic      Colitis with loose stool  Elevated liver transaminases   He and his wife describe flu like symptoms starting last week with nausea, vomiting, decreased appetite, generalized fatigue and weakness.   INR was elevated at 1.8 and liver enzymes AST/ALT were elevated in the 100s but are now improving. Stool panel/ clostridium difficile negative.  Continue to monitor   LFTs have returned to normal.  Repeat INR pending.     Anemia status post packed red blood cells  Thrombocytopenia, acute   Anemia likely due to renal disease and sepsis.  Iron, B12 and folate ok.   Thrombocytopenia could also be due to sepsis but heparin held.   HIT work-up negative.  Continue to monitor H3  Transfuse for hemoglobin less than 7.  Patient received a unit of packed RBCs on 9/28/2023 when hemoglobin fell to 6.9.  1 unit of packed RBCs given on 10/4/2023 when hemoglobin dropped to 5.7.  Posttransfusion hemoglobin at 8.5.  In the setting of persistent drop in hemoglobin will evaluate for GI blood loss.  Stool Hemoccult ordered.  Will start on oral pantoprazole 40 daily.  Will consider GI consult if stool Hemoccult is positive.  ?  Question erythropoietin for nephrology     Generalized weakness and debility   Continue physcial therapy and  occupational therapy         Diet: Renal Diet (dialysis)  Snacks/Supplements Adult: Nepro Oral Supplement; Between Meals  Room Service    DVT Prophylaxis: pneumatic compression device   Culp Catheter: PRESENT, indication: Strict 1-2 Hour I&O;Insertion Difficulty  Lines: PRESENT      PICC 09/27/23 Triple Lumen Right Brachial vein lateral Vascular Access-Site Assessment: WDL  CVC Double Lumen Right Internal jugular Non - tunneled-Site Assessment: WDL      Cardiac Monitoring: None  Code Status: Full Code         Clinically Significant Risk Factors        # Hyperkalemia: Highest K = 6.6 mmol/L in last 2 days, will monitor as appropriate     # Hypomagnesemia: Lowest Mg = 1.3 mg/dL in last 2 days, will replace as needed   # Hypoalbuminemia: Lowest albumin = 2.3 g/dL at 10/4/2023  6:52 AM, will monitor as appropriate     # Thrombocytopenia: Lowest platelets = 100 in last 2 days, will monitor for bleeding      # Acute heart failure with reduced ejection fraction: last echo with EF <40% and receiving IV diuretics       # Overweight: Estimated body mass index is 26.19 kg/m  as calculated from the following:    Height as of this encounter: 1.829 m (6').    Weight as of this encounter: 87.6 kg (193 lb 2 oz).     # Moderate Malnutrition: based on nutrition assessment               Andie Dominguez MD, MD  [phone](p)

## 2023-10-05 NOTE — PROGRESS NOTES
Pt here with Acute renal failure (ARF) (H24) [N17.9].   Patient vital signs are at baseline: Yes, VSS on room air  Patient able to ambulate as they were prior to admission or with assist devices provided by therapies during their stay:  Pt uses walker and gaitbelt for safety  Patient MUST void prior to discharge:  Pt has a guido for accurate I&O  Patient able to tolerate oral intake:  using ice chips and popsicle this shift. Sleeping rest of the time  Pain has adequate pain control using Oral analgesics:  Denies need for pain meds  Does patient have an identified :  Yes  Has goal D/C date and time been discussed with patient:  Pending, pt has dialysis port for dialysis access, Picc line for labs and IV infusion/meds, Awaiting am lab checks in am

## 2023-10-05 NOTE — PROGRESS NOTES
[facility]    Nephrology Progress Note     Assessment & Plan       RICHARD:     Creatinine prior to admission ranging between 1.8-3.3 over the course of 2022, no recent labs.      Creatinine on admission 14.61 in the setting of encephalopathy, recent NSAID use, sepsis, UTI, and obstructive uropathy.  Most likely significant prerenal RICHARD, probable ATN at this point.  He did undergo dialysis at [facility] for acidosis and severe hyperkalemia causing EKG changes.       K up yesterday so had one more HD treatment.    K 3.4 today.          Hyperkalemia:    Worse 10/4.  Better after HD.  Unclear cause. GI bleed can do this if upper.       Severe anion gap metabolic acidosis  Lactic acidosis, resolved  Starvation ketoacidosis  Bicarb up to 25 with IV bicarb and HD.      Obstructive uropathy  Moderate bilateral hydroureteronephrosis  Pyelonephritis  Hypospadias  History of urinary retention  Patient has history of urinary retention and UTIs in the past.  CT with bilateral hydroureteronephrosis and bladder wall thickening consistent with cystitis.  Culp placed by urology, initially yellow urine followed by milky cloudy urine consistent with UTI/pyelo-.   Trial of voiding today.      Severe diarrhea  Colitis, possibly infectious     Acute on chronic anemia  Hemoglobin 5.7 yesterday.  GI bleed ? Transfused 1 unit.       Cardiomyopathy with reduced EF     Plan:     No need for HD today  Keep HD line in for now.  Follow K, Cr and HGB.     I  updated his wife and daughter at the bedside.      Following.         Oz Higgins MD  [facility] - Nephrology  [phone]    Interval History     He is sitting up in chair.  He is eating lunch.  He feels pretty well.  Culp out for trial of voiding.      Physical Exam   Temp: 98.1  F (36.7  C) Temp src: Oral BP: 101/57 Pulse: 109   Resp: 16 SpO2: 97 % O2 Device: None (Room air)    Vitals:    10/04/23 0347 10/05/23 0615 10/05/23 0924   Weight:  91 kg (200 lb 9.9 oz) (!) 192 kg (423 lb 4.5 oz) 87.6 kg (193 lb 2 oz)     Vital Signs with Ranges  Temp:  [97  F (36.1  C)-99.1  F (37.3  C)] 98.1  F (36.7  C)  Pulse:  [] 109  Resp:  [16-25] 16  BP: ()/(51-92) 101/57  SpO2:  [94 %-100 %] 97 %  I/O last 3 completed shifts:  In: 1040 [P.O.:390; Other:300]  Out: 3280 [Urine:2180; Other:1100]    GENERAL APPEARANCE: pleasant, NAD, a & o  HEENT:  Eyes/ears/nose/neck grossly normal  RESP: lungs cta b c good efforts, no crackles, rhonchi or wheezes  CV: RRR, nl S1/S2, no m/r/g   ABDOMEN: o/s/nt/nd, bs present  EXTREMITIES/SKIN: no rashes/lesions; tr edema    Medications      - MEDICATION INSTRUCTIONS for Dialysis Patients -   Does not apply See Admin Instructions    aspirin  81 mg Oral Daily    diclofenac  2 g Topical 4x Daily    [Held by provider] heparin ANTICOAGULANT  5,000 Units Subcutaneous Q12H    hydrALAZINE  10 mg Oral TID    metoprolol succinate ER  25 mg Oral Daily    multivitamin RENAL  1 mg Oral Daily    [START ON 10/6/2023] pantoprazole  40 mg Oral QAM AC    sodium chloride (PF)  10-40 mL Intracatheter Q8H       Data   BMP  Recent Labs   Lab 10/05/23  0650 10/04/23  1809 10/04/23  0827 10/04/23  0652 10/03/23  0444 10/02/23  0818 10/02/23  0653     --   --  136 137  --  135   POTASSIUM 3.4 3.4  --  6.6* 3.8   < > 3.2*   CHLORIDE 103  --   --  102 104  --  104   CHANA 7.5*  --   --  5.4* 7.0*  --  6.9*   CO2 25  --   --  23 15*  --  16*   BUN 43.2*  --   --  64.3* 74.6*  --  71.2*   CR 3.93*  --   --  4.69* 5.62*  --  5.26*   GLC 98  --  96 81 99   < > 88    < > = values in this interval not displayed.     [email or web address](phos:4)  CBC)  Recent Labs   Lab 10/05/23  0650 10/04/23  1809 10/04/23  0652 10/03/23  0444 10/02/23  0653   WBC 7.8  --  7.1 9.6 9.5   HGB 7.6* 8.5* 5.7* 7.3* 7.3*   HCT 23.5*  --  17.4* 21.7* 22.0*   MCV 96  --  94 95 96   *  --  100* 120* 101*     Recent Labs   Lab 10/05/23  0650   AST 44   ALT 61   ALKPHOS 51    BILITOTAL 0.4         Attestation:   I have reviewed today's relevant vital signs, notes, medications, labs and imaging.

## 2023-10-05 NOTE — PROGRESS NOTES
Shift: [id number] 10/5/2023    Orientation: AO x4  Pain: Knee pain reported, Volaren applied BiLaterally   Vitals/Tele: VSS RA   IV Access/drains: R PICC SL, blood return noted in all three lumens; R Jugular CVC (Hemodialysis)  Diet: Renal   Mobility: A1-2 GB W  GI/: No BM this shift; Culp due to be removed  Wound/Skin: Trace Edema; Scattered Bruising; Peeling on heels; Penile Ulceration.   Consults: Urology/Nephrology   Discharge Plan: TBD      See Flow sheets for assessment

## 2023-10-05 NOTE — PROGRESS NOTES
[facility] Nurse Inpatient Assessment     Consulted for: Penis    Summary: Pt with hypospadias and ulceration to ventral aspect. Unclear etiology of this wound as pt reports no catheter use until hospitalization. This is however friable tissue that can be damaged easily. Devitalized tissue covers the area, will apply a thin layer of triad paste to debride and protect. No acute s/sx of infection.   10/5: Improvement, NO triad in use at time of assessment, periwound skin macerated. Possible plan for guido removal 10/5.     Patient History (according to provider note(s):      74 yo male w/ hx of HTN, CAD who presented to OSH for SOB. Per chart review, patient thought he had flu ,and thus has had very limited PO intake for the past week. He also reports N/V as well as abdominal tenderness. He  has been taking Tylenol and ibuprofen, but unable to recall how much. No cough or diarrhea.      In the outside ED, he was given albuterol neb, 1 g calcium gluconate, and insulin for shifting. He was also started on  Zosyn and vanc, given 2.5 L NS, and 150 mg of bicarb.  Patient completed run of HD at OSH. Difficult guido placement and required urology consult who recommended STAT CT abdomen to assess for obstructive uropathy.     Assessment:      Areas visualized during today's visit:  penis    Wound location: Ventral Penis    Last photo: 10/5/23           10/5  Wound due to: Unknown Etiology  Wound history/plan of care: Erosion of mucosa with thin layer of devitalized tissue  Wound base: 75 % grey slick adherent slough 25%red mucosa     Palpation of the wound bed: firm      Drainage: scant     Description of drainage: serous     Measurements (length x width x depth, in cm): 2.3  x 1.5  x  0.1 cm      Tunneling: N/A     Undermining: N/A  Periwound skin: Intact      Color: normal and consistent with surrounding tissue      Temperature: normal   Odor: none  Pain: mild, intermittent and tender  Pain  interventions prior to dressing change: slow and gentle cares   Treatment goal: Heal , Maintain (prevention of deterioration), Protection, and Remove necrotic tissue  STATUS: evolving and improving  Supplies ordered: ordered Triad paste       Treatment Plan:     Ventral Penis wound(s): Daily   cleanse with cindy cleanse and protect and bob dry wipes/washcloths.   Ensure area is completely dry by blotting and using circular motions, do not wipe as this can cause trauma to the skin   Apply a thin layer (pea sized amt) of Triad paste (#[id number]) to wound base on underside of penis. Using a very small amount to the area will prevent it from caking and becoming messy.   Remove only soiled paste, then reapply thin layer. If complete removal is needed use baby/mineral oil (located in pharmacy).   *Avoid pre moisten wipes.   *Avoid use of brief  *Use single covidien pad and limit number of linens underneath patient. Covidien pad can be used as incontinence pad and lift pad       Orders: Reviewed    RECOMMEND PRIMARY TEAM ORDER: None, at this time  Education provided: importance of repositioning, plan of care, and wound progress  Discussed plan of care with: Patient and Nurse  WOC nurse follow-up plan: weekly  Notify WOC if wound(s) deteriorate.  Nursing to notify the Provider(s) and re-consult the WOC Nurse if new skin concern.    DATA:     Current support surface: Standard  Standard gel/foam mattress (IsoFlex, Atmos air, etc)  Containment of urine/stool: Incontinence Protocol, Incontinent pad in bed, and Indwelling catheter  BMI: Body mass index is 57.41 kg/m .   Active diet order: Orders Placed This Encounter      Renal Diet (dialysis)     Output: I/O last 3 completed shifts:  In: 1040 [P.O.:390; Other:300]  Out: 3280 [Urine:2180; Other:1100]     Labs:   Recent Labs   Lab 10/05/23  0650   ALBUMIN 2.8*   HGB 7.6*   WBC 7.8       Pressure injury risk assessment:   Sensory Perception: 4-->no impairment  Moisture: 4-->rarely  moist  Activity: 3-->walks occasionally  Mobility: 3-->slightly limited  Nutrition: 3-->adequate  Friction and Shear: 3-->no apparent problem  Milo Score: 20    Gisela Jones RN, BSN, CWON   Pager no longer is use, please contact through [employer] group: [facility] Nurse Kehinde  Dept. Office Number: [phone]

## 2023-10-05 NOTE — CONSULTS
GASTROENTEROLOGY CONSULTATION      Bret Fleming  [address]  [place] [address]  73 year old adult     Admission Date/Time: 2023  Primary Care Provider: Alisha Gloria     We were asked to see the patient in consultation by Dr. Dominguez for evaluation of anemia.    ASSESSMENT:      #1 Subacute to chronic anemia without overt bleeding and with intermittent transfusion requirement    No recent GI eval. NSAID use- at risk for PUD/gastritis. Other d/d include AVMs, tumor/cancer in the gi tract. No symptoms to suggest IBD.      RECOMMENDATIONS:    EGD tomorrow.   BID PPI.  If EGD negative, may need interval colon/pillcam evals.   4.   NPO after midnight.      Joaquin Lynn MD   [facility]  [phone]      ________________________________________________________________________        HPI:  Bret Fleming is a 73 year old adult who was admitted about 1 wk ago for SOB. Pt reports no prior active health issues. He was found to have RICHARD with obstructive uropathy, cardiomyopathy with reduced EF, ketoacidosis, and also anemia. Pt denies any loren melena or rectal bleeding. In the last few days, he has noticed a few drops of blood with stool and wonders about hemorrhoid. Last Colon 8 year ago.     Initially, pt reports having N/V and upper stomach upset with no appetite. These symptoms have improved. Also NSAID use and aspirin use. No anticoagulation.       He has required intermittent transfusions in the hospital.      ROS: A comprehensive ten point review of systems was negative aside from those in mentioned in the HPI.      PAST MEDICAL HISTORY:  Past Medical History:   Diagnosis Date    Hypertension      SOCIAL HISTORY:  Social History     Tobacco Use    Smoking status: Former     Types: Cigarettes     Quit date: 1995     Years since quittin.4    Smokeless tobacco: Never     FAMILY HISTORY:  Family History   Problem Relation Age of Onset    Cancer Father     Cancer Paternal  Grandfather     Diabetes Paternal Grandfather     Hypertension No family hx of     Cerebrovascular Disease No family hx of     Thyroid Disease No family hx of     Glaucoma No family hx of     Macular Degeneration No family hx of      ALLERGIES:   Allergies   Allergen Reactions    Ciprofloxacin Rash    Other Drug Allergy (See Comments)      Cough Syrup Abstracted from [facility] Chart( Hydrobromide)     MEDICATIONS:   Prior to Admission medications    Medication Sig Start Date End Date Taking? Authorizing Provider   acetaminophen (TYLENOL) 500 MG tablet Take 1,000 mg by mouth every 6 hours   Yes Unknown, Entered By History   aspirin 81 MG EC tablet Take 81 mg by mouth daily   Yes Unknown, Entered By History   atenolol (TENORMIN) 100 MG tablet Take 1 tablet (100 mg) by mouth daily 8/26/22  Yes Alisha Gloria MD   atorvastatin (LIPITOR) 80 MG tablet Take 1 tablet (80 mg) by mouth daily 8/26/22  Yes Alisha Gloria MD   guaiFENesin (MUCINEX) 600 MG 12 hr tablet Take 600 mg by mouth 2 times daily as needed for congestion   Yes Unknown, Entered By History   ibuprofen (ADVIL/MOTRIN) 200 MG tablet Take 400 mg by mouth every 6 hours   Yes Unknown, Entered By History   LISINOPRIL PO Take 0.25-0.5 tablets by mouth daily   Yes Unknown, Entered By History   Pseudoephedrine HCl (SUDAFED PO) Take 1 tablet by mouth every 8 hours as needed for congestion   Yes Unknown, Entered By History   fluticasone (FLONASE) 50 MCG/ACT nasal spray Spray 1 spray into both nostrils daily  Patient not taking: Reported on 9/29/2023 5/27/22   Jassi Sanchez MD     PHYSICAL EXAM:   /57   Pulse 109   Temp 98.1  F (36.7  C) (Oral)   Resp 16   Ht 1.829 m (6')   Wt 87.6 kg (193 lb 2 oz)   SpO2 97%   BMI 26.19 kg/m     GEN: No distress, Alert, comfortable.  SUDHEER: No pallor, No icterus, oral mucosa moist.    NECK: No thyromegaly. No mass.    HEART: RRR, S1 S2 normal.   LUNGS: CTA BL  ABD: soft, non-tender, non-distended, no peritoneal  signs.  NEURO: No gross motor deficits.  PSYCH: A O X 3.  EXTREMITIES: No pedal edema.      ADDITIONAL DATA:   I reviewed the patient's new clinical lab test results.   Recent Labs   Lab Test 10/05/23  0650 10/04/23  1809 10/04/23  0652 10/03/23  0444 09/29/23  0538 09/28/23 0449 09/27/23 2344 09/27/23  1446   WBC 7.8  --  7.1 9.6   < > 13.0* 9.3 14.6*   HGB 7.6* 8.5* 5.7* 7.3*   < > 6.9* 7.1* 8.7*   MCV 96  --  94 95   < > 92 93 99   *  --  100* 120*   < > 82* 81* 153   INR  --   --   --   --   --  1.58* 1.56* 1.81*    < > = values in this interval not displayed.     Recent Labs   Lab Test 10/05/23  0650 10/04/23  1809 10/04/23  0827 10/04/23  0652 10/03/23  0444     --   --  136 137   POTASSIUM 3.4 3.4  --  6.6* 3.8   CHLORIDE 103  --   --  102 104   CO2 25  --   --  23 15*   BUN 43.2*  --   --  64.3* 74.6*   CR 3.93*  --   --  4.69* 5.62*   ANIONGAP 12  --   --  11 18*   CHANA 7.5*  --   --  5.4* 7.0*   GLC 98  --  96 81 99     Recent Labs   Lab Test 10/05/23  0650 10/04/23  0652 10/03/23  0444 10/01/23  0635 09/30/23  1550 09/27/23 2344 09/27/23 2012 09/27/23 1446 05/27/22 1859 02/05/22 2031 02/05/22 1854   ALBUMIN 2.8* 2.3* 2.8*   < >  --    < >  --  4.5  --   --  3.0*   BILITOTAL 0.4 0.2 0.3   < >  --    < >  --  0.5  --   --  0.4   ALT 61 59 79*   < >  --    < >  --  100*  --   --  18   AST 44 46* 64*   < >  --    < >  --  182*  --   --  13   PROTEIN  --   --   --   --  100*  --  100*  --  100*   < >  --    LIPASE  --   --   --   --   --   --   --  405*  --   --  52    < > = values in this interval not displayed.        I reviewed the patient's new imaging results.    Total time: 45 minutes.

## 2023-10-05 NOTE — PROGRESS NOTES
Notified provider about indwelling guido catheter discussed removal or continued need.    Did provider choose to remove indwelling guido catheter? NO    Provider's guido indication for keeping indwelling guido catheter: Indication for continued use: Obstruction    Is there an order for indwelling guido catheter? YES    *If there is a plan to keep guido catheter in place at discharge daily notification with provider is not necessary, but please add a notation in the treatment team sticky note that the patient will be discharging with the catheter.

## 2023-10-05 NOTE — PROGRESS NOTES
[facility]    Hospitalist Progress Note    Interval History     Patient awake and alert.  No acute events overnight.  Did significantly worsening anemia and hyperkalemia this morning.  Underwent a session of emergent dialysis.  Did receive a dose of Kayexalate and calcium gluconate.    -Data reviewed today: I reviewed all new labs and imaging results over the last 24 hours. I personally reviewed no images or EKG's today.    Physical Exam   Temp: 99.1  F (37.3  C) Temp src: Oral BP: 110/65 Pulse: 111   Resp: 16 SpO2: 96 % O2 Device: None (Room air)    Vitals:    09/29/23 0640 10/03/23 0857 10/04/23 0347   Weight: 81.2 kg (179 lb 0.2 oz) 87.5 kg (192 lb 14.4 oz) 91 kg (200 lb 9.9 oz)     Vital Signs with Ranges  Temp:  [97.3  F (36.3  C)-99.1  F (37.3  C)] 99.1  F (37.3  C)  Pulse:  [] 111  Resp:  [11-27] 16  BP: ()/(51-92) 110/65  SpO2:  [92 %-100 %] 96 %  I/O last 3 completed shifts:  In: 950 [P.O.:300; Other:300]  Out: 3175 [Urine:2075; Other:1100]    Physical Exam  Constitutional:       Appearance: Normal appearance. Shilpi Fleming is ill-appearing.   Cardiovascular:      Rate and Rhythm: Normal rate and regular rhythm.      Pulses: Normal pulses.      Heart sounds: Normal heart sounds.   Pulmonary:      Effort: Pulmonary effort is normal. No respiratory distress.      Breath sounds: Normal breath sounds.   Abdominal:      General: Bowel sounds are normal. There is distension.      Tenderness: There is no abdominal tenderness. There is no guarding.   Musculoskeletal:      Right lower leg: Edema present.      Left lower leg: Edema present.   Skin:     General: Skin is warm and dry.      Coloration: Skin is pale.   Neurological:      General: No focal deficit present.           Medications        - MEDICATION INSTRUCTIONS for Dialysis Patients -   Does not apply See Admin Instructions    aspirin  81 mg Oral Daily    diclofenac  2 g Topical 4x Daily    [Held by provider]  heparin ANTICOAGULANT  5,000 Units Subcutaneous Q12H    hydrALAZINE  10 mg Oral TID    metoprolol succinate ER  25 mg Oral Daily    multivitamin RENAL  1 tablet Oral Daily    sodium chloride (PF)  10-40 mL Intracatheter Q8H       Data   Recent Labs   Lab 10/04/23  1809 10/04/23  0827 10/04/23  0652 10/03/23  0444 10/02/23  0818 10/02/23  0653 09/28/23  0820 09/28/23  0449 09/28/23  0023 09/27/23  2344   WBC  --   --  7.1 9.6  --  9.5   < > 13.0*  --  9.3   HGB 8.5*  --  5.7* 7.3*  --  7.3*   < > 6.9*  --  7.1*   MCV  --   --  94 95  --  96   < > 92  --  93   PLT  --   --  100* 120*  --  101*   < > 82*  --  81*   INR  --   --   --   --   --   --   --  1.58*  --  1.56*   NA  --   --  136 137  --  135   < > 136  --  137   POTASSIUM 3.4  --  6.6* 3.8   < > 3.2*   < > 3.7  --  3.3*   CHLORIDE  --   --  102 104  --  104   < > 96*  --  96*   CO2  --   --  23 15*  --  16*   < > 13*  --  11*   BUN  --   --  64.3* 74.6*  --  71.2*   < > 123.6*  --  115.6*   CR  --   --  4.69* 5.62*  --  5.26*   < > 8.48*  --  8.07*   ANIONGAP  --   --  11 18*  --  15   < > 27*  --  30*   CHANA  --   --  5.4* 7.0*  --  6.9*   < > 8.4*  --  7.6*   GLC  --  96 81 99   < > 88   < > 99   < > 82   ALBUMIN  --   --  2.3* 2.8*  --  2.7*   < > 3.3*  --  3.5   PROTTOTAL  --   --  4.1* 5.0*  --  4.8*   < > 5.7*  --  6.0*   BILITOTAL  --   --  0.2 0.3  --  0.3   < > 0.4  --  0.5   ALKPHOS  --   --  42 55  --  54   < > 69  --  72   ALT  --   --  59 79*  --  76*   < > 94*  --  100*   AST  --   --  46* 64*  --  56*   < > 166*  --  182*    < > = values in this interval not displayed.         No results found for this or any previous visit (from the past 24 hour(s)).      Assessment & Plan     Mr. Fleming is a 73-year-old gentleman with a past medical history of hypertension, coronary artery disease, CKD stage III who initially presented to an outside hospital for shortness of breath and poor p.o. intake with some nausea and vomiting.  He was found to have a  potassium of 7 and a creatinine of 14.6and a pH of 6.98..  He was initially given albuterol, 1 g of calcium gluconate and insulin for shifting for his hyperkalemia.  Nephrology was consulted and he was initiated on an emergent run of hemodialysis at the outside hospital.  Due to very difficult Culp catheter placement a urology consult was placed and a CT abdomen and pelvis discovered obstructive uropathy with bilateral hydronephrosis.  The CT also displayed colitis with bladder wall thickening.  Urine analysis was positive for infection and he was started on broad-spectrum antibiotics with vancomycin and Zosyn.  He was transferred to [facility] on 9/27/2023 for ongoing care of worsening renal failure, electrolyte abnormalities and a urinary tract infection causing sepsis.     Acute on chronic renal failure requiring emergent hemodialysis- likely acute tubular necrosis   Hyperkalemia with ECG changes, resolved  Starvation ketoacidosis, resolved  Severe lactic acidosis, resolved  Metabolic acidosis due to renal failure   Hypocalcemia  Had last hemodialysis 9/29. Good urine output npw  Monitor urine output, renal function and electrolytes   Right IJ temporary dialysis catheter in place should he need future dialysis  Nephrology initially plan to hold dialysis until middle of the week and evaluated patient regarding this.  However on on 10/4/2023 patient did develop progressively worsening anemia with hyperkalemia and had to undergo a session of dialysis.  Also received 1 unit packed RBCs for severe anemia with hemoglobin at 5.7.  We will start dialysis potassium did improve to 3 and hemoglobin improved to 8.5  Received IV calcium gluconate 1 g for hypocalcemia.  Repeat BMP ordered for tomorrow.  Continues to have good urine output that appears cleared for Culp catheter.     Obstructive uropathy  Moderate bilateral hydroureteronephrosis  Sepsis due to pyelonephritis  Hypospadias  History of urinary  retention  CT abdomen and pelvis revealed bilateral hydro ureteral nephrosis without ureteral obstruction.  Guido catheter was placed on 9/27/2023 and is helping to decompress the bladder presently.  Urine culture grew corynebacterium striatum.   Urology is consulted and per their recommendations we will continue the Guido catheter for bladder decompression.    Patient received 6 days of antibiotics with IV Zosyn and vancomycin.  So far the only positive culture was corny bacterium stratum and urine which is a regular skin pathogen.  Discussed with infectious disease.  No indication to continue IV antibiotics at this time.  Discontinued both Zosyn and vancomycin.  Follow up urine culture on 9/30/2023 shows no growth so far.  Continue guido      Non-ST-elevation myocardial infarction   New moderate mitral valve regurgitation  Cardiomyopathy EF 30%  Suspected coronary artery disease   TTE from 9/28/2023 reveals worsening heart failure with an EF of 30 to 35% with a large area of hypokinesis and akinesis distal to the septum with only suspected large LAD infarct pattern, mitral valve regurgitation and elevated right ventricular systolic pressure.  Continue aspirin, metoprolol and hydralazine   Cardiology consult appreciated- follow up in clinic      Colitis with loose stool  Elevated liver transaminases   He and his wife describe flu like symptoms starting last week with nausea, vomiting, decreased appetite, generalized fatigue and weakness.   INR was elevated at 1.8 and liver enzymes AST/ALT were elevated in the 100s but are now improving. Stool panel/ clostridium difficile negative.  Continue to monitor      Anemia status post packed red blood cells  Thrombocytopenia, acute   Anemia likely due to renal disease and sepsis.  Iron, B12 and folate ok.   Thrombocytopenia could also be due to sepsis but heparin held.   HIT work-up negative.  Continue to monitor H3  Hemoglobin stable at 7.3 today.  Transfuse for hemoglobin  less than 7.  Patient received a unit of packed RBCs on 9/28/2023 when hemoglobin fell to 6.9.  1 unit of packed RBCs given on 10/4/2023 when hemoglobin dropped to 5.7.  Posttransfusion hemoglobin at 8.5.     Generalized weakness and debility   Continue physcial therapy and occupational therapy         Diet: Renal Diet (dialysis)  Snacks/Supplements Adult: Nepro Oral Supplement; Between Meals  Room Service    DVT Prophylaxis: pneumatic compression device   Culp Catheter: PRESENT, indication: Strict 1-2 Hour I&O;Insertion Difficulty  Lines: PRESENT      PICC 09/27/23 Triple Lumen Right Brachial vein lateral Vascular Access-Site Assessment: WDL  CVC Double Lumen Right Internal jugular Non - tunneled-Site Assessment: WDL      Cardiac Monitoring: None  Code Status: Full Code         Clinically Significant Risk Factors        # Hyperkalemia: Highest K = 6.6 mmol/L in last 2 days, will monitor as appropriate     # Hypomagnesemia: Lowest Mg = 1.3 mg/dL in last 2 days, will replace as needed   # Hypoalbuminemia: Lowest albumin = 2.3 g/dL at 10/4/2023  6:52 AM, will monitor as appropriate     # Thrombocytopenia: Lowest platelets = 100 in last 2 days, will monitor for bleeding      # Acute heart failure with reduced ejection fraction: last echo with EF <40% and receiving IV diuretics       # Overweight: Estimated body mass index is 27.21 kg/m  as calculated from the following:    Height as of this encounter: 1.829 m (6').    Weight as of this encounter: 91 kg (200 lb 9.9 oz).     # Moderate Malnutrition: based on nutrition assessment               Andie Dominguez MD, MD  [phone](p)

## 2023-10-06 ENCOUNTER — APPOINTMENT (OUTPATIENT)
Dept: GENERAL RADIOLOGY | Facility: CLINIC | Age: 73
DRG: 871 | End: 2023-10-06
Attending: HOSPITALIST
Payer: COMMERCIAL

## 2023-10-06 ENCOUNTER — APPOINTMENT (OUTPATIENT)
Dept: GENERAL RADIOLOGY | Facility: CLINIC | Age: 73
DRG: 871 | End: 2023-10-06
Payer: COMMERCIAL

## 2023-10-06 LAB
ALBUMIN SERPL BCG-MCNC: 3 G/DL (ref 3.5–5.2)
ALLEN'S TEST: YES
ALP SERPL-CCNC: 54 U/L (ref 35–129)
ALT SERPL W P-5'-P-CCNC: 57 U/L (ref 0–70)
ANION GAP SERPL CALCULATED.3IONS-SCNC: 16 MMOL/L (ref 7–15)
AST SERPL W P-5'-P-CCNC: 36 U/L (ref 0–45)
BASE EXCESS BLDA CALC-SCNC: 0.7 MMOL/L (ref -9–1.8)
BASE EXCESS BLDV CALC-SCNC: 2.2 MMOL/L (ref -7.7–1.9)
BASE EXCESS BLDV CALC-SCNC: 5.4 MMOL/L (ref -7.7–1.9)
BILIRUB SERPL-MCNC: 0.4 MG/DL
BUN SERPL-MCNC: 49.2 MG/DL (ref 8–23)
CALCIUM SERPL-MCNC: 7.5 MG/DL (ref 8.8–10.2)
CHLORIDE SERPL-SCNC: 101 MMOL/L (ref 98–107)
CREAT SERPL-MCNC: 4.63 MG/DL (ref 0.51–1.17)
DEPRECATED HCO3 PLAS-SCNC: 21 MMOL/L (ref 22–29)
EGFRCR SERPLBLD CKD-EPI 2021: 13 ML/MIN/1.73M2
ERYTHROCYTE [DISTWIDTH] IN BLOOD BY AUTOMATED COUNT: 15.8 % (ref 10–15)
FLUAV RNA SPEC QL NAA+PROBE: NEGATIVE
FLUBV RNA RESP QL NAA+PROBE: NEGATIVE
GLUCOSE BLDC GLUCOMTR-MCNC: 100 MG/DL (ref 70–99)
GLUCOSE SERPL-MCNC: 94 MG/DL (ref 70–99)
HCO3 BLD-SCNC: 24 MMOL/L (ref 21–28)
HCO3 BLDV-SCNC: 26 MMOL/L (ref 21–28)
HCO3 BLDV-SCNC: 29 MMOL/L (ref 21–28)
HCT VFR BLD AUTO: 22.9 % (ref 35–53)
HGB BLD-MCNC: 7.6 G/DL (ref 11.7–17.7)
LACTATE SERPL-SCNC: 0.9 MMOL/L (ref 0.7–2)
MAGNESIUM SERPL-MCNC: 1.8 MG/DL (ref 1.7–2.3)
MCH RBC QN AUTO: 32.1 PG (ref 26.5–33)
MCHC RBC AUTO-ENTMCNC: 33.2 G/DL (ref 31.5–36.5)
MCV RBC AUTO: 97 FL (ref 78–100)
MRSA DNA SPEC QL NAA+PROBE: NEGATIVE
NT-PROBNP SERPL-MCNC: ABNORMAL PG/ML (ref 0–900)
O2/TOTAL GAS SETTING VFR VENT: 30 %
O2/TOTAL GAS SETTING VFR VENT: 30 %
O2/TOTAL GAS SETTING VFR VENT: 8 %
OXYHGB MFR BLD: 97 % (ref 92–100)
OXYHGB MFR BLDV: 76 % (ref 70–75)
PCO2 BLD: 32 MM HG (ref 35–45)
PCO2 BLDV: 35 MM HG (ref 40–50)
PCO2 BLDV: 40 MM HG (ref 40–50)
PH BLD: 7.49 [PH] (ref 7.35–7.45)
PH BLDV: 7.48 [PH] (ref 7.32–7.43)
PH BLDV: 7.48 [PH] (ref 7.32–7.43)
PHOSPHATE SERPL-MCNC: 3.5 MG/DL (ref 2.5–4.5)
PLATELET # BLD AUTO: 157 10E3/UL (ref 150–450)
PO2 BLD: 115 MM HG (ref 80–105)
PO2 BLDV: 27 MM HG (ref 25–47)
PO2 BLDV: 41 MM HG (ref 25–47)
POTASSIUM SERPL-SCNC: 3.9 MMOL/L (ref 3.4–5.3)
PROCALCITONIN SERPL IA-MCNC: 0.56 NG/ML
PROT SERPL-MCNC: 5.4 G/DL (ref 6.4–8.3)
RBC # BLD AUTO: 2.37 10E6/UL (ref 3.8–5.9)
RSV RNA SPEC NAA+PROBE: NEGATIVE
SA TARGET DNA: NEGATIVE
SARS-COV-2 RNA RESP QL NAA+PROBE: NEGATIVE
SODIUM SERPL-SCNC: 138 MMOL/L (ref 135–145)
URATE SERPL-MCNC: 5.3 MG/DL (ref 2.4–7)
WBC # BLD AUTO: 8.8 10E3/UL (ref 4–11)

## 2023-10-06 PROCEDURE — 250N000011 HC RX IP 250 OP 636: Performed by: INTERNAL MEDICINE

## 2023-10-06 PROCEDURE — 87637 SARSCOV2&INF A&B&RSV AMP PRB: CPT

## 2023-10-06 PROCEDURE — 83605 ASSAY OF LACTIC ACID: CPT

## 2023-10-06 PROCEDURE — 80053 COMPREHEN METABOLIC PANEL: CPT | Performed by: INTERNAL MEDICINE

## 2023-10-06 PROCEDURE — 36600 WITHDRAWAL OF ARTERIAL BLOOD: CPT

## 2023-10-06 PROCEDURE — 84145 PROCALCITONIN (PCT): CPT

## 2023-10-06 PROCEDURE — 94660 CPAP INITIATION&MGMT: CPT

## 2023-10-06 PROCEDURE — 99207 PR NO BILLABLE SERVICE THIS VISIT: CPT | Performed by: HOSPITALIST

## 2023-10-06 PROCEDURE — 258N000003 HC RX IP 258 OP 636: Performed by: INTERNAL MEDICINE

## 2023-10-06 PROCEDURE — 83880 ASSAY OF NATRIURETIC PEPTIDE: CPT

## 2023-10-06 PROCEDURE — 999N000157 HC STATISTIC RCP TIME EA 10 MIN

## 2023-10-06 PROCEDURE — C9113 INJ PANTOPRAZOLE SODIUM, VIA: HCPCS | Mod: JZ | Performed by: HOSPITALIST

## 2023-10-06 PROCEDURE — 90937 HEMODIALYSIS REPEATED EVAL: CPT

## 2023-10-06 PROCEDURE — 210N000001 HC R&B IMCU HEART CARE

## 2023-10-06 PROCEDURE — 250N000011 HC RX IP 250 OP 636: Performed by: HOSPITALIST

## 2023-10-06 PROCEDURE — 250N000013 HC RX MED GY IP 250 OP 250 PS 637: Performed by: INTERNAL MEDICINE

## 2023-10-06 PROCEDURE — 87640 STAPH A DNA AMP PROBE: CPT

## 2023-10-06 PROCEDURE — 36415 COLL VENOUS BLD VENIPUNCTURE: CPT | Performed by: HOSPITALIST

## 2023-10-06 PROCEDURE — 71045 X-RAY EXAM CHEST 1 VIEW: CPT

## 2023-10-06 PROCEDURE — 73562 X-RAY EXAM OF KNEE 3: CPT | Mod: RT

## 2023-10-06 PROCEDURE — 250N000011 HC RX IP 250 OP 636

## 2023-10-06 PROCEDURE — 82805 BLOOD GASES W/O2 SATURATION: CPT | Performed by: NURSE PRACTITIONER

## 2023-10-06 PROCEDURE — 99207 PR SC NO CHARGE VISIT: CPT | Performed by: INTERNAL MEDICINE

## 2023-10-06 PROCEDURE — 82805 BLOOD GASES W/O2 SATURATION: CPT

## 2023-10-06 PROCEDURE — 87040 BLOOD CULTURE FOR BACTERIA: CPT

## 2023-10-06 PROCEDURE — 84550 ASSAY OF BLOOD/URIC ACID: CPT | Performed by: HOSPITALIST

## 2023-10-06 PROCEDURE — 99233 SBSQ HOSP IP/OBS HIGH 50: CPT | Performed by: INTERNAL MEDICINE

## 2023-10-06 PROCEDURE — 634N000001 HC RX 634: Mod: JZ | Performed by: INTERNAL MEDICINE

## 2023-10-06 PROCEDURE — 82803 BLOOD GASES ANY COMBINATION: CPT

## 2023-10-06 PROCEDURE — 87040 BLOOD CULTURE FOR BACTERIA: CPT | Performed by: HOSPITALIST

## 2023-10-06 PROCEDURE — 250N000013 HC RX MED GY IP 250 OP 250 PS 637: Performed by: HOSPITALIST

## 2023-10-06 PROCEDURE — 99222 1ST HOSP IP/OBS MODERATE 55: CPT | Performed by: INTERNAL MEDICINE

## 2023-10-06 PROCEDURE — 250N000009 HC RX 250

## 2023-10-06 PROCEDURE — 258N000003 HC RX IP 258 OP 636: Performed by: HOSPITALIST

## 2023-10-06 PROCEDURE — 250N000011 HC RX IP 250 OP 636: Mod: JZ | Performed by: HOSPITALIST

## 2023-10-06 PROCEDURE — 84100 ASSAY OF PHOSPHORUS: CPT | Performed by: INTERNAL MEDICINE

## 2023-10-06 PROCEDURE — 85027 COMPLETE CBC AUTOMATED: CPT

## 2023-10-06 PROCEDURE — 83735 ASSAY OF MAGNESIUM: CPT | Performed by: INTERNAL MEDICINE

## 2023-10-06 PROCEDURE — 94640 AIRWAY INHALATION TREATMENT: CPT

## 2023-10-06 PROCEDURE — 5A09357 ASSISTANCE WITH RESPIRATORY VENTILATION, LESS THAN 24 CONSECUTIVE HOURS, CONTINUOUS POSITIVE AIRWAY PRESSURE: ICD-10-PCS

## 2023-10-06 PROCEDURE — 99291 CRITICAL CARE FIRST HOUR: CPT

## 2023-10-06 RX ORDER — FUROSEMIDE 10 MG/ML
40 INJECTION INTRAMUSCULAR; INTRAVENOUS EVERY 12 HOURS
Status: DISCONTINUED | OUTPATIENT
Start: 2023-10-06 | End: 2023-10-07

## 2023-10-06 RX ORDER — LIDOCAINE 4 G/G
1 PATCH TOPICAL
Status: DISCONTINUED | OUTPATIENT
Start: 2023-10-07 | End: 2023-10-13 | Stop reason: HOSPADM

## 2023-10-06 RX ORDER — CARBOXYMETHYLCELLULOSE SODIUM 5 MG/ML
1 SOLUTION/ DROPS OPHTHALMIC
Status: DISCONTINUED | OUTPATIENT
Start: 2023-10-06 | End: 2023-10-13 | Stop reason: HOSPADM

## 2023-10-06 RX ORDER — FUROSEMIDE 10 MG/ML
20 INJECTION INTRAMUSCULAR; INTRAVENOUS DAILY
Status: DISCONTINUED | OUTPATIENT
Start: 2023-10-06 | End: 2023-10-06

## 2023-10-06 RX ORDER — IPRATROPIUM BROMIDE AND ALBUTEROL SULFATE 2.5; .5 MG/3ML; MG/3ML
3 SOLUTION RESPIRATORY (INHALATION) 4 TIMES DAILY PRN
Status: DISCONTINUED | OUTPATIENT
Start: 2023-10-06 | End: 2023-10-13 | Stop reason: HOSPADM

## 2023-10-06 RX ORDER — NITROGLYCERIN 0.4 MG/1
0.4 TABLET SUBLINGUAL EVERY 5 MIN PRN
Status: DISCONTINUED | OUTPATIENT
Start: 2023-10-06 | End: 2023-10-13 | Stop reason: HOSPADM

## 2023-10-06 RX ORDER — LIDOCAINE HYDROCHLORIDE 20 MG/ML
JELLY TOPICAL ONCE
Status: COMPLETED | OUTPATIENT
Start: 2023-10-06 | End: 2023-10-06

## 2023-10-06 RX ORDER — PANTOPRAZOLE SODIUM 40 MG/1
40 TABLET, DELAYED RELEASE ORAL
Status: DISCONTINUED | OUTPATIENT
Start: 2023-10-06 | End: 2023-10-06

## 2023-10-06 RX ORDER — LIDOCAINE 40 MG/G
CREAM TOPICAL
Status: DISCONTINUED | OUTPATIENT
Start: 2023-10-06 | End: 2023-10-13 | Stop reason: HOSPADM

## 2023-10-06 RX ORDER — PIPERACILLIN SODIUM, TAZOBACTAM SODIUM 3; .375 G/15ML; G/15ML
3.38 INJECTION, POWDER, LYOPHILIZED, FOR SOLUTION INTRAVENOUS EVERY 6 HOURS
Status: DISCONTINUED | OUTPATIENT
Start: 2023-10-06 | End: 2023-10-06

## 2023-10-06 RX ORDER — ATORVASTATIN CALCIUM 40 MG/1
40 TABLET, FILM COATED ORAL EVERY EVENING
Status: DISCONTINUED | OUTPATIENT
Start: 2023-10-06 | End: 2023-10-13 | Stop reason: HOSPADM

## 2023-10-06 RX ORDER — LORAZEPAM 2 MG/ML
0.5 INJECTION INTRAMUSCULAR ONCE
Status: COMPLETED | OUTPATIENT
Start: 2023-10-06 | End: 2023-10-06

## 2023-10-06 RX ADMIN — ATORVASTATIN CALCIUM 40 MG: 40 TABLET, FILM COATED ORAL at 21:04

## 2023-10-06 RX ADMIN — PANTOPRAZOLE SODIUM 40 MG: 40 INJECTION, POWDER, FOR SOLUTION INTRAVENOUS at 21:03

## 2023-10-06 RX ADMIN — PIPERACILLIN AND TAZOBACTAM 3.38 G: 3; .375 INJECTION, POWDER, FOR SOLUTION INTRAVENOUS at 06:57

## 2023-10-06 RX ADMIN — DICLOFENAC SODIUM 2 G: 10 GEL TOPICAL at 15:27

## 2023-10-06 RX ADMIN — Medication: at 13:33

## 2023-10-06 RX ADMIN — HEPARIN SODIUM 2200 UNITS: 1000 INJECTION INTRAVENOUS; SUBCUTANEOUS at 13:34

## 2023-10-06 RX ADMIN — LORAZEPAM 0.5 MG: 2 INJECTION INTRAMUSCULAR; INTRAVENOUS at 05:31

## 2023-10-06 RX ADMIN — SODIUM CHLORIDE 300 ML: 9 INJECTION, SOLUTION INTRAVENOUS at 13:29

## 2023-10-06 RX ADMIN — DICLOFENAC SODIUM 2 G: 10 GEL TOPICAL at 08:54

## 2023-10-06 RX ADMIN — IPRATROPIUM BROMIDE AND ALBUTEROL SULFATE 3 ML: .5; 3 SOLUTION RESPIRATORY (INHALATION) at 05:24

## 2023-10-06 RX ADMIN — HYDRALAZINE HYDROCHLORIDE 10 MG: 10 TABLET ORAL at 21:04

## 2023-10-06 RX ADMIN — SODIUM CHLORIDE 250 ML: 9 INJECTION, SOLUTION INTRAVENOUS at 13:29

## 2023-10-06 RX ADMIN — EPOETIN ALFA-EPBX 10000 UNITS: 10000 INJECTION, SOLUTION INTRAVENOUS; SUBCUTANEOUS at 13:31

## 2023-10-06 RX ADMIN — VANCOMYCIN HYDROCHLORIDE 1250 MG: 10 INJECTION, POWDER, LYOPHILIZED, FOR SOLUTION INTRAVENOUS at 09:35

## 2023-10-06 RX ADMIN — DICLOFENAC SODIUM 2 G: 10 GEL TOPICAL at 18:26

## 2023-10-06 RX ADMIN — DICLOFENAC SODIUM 2 G: 10 GEL TOPICAL at 21:55

## 2023-10-06 ASSESSMENT — ACTIVITIES OF DAILY LIVING (ADL)
ADLS_ACUITY_SCORE: 25
CHANGE_IN_FUNCTIONAL_STATUS_SINCE_ONSET_OF_CURRENT_ILLNESS/INJURY: YES
DIFFICULTY_EATING/SWALLOWING: NO
ADLS_ACUITY_SCORE: 36
ADLS_ACUITY_SCORE: 37
ADLS_ACUITY_SCORE: 25
VISION_MANAGEMENT: GLASSES
ADLS_ACUITY_SCORE: 36
CONCENTRATING,_REMEMBERING_OR_MAKING_DECISIONS_DIFFICULTY: NO
ADLS_ACUITY_SCORE: 36
DIFFICULTY_COMMUNICATING: NO
WEAR_GLASSES_OR_BLIND: YES
ADLS_ACUITY_SCORE: 36
EQUIPMENT_CURRENTLY_USED_AT_HOME: CANE, STRAIGHT
DOING_ERRANDS_INDEPENDENTLY_DIFFICULTY: NO
ADLS_ACUITY_SCORE: 36
ADLS_ACUITY_SCORE: 25
TOILETING_ISSUES: NO
DRESSING/BATHING_DIFFICULTY: NO
ADLS_ACUITY_SCORE: 31
WALKING_OR_CLIMBING_STAIRS_DIFFICULTY: NO

## 2023-10-06 NOTE — PROGRESS NOTES
CPAP/BiPAP Settings  IPAP: 10  EPAP: 5  Rate: 10  FiO2: 30  Timed Inspiration (sec): .9      CPAP/BiPAP/AVAPS/AVAPS AE Patient Assessment  Skin Assessment: Bridge of nose blanchable redness. Has skin barrier in place.   Barriers Applied: In place on bridge of nose. Mask changed to under the nose style @ 1500 today.     Plan:  Patient was an RRT from around 0500 today. Placed on BiPAP for SOB. ABG obtained after placing on BiPAP. Transported to dialysis this shift. RT to continue to follow and wean.

## 2023-10-06 NOTE — PROGRESS NOTES
House NP note    Informed of pt by night house NP  On chart review it is my opinion that pt has 3 organ systems that are dysfunctional:  Adam/ckd  Acute resp failure now on NIPPV  HFrEF    Subjective: Denies dyspnea, chest pain, abdominal pain    Objective: Vital signs heart rate 101, /62, respiratory rate anywhere from 32-40 at times as low as the mid 20s on 10/5, 0.3 FiO2, tidal volume ranging from 500s-1000's, minute ventilation ranging from 12-30 L/min, SPO2 100%  General:  adult pt lying in bed without acute distress  Neuro: +follows commands wiggle toes and show 2 fingers bilat, +verbal response  Eyes defer  Head, ENT & mouth: defer  Neck: supple  CV S1S2  resp: Few wheezes on the right, otherwise coarse lung sounds bilateral upper   gi:normoactive bowel sounds, soft, nontender, nondisteded  Ext: Trace bilateral lower extremity edema no mottling  Skin: no rashes on exposed skin  Lymph: defer  Musculoskeletal no bony joint deformities    Multiorgan function  -will transfer to higher level of care on CCU/[facility] status  -Discussed with silva hospitalist attending physician   Bedside RNs night shift and dayshift    No charge    RODDY Fagan CNP  Hospitalist Service  [facility]  Securely message with [employer] (more info)  Text page via [employer] Paging/Directory

## 2023-10-06 NOTE — PLAN OF CARE
Goal Outcome Evaluation:      Plan of Care Reviewed With: patient, spouse        Pt arrived to unit at 1500 from dialysis. Per dialysis RN report, 2.8L off, pt tolerated well. A&O x 4. BP soft upon arrival to unit, O2 stable on current BiPAP settings. Currently NPO while on BiPAP. Culp patent with adequate output. Reports pain to bilateral knees and right hip, voltaren gel effective per pt report. Xray of R knee this evening. Currently on 8L oxymask, RT recommending VBG and to replace BiPAP at HS. Plan of care ongoing.

## 2023-10-06 NOTE — PROGRESS NOTES
CLINICAL NUTRITION SERVICES - REASSESSMENT NOTE      Malnutrition: (10/1)  % Weight Loss:  Up to 20% in 1 year (moderate malnutrition)  % Intake:  </= 50% for >/= 5 days (severe malnutrition)  Subcutaneous Fat Loss:  Orbital region mild-moderate depletion and Upper arm region mild-moderate depletion depletion  Muscle Loss:  Temporal region mild-moderate depletion depletion and Clavicle bone region mild-moderate depletion depletion  Fluid Retention:  Trace      Malnutrition Diagnosis: Moderate malnutrition  In Context of:  Acute on Chronic illness or disease       EVALUATION OF PROGRESS TOWARD GOALS   Diet:  NPO, downgraded this afternoon     Intake/Tolerance:    Chart reviewed   Pt appears to have been eating 100% of his meals TID for the past 6 days - this is an improvement from earlier on in admission and PTA  Did not visit pt today d/t recent change to medical condition     ASSESSED NUTRITION NEEDS:  Dosing Weight 81 kg   Estimated Energy Needs: 8037-7743 kcals (25-30 Kcal/Kg)  Justification: maintenance  Estimated Protein Needs: 81-97 grams protein (1-1.2 g pro/Kg)  Justification: CKD and preservation of lean body mass  Estimated Fluid Needs: per MD pending fluid status        NEW FINDINGS:   Chart reviewed   RRT early this morning, now on BIPAP for hypoxic respiratory failure   EGD planned for today but now deferred   HD run today     Suspect weight entered incorrectly - pt likely 194 lbs not 194 kg. Pt on lasix   Vitals:    10/03/23 0857 10/04/23 0347 10/05/23 0615 10/05/23 0924   Weight: 87.5 kg (192 lb 14.4 oz) 91 kg (200 lb 9.9 oz) (!) 192 kg (423 lb 4.5 oz) 87.6 kg (193 lb 2 oz)    10/06/23 0122   Weight: (!) 194.2 kg (428 lb 2.1 oz)       Previous Goals:   Patient will consume >/=50% nutritionally adequate meals TID on average   Evaluation: Met, now changed    Previous Nutrition Diagnosis:   Inadequate oral intake related to decreased appetite, prior N/V as evidenced by intakes </=25% for the past 1+ week    Evaluation: No change      CURRENT NUTRITION DIAGNOSIS  Inadequate protein-energy intake related to recent change to NPO with worsening respiratory status as evidenced by day 1 w/o PO    INTERVENTIONS  Recommendations / Nutrition Prescription  ADAT    Implementation  None today     Goals  Diet adv and tolerance >FL vs nutrition support within 72 hrs       MONITORING AND EVALUATION:  Progress towards goals will be monitored and evaluated per protocol and Practice Guidelines      Lucero Mireles RD, LD  Clinical Dietitian   [facility], Gen Surg, Ortho Spine  Pager [phone]

## 2023-10-06 NOTE — PROGRESS NOTES
Inpatient Dialysis Progress Note            Assessment and Plan:     RICHARD:  Creatinine prior to admission ranging between 1.8-3.3 over the course of 2022, no recent labs.      Creatinine on admission 14.61 in the setting of encephalopathy, recent NSAID use, sepsis, UTI, and obstructive uropathy.  Most likely significant prerenal RICHARD, probable ATN at this point.  He did undergo dialysis at [facility] primarily for acidosis and severe hyperkalemia causing EKG changes.       K up Wed 10/4 so had an HD treatment.     Now appears volume up overnight with reduced urine output.    Not sure if this is due to obstruction with reduced urine output.    He is fragile with RICHARD on top of CKD, HFrEF.    I will dialyze again today for volume removal.  Keep line in.       Hyperkalemia:    Worse 10/4.  Better after HD.  Unclear cause. GI bleed can do this if upper.       Severe anion gap metabolic acidosis  Lactic acidosis, resolved  Starvation ketoacidosis  Better but bicarb 21.       Obstructive uropathy  Moderate bilateral hydroureteronephrosis  Pyelonephritis  Hypospadias  History of urinary retention  Patient has history of urinary retention and UTIs in the past.  CT with bilateral hydroureteronephrosis and bladder wall thickening consistent with cystitis.  Guido placed by urology, initially yellow urine followed by milky cloudy urine consistent with UTI/pyelo-.   Trial of voiding not successful.       Severe diarrhea  Colitis, possibly infectious     Acute on chronic anemia  Hemoglobin 5.7 yesterday.  GI bleed ? Transfused 1 unit.  He was to have EGD today but this may be delayed in light of other events.       Cardiomyopathy with reduced EF     Plan:     HD today.  Still needs EGD at some point.    Keep guido in.  Keep HD line in.       Following.               Interval History:     Trial of voiding yesterday.  It appears unsuccessful as he needed straight cath.  His urine output had been ~2500 mL per day but yesterday was  down to only 950.  Then 550 since midnight.    He also developed hypoxic resp failure overnight.  CXR looks like volume overload.  COVID, influenza, RSV  all neg.  He has had cultures and is on antibacterials.    He feels OK on BIPAP.  Less SOB.  He will be transferring to CCU.         Dialysis Parameters:     Wt Readings from Last 4 Encounters:   10/06/23 (!) 194.2 kg (428 lb 2.1 oz)   09/27/23 90.7 kg (200 lb)   05/27/22 104 kg (229 lb 3.2 oz)   02/05/22 106.5 kg (234 lb 12.8 oz)     I/O last 3 completed shifts:  In: 480 [P.O.:480]  Out: 800 [Urine:800]  BP Readings from Last 3 Encounters:   10/06/23 101/61   09/27/23 122/57   05/27/22 131/70       Routine, ONE TIME, Starting today For 1 Occurrences  Weight Loss (kg): 3  Dialysis Temp: 36.5  C  Access Device: CVC  Access Site: R internal jugular temp line  Dialyzer: Revaclear  Dialysis Bath: K 3  Blood Flow Rate (mL/min): 300  Total Treatment Time (hrs): 3  Heparin: no         Medications and Allergies:   Reviewed in EPIC     - MEDICATION INSTRUCTIONS for Dialysis Patients -   Does not apply See Admin Instructions    aspirin  81 mg Oral Daily    diclofenac  2 g Topical 4x Daily    epoetin corbin-epbx  10,000 Units Intravenous Once in dialysis/CRRT    furosemide  20 mg Intravenous Daily    sodium chloride (PF) 0.9%  10 mL Intracatheter Once in dialysis/CRRT    Followed by    heparin  1.3-2.6 mL Intracatheter Once in dialysis/CRRT    sodium chloride (PF) 0.9%  10 mL Intracatheter Once in dialysis/CRRT    Followed by    heparin  1.3-2.6 mL Intracatheter Once in dialysis/CRRT    [Held by provider] heparin ANTICOAGULANT  5,000 Units Subcutaneous Q12H    hydrALAZINE  10 mg Oral TID    metoprolol succinate ER  25 mg Oral Daily    multivitamin RENAL  1 mg Oral Daily    - MEDICATION INSTRUCTIONS -   Does not apply Once    pantoprazole  40 mg Oral BID AC    piperacillin-tazobactam  3.375 g Intravenous Q6H    sodium chloride (PF)  10-40 mL Intracatheter Q8H    sodium chloride  (PF)  3 mL Intracatheter Q8H    sodium chloride (PF)  9 mL Intracatheter During Dialysis/CRRT (from stock)    sodium chloride (PF)  9 mL Intracatheter During Dialysis/CRRT (from stock)    sodium chloride 0.9%  250 mL Intravenous Once in dialysis/CRRT    sodium chloride 0.9%  300 mL Hemodialysis Machine Once    vancomycin  1,250 mg Intravenous Q48H     acetaminophen **OR** acetaminophen, alteplase, alteplase, alteplase, alteplase, artificial tears, glucose **OR** dextrose **OR** glucagon, ipratropium - albuterol 0.5 mg/2.5 mg/3 mL, lidocaine 4%, lidocaine (buffered or not buffered), nitroGLYcerin, - MEDICATION INSTRUCTIONS -, ondansetron, - MEDICATION INSTRUCTIONS -, senna-docusate **OR** senna-docusate, sodium chloride (PF), sodium chloride (PF), sodium chloride (PF), sodium chloride (PF), sodium chloride 0.9%, sodium chloride 0.9%     Allergies   Allergen Reactions    Ciprofloxacin Rash    Other Drug Allergy (See Comments)      Cough Syrup Abstracted from [facility] Chart( Hydrobromide)              Labs:     BMP  Recent Labs   Lab 10/06/23  0519 10/06/23  0430 10/05/23  1422 10/05/23  0650 10/04/23  1809 10/04/23  0827 10/04/23  0652 10/03/23  0444     --   --  140  --   --  136 137   POTASSIUM 3.9  --  3.7 3.4 3.4  --  6.6* 3.8   CHLORIDE 101  --   --  103  --   --  102 104   CHANA 7.5*  --   --  7.5*  --   --  5.4* 7.0*   CO2 21*  --   --  25  --   --  23 15*   BUN 49.2*  --   --  43.2*  --   --  64.3* 74.6*   CR 4.63*  --   --  3.93*  --   --  4.69* 5.62*   GLC 94 100*  --  98  --  96 81 99     CBC  Recent Labs   Lab 10/06/23  0519 10/05/23  0650 10/04/23  1809 10/04/23  0652 10/03/23  0444   WBC 8.8 7.8  --  7.1 9.6   HGB 7.6* 7.6* 8.5* 5.7* 7.3*   HCT 22.9* 23.5*  --  17.4* 21.7*   MCV 97 96  --  94 95    142*  --  100* 120*     Lab Results   Component Value Date    AST 36 10/06/2023    ALT 57 10/06/2023    ALKPHOS 54 10/06/2023    BILITOTAL 0.4 10/06/2023            Physical Exam:   Vitals were  reviewed in [facility]    Wt Readings from Last 3 Encounters:   10/06/23 (!) 194.2 kg (428 lb 2.1 oz)   09/27/23 90.7 kg (200 lb)   05/27/22 104 kg (229 lb 3.2 oz)       Intake/Output Summary (Last 24 hours) at 10/6/2023 1104  Last data filed at 10/6/2023 0500  Gross per 24 hour   Intake 240 ml   Output 800 ml   Net -560 ml       GENERAL APPEARANCE: He is resting with BIPAP. He can speak in full sentences.    HEENT:  Eyes/ears/nose grossly normal, neck supple  RESP: lungs clear at present  CV: regular rate and rhythm, normal S1 S2, no murmur, click or rub   ABDOMEN: soft, nontender, bowel sounds normal  EXTREMITIES/SKIN: tr edema, no rashes or lesions     Pt seen on dialysis.  Stable run.  Good BFR.      Attestation:  I have reviewed today's vital signs, notes, medications, labs and imaging.     Oz Higgins MD  [facility] - Nephrology  [phone]

## 2023-10-06 NOTE — CODE/RAPID RESPONSE
[facility]  House [facility] RRT Note  10/6/2023   Time called: 0430  RRT called for: Shortness of breath, tachypnea  Code status: Full Code    Assessment & Plan   Mr. Fleming is a 73-year-old gentleman with a past medical history of hypertension, coronary artery disease, CKD stage III who initially presented to an outside hospital for shortness of breath and poor p.o. intake with some nausea and vomiting.  He was found to have a potassium of 7 and a creatinine of 14.6and a pH of 6.98..  He was initially given albuterol, 1 g of calcium gluconate and insulin for shifting for his hyperkalemia.  Nephrology was consulted and he was initiated on an emergent run of hemodialysis at the outside hospital.  Due to very difficult Culp catheter placement a urology consult was placed and a CT abdomen and pelvis discovered obstructive uropathy with bilateral hydronephrosis.  The CT also displayed colitis with bladder wall thickening.  Urine analysis was positive for infection and he was started on broad-spectrum antibiotics with vancomycin and Zosyn, now having completed 6 days of this regimen.  He was transferred to [facility] on 9/27/2023 for ongoing care of worsening renal failure, electrolyte abnormalities and a urinary tract infection causing sepsis.     I was paged to the bedside to evaluate   Bret Fleming for an acute episode of dyspnea and tachypnea that started suddenly and without precipitating factors.    Upon my arrival the patient was side-lying on his right side and appeared calm but patient's respiratory rate 35 breaths per minute. Other vitals stable and patient was on room air with oxygen saturation of 96%. Blood pressure slightly soft but seemingly inconsequential. HR was 104 and this appeared to be near his baseline for the hospitalization. Patient was warm and dry with 2+ pulses in all extremities. Patient's lung sounds were clear, heart tones normal. Abdomen was soft, mildly  tender. Patient was able to move all extremities.    Patient endorsed dyspnea stating that he felt like something was pushing on his diaphragm, preventing a full breath. Patient could not complete a full sentence without becoming very dyspnic and having to take a break from speaking in order to breathe. Patient denied chest pain, denied numbness or tingling, denied fever or chills. Patient was repositioned and lying supine helped his dyspnea slightly but his respiratory rate remained ~40 bpm. Patient was bladder scanned showing ~450ml of urine, straight catheterization performed but without change in respirations. Patient was started on BiPAP and pulled good volumes. Lung sounds were clear, upper airway without wheezes but did sound a little torturous so I ordered a duoneb. Patient endorsed an improvement in breathing 10 minutes after BiPAP was placed but his respiratory rate remained in the 30s. Duoneb was given with some improvement. CXR showed interval worsening of bilateral pulmonary infiltrates, this is after completing 6 days of zosyn and vancomycin. This raises the suspicion of viral illness but until viral panel is back, I will restart broad spectrum antibiotics with zosyn and vancomycin. VBG reflects hyperventilation, I ordered 0.5mg IV lorazepam for anxiety but without much change in respiratory rate.    BP 95/52 (BP Location: Left arm, Patient Position: Right side, Cuff Size: Adult Regular)   Pulse 104   Temp 97.6  F (36.4  C) (Oral)   Resp (!) 32   Ht 1.829 m (6')   Wt (!) 194.2 kg (428 lb 2.1 oz)   SpO2 99%   BMI 58.07 kg/m        Diagnosis:  -- Community acquired pneumonia: ?viral versus bacterial  -- Bilateral lobe infiltrates  Patient completed 6 days of broad spectrum antibiotics with zosyn and vancomycin but now has interval increase in infiltrates since CXR from ED.   -- Respiratory distress without hypoxia  Respiratory rate of 35-40 and patient endorsed dyspnea. Dyspnea resolved with BiPAP  but rate continued to be in the 30s.     Ddx: Considered PE, patient denied chest pain, no hemodynamic changes and he is oxygenating well even on room air. Would consider PE study in the absence of other evidence.    Plan:  -- CXR  -- BiPAP  -- Duoneb  -- Labs   *CBC   *VBG   *BMP   *Lactic acid   *BNP  -- Influenza A and B antigen testing  -- Covid 19 testing  -- MRSA/MSSA testing  -- Restart zosyn and vancomycin  -- Lorazepam 0.5mg IV once    At the conclusion of this RRT patient was hemodynamically stable and will remain on current unit.    0600 Addendum:  I checked on patient and he was sleeping with RR of 16 and minute ventilation of 11. He appeared comfortable on BiPAP and I suspect he can remove this when he wakes up.    Shilpi Fleming's history is significant for:  Past Medical History:   Diagnosis Date    Hypertension      Past Surgical History:   Procedure Laterality Date    IR CVC TUNNEL PLACEMENT > 5 YRS OF AGE  9/27/2023    PICC TRIPLE LUMEN PLACEMENT  9/27/2023       Review of Systems   The 10 point Review of Systems is negative other than noted in the HPI or here.     Allergies   Allergies   Allergen Reactions    Ciprofloxacin Rash    Other Drug Allergy (See Comments)      Cough Syrup Abstracted from [facility] Chart( Hydrobromide)       Physical Exam   Physical Exam  Constitutional:       General: Shilpi Fleming is in acute distress.   HENT:      Head: Normocephalic.      Mouth/Throat:      Mouth: Mucous membranes are moist.   Eyes:      Pupils: Pupils are equal, round, and reactive to light.   Cardiovascular:      Rate and Rhythm: Regular rhythm. Tachycardia present.   Pulmonary:      Effort: Pulmonary effort is normal.   Abdominal:      General: Abdomen is flat.      Palpations: Abdomen is soft.   Musculoskeletal:      Cervical back: Normal range of motion.      Right lower leg: No edema.      Left lower leg: No edema.   Skin:     General: Skin is warm and dry.      Capillary Refill: Capillary  "refill takes less than 2 seconds.      Coloration: Skin is pale.   Neurological:      General: No focal deficit present.      Mental Status: Shilpi Fleming is alert and oriented to person, place, and time.   Psychiatric:         Mood and Affect: Mood is anxious.         Vital Signs with Ranges:  Temp:  [96.9  F (36.1  C)-98.4  F (36.9  C)] 97.6  F (36.4  C)  Pulse:  [104-111] 104  Resp:  [16-32] 32  BP: ()/(52-78) 95/52  SpO2:  [95 %-99 %] 99 %  I/O last 3 completed shifts:  In: 480 [P.O.:480]  Out: 825 [Urine:825]    Data   Results for orders placed or performed during the hospital encounter of 09/27/23 (from the past 24 hour(s))   Comprehensive metabolic panel   Result Value Ref Range    Sodium 140 135 - 145 mmol/L    Potassium 3.4 3.4 - 5.3 mmol/L    Carbon Dioxide (CO2) 25 22 - 29 mmol/L    Anion Gap 12 7 - 15 mmol/L    Urea Nitrogen 43.2 (H) 8.0 - 23.0 mg/dL    Creatinine 3.93 (H) 0.51 - 1.17 mg/dL    GFR Estimate 15 (L) >60 mL/min/1.73m2    Calcium 7.5 (L) 8.8 - 10.2 mg/dL    Chloride 103 98 - 107 mmol/L    Glucose 98 70 - 99 mg/dL    Alkaline Phosphatase 51 35 - 129 U/L    AST 44 0 - 45 U/L    ALT 61 0 - 70 U/L    Protein Total 5.3 (L) 6.4 - 8.3 g/dL    Albumin 2.8 (L) 3.5 - 5.2 g/dL    Bilirubin Total 0.4 <=1.2 mg/dL    Narrative    The generation of reference intervals for this test is currently based on binary male or female sex. If the electronic health record information indicates another gender identity or if Legal Sex is recorded as \"Unknown\", both male and female reference intervals are provided where applicable, and should be considered according to the individual's appropriate clinical context.   Magnesium   Result Value Ref Range    Magnesium 1.9 1.7 - 2.3 mg/dL   CBC with platelets   Result Value Ref Range    WBC Count 7.8 4.0 - 11.0 10e3/uL    RBC Count 2.46 (L) 3.80 - 5.90 10e6/uL    Hemoglobin 7.6 (L) 11.7 - 17.7 g/dL    Hematocrit 23.5 (L) 35.0 - 53.0 %    MCV 96 78 - 100 fL    MCH " "30.9 26.5 - 33.0 pg    MCHC 32.3 31.5 - 36.5 g/dL    RDW 15.2 (H) 10.0 - 15.0 %    Platelet Count 142 (L) 150 - 450 10e3/uL    Narrative    The generation of reference intervals for this test is currently based on binary male or female sex. If the electronic health record information indicates another gender identity or if Legal Sex is recorded as \"Unknown\", both male and female reference intervals are provided where applicable, and should be considered according to the individual's appropriate clinical context.   Phosphorus   Result Value Ref Range    Phosphorus 3.3 2.5 - 4.5 mg/dL   Potassium   Result Value Ref Range    Potassium 3.7 3.4 - 5.3 mmol/L   INR   Result Value Ref Range    INR 1.18 (H) 0.85 - 1.15   Glucose by meter   Result Value Ref Range    GLUCOSE BY METER POCT 100 (H) 70 - 99 mg/dL   XR Chest Port 1 View    Narrative    EXAM: XR CHEST PORT 1 VIEW  LOCATION: [facility]  DATE: 10/6/2023    INDICATION: Respiratory distress; acute hypoxic respiratory failure  COMPARISON: 09/28/2023.    FINDINGS: Right IJ infusion catheter. Right PICC. No pneumothorax. The heart size is normal. The thoracic aorta is calcified. There are increasing bilateral pulmonary infiltrates, right greater than left. Degenerative disease in the spine.      Impression    IMPRESSION: Worsening bilateral pulmonary infiltrates.   CBC with platelets   Result Value Ref Range    WBC Count 8.8 4.0 - 11.0 10e3/uL    RBC Count 2.37 (L) 3.80 - 5.90 10e6/uL    Hemoglobin 7.6 (L) 11.7 - 17.7 g/dL    Hematocrit 22.9 (L) 35.0 - 53.0 %    MCV 97 78 - 100 fL    MCH 32.1 26.5 - 33.0 pg    MCHC 33.2 31.5 - 36.5 g/dL    RDW 15.8 (H) 10.0 - 15.0 %    Platelet Count 157 150 - 450 10e3/uL    Narrative    The generation of reference intervals for this test is currently based on binary male or female sex. If the electronic health record information indicates another gender identity or if Legal Sex is recorded as \"Unknown\", both " male and female reference intervals are provided where applicable, and should be considered according to the individual's appropriate clinical context.   Blood gas venous   Result Value Ref Range    pH Venous 7.48 (H) 7.32 - 7.43    pCO2 Venous 35 (L) 40 - 50 mm Hg    pO2 Venous 27 25 - 47 mm Hg    Bicarbonate Venous 26 21 - 28 mmol/L    Base Excess/Deficit 2.2 (H) -7.7 - 1.9 mmol/L    FIO2 30    Lactic acid whole blood   Result Value Ref Range    Lactic Acid 0.9 0.7 - 2.0 mmol/L     COVID-19 PCR Results          9/27/2023    14:33 9/27/2023    17:41   COVID-19 PCR Results   SARS CoV2 PCR Negative  Negative      COVID-19 Antibody Results, Testing for Immunity           No data to display                Time Spent on this Encounter   I spent 45 minutes of critical care time on the unit/floor managing the care of Bret Fleming. Upon evaluation, this patient had a high probability of imminent or life-threatening deterioration due to respiratory distress, which required my direct attention, intervention, and personal management. 100% of my time was spent at the bedside counseling the patient and/or coordinating care regarding services listed in this note.    RODDY Guardado, CNP  Hospitalist - House [facility]  Text me on the [employer] viola for a textback  Text page with web based paging for a callback

## 2023-10-06 NOTE — PROGRESS NOTES
GI chart review:    Per anesthesia review, pt with dyspnea and new BIPAP need. EGD was planned, elective, for anemia without overt bleed evaluation. Hb stable. Given increased anesthesia risks with breathing status, I recommend deferring EGD today.   Continue to address renal failure and respiratory status. GI team will reassess on Monday for any further procedures, to be planned once pt's respiratory status more stable.    Joaquin Lynn MD  [facility]  Phone [phone]

## 2023-10-06 NOTE — PROGRESS NOTES
Trauma/Ortho/Medical (Choose one): Medical     Diagnosis: RICHARD/ ARF  POD# na  Mental Status: A/O a  Activity/dangle: 1 gb/walker  Diet: NPO   Pain: denied  Culp/Voiding:  BR  Tele/Restraints/Iso: R/O Covid  02/LDA: 2lpm, PICC RUE. Dialysis port RU chest.  D/C Date:   Other Info: RRT called for SOB see RRT notes. Peripheral blood culture order c/o lab Charge nurse called lab regarding this. Order to transfer to [facility].

## 2023-10-06 NOTE — PROGRESS NOTES
A&Ox4. VSS on BIPAP. Was straight cathed by previous shift hospitalist ordered guido cath be replaced. Writer attempted but pt appeared to be in distress and was difficult guido placement. Paged out to urology who came and replaced. Pain in BLE knees to calves, voltern gel has been effective for this. Report given to CCU nurse, Dialysis called and pt will be going to dialysis then to CCU, CCU aware. Pt sent down with flyer and RT.

## 2023-10-06 NOTE — PROGRESS NOTES
[facility]    Urology Progress Note  Date of Service: 10/06/2023      Interval History   \Patient transferring to cardiac intensive care unit today secondary to respiratory distress overnight.  Now on BiPAP.  Guido catheter was removed yesterday per nephrology for trial of void though patient has been unable to void.  Urology reconsulted today to assist in Guido catheter placement.  Creatinine: 3.9-4.6      Assessment & Plan   73 year old male with renal failure and electrolyte derangements, cystitis with bilateral hydroureteronephrosis on CT without evidence of ureteral obstruction. Urinary retention s/p guido placement 9/27 by Dr. Clayton (uncomplicated other than hypospadias) for 600 ml.      The catheter was removed on 10/5/2023 per nephrology for trial of void.  Patient has not been able to void and required a straight cath early this morning.  I was paged at this time to assist with Guido catheter reinsertion as it was difficult per nursing staff.    Procedure:  Patient's urethra/penis was prepped with betadine solution. Lidocaine lubrication with Urojet was inserted into the urethra and allowed to take effect. A 16 [place]i coude catheter was copiously lubricated then inserted and advanced without difficulty into the bladder. Light yellow colored urine returned.  The catheter balloon was then inflated with 10 of sterile water and pulled back where it seated well against the bladder wall.  The catheter was connected to gravity drainage.  Approximately 350 cc urine returned in total. The catheter was then secured to patient's thigh with stat-lock.  Patient appeared to tolerate procedure well.       Plan:  - Continue guido catheter for bladder decompression.   - Monitor creatinine, nephrology consultation.  Dialysis run per nephrology.  - Hydroureteronephrosis likely residual following recent guido placement and should improve with treatment of cystitis. If there is concern for poor  improvement of renal function, could repeat imaging.   - Continue antibiotics  - Maintain guido to drainage and trend I/O.  Could attempt repeat trial of void once patient is more medically stable.  - Recommend outpatient follow up with urology for PSA (once infection adequately treated) and PINA. AVS updated.    Physical Exam   Temp:  [96.9  F (36.1  C)-97.7  F (36.5  C)] 97.6  F (36.4  C)  Pulse:  [] 98  Resp:  [16-32] 18  BP: ()/(52-78) 101/61  SpO2:  [95 %-100 %] 100 %    Weights:   Vitals:    10/05/23 0615 10/05/23 0924 10/06/23 0122   Weight: (!) 192 kg (423 lb 4.5 oz) 87.6 kg (193 lb 2 oz) (!) 194.2 kg (428 lb 2.1 oz)    Body mass index is 58.07 kg/m .    Constitutional: Alert. Pleasant. No acute distress.   Respiratory: On BiPAP  Male  patient has severe hypospadias.  There is a sore on the shaft of his penis secondary to presumably Guido catheter irritation.  Skin: Warm and dry.    Data   Recent Labs   Lab 10/06/23  0519 10/06/23  0430 10/05/23  1450 10/05/23  1422 10/05/23  0650 10/04/23  1809 10/04/23  0827 10/04/23  0652   WBC 8.8  --   --   --  7.8  --   --  7.1   HGB 7.6*  --   --   --  7.6* 8.5*  --  5.7*   MCV 97  --   --   --  96  --   --  94     --   --   --  142*  --   --  100*   INR  --   --  1.18*  --   --   --   --   --      --   --   --  140  --   --  136   POTASSIUM 3.9  --   --  3.7 3.4 3.4  --  6.6*   CHLORIDE 101  --   --   --  103  --   --  102   CO2 21*  --   --   --  25  --   --  23   BUN 49.2*  --   --   --  43.2*  --   --  64.3*   CR 4.63*  --   --   --  3.93*  --   --  4.69*   ANIONGAP 16*  --   --   --  12  --   --  11   CHANA 7.5*  --   --   --  7.5*  --   --  5.4*   GLC 94 100*  --   --  98  --    < > 81   ALBUMIN 3.0*  --   --   --  2.8*  --   --  2.3*   PROTTOTAL 5.4*  --   --   --  5.3*  --   --  4.1*   BILITOTAL 0.4  --   --   --  0.4  --   --  0.2   ALKPHOS 54  --   --   --  51  --   --  42   ALT 57  --   --   --  61  --   --  59   AST 36  --   --   --   44  --   --  46*    < > = values in this interval not displayed.       Recent Labs   Lab 10/06/23  0519 10/06/23  0430 10/05/23  0650 10/04/23  0827 10/04/23  0652 10/03/23  0444   GLC 94 100* 98 96 81 99        Unresulted Labs Ordered in the Past 30 Days of this Admission       Date and Time Order Name Status Description    10/6/2023  8:08 AM Blood Culture Hand, Left In process     10/4/2023  7:57 AM Prepare red blood cells (unit) Preliminary     10/4/2023  7:30 AM CONDITIONAL Prepare red blood cells (unit) Preliminary              Imaging  Recent Results (from the past 24 hour(s))   XR Chest Port 1 View    Narrative    EXAM: XR CHEST PORT 1 VIEW  LOCATION: [facility]  DATE: 10/6/2023    INDICATION: Respiratory distress; acute hypoxic respiratory failure  COMPARISON: 09/28/2023.    FINDINGS: Right IJ infusion catheter. Right PICC. No pneumothorax. The heart size is normal. The thoracic aorta is calcified. There are increasing bilateral pulmonary infiltrates, right greater than left. Degenerative disease in the spine.      Impression    IMPRESSION: Worsening bilateral pulmonary infiltrates.        Kika Cruz PA-C on 10/6/2023 at 10:14 AM   [facility]

## 2023-10-06 NOTE — CONSULTS
[facility]  Cardiology Progress Note  Date of Service: 10/06/2023  Primary Cardiologist: will be Dr. Martinez    Assessment & Plan    Bret Fleming is a 73 year old adult admitted on 9/27/2023.    Assessment:  Newly diagnosed cardiomyopathy with moderate to severe reduced LV systolic function with LVEF of 30 to 35%.  Large area of hypokinesis to akinesis noted in distal two third of the LV that could represent a large LAD infarct.  Given acute on chronic kidney disease, recommendation was made to initiate medical therapy, planning for outpatient ischemic evaluation should patient's kidney function normalize or require permanent dialysis.  Flash pulmonary edema resulting in RRT this morning.  Underwent emergent dialysis with removal of 3 L of fluid.  Continues to require BiPAP.   Admitted with acute on chronic renal failure requiring emergent dialysis, nephrology following.  Underwent emergent dialysis with removal of 3 L of fluid today.  Bilateral hydronephrosis  Anemia, thrombocytopenia  Abnormal liver enzymes    Plan:   Repeat dialysis as deemed necessary by nephrology  Agree with diuretic initiation, guided by nephrology  No changes to current cardiac plan of care  Cardiology follow-up 3 weeks after discharge  Cardiology to sign off      Maryjo Schwartz PA-C  [facility]  Pager: [phone]    Interval History   RRT was called on this patient early this morning for sudden onset shortness of breath with acute hypoxic respiratory failure.  He was found to have bilateral pleural effusions and was started on BiPAP with improvement in his symptoms.  Nephrology was informed of this episode and patient underwent emergent dialysis removal of 3 L of fluid today.  Cardiology was reconsulted.    At present, patient resting in bed on BiPAP.  Reports significant improvement in shortness of breath since completion of dialysis.    Physical Exam   Temp: 97.9  F (36.6  C) Temp src: Oral  BP: 94/74 Pulse: 104   Resp: (!) 42 SpO2: 99 % O2 Device: BiPAP/CPAP Oxygen Delivery: 11 LPM  Vitals:    10/05/23 0615 10/05/23 0924 10/06/23 0122   Weight: (!) 192 kg (423 lb 4.5 oz) 87.6 kg (193 lb 2 oz) (!) 194.2 kg (428 lb 2.1 oz)       GEN: well nourished, in no acute distress.  HEENT:  Pupils equal, round. Sclerae nonicteric.   NECK: Supple, no masses appreciated. No JVD  C/V: Regular rhythm, tachycardic  RESP: Respirations are unlabored. Clear to auscultation bilaterally without wheezing, rales, or rhonchi.  GI: Abdomen soft, nontender.  EXTREM: No LE edema.  NEURO: Alert and oriented, cooperative.  SKIN: Warm and dry.     Medications    - MEDICATION INSTRUCTIONS -      - MEDICATION INSTRUCTIONS -        - MEDICATION INSTRUCTIONS for Dialysis Patients -   Does not apply See Admin Instructions    aspirin  81 mg Oral Daily    atorvastatin  40 mg Oral QPM    diclofenac  2 g Topical 4x Daily    furosemide  20 mg Intravenous Daily    [Held by provider] heparin ANTICOAGULANT  5,000 Units Subcutaneous Q12H    hydrALAZINE  10 mg Oral TID    [START ON 10/7/2023] lidocaine  1 patch Transdermal Q24H    metoprolol succinate ER  25 mg Oral Daily    multivitamin RENAL  1 mg Oral Daily    pantoprazole  40 mg Oral BID AC    sodium chloride (PF)  10-40 mL Intracatheter Q8H    sodium chloride (PF)  3 mL Intracatheter Q8H       Data   Last 24 hours labs reviewed     Tele: SR/ST    Echo 9/28/2023  The left ventricle is borderline dilated.  The visual ejection fraction is 30-35%.  Large area of hypokinesis and akinesis distal 2/3 septum/apex/infero-apex/lat  walls. Some wall thinning. Suspect large LAD infarct pattern but cannot  exclude stress cardiomyopathy  There is moderate (2+) mitral regurgitation.  Right ventricular systolic pressure is elevated, consistent with mild to  moderate pulmonary hypertension.  The rhythm was sinus tachycardia.

## 2023-10-06 NOTE — PLAN OF CARE
Goal Outcome Evaluation:      Plan of Care Reviewed With: other (see comments)    Overall Patient Progress: no changeOverall Patient Progress: no change    Outcome Evaluation: Pt had been eating well & consuming most of his meals TID throughout this week. Early this AM downgraded to NPO while on BIPAP

## 2023-10-06 NOTE — PROGRESS NOTES
Potassium   Date Value Ref Range Status   10/06/2023 3.9 3.4 - 5.3 mmol/L Final   05/27/2022 4.4 3.5 - 5.0 mmol/L Final     Hemoglobin   Date Value Ref Range Status   10/06/2023 7.6 (L) 11.7 - 17.7 g/dL Final     Comment:     Sex Specific Reference Ranges:    Female  11.7-15.7  g/dL  Male    13.3-17.7   g/dL       Creatinine   Date Value Ref Range Status   10/06/2023 4.63 (H) 0.51 - 1.17 mg/dL Final     Comment:     Male and Female  0-2 Months    0.31-0.88 mg/dL  2-12 Months   0.16-0.39 mg/dL  1-2 Years     0.18-0.35 mg/dL  3-4 Years     0.26-0.42 mg/dL  5-6 Years     0.29-0.47 mg/dL  7-8 Years     0.34-0.53 mg/dL  9-10 Years    0.33-0.64 mg/dL  11-12 Years   0.44-0.68 mg/dL  13-14 Years   0.46-0.77 mg/dL    Female  15 Years and older  0.51-0.95 mg/dL    Male  15 Years and older  0.67-1.17 mg/dL         Urea Nitrogen   Date Value Ref Range Status   10/06/2023 49.2 (H) 8.0 - 23.0 mg/dL Final   05/27/2022 49 (H) 8 - 28 mg/dL Final     Sodium   Date Value Ref Range Status   10/06/2023 138 135 - 145 mmol/L Final     Comment:     Reference intervals for this test were updated on 09/26/2023 to more accurately reflect our healthy population. There may be differences in the flagging of prior results with similar values performed with this method. Interpretation of those prior results can be made in the context of the updated reference intervals.      INR   Date Value Ref Range Status   10/05/2023 1.18 (H) 0.85 - 1.15 Final      Latest Reference Range & Units 09/28/23 16:59   Hep B Surface Agn Nonreactive  Nonreactive   Hepatitis B Surface Antibody Instrument Value <8.00 m[IU]/mL 0.39   Hepatitis B Surface Antibody  Nonreactive     DIALYSIS PROCEDURE NOTE  Hepatitis status of previous patient on machine log was checked and verified ok to use with this patients hepatitis status.  Patient dialyzed for 2:45 hrs. on a K2 bath with a net fluid removal of  2.8L.  A BFR of 400 ml/min was obtained via a Right cvc.    The treatment  plan was discussed with Dr. Higgins during the treatment.    Total heparin received during the treatment: 0 units.     Line flushed, clamped and capped with heparin 1:1000 3.6  1.6 mL (1.6 units) per lumen    Meds  given: Epogen   Complications: none      Person educated: pre tx. Knowledge base minimal. Barriers to learning: none. Educated on procdure via verbal mode. The patient verbalized understanding. Pt prefers verbal education style.     ICEBOAT? Timeout performed pre-treatment  I: Patient was identified using 2 identifiers  C:  Consent Signed Yes  E: Equipment preventative maintenance is current and dialysis delivery system OK to use    O: Dialysis orders present and complete prior to treatment  A: Vascular access verified and assessed prior to treatment  T: Treatment was performed at a clinically appropriate time  ?: Patient was allowed to ask questions and address concerns prior to treatment  See Adult Hemodialysis flowsheet in [facility] for further details and post assessment.  Machine water alarm in place and functioning. Transducer pods intact and checked every 15min.   Pt returned via stretcher.  Chlorine/Chloramine water system checked every 4 hours.  Outpatient Dialysis at [facility]    Patient repositioned every 2 hours during the treatment.  Post treatment report given to Kaya FONTAINE RN regarding 2.8L of fluid removed, last BP of 108/60, and patient pain rating of 5/10. Back and knee pain

## 2023-10-06 NOTE — PROGRESS NOTES
Called an RRT d/t patient report increased shortness of breathe (unable to take a full breathe). Bladder scanned 462, and straight cath 550 ml.

## 2023-10-06 NOTE — H&P (VIEW-ONLY)
[facility]  Cardiology Progress Note  Date of Service: 10/06/2023  Primary Cardiologist: will be Dr. Martinez    Assessment & Plan    Bret Fleming is a 73 year old adult admitted on 9/27/2023.    Assessment:  Newly diagnosed cardiomyopathy with moderate to severe reduced LV systolic function with LVEF of 30 to 35%.  Large area of hypokinesis to akinesis noted in distal two third of the LV that could represent a large LAD infarct.  Given acute on chronic kidney disease, recommendation was made to initiate medical therapy, planning for outpatient ischemic evaluation should patient's kidney function normalize or require permanent dialysis.  Flash pulmonary edema resulting in RRT this morning.  Underwent emergent dialysis with removal of 3 L of fluid.  Continues to require BiPAP.   Admitted with acute on chronic renal failure requiring emergent dialysis, nephrology following.  Underwent emergent dialysis with removal of 3 L of fluid today.  Bilateral hydronephrosis  Anemia, thrombocytopenia  Abnormal liver enzymes    Plan:   Repeat dialysis as deemed necessary by nephrology  Agree with diuretic initiation, guided by nephrology  No changes to current cardiac plan of care  Cardiology follow-up 3 weeks after discharge  Cardiology to sign off      Maryjo Schwartz PA-C  [facility] - [facility]  Pager: [phone]    Interval History   RRT was called on this patient early this morning for sudden onset shortness of breath with acute hypoxic respiratory failure.  He was found to have bilateral pleural effusions and was started on BiPAP with improvement in his symptoms.  Nephrology was informed of this episode and patient underwent emergent dialysis removal of 3 L of fluid today.  Cardiology was reconsulted.    At present, patient resting in bed on BiPAP.  Reports significant improvement in shortness of breath since completion of dialysis.    Physical Exam   Temp: 97.9  F (36.6  C) Temp src: Oral  BP: 94/74 Pulse: 104   Resp: (!) 42 SpO2: 99 % O2 Device: BiPAP/CPAP Oxygen Delivery: 11 LPM  Vitals:    10/05/23 0615 10/05/23 0924 10/06/23 0122   Weight: (!) 192 kg (423 lb 4.5 oz) 87.6 kg (193 lb 2 oz) (!) 194.2 kg (428 lb 2.1 oz)       GEN: well nourished, in no acute distress.  HEENT:  Pupils equal, round. Sclerae nonicteric.   NECK: Supple, no masses appreciated. No JVD  C/V: Regular rhythm, tachycardic  RESP: Respirations are unlabored. Clear to auscultation bilaterally without wheezing, rales, or rhonchi.  GI: Abdomen soft, nontender.  EXTREM: No LE edema.  NEURO: Alert and oriented, cooperative.  SKIN: Warm and dry.     Medications    - MEDICATION INSTRUCTIONS -      - MEDICATION INSTRUCTIONS -        - MEDICATION INSTRUCTIONS for Dialysis Patients -   Does not apply See Admin Instructions    aspirin  81 mg Oral Daily    atorvastatin  40 mg Oral QPM    diclofenac  2 g Topical 4x Daily    furosemide  20 mg Intravenous Daily    [Held by provider] heparin ANTICOAGULANT  5,000 Units Subcutaneous Q12H    hydrALAZINE  10 mg Oral TID    [START ON 10/7/2023] lidocaine  1 patch Transdermal Q24H    metoprolol succinate ER  25 mg Oral Daily    multivitamin RENAL  1 mg Oral Daily    pantoprazole  40 mg Oral BID AC    sodium chloride (PF)  10-40 mL Intracatheter Q8H    sodium chloride (PF)  3 mL Intracatheter Q8H       Data   Last 24 hours labs reviewed     Tele: SR/ST    Echo 9/28/2023  The left ventricle is borderline dilated.  The visual ejection fraction is 30-35%.  Large area of hypokinesis and akinesis distal 2/3 septum/apex/infero-apex/lat  walls. Some wall thinning. Suspect large LAD infarct pattern but cannot  exclude stress cardiomyopathy  There is moderate (2+) mitral regurgitation.  Right ventricular systolic pressure is elevated, consistent with mild to  moderate pulmonary hypertension.  The rhythm was sinus tachycardia.

## 2023-10-06 NOTE — PROGRESS NOTES
[facility]    Hospitalist Progress Note    Interval History     Patient was being evaluated while undergoing dialysis today.  Currently on BiPAP.  Had an RRT yesterday evening with acute sudden onset shortness of breath and was found to have bilateral pulmonary edema and was started on BiPAP.  Please review RRT note for further details.  Currently is quite tachypneic and on BiPAP.  Endorsing some right knee pain as well.    -Data reviewed today: I reviewed all new labs and imaging results over the last 24 hours. I personally reviewed no images or EKG's today.    Physical Exam   Temp: 97.7  F (36.5  C) Temp src: Oral BP: 104/65 Pulse: 100   Resp: 29 SpO2: (!) 78 % O2 Device: BiPAP/CPAP Oxygen Delivery: 11 LPM  Vitals:    10/05/23 0615 10/05/23 0924 10/06/23 0122   Weight: (!) 192 kg (423 lb 4.5 oz) 87.6 kg (193 lb 2 oz) (!) 194.2 kg (428 lb 2.1 oz)     Vital Signs with Ranges  Temp:  [96.9  F (36.1  C)-97.7  F (36.5  C)] 97.7  F (36.5  C)  Pulse:  [] 100  Resp:  [16-72] 29  BP: ()/(52-80) 104/65  FiO2 (%):  [30 %] 30 %  SpO2:  [18 %-100 %] 78 %  I/O last 3 completed shifts:  In: 480 [P.O.:480]  Out: 800 [Urine:800]    Physical Exam  Constitutional:       Appearance: Shilpi Fleming is ill-appearing.   Cardiovascular:      Rate and Rhythm: Regular rhythm. Tachycardia present.      Pulses: Normal pulses.      Heart sounds: Normal heart sounds.   Pulmonary:      Effort: Respiratory distress present.      Comments: On BiPAP with severe tachypnea and bilateral coarse breath sounds and crackles  Abdominal:      General: Bowel sounds are normal. There is distension.      Tenderness: There is no abdominal tenderness. There is no guarding.   Musculoskeletal:      Right lower leg: Edema present.      Left lower leg: Edema present.      Comments: Right knee tender and swollen    Skin:     General: Skin is warm and dry.      Coloration: Skin is pale.   Neurological:      General: No focal  deficit present.           Medications    - MEDICATION INSTRUCTIONS -      - MEDICATION INSTRUCTIONS -          - MEDICATION INSTRUCTIONS for Dialysis Patients -   Does not apply See Admin Instructions    aspirin  81 mg Oral Daily    atorvastatin  40 mg Oral QPM    diclofenac  2 g Topical 4x Daily    epoetin corbin-epbx  10,000 Units Intravenous Once in dialysis/CRRT    furosemide  20 mg Intravenous Daily    sodium chloride (PF) 0.9%  10 mL Intracatheter Once in dialysis/CRRT    Followed by    heparin  1.3-2.6 mL Intracatheter Once in dialysis/CRRT    sodium chloride (PF) 0.9%  10 mL Intracatheter Once in dialysis/CRRT    Followed by    heparin  1.3-2.6 mL Intracatheter Once in dialysis/CRRT    [Held by provider] heparin ANTICOAGULANT  5,000 Units Subcutaneous Q12H    hydrALAZINE  10 mg Oral TID    [START ON 10/7/2023] lidocaine  1 patch Transdermal Q24H    metoprolol succinate ER  25 mg Oral Daily    multivitamin RENAL  1 mg Oral Daily    - MEDICATION INSTRUCTIONS -   Does not apply Once    pantoprazole  40 mg Oral BID AC    sodium chloride (PF)  10-40 mL Intracatheter Q8H    sodium chloride (PF)  3 mL Intracatheter Q8H    sodium chloride (PF)  9 mL Intracatheter During Dialysis/CRRT (from stock)    sodium chloride (PF)  9 mL Intracatheter During Dialysis/CRRT (from stock)    sodium chloride 0.9%  250 mL Intravenous Once in dialysis/CRRT    sodium chloride 0.9%  300 mL Hemodialysis Machine Once       Data   Recent Labs   Lab 10/06/23  0519 10/06/23  0430 10/05/23  1450 10/05/23  1422 10/05/23  0650 10/04/23  1809 10/04/23  0827 10/04/23  0652   WBC 8.8  --   --   --  7.8  --   --  7.1   HGB 7.6*  --   --   --  7.6* 8.5*  --  5.7*   MCV 97  --   --   --  96  --   --  94     --   --   --  142*  --   --  100*   INR  --   --  1.18*  --   --   --   --   --      --   --   --  140  --   --  136   POTASSIUM 3.9  --   --  3.7 3.4 3.4  --  6.6*   CHLORIDE 101  --   --   --  103  --   --  102   CO2 21*  --   --    --  25  --   --  23   BUN 49.2*  --   --   --  43.2*  --   --  64.3*   CR 4.63*  --   --   --  3.93*  --   --  4.69*   ANIONGAP 16*  --   --   --  12  --   --  11   CHANA 7.5*  --   --   --  7.5*  --   --  5.4*   GLC 94 100*  --   --  98  --    < > 81   ALBUMIN 3.0*  --   --   --  2.8*  --   --  2.3*   PROTTOTAL 5.4*  --   --   --  5.3*  --   --  4.1*   BILITOTAL 0.4  --   --   --  0.4  --   --  0.2   ALKPHOS 54  --   --   --  51  --   --  42   ALT 57  --   --   --  61  --   --  59   AST 36  --   --   --  44  --   --  46*    < > = values in this interval not displayed.         Recent Results (from the past 24 hour(s))   XR Chest Port 1 View    Narrative    EXAM: XR CHEST PORT 1 VIEW  LOCATION: [facility] [place]  DATE: 10/6/2023    INDICATION: Respiratory distress; acute hypoxic respiratory failure  COMPARISON: 09/28/2023.    FINDINGS: Right IJ infusion catheter. Right PICC. No pneumothorax. The heart size is normal. The thoracic aorta is calcified. There are increasing bilateral pulmonary infiltrates, right greater than left. Degenerative disease in the spine.      Impression    IMPRESSION: Worsening bilateral pulmonary infiltrates.         Assessment & Plan     Mr. Fleming is a 73-year-old gentleman with a past medical history of hypertension, coronary artery disease, CKD stage III who initially presented to an outside hospital for shortness of breath and poor p.o. intake with some nausea and vomiting.  He was found to have a potassium of 7 and a creatinine of 14.6and a pH of 6.98..  He was initially given albuterol, 1 g of calcium gluconate and insulin for shifting for his hyperkalemia.  Nephrology was consulted and he was initiated on an emergent run of hemodialysis at the outside hospital.  Due to very difficult Culp catheter placement a urology consult was placed and a CT abdomen and pelvis discovered obstructive uropathy with bilateral hydronephrosis.  The CT also displayed colitis with  bladder wall thickening.  Urine analysis was positive for infection and he was started on broad-spectrum antibiotics with vancomycin and Zosyn.  He was transferred to [facility] on 9/27/2023 for ongoing care of worsening renal failure, electrolyte abnormalities and a urinary tract infection causing sepsis.     Acute on chronic renal failure requiring emergent hemodialysis- likely acute tubular necrosis   Hyperkalemia with ECG changes, resolved  Starvation ketoacidosis, resolved  Severe lactic acidosis, resolved  Metabolic acidosis due to renal failure   Hypocalcemia  Had last hemodialysis 9/29. Good urine output npw  Monitor urine output, renal function and electrolytes   Right IJ temporary dialysis catheter in place should he need future dialysis  Nephrology initially plan to hold dialysis until middle of the week and evaluated patient regarding this.  However on on 10/4/2023 patient did develop progressively worsening anemia with hyperkalemia and had to undergo a session of dialysis.  Also received 1 unit packed RBCs for severe anemia with hemoglobin at 5.7.  Post dialysis and transfusion potassium did improve to 3 and hemoglobin improved to 8.5  Received IV calcium gluconate 1 g for hypocalcemia.  Repeat BMP postdialysis showed resolved hyperkalemia with improved creatinine.  RRT was called on the patient on 10/6/2023 early morning at 430 for sudden onset shortness of breath with acute hypoxic respiratory failure.  Review RRT note for further details but essentially patient was found to have bilateral pleural effusions and was started on BiPAP with improvement with his respiratory failure.  Patient was started on IV vancomycin and Zosyn to cover for pneumonia.  But per my assessment of the chest x-ray on the patient this appears to be more of a flash pulmonary edema and fluid overload rather than infection.  Antibiotics were discontinued.  Currently no leukocytosis and is afebrile.  Nephrology informed of  this episode and patient is undergoing emergent dialysis with removal of 3 L fluid.  Patient also underwent a trial of voiding on 10/5/2023 with removal of Culp catheter but failed this and continued to retain urine.  Culp catheter was replaced by urology.  Patient is a difficult placement for Culp.  Would recommend not removing it until he is over acute illness and might have to be discharged with Culp in place.     Obstructive uropathy  Moderate bilateral hydroureteronephrosis  Sepsis due to pyelonephritis  Hypospadias  History of urinary retention  CT abdomen and pelvis revealed bilateral hydro ureteral nephrosis without ureteral obstruction.  Culp catheter was placed on 9/27/2023 and is helping to decompress the bladder presently.  Urine culture grew corynebacterium striatum.   Urology is consulted and per their recommendations we will continue the Culp catheter for bladder decompression.    Patient received 6 days of antibiotics with IV Zosyn and vancomycin.  So far the only positive culture was cornebacterium stratum in urine which is a regular skin pathogen.  Discussed with infectious disease.  No indication to continue IV antibiotics at this time.  Discontinued both Zosyn and vancomycin.  Follow up urine culture on 9/30/2023 shows no growth so far.  Culp was placed by urology for obstruction.  Removed it on 10/5/2023.  Failed a trial of voiding with retention after removal of Culp.  Replaced on 10/6/2023.  Should be discharged with Culp in place and outpatient follow-up with urology.  Patient is a very difficult placement for Culp.     Non-ST-elevation myocardial infarction   New moderate mitral valve regurgitation  Cardiomyopathy EF 30%  Suspected coronary artery disease   TTE from 9/28/2023 reveals worsening heart failure with an EF of 30 to 35% with a large area of hypokinesis and akinesis distal to the septum with only suspected large LAD infarct pattern, mitral valve regurgitation and elevated  right ventricular systolic pressure.  Continue aspirin, metoprolol and hydralazine   Cardiology had signed off with plan for outpatient evaluation for possible ischemia once his renal function is stable.  Reconsulted cardiology today in the setting of flash pulmonary edema and worsening respiratory distress likely from a combination of renal failure and heart failure.  Started on IV Lasix 20 mg daily     Colitis with loose stool  Elevated liver transaminases   He and his wife describe flu like symptoms starting last week with nausea, vomiting, decreased appetite, generalized fatigue and weakness.   INR was elevated at 1.8 and liver enzymes AST/ALT were elevated in the 100s but are now improving. Stool panel/ clostridium difficile negative.  Continue to monitor   LFTs have returned to normal.  INR improved to 1.18     Anemia status post packed red blood cells  Thrombocytopenia, acute   Anemia likely due to renal disease and sepsis.  Iron, B12 and folate ok.   Thrombocytopenia could also be due to sepsis but heparin held.   HIT work-up negative.  Continue to monitor H3  Transfuse for hemoglobin less than 7.  Patient received a unit of packed RBCs on 9/28/2023 when hemoglobin fell to 6.9.  1 unit of packed RBCs given on 10/4/2023 when hemoglobin dropped to 5.7.  Posttransfusion hemoglobin at 8.5.  In the setting of persistent drop in hemoglobin will evaluate for GI blood loss.  Stool Hemoccult ordered.  Will start on oral pantoprazole 40 daily.    GI consulted to evaluate for GI blood loss.  Initial plan was to proceed with EGD on 10/6/2023 but this has been held off now due to acute respiratory failure from pulmonary edema.  We will reconsider this once patient is more stable.  ?  Question erythropoietin for nephrology     Generalized weakness and debility   Continue physcial therapy and occupational therapy     Right knee pain  10/6/2023-Patient is endorsing significant discomfort with right knee.  X-ray ordered.   Can consider Ortho consult if he has significant effusion.  Question possible gout although there is no obvious erythema.  -Lidocaine patch ordered.  Avoid NSAIDs.  Can use Tylenol.          Diet: Renal Diet (dialysis)  Snacks/Supplements Adult: Nepro Oral Supplement; Between Meals  Room Service    DVT Prophylaxis: pneumatic compression device   Culp Catheter: PRESENT, indication: Strict 1-2 Hour I&O;Insertion Difficulty  Lines: PRESENT      PICC 09/27/23 Triple Lumen Right Brachial vein lateral Vascular Access-Site Assessment: WDL  CVC Double Lumen Right Internal jugular Non - tunneled-Site Assessment: WDL      Cardiac Monitoring: None  Code Status: Full Code         Clinically Significant Risk Factors              # Hypoalbuminemia: Lowest albumin = 2.3 g/dL at 10/4/2023  6:52 AM, will monitor as appropriate  # Coagulation Defect: INR = 1.18 (Ref range: 0.85 - 1.15) and/or PTT = 34 Seconds (Ref range: 22 - 38 Seconds), will monitor for bleeding         # Acute heart failure with reduced ejection fraction: last echo with EF <40% and receiving IV diuretics       # Severe Obesity: Estimated body mass index is 58.07 kg/m  as calculated from the following:    Height as of this encounter: 1.829 m (6').    Weight as of this encounter: 194.2 kg (428 lb 2.1 oz).     # Moderate Malnutrition: based on nutrition assessment               Andie Dominguez MD, MD  [phone](p)

## 2023-10-06 NOTE — PROGRESS NOTES
Care Management Follow Up    Length of Stay (days): 9    Expected Discharge Date: 10/07/2023     Concerns to be Addressed: discharge planning     Patient plan of care discussed at interdisciplinary rounds: Yes    Anticipated Discharge Disposition: Other (Comments) (Pt would like to return home with home care; he is not interested in TCU)     Anticipated Discharge Services:    Anticipated Discharge DME:      Patient/family educated on Medicare website which has current facility and service quality ratings:    Education Provided on the Discharge Plan:    Patient/Family in Agreement with the Plan: other (see comments) (Pt would like to return home with home care; he is not interested in TCU)    Referrals Placed by CM/SW:    Private pay costs discussed: Not applicable    Additional Information:  Writer met with patient to get TCU choices. Per patient, the following referrals will be sent out:    [facility] at [facility]       ALIX Dickson  [facility]  Social Work

## 2023-10-07 ENCOUNTER — APPOINTMENT (OUTPATIENT)
Dept: PHYSICAL THERAPY | Facility: CLINIC | Age: 73
DRG: 871 | End: 2023-10-07
Attending: INTERNAL MEDICINE
Payer: COMMERCIAL

## 2023-10-07 ENCOUNTER — HEALTH MAINTENANCE LETTER (OUTPATIENT)
Age: 73
End: 2023-10-07

## 2023-10-07 PROBLEM — N39.0 SEPSIS DUE TO GRAM-NEGATIVE UTI (H): Status: ACTIVE | Noted: 2023-10-07

## 2023-10-07 PROBLEM — D64.9 ACUTE ON CHRONIC ANEMIA: Status: ACTIVE | Noted: 2023-10-07

## 2023-10-07 PROBLEM — E44.0 MODERATE MALNUTRITION (H): Status: ACTIVE | Noted: 2023-10-07

## 2023-10-07 PROBLEM — N13.30 BILATERAL HYDRONEPHROSIS: Status: ACTIVE | Noted: 2023-10-07

## 2023-10-07 PROBLEM — I50.21 ACUTE SYSTOLIC CONGESTIVE HEART FAILURE (H): Status: ACTIVE | Noted: 2023-10-07

## 2023-10-07 PROBLEM — A41.50 SEPSIS DUE TO GRAM-NEGATIVE UTI (H): Status: ACTIVE | Noted: 2023-10-07

## 2023-10-07 PROBLEM — N18.30 ACUTE RENAL FAILURE WITH ACUTE RENAL CORTICAL NECROSIS SUPERIMPOSED ON STAGE 3 CHRONIC KIDNEY DISEASE (H): Status: ACTIVE | Noted: 2023-09-27

## 2023-10-07 PROBLEM — D69.6 THROMBOCYTOPENIA (H): Status: ACTIVE | Noted: 2023-10-07

## 2023-10-07 PROBLEM — N17.1 ACUTE RENAL FAILURE WITH ACUTE RENAL CORTICAL NECROSIS SUPERIMPOSED ON STAGE 3 CHRONIC KIDNEY DISEASE (H): Status: ACTIVE | Noted: 2023-09-27

## 2023-10-07 PROBLEM — I10 HTN (HYPERTENSION): Status: ACTIVE | Noted: 2023-10-07

## 2023-10-07 PROBLEM — J96.01 ACUTE RESPIRATORY FAILURE WITH HYPOXIA (H): Status: ACTIVE | Noted: 2023-10-07

## 2023-10-07 LAB
ALBUMIN SERPL BCG-MCNC: 2.9 G/DL (ref 3.5–5.2)
ALP SERPL-CCNC: 53 U/L (ref 35–129)
ALT SERPL W P-5'-P-CCNC: 43 U/L (ref 0–70)
ANION GAP SERPL CALCULATED.3IONS-SCNC: 12 MMOL/L (ref 7–15)
AST SERPL W P-5'-P-CCNC: 28 U/L (ref 0–45)
BILIRUB SERPL-MCNC: 0.4 MG/DL
BUN SERPL-MCNC: 29.6 MG/DL (ref 8–23)
CALCIUM SERPL-MCNC: 7.6 MG/DL (ref 8.8–10.2)
CHLORIDE SERPL-SCNC: 104 MMOL/L (ref 98–107)
CREAT SERPL-MCNC: 3.79 MG/DL (ref 0.51–1.17)
DEPRECATED HCO3 PLAS-SCNC: 26 MMOL/L (ref 22–29)
EGFRCR SERPLBLD CKD-EPI 2021: 16 ML/MIN/1.73M2
ERYTHROCYTE [DISTWIDTH] IN BLOOD BY AUTOMATED COUNT: 15.4 % (ref 10–15)
GLUCOSE SERPL-MCNC: 88 MG/DL (ref 70–99)
HCT VFR BLD AUTO: 22 % (ref 35–53)
HGB BLD-MCNC: 7 G/DL (ref 11.7–17.7)
MAGNESIUM SERPL-MCNC: 1.7 MG/DL (ref 1.7–2.3)
MCH RBC QN AUTO: 31.5 PG (ref 26.5–33)
MCHC RBC AUTO-ENTMCNC: 31.8 G/DL (ref 31.5–36.5)
MCV RBC AUTO: 99 FL (ref 78–100)
PHOSPHATE SERPL-MCNC: 3.8 MG/DL (ref 2.5–4.5)
PLATELET # BLD AUTO: 149 10E3/UL (ref 150–450)
POTASSIUM SERPL-SCNC: 3.9 MMOL/L (ref 3.4–5.3)
PROT SERPL-MCNC: 5.2 G/DL (ref 6.4–8.3)
RBC # BLD AUTO: 2.22 10E6/UL (ref 3.8–5.9)
SODIUM SERPL-SCNC: 142 MMOL/L (ref 135–145)
WBC # BLD AUTO: 7.1 10E3/UL (ref 4–11)

## 2023-10-07 PROCEDURE — 250N000013 HC RX MED GY IP 250 OP 250 PS 637: Performed by: INTERNAL MEDICINE

## 2023-10-07 PROCEDURE — 210N000001 HC R&B IMCU HEART CARE

## 2023-10-07 PROCEDURE — 97530 THERAPEUTIC ACTIVITIES: CPT | Mod: GP

## 2023-10-07 PROCEDURE — 85027 COMPLETE CBC AUTOMATED: CPT | Performed by: INTERNAL MEDICINE

## 2023-10-07 PROCEDURE — 250N000011 HC RX IP 250 OP 636: Mod: JZ | Performed by: HOSPITALIST

## 2023-10-07 PROCEDURE — 84100 ASSAY OF PHOSPHORUS: CPT | Performed by: INTERNAL MEDICINE

## 2023-10-07 PROCEDURE — P9016 RBC LEUKOCYTES REDUCED: HCPCS | Performed by: HOSPITALIST

## 2023-10-07 PROCEDURE — 250N000011 HC RX IP 250 OP 636: Mod: JZ | Performed by: INTERNAL MEDICINE

## 2023-10-07 PROCEDURE — 83735 ASSAY OF MAGNESIUM: CPT | Performed by: INTERNAL MEDICINE

## 2023-10-07 PROCEDURE — 80053 COMPREHEN METABOLIC PANEL: CPT | Performed by: INTERNAL MEDICINE

## 2023-10-07 PROCEDURE — 99233 SBSQ HOSP IP/OBS HIGH 50: CPT | Performed by: INTERNAL MEDICINE

## 2023-10-07 PROCEDURE — 250N000011 HC RX IP 250 OP 636: Performed by: HOSPITALIST

## 2023-10-07 PROCEDURE — 250N000013 HC RX MED GY IP 250 OP 250 PS 637: Performed by: HOSPITALIST

## 2023-10-07 PROCEDURE — 36430 TRANSFUSION BLD/BLD COMPNT: CPT

## 2023-10-07 PROCEDURE — 99233 SBSQ HOSP IP/OBS HIGH 50: CPT | Performed by: HOSPITALIST

## 2023-10-07 PROCEDURE — C9113 INJ PANTOPRAZOLE SODIUM, VIA: HCPCS | Mod: JZ | Performed by: HOSPITALIST

## 2023-10-07 RX ORDER — MAGNESIUM SULFATE HEPTAHYDRATE 40 MG/ML
2 INJECTION, SOLUTION INTRAVENOUS ONCE
Status: COMPLETED | OUTPATIENT
Start: 2023-10-07 | End: 2023-10-07

## 2023-10-07 RX ORDER — FUROSEMIDE 10 MG/ML
80 INJECTION INTRAMUSCULAR; INTRAVENOUS EVERY 12 HOURS
Status: DISCONTINUED | OUTPATIENT
Start: 2023-10-07 | End: 2023-10-12

## 2023-10-07 RX ORDER — FUROSEMIDE 10 MG/ML
80 INJECTION INTRAMUSCULAR; INTRAVENOUS EVERY 8 HOURS
Status: CANCELLED | OUTPATIENT
Start: 2023-10-07

## 2023-10-07 RX ADMIN — ASPIRIN 81 MG: 81 TABLET, COATED ORAL at 08:09

## 2023-10-07 RX ADMIN — PANTOPRAZOLE SODIUM 40 MG: 40 INJECTION, POWDER, FOR SOLUTION INTRAVENOUS at 22:19

## 2023-10-07 RX ADMIN — METOPROLOL SUCCINATE 25 MG: 25 TABLET, EXTENDED RELEASE ORAL at 08:09

## 2023-10-07 RX ADMIN — DICLOFENAC SODIUM 2 G: 10 GEL TOPICAL at 12:06

## 2023-10-07 RX ADMIN — DICLOFENAC SODIUM 2 G: 10 GEL TOPICAL at 17:19

## 2023-10-07 RX ADMIN — FUROSEMIDE 80 MG: 10 INJECTION, SOLUTION INTRAMUSCULAR; INTRAVENOUS at 12:00

## 2023-10-07 RX ADMIN — Medication 1 MG: at 08:09

## 2023-10-07 RX ADMIN — ATORVASTATIN CALCIUM 40 MG: 40 TABLET, FILM COATED ORAL at 22:18

## 2023-10-07 RX ADMIN — LIDOCAINE 1 PATCH: 560 PATCH PERCUTANEOUS; TOPICAL; TRANSDERMAL at 07:24

## 2023-10-07 RX ADMIN — PANTOPRAZOLE SODIUM 40 MG: 40 INJECTION, POWDER, FOR SOLUTION INTRAVENOUS at 08:08

## 2023-10-07 RX ADMIN — DICLOFENAC SODIUM 2 G: 10 GEL TOPICAL at 22:19

## 2023-10-07 RX ADMIN — FUROSEMIDE 40 MG: 10 INJECTION, SOLUTION INTRAMUSCULAR; INTRAVENOUS at 00:23

## 2023-10-07 RX ADMIN — HEPARIN SODIUM 5000 UNITS: 5000 INJECTION, SOLUTION INTRAVENOUS; SUBCUTANEOUS at 22:19

## 2023-10-07 RX ADMIN — MAGNESIUM SULFATE HEPTAHYDRATE 2 G: 40 INJECTION, SOLUTION INTRAVENOUS at 10:29

## 2023-10-07 RX ADMIN — DICLOFENAC SODIUM 2 G: 10 GEL TOPICAL at 08:09

## 2023-10-07 RX ADMIN — HYDRALAZINE HYDROCHLORIDE 10 MG: 10 TABLET ORAL at 22:19

## 2023-10-07 ASSESSMENT — ACTIVITIES OF DAILY LIVING (ADL)
ADLS_ACUITY_SCORE: 31
ADLS_ACUITY_SCORE: 30
ADLS_ACUITY_SCORE: 30
ADLS_ACUITY_SCORE: 31

## 2023-10-07 NOTE — PLAN OF CARE
Neuro: A&Ox4  Tele/Cardiac: SR  Resp: BiPAP worn overnight-tolerated well. While awake on 7L oxymask  Activity: did not ambulate this shift. A1 gb/walker  Pain: pt reported knee soreness- scheduled Voltaren gel given for relief  Drips/IV: R PICC saline locked  GI/: Culp for retention  Skin: wound on penis- WOC following. Scattered bruising  Diet: advanced to renal diet  Test/Procedures: n/a  Plan: wean O2 as tolerated, possible EGD monday   Hgb 7, unit of blood running.

## 2023-10-07 NOTE — PROGRESS NOTES
Renal Medicine Progress Note            Assessment/Plan:       Assessment:  1) Acute Kidney injury, non-oliguric    Creatinine prior to admission ranging between 1.8-3.3 over the course of 2022, no recent labs.      Creatinine on admission 14.61 in the setting of encephalopathy, recent NSAID use, sepsis, UTI, and obstructive uropathy.  Most likely significant prerenal RICHARD, probable ATN at this point.  He did undergo dialysis at [facility] primarily for acidosis and severe hyperkalemia causing EKG changes.  HD 10/4 for hyperkalemia, 10/6 for hypervolemia (10/6 had worsening infiltrates on CXR) He is fragile with RICHARD on top of CKD, HFrEF.      No indication for HD today or likely this weekend. Can see if GFR stabalized and can have adequate urine output. Room for increasing diuretics.    2) Obstructive uropathy with Moderate bilateral hydroureteronephrosis on admission  Pyelonephritis  Hypospadias  History of urinary retention  Patient has history of urinary retention and UTIs in the past.  CT with bilateral hydroureteronephrosis and bladder wall thickening consistent with cystitis.  Guido placed by urology, initially yellow urine followed by milky cloudy urine consistent with UTI/pyelo-.   Trial of voiding not successful.       Other:  Severe diarrhea  Colitis, possibly infectious     Acute on chronic anemia  Hemoglobin 7.0 today. EGD deferred 10/6.      Cardiomyopathy with reduced EF     Plan:  1) increasing lasix to 80mg q12 hrs  2) for now keep temp internal jugular line in  3) continue daily weights, I/O's.   4) No HD planned today, reassess daily    Cristóbal Rutledge DO  [facility]  Office: [phone]  Cell: [phone]        Interval History:      Pt denies acute complaints. States did not tolerate well the CPAP overnight. Has guido.  S/p HD yesterday, had 2.8L UF done.     Additional 1250 ml urine yesterday, 700 so far today on 40mg q 12 lasix.     No weight today, yesterday 84.8kg.           Medications and Allergies:      - MEDICATION INSTRUCTIONS for Dialysis Patients -   Does not apply See Admin Instructions    aspirin  81 mg Oral Daily    atorvastatin  40 mg Oral QPM    diclofenac  2 g Topical 4x Daily    furosemide  40 mg Intravenous Q12H    heparin ANTICOAGULANT  5,000 Units Subcutaneous Q12H    hydrALAZINE  10 mg Oral TID    lidocaine  1 patch Transdermal Q24H    magnesium sulfate  2 g Intravenous Once    metoprolol succinate ER  25 mg Oral Daily    multivitamin RENAL  1 mg Oral Daily    pantoprazole  40 mg Intravenous BID    sodium chloride (PF)  10-40 mL Intracatheter Q8H    sodium chloride (PF)  3 mL Intracatheter Q8H        Allergies   Allergen Reactions    Ciprofloxacin Rash    Other Drug Allergy (See Comments)      Cough Syrup Abstracted from [facility] Chart( Hydrobromide)            Physical Exam:   Vitals were reviewed  /57   Pulse 101   Temp 98.8  F (37.1  C) (Oral)   Resp 17   Ht 1.829 m (6')   Wt 84.8 kg (186 lb 15.2 oz)   SpO2 100%   BMI 25.35 kg/m      Wt Readings from Last 3 Encounters:   10/06/23 84.8 kg (186 lb 15.2 oz)   09/27/23 90.7 kg (200 lb)   05/27/22 104 kg (229 lb 3.2 oz)       Intake/Output Summary (Last 24 hours) at 10/7/2023 1121  Last data filed at 10/7/2023 1024  Gross per 24 hour   Intake 540 ml   Output 4100 ml   Net -3560 ml       GENERAL APPEARANCE: Pt in no distress  HEENT:  Eyes/ears/nose grossly normal, neck supple  RESP: lungs clear at present  CV: regular rate and rhythm, normal S1 S2, no murmur, click or rub   ABDOMEN: soft, nontender, bowel sounds normal  EXTREMITIES/SKIN: tr edema dependently, no rashes or lesions           Data:     BMP  Recent Labs   Lab 10/07/23  0531 10/06/23  0519 10/06/23  0430 10/05/23  1422 10/05/23  0650 10/04/23  0827 10/04/23  0652    138  --   --  140  --  136   POTASSIUM 3.9 3.9  --  3.7 3.4   < > 6.6*   CHLORIDE 104 101  --   --  103  --  102   CHANA 7.6* 7.5*  --   --  7.5*  --  5.4*   CO2 26 21*  --   --  25   --  23   BUN 29.6* 49.2*  --   --  43.2*  --  64.3*   CR 3.79* 4.63*  --   --  3.93*  --  4.69*   GLC 88 94 100*  --  98   < > 81    < > = values in this interval not displayed.     CBC  Recent Labs   Lab 10/07/23  0531 10/06/23  0519 10/05/23  0650 10/04/23  1809 10/04/23  0652   WBC 7.1 8.8 7.8  --  7.1   HGB 7.0* 7.6* 7.6* 8.5* 5.7*   HCT 22.0* 22.9* 23.5*  --  17.4*   MCV 99 97 96  --  94   * 157 142*  --  100*     Lab Results   Component Value Date    AST 28 10/07/2023    ALT 43 10/07/2023    ALKPHOS 53 10/07/2023    BILITOTAL 0.4 10/07/2023     Lab Results   Component Value Date    INR 1.18 (H) 10/05/2023       Attestation:  I have reviewed today's vital signs, notes, medications, labs and imaging.    Cristóbal Rutledge DO  [facility] - Nephrology  Office: [phone]  Cell: [phone]

## 2023-10-07 NOTE — PROGRESS NOTES
[facility]    Medicine Progress Note - Hospitalist Service    Date of Admission:  9/27/2023    Assessment & Plan   Principal Problem:    Acute respiratory failure with hypoxia (H)  Active Problems:    NSTEMI (non-ST elevated myocardial infarction) (H)    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 (H)    Coronary atherosclerosis    Hyperlipidemia    Acute systolic congestive heart failure - EF-30%    HTN (hypertension)    Sepsis due to gram-negative UTI (H)    Acute on chronic anemia    Bilateral hydronephrosis    Thrombocytopenia (H24)    Moderate malnutrition (H24)      Mr. Fleming is a 73-year-old gentleman with a past medical history of hypertension, coronary artery disease, CKD stage III who initially presented to an outside hospital for shortness of breath and poor p.o. intake with some nausea and vomiting.  He was found to have a potassium of 7 and a creatinine of 14.6and a pH of 6.98..  He was initially given albuterol, 1 g of calcium gluconate and insulin for shifting for his hyperkalemia.  Nephrology was consulted and he was initiated on an emergent run of hemodialysis at the outside hospital.  Due to very difficult Culp catheter placement a urology consult was placed and a CT abdomen and pelvis discovered obstructive uropathy with bilateral hydronephrosis.  The CT also displayed colitis with bladder wall thickening.  Urine analysis was positive for infection and he was started on broad-spectrum antibiotics with vancomycin and Zosyn.  He was transferred to [facility] on 9/27/2023 for ongoing care of worsening renal failure, electrolyte abnormalities and a urinary tract infection causing sepsis.  During the hospital stay he was also found on echocardiogram a new LV systolic dysfunction [EF 30 to 35%] with wall motion abnormality suggesting LAD infarct, severe renal failure requiring hemodialysis, anemia,  thrombocytopenia   - The patient underwent urgent hemodialysis with removal of 3 L for flash pulmonary edema which led to significant improvement in his symptoms. He is also making some urine and is currently being diuresed per recommendations of nephrology.   -From cardiac standpoint we do not have much room in terms of blood pressure for heart failure therapy. We are going to continue aspirin, Lipitor, low-dose metoprolol and hydralazine. If blood pressure remains needed hydralazine can be discontinued.  Cardiology plans for outpatient follow-up and likely perform cardiac MRI with stress to assess viability in the LAD territory before sending him for coronary angiogram.     Acute on chronic renal failure requiring emergent hemodialysis- likely acute tubular necrosis   Hyperkalemia with ECG changes, resolved  Starvation ketoacidosis, resolved  Severe lactic acidosis, resolved  Metabolic acidosis due to renal failure   Hypocalcemia  Symptomatic anemia with anemia of chronic disease needing blood transfusion  Had last hemodialysis 9/29. Good urine output npw  Monitor urine output, renal function and electrolytes   Right IJ temporary dialysis catheter in place should he need future dialysis  Nephrology initially plan to hold dialysis until middle of the week and evaluated patient regarding this.  However on on 10/4/2023 patient did develop progressively worsening anemia with hyperkalemia and had to undergo a session of dialysis.  Also received 1 unit packed RBCs for severe anemia with hemoglobin at 5.7.  Post dialysis and transfusion potassium did improve to 3 and hemoglobin improved to 8.5  He has symptomatic anemia chronic hemoglobin runs around 9, dipped down further and due to hypoxia and symptoms given blood transfusion as well  Received IV calcium gluconate 1 g for hypocalcemia.  Repeat BMP postdialysis showed resolved hyperkalemia with improved creatinine.  RRT was called on the patient on 10/6/2023 early  morning at 430 for sudden onset shortness of breath with acute hypoxic respiratory failure.  Review RRT note for further details but essentially patient was found to have bilateral pleural effusions and was started on BiPAP with improvement with his respiratory failure.  Patient was started on IV vancomycin and Zosyn to cover for pneumonia.  But per my assessment of the chest x-ray on the patient this appears to be more of a flash pulmonary edema and fluid overload rather than infection.  Antibiotics were discontinued 10/6.  Currently no leukocytosis and is afebrile.  Nephrology informed of this episode and patient is undergoing emergent dialysis with removal of 3 L fluid.  Patient also underwent a trial of voiding on 10/5/2023 with removal of Culp catheter but failed this and continued to retain urine.  Culp catheter was replaced by urology.  Patient is a difficult placement for Culp.  Would recommend not removing it until he is over acute illness and might have to be discharged with Culp in place.     Obstructive uropathy  Moderate bilateral hydroureteronephrosis  Sepsis due to pyelonephritis  Hypospadias  History of urinary retention  CT abdomen and pelvis revealed bilateral hydro ureteral nephrosis without ureteral obstruction.  Culp catheter was placed on 9/27/2023 and is helping to decompress the bladder presently.  Urine culture grew corynebacterium striatum.   Urology is consulted and per their recommendations we will continue the Culp catheter for bladder decompression.    Patient received 6 days of antibiotics with IV Zosyn and vancomycin.  So far the only positive culture was cornebacterium stratum in urine which is a regular skin pathogen.  Discussed with infectious disease.  No indication to continue IV antibiotics at this time.  Discontinued both Zosyn and vancomycin.  Follow up urine culture on 9/30/2023 shows no growth so far.  Culp was placed by urology for obstruction.  Removed it on  10/5/2023.  Failed a trial of voiding with retention after removal of Culp.  Replaced on 10/6/2023.  Should be discharged with Culp in place and outpatient follow-up with urology.  Patient is a very difficult placement for Culp.     Non-ST-elevation myocardial infarction   New moderate mitral valve regurgitation  Cardiomyopathy EF 30%  Suspected coronary artery disease   TTE from 9/28/2023 reveals worsening heart failure with an EF of 30 to 35% with a large area of hypokinesis and akinesis distal to the septum with only suspected large LAD infarct pattern, mitral valve regurgitation and elevated right ventricular systolic pressure.  Continue aspirin, metoprolol and hydralazine   Cardiology had signed off with plan for outpatient evaluation for possible ischemia once his renal function is stable.  Reconsulted cardiology today in the setting of flash pulmonary edema and worsening respiratory distress likely from a combination of renal failure and heart failure.  Started on IV Lasix 20 mg daily     Colitis with loose stool  Elevated liver transaminases   He and his wife describe flu like symptoms starting last week with nausea, vomiting, decreased appetite, generalized fatigue and weakness.   INR was elevated at 1.8 and liver enzymes AST/ALT were elevated in the 100s but are now improving. Stool panel/ clostridium difficile negative.  Continue to monitor   LFTs have returned to normal.  INR improved to 1.18     Anemia status post packed red blood cells  Thrombocytopenia, acute   Anemia likely due to renal disease and sepsis.  Iron, B12 and folate ok.   Thrombocytopenia could also be due to sepsis but heparin held.   HIT work-up negative.  Continue to monitor H3  Transfuse for hemoglobin less than 7.  Patient received a unit of packed RBCs on 9/28/2023 when hemoglobin fell to 6.9.  1 unit of packed RBCs given on 10/4/2023 when hemoglobin dropped to 5.7.  Posttransfusion hemoglobin at 8.5.  In the setting of  persistent drop in hemoglobin will evaluate for GI blood loss.  Stool Hemoccult ordered.  Will start on oral pantoprazole 40 daily.    GI consulted to evaluate for GI blood loss.  Initial plan was to proceed with EGD on 10/6/2023 but this has been held off now due to acute respiratory failure from pulmonary edema.  We will reconsider this once patient is more stable.  ?  Question erythropoietin for nephrology     Generalized weakness and debility   Continue physcial therapy and occupational therapy     Right knee pain  10/6/2023-Patient is endorsing significant discomfort with right knee.  X-ray ordered.  Can consider Ortho consult if he has significant effusion.  Question possible gout although there is no obvious erythema.  -Lidocaine patch ordered.  Avoid NSAIDs.  Can use Tylenol.          Diet: Renal Diet (dialysis)  Snacks/Supplements Adult: Nepro Oral Supplement; Between Meals  Room Service    DVT Prophylaxis: pneumatic compression device   Culp Catheter: PRESENT, indication: Strict 1-2 Hour I&O;Insertion Difficulty  Lines: PRESENT      PICC 09/27/23 Triple Lumen Right Brachial vein lateral Vascular Access-Site Assessment: WDL  CVC Double Lumen Right Internal jugular Non - tunneled-Site Assessment: WDL      Cardiac Monitoring: None  Code Status: Full Code            Diet: Room Service  Renal Diet (dialysis)    DVT Prophylaxis: Heparin SQ  Culp Catheter: PRESENT, indication: Retention, Retention  Lines: PRESENT      PICC 09/27/23 Triple Lumen Right Brachial vein lateral Vascular Access-Site Assessment: WDL  CVC Double Lumen Right Internal jugular Non - tunneled-Site Assessment: WDL      Cardiac Monitoring: ACTIVE order. Indication: Electrolyte Imbalance (24 hours)- Magnesium <1.3 mg/ml; Potassium < =2.8 or > 5.5 mg/ml  Code Status: Full Code      Clinically Significant Risk Factors              # Hypoalbuminemia: Lowest albumin = 2.3 g/dL at 10/4/2023  6:52 AM, will monitor as appropriate    # Coagulation  Defect: INR = 1.18 (Ref range: 0.85 - 1.15) and/or PTT = 34 Seconds (Ref range: 22 - 38 Seconds), will monitor for bleeding      # Hypertension: Noted on problem list    # Acute heart failure with reduced ejection fraction: last echo with EF <40% and receiving IV diuretics       # Overweight: Estimated body mass index is 25.35 kg/m  as calculated from the following:    Height as of this encounter: 1.829 m (6').    Weight as of this encounter: 84.8 kg (186 lb 15.2 oz).     # Moderate Malnutrition: based on nutrition assessment           Disposition Plan      Expected Discharge Date: 10/09/2023      Destination: other (comment) (Pt would like to return home with home care; he is not interested in TCU)  Discharge Comments: needs cabg          Pradip Arriaga MD  Hospitalist Service  [facility]  Securely message with [employer] (more info)  Text page via [employer] Paging/Directory   ______________________________________________________________________    Interval History   Wife at the bedside no significant overnight issues today, still needing oxygen although diuresing a bit  Magnesium low will replete    Physical Exam   Vital Signs: Temp: 98.8  F (37.1  C) Temp src: Oral BP: 101/57 Pulse: 101   Resp: 17 SpO2: 100 % O2 Device: Oxymask Oxygen Delivery: 7 LPM  Weight: 186 lbs 15.2 oz    Alert awake  Mild distress needing supplemental oxygen on 7 L he is saturating 100%  Vision Baseline  Neck supple right IJ catheter mild JVD  Oral mucosa moist  bilateral air entry heard, mild diminished at bases no significant Rales  S1-S2 normal, mild systolic murmur  Abdomen is soft no tenderness  Culp with somewhat cloudy urine output  Extremities are fully mobile and there is no visible swelling noted  No skin cyanosis  Neurologically-generalized weak      Medical Decision Making       35 MINUTES SPENT BY ME on the date of service doing chart review, history, exam, documentation & further activities per the note.       Data     I have personally reviewed the following data over the past 24 hrs:    7.1  \   7.0 (L)   / 149 (L)     142 104 29.6 (H) /  88   3.9 26 3.79 (H) \     ALT: 43 AST: 28 AP: 53 TBILI: 0.4   ALB: 2.9 (L) TOT PROTEIN: 5.2 (L) LIPASE: N/A     Imaging results reviewed over the past 24 hrs:   Recent Results (from the past 24 hour(s))   XR Knee Right 3 Views    Narrative    EXAM: XR KNEE RIGHT 3 VIEWS  LOCATION: [facility]  DATE: 10/6/2023    INDICATION: Severe pain  COMPARISON: None.      Impression    IMPRESSION: Normal joint spaces and alignment. No fracture or joint effusion. Osteopenia. Atherosclerosis.

## 2023-10-07 NOTE — PLAN OF CARE
Goal Outcome Evaluation:      Plan of Care Reviewed With: patient, spouse        Up A-1 GB and walker. A&O x 4. BP soft, O2 stable on 6L Oxymizer. Tolerating current diet, good appetite. Culp patent with adequate output, BM this afternoon. Reports pain to right knee and hip, lidocaine patches and voltaren gel effective per pt report. Magnesium replaced IV. One unit RBC infused, no reaction noted. Up in chair for dinner. Plan of care ongoing, wife at bedside.

## 2023-10-08 ENCOUNTER — APPOINTMENT (OUTPATIENT)
Dept: PHYSICAL THERAPY | Facility: CLINIC | Age: 73
DRG: 871 | End: 2023-10-08
Attending: INTERNAL MEDICINE
Payer: COMMERCIAL

## 2023-10-08 ENCOUNTER — APPOINTMENT (OUTPATIENT)
Dept: OCCUPATIONAL THERAPY | Facility: CLINIC | Age: 73
DRG: 871 | End: 2023-10-08
Attending: INTERNAL MEDICINE
Payer: COMMERCIAL

## 2023-10-08 LAB
ALBUMIN SERPL BCG-MCNC: 2.9 G/DL (ref 3.5–5.2)
ALP SERPL-CCNC: 62 U/L (ref 35–129)
ALT SERPL W P-5'-P-CCNC: 39 U/L (ref 0–70)
ANION GAP SERPL CALCULATED.3IONS-SCNC: 13 MMOL/L (ref 7–15)
AST SERPL W P-5'-P-CCNC: 28 U/L (ref 0–45)
BILIRUB SERPL-MCNC: 0.4 MG/DL
BUN SERPL-MCNC: 42.6 MG/DL (ref 8–23)
CALCIUM SERPL-MCNC: 7.8 MG/DL (ref 8.8–10.2)
CHLORIDE SERPL-SCNC: 102 MMOL/L (ref 98–107)
CREAT SERPL-MCNC: 5.17 MG/DL (ref 0.51–1.17)
DEPRECATED HCO3 PLAS-SCNC: 25 MMOL/L (ref 22–29)
EGFRCR SERPLBLD CKD-EPI 2021: 11 ML/MIN/1.73M2
ERYTHROCYTE [DISTWIDTH] IN BLOOD BY AUTOMATED COUNT: 15.5 % (ref 10–15)
GLUCOSE SERPL-MCNC: 96 MG/DL (ref 70–99)
HCT VFR BLD AUTO: 24.3 % (ref 35–53)
HEMOCCULT STL QL: NEGATIVE
HGB BLD-MCNC: 7.9 G/DL (ref 11.7–17.7)
MAGNESIUM SERPL-MCNC: 2 MG/DL (ref 1.7–2.3)
MCH RBC QN AUTO: 31.9 PG (ref 26.5–33)
MCHC RBC AUTO-ENTMCNC: 32.5 G/DL (ref 31.5–36.5)
MCV RBC AUTO: 98 FL (ref 78–100)
PHOSPHATE SERPL-MCNC: 4.8 MG/DL (ref 2.5–4.5)
PLATELET # BLD AUTO: 155 10E3/UL (ref 150–450)
POTASSIUM SERPL-SCNC: 3.9 MMOL/L (ref 3.4–5.3)
PROT SERPL-MCNC: 5.4 G/DL (ref 6.4–8.3)
RBC # BLD AUTO: 2.48 10E6/UL (ref 3.8–5.9)
SODIUM SERPL-SCNC: 140 MMOL/L (ref 135–145)
WBC # BLD AUTO: 9 10E3/UL (ref 4–11)

## 2023-10-08 PROCEDURE — 97535 SELF CARE MNGMENT TRAINING: CPT | Mod: GO | Performed by: OCCUPATIONAL THERAPIST

## 2023-10-08 PROCEDURE — 97110 THERAPEUTIC EXERCISES: CPT | Mod: GP

## 2023-10-08 PROCEDURE — 250N000011 HC RX IP 250 OP 636: Mod: JZ | Performed by: HOSPITALIST

## 2023-10-08 PROCEDURE — 82272 OCCULT BLD FECES 1-3 TESTS: CPT | Performed by: HOSPITALIST

## 2023-10-08 PROCEDURE — 210N000001 HC R&B IMCU HEART CARE

## 2023-10-08 PROCEDURE — 99232 SBSQ HOSP IP/OBS MODERATE 35: CPT | Performed by: INTERNAL MEDICINE

## 2023-10-08 PROCEDURE — 250N000013 HC RX MED GY IP 250 OP 250 PS 637: Performed by: HOSPITALIST

## 2023-10-08 PROCEDURE — 250N000011 HC RX IP 250 OP 636: Mod: JZ | Performed by: INTERNAL MEDICINE

## 2023-10-08 PROCEDURE — 82040 ASSAY OF SERUM ALBUMIN: CPT | Performed by: INTERNAL MEDICINE

## 2023-10-08 PROCEDURE — 86704 HEP B CORE ANTIBODY TOTAL: CPT | Performed by: INTERNAL MEDICINE

## 2023-10-08 PROCEDURE — 99233 SBSQ HOSP IP/OBS HIGH 50: CPT | Performed by: HOSPITALIST

## 2023-10-08 PROCEDURE — C9113 INJ PANTOPRAZOLE SODIUM, VIA: HCPCS | Mod: JZ | Performed by: HOSPITALIST

## 2023-10-08 PROCEDURE — 250N000011 HC RX IP 250 OP 636: Performed by: HOSPITALIST

## 2023-10-08 PROCEDURE — 83735 ASSAY OF MAGNESIUM: CPT | Performed by: INTERNAL MEDICINE

## 2023-10-08 PROCEDURE — 85027 COMPLETE CBC AUTOMATED: CPT | Performed by: INTERNAL MEDICINE

## 2023-10-08 PROCEDURE — 250N000013 HC RX MED GY IP 250 OP 250 PS 637: Performed by: INTERNAL MEDICINE

## 2023-10-08 PROCEDURE — 97530 THERAPEUTIC ACTIVITIES: CPT | Mod: GP

## 2023-10-08 PROCEDURE — 84100 ASSAY OF PHOSPHORUS: CPT | Performed by: INTERNAL MEDICINE

## 2023-10-08 RX ADMIN — Medication 1 MG: at 08:39

## 2023-10-08 RX ADMIN — HEPARIN SODIUM 5000 UNITS: 5000 INJECTION, SOLUTION INTRAVENOUS; SUBCUTANEOUS at 21:17

## 2023-10-08 RX ADMIN — DICLOFENAC SODIUM 2 G: 10 GEL TOPICAL at 08:39

## 2023-10-08 RX ADMIN — DICLOFENAC SODIUM 2 G: 10 GEL TOPICAL at 13:30

## 2023-10-08 RX ADMIN — LIDOCAINE 1 PATCH: 560 PATCH PERCUTANEOUS; TOPICAL; TRANSDERMAL at 07:47

## 2023-10-08 RX ADMIN — ASPIRIN 81 MG: 81 TABLET, COATED ORAL at 08:39

## 2023-10-08 RX ADMIN — HYDRALAZINE HYDROCHLORIDE 10 MG: 10 TABLET ORAL at 22:05

## 2023-10-08 RX ADMIN — PANTOPRAZOLE SODIUM 40 MG: 40 INJECTION, POWDER, FOR SOLUTION INTRAVENOUS at 08:38

## 2023-10-08 RX ADMIN — FUROSEMIDE 80 MG: 10 INJECTION, SOLUTION INTRAMUSCULAR; INTRAVENOUS at 00:23

## 2023-10-08 RX ADMIN — DICLOFENAC SODIUM 2 G: 10 GEL TOPICAL at 22:05

## 2023-10-08 RX ADMIN — ATORVASTATIN CALCIUM 40 MG: 40 TABLET, FILM COATED ORAL at 21:18

## 2023-10-08 RX ADMIN — PANTOPRAZOLE SODIUM 40 MG: 40 INJECTION, POWDER, FOR SOLUTION INTRAVENOUS at 21:17

## 2023-10-08 RX ADMIN — HEPARIN SODIUM 5000 UNITS: 5000 INJECTION, SOLUTION INTRAVENOUS; SUBCUTANEOUS at 07:47

## 2023-10-08 RX ADMIN — FUROSEMIDE 80 MG: 10 INJECTION, SOLUTION INTRAMUSCULAR; INTRAVENOUS at 12:34

## 2023-10-08 RX ADMIN — METOPROLOL SUCCINATE 25 MG: 25 TABLET, EXTENDED RELEASE ORAL at 08:39

## 2023-10-08 RX ADMIN — DICLOFENAC SODIUM 2 G: 10 GEL TOPICAL at 17:49

## 2023-10-08 ASSESSMENT — ACTIVITIES OF DAILY LIVING (ADL)
ADLS_ACUITY_SCORE: 26
ADLS_ACUITY_SCORE: 30
ADLS_ACUITY_SCORE: 26
ADLS_ACUITY_SCORE: 26
ADLS_ACUITY_SCORE: 28
ADLS_ACUITY_SCORE: 30
ADLS_ACUITY_SCORE: 28
ADLS_ACUITY_SCORE: 28
ADLS_ACUITY_SCORE: 30
ADLS_ACUITY_SCORE: 28

## 2023-10-08 NOTE — PLAN OF CARE
Neuro: A&Ox4  Tele/Cardiac: SR  Resp: 6L oxymizer canula, RODRIGUEZ  Activity: A1 gb/walker  Pain: denies  Drips/IV: SL  GI/: guido placed for retention- good output overnight  Skin: penis wound  Diet: Diet: Room Service  Renal Diet (dialysis)     Test/Procedures: n/a  Plan: possible EGD monday

## 2023-10-08 NOTE — PROGRESS NOTES
[employer] [employer]    Medicine Progress Note - Hospitalist Service    Date of Admission:  9/27/2023    Assessment & Plan   Principal Problem:    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 (H)  Active Problems:    NSTEMI (non-ST elevated myocardial infarction) (H)    Coronary atherosclerosis    Hyperlipidemia    Acute systolic congestive heart failure - EF-30%    Acute respiratory failure with hypoxia (H)    HTN (hypertension)    Sepsis due to gram-negative UTI (H)    Acute on chronic anemia    Bilateral hydronephrosis    Thrombocytopenia (H24)    Moderate malnutrition (H24)      Mr. Fleming is a 73-year-old gentleman with a past medical history of hypertension, coronary artery disease, CKD stage III who initially presented to an outside hospital for shortness of breath and poor p.o. intake with some nausea and vomiting.  He was found to have a potassium of 7 and a creatinine of 14.6and a pH of 6.98..  He was initially given albuterol, 1 g of calcium gluconate and insulin for shifting for his hyperkalemia.  Nephrology was consulted and he was initiated on an emergent run of hemodialysis at the outside hospital.  Due to very difficult Culp catheter placement a urology consult was placed and a CT abdomen and pelvis discovered obstructive uropathy with bilateral hydronephrosis.  The CT also displayed colitis with bladder wall thickening.  Urine analysis was positive for infection and he was started on broad-spectrum antibiotics with vancomycin and Zosyn.  He was transferred to [facility] on 9/27/2023 for ongoing care of worsening renal failure, electrolyte abnormalities and a urinary tract infection causing sepsis.  During the hospital stay he was also found on echocardiogram a new LV systolic dysfunction [EF 30 to 35%] with wall motion abnormality suggesting LAD infarct, severe renal failure requiring hemodialysis, anemia,  thrombocytopenia   - The patient underwent urgent hemodialysis with removal of 3 L for flash pulmonary edema which led to significant improvement in his symptoms. He is also making some urine and is currently being diuresed per recommendations of nephrology.   -From cardiac standpoint we do not have much room in terms of blood pressure for heart failure therapy. We are going to continue aspirin, Lipitor, low-dose metoprolol and hydralazine. If blood pressure remains needed hydralazine can be discontinued.  Cardiology plans for outpatient follow-up and likely perform cardiac MRI with stress to assess viability in the LAD territory before sending him for coronary angiogram.     Acute on chronic renal failure requiring emergent hemodialysis- likely acute tubular necrosis due to sepsis. Creatinine prior to admission ranging between 1.8-3.3 over the course of 2022, no recent labs. Creatinine on admission 14.61 in the setting of encephalopathy, recent NSAID use, sepsis, UTI, and obstructive uropathy.  Most likely significant prerenal RICHARD,   Severe sepsis with lactic acidosis due to pyelonephritis and colitis   Hyperkalemia with ECG changes, resolved  Starvation ketoacidosis, resolved  Severe lactic acidosis due to sepsis, resolved  Metabolic acidosis due to renal failure   Hypocalcemia  Symptomatic anemia with anemia of chronic disease needing blood transfusion, hemodilution anemia from fluid overload-no acute loss seen  Thrombocytopenia again due to sepsis- resolved  Had last hemodialysis 9/29. Good urine output -although kidney functions are deteriorating with GFR going down still having overload -discussed with nephrology and plan is to increase his Lasix to 80 mg twice a day today and tentative plan for dialysis again tomorrow via temp line and then proceed with tunneled catheter in week.    Monitor urine output, renal function and electrolytes   Right IJ temporary dialysis catheter in place should he need future  dialysis  Nephrology initially plan to hold dialysis until middle of the week and evaluated patient regarding this.  However on on 10/4/2023 patient did develop progressively worsening anemia with hyperkalemia and had to undergo a session of dialysis.  Also received 1 unit packed RBCs for severe anemia with hemoglobin at 5.7.  Post dialysis and transfusion potassium did improve to 3 and hemoglobin improved to 8.5  He has symptomatic anemia chronic hemoglobin runs around 9, dipped down further and due to hypoxia and symptoms given blood transfusion as well  Received IV calcium gluconate 1 g for hypocalcemia.  Repeat BMP postdialysis showed resolved hyperkalemia with improved creatinine.  RRT was called on the patient on 10/6/2023 early morning at 430 for sudden onset shortness of breath with acute hypoxic respiratory failure.  Review RRT note for further details but essentially patient was found to have bilateral pleural effusions and was started on BiPAP with improvement with his respiratory failure.  Patient was started on IV vancomycin and Zosyn to cover for pneumonia.  But per my assessment of the chest x-ray on the patient this appears to be more of a flash pulmonary edema and fluid overload rather than infection.  Antibiotics were discontinued 10/6.  Currently no leukocytosis and is afebrile.  Nephrology informed of this episode and patient is undergoing emergent dialysis with removal of 3 L fluid.  Patient also underwent a trial of voiding on 10/5/2023 with removal of Culp catheter but failed this and continued to retain urine.  Culp catheter was replaced by urology.  Patient is a difficult placement for Culp.  Would recommend not removing it until he is over acute illness and might have to be discharged with Culp in place.     Obstructive uropathy  Moderate bilateral hydroureteronephrosis  Sepsis due to pyelonephritis  Hypospadias  History of urinary retention  CT abdomen and pelvis revealed bilateral  hydro ureteral nephrosis without ureteral obstruction.  Culp catheter was placed on 9/27/2023 and is helping to decompress the bladder presently.  Urine culture grew corynebacterium striatum.   Urology is consulted and per their recommendations we will continue the Culp catheter for bladder decompression.    Patient received 6 days of antibiotics with IV Zosyn and vancomycin.  So far the only positive culture was cornebacterium stratum in urine which is a regular skin pathogen.  Discussed with infectious disease.  No indication to continue IV antibiotics at this time.  Discontinued both Zosyn and vancomycin.  Follow up urine culture on 9/30/2023 shows no growth so far.  Culp was placed by urology for obstruction.  Removed it on 10/5/2023.  Failed a trial of voiding with retention after removal of Culp.  Replaced on 10/6/2023.  Should be discharged with Clup in place and outpatient follow-up with urology.  Patient is a very difficult placement for Culp.     Non-ST-elevation myocardial infarction   New moderate mitral valve regurgitation  Cardiomyopathy EF 30%  Suspected coronary artery disease   TTE from 9/28/2023 reveals worsening heart failure with an EF of 30 to 35% with a large area of hypokinesis and akinesis distal to the septum with only suspected large LAD infarct pattern, mitral valve regurgitation and elevated right ventricular systolic pressure.  Continue aspirin, metoprolol and hydralazine   Cardiology has plan for outpatient evaluation for possible ischemia once his renal function is stable to avoid further contrast LAD injury and with anemia in focus.  Medical management has been started  Reconsulted cardiology  in the setting of flash pulmonary edema and worsening respiratory distress likely from a combination of renal failure and heart failure.       Colitis with loose stool  Elevated liver transaminases   He and his wife describe flu like symptoms starting last week with nausea, vomiting,  decreased appetite, generalized fatigue and weakness.   INR was elevated at 1.8 and liver enzymes AST/ALT were elevated in the 100s but are now improving. Stool panel/ clostridium difficile negative.  Continue to monitor   LFTs have returned to normal.  INR improved to 1.18     Anemia status post packed red blood cells  Thrombocytopenia, acute due to sepsis resolved  Anemia chronic likely due to renal disease-no acute blood loss seen, also hemodilutional anemia due to fluid overload  -  Iron, B12 and folate ok.   Thrombocytopenia could also be due to sepsis  HIT work-up negative.  Continue to monitor H3  Transfuse for hemoglobin less than 7.  Patient received a unit of packed RBCs on 9/28/2023 when hemoglobin fell to 6.9.  1 unit of packed RBCs given on 10/4/2023 when hemoglobin dropped to 5.7.  Posttransfusion hemoglobin at 8.5.  In the setting of persistent drop in hemoglobin-no acute blood loss seen, possibly hemodilutional, stool Hemoccult ordered.     oral pantoprazole 40 daily.    GI consulted to evaluate for GI blood loss.  Initial plan was to proceed with EGD on 10/6/2023 but this has been held off now -We will reconsider this once patient is more stable.  ?  Question erythropoietin for nephrology     Generalized weakness and debility   Continue physcial therapy and occupational therapy     Right knee pain likely osteoarthritis  10/6/2023-Patient is endorsing significant discomfort with right knee.  X-ray -Normal joint spaces and alignment. No fracture or joint effusion. Osteopenia. Atherosclerosis.   -Lidocaine patch ordered.  Avoid NSAIDs.  Can use Tylenol.  -Monitor           Diet: Room Service  Renal Diet (dialysis)  Fluid restriction 1500 ML FLUID    DVT Prophylaxis: Heparin SQ  Culp Catheter: PRESENT, indication: Retention, Retention  Lines: PRESENT      PICC 09/27/23 Triple Lumen Right Brachial vein lateral Vascular Access-Site Assessment: WDL      Cardiac Monitoring: ACTIVE order. Indication:  Electrolyte Imbalance (24 hours)- Magnesium <1.3 mg/ml; Potassium < =2.8 or > 5.5 mg/ml  Code Status: Full Code      Clinically Significant Risk Factors              # Hypoalbuminemia: Lowest albumin = 2.3 g/dL at 10/4/2023  6:52 AM, will monitor as appropriate         # Hypertension: Noted on problem list    # Acute heart failure with reduced ejection fraction: last echo with EF <40% and receiving IV diuretics       # Overweight: Estimated body mass index is 25.57 kg/m  as calculated from the following:    Height as of this encounter: 1.829 m (6').    Weight as of this encounter: 85.5 kg (188 lb 8 oz).     # Moderate Malnutrition: based on nutrition assessment           Disposition Plan      Expected Discharge Date: 10/10/2023      Destination: other (comment) (Pt would like to return home with home care; he is not interested in TCU)  Discharge Comments: needs cabg          Pradip Arriaga MD  Hospitalist Service  [facility]  Securely message with [employer] (more info)  Text page via [employer] Paging/Directory   ______________________________________________________________________    Interval History   Wife at the bedside no significant overnight issues, creatinine trending up-discussed with nephrology and plan is to increase his Lasix to 80 mg twice a day today and tentative plan for dialysis again tomorrow via temp line and then proceed with tunneled catheter in week.      -Hemoglobin stable after transfusion no blood loss    Physical Exam   Vital Signs: Temp: 98.3  F (36.8  C) Temp src: Oral BP: 93/53 Pulse: 101   Resp: 14 SpO2: 96 % O2 Device: Nasal cannula Oxygen Delivery: 6 LPM  Weight: 188 lbs 8 oz    Alert awake  Mild distress needing supplemental oxygen on 6L he is saturating 100%  Vision Baseline  Neck supple right IJ catheter mild JVD  Oral mucosa moist  bilateral air entry heard, mild diminished at bases no significant Rales  S1-S2 normal, mild systolic murmur  Abdomen is soft no  tenderness  Culp with urine output  Extremities are fully mobile and there is 2+ visible swelling noted  No skin cyanosis  Neurologically-generalized weak      Medical Decision Making       35 MINUTES SPENT BY ME on the date of service doing chart review, history, exam, documentation & further activities per the note.      Data     I have personally reviewed the following data over the past 24 hrs:    9.0  \   7.9 (L)   / 155     140 102 42.6 (H) /  96   3.9 25 5.17 (H) \     ALT: 39 AST: 28 AP: 62 TBILI: 0.4   ALB: 2.9 (L) TOT PROTEIN: 5.4 (L) LIPASE: N/A     Imaging results reviewed over the past 24 hrs:   No results found for this or any previous visit (from the past 24 hour(s)).

## 2023-10-08 NOTE — PLAN OF CARE
Goal Outcome Evaluation:      Plan of Care Reviewed With: patient, spouse        Up A-1 GB and walker. A&O x 4. VSS, O2 stable on 2L NC. Reports pain to bilateral knees and right hip, lidocaine patch and voltaren gel effective. Culp patent with adequate output. Occult stool sample sent to lab, resulted negative. BM x 2 this shift. Tolerating current diet, good appetite, fluid restriction implemented. Up in chair for meals today. Plan of care ongoing, dialysis tomorrow per Nephrology. Wife at bedside.

## 2023-10-08 NOTE — PROGRESS NOTES
Notified provider about indwelling guido catheter discussed removal or continued need.    Did provider choose to remove indwelling guido catheter? No    Provider's guido indication for keeping indwelling guido catheter: Retention.    Is there an order for indwelling guido catheter? Yes    *If there is a plan to keep guido catheter in place at discharge daily notification with provider is not necessary, but please add a notation in the treatment team sticky note that the patient will be discharging with the catheter.

## 2023-10-08 NOTE — PROGRESS NOTES
Renal Medicine Progress Note            Assessment/Plan:     Assessment:  1) Acute Kidney injury, non-oliguric     Creatinine prior to admission ranging between 1.8-3.3 over the course of 2022, no recent labs.      Creatinine on admission 14.61 in the setting of encephalopathy, recent NSAID use, sepsis, UTI, and obstructive uropathy.  Most likely significant prerenal RICHARD, probable ATN at this point.  He did undergo dialysis at [facility] primarily for acidosis and severe hyperkalemia causing EKG changes.  HD 10/4 for hyperkalemia, 10/6 for hypervolemia (10/6 had worsening infiltrates on CXR) He is fragile with RICHARD on top of CKD, HFrEF.       Adequate urine output but interdialytic creatinine rise does not suggest any meaningful recovery to date. Expect will need prolonged recovery or will remain dialysis dependent. Will plan on HD tomorrow via temp line and then proceed with tunneled catheter in week.      2) Obstructive uropathy with Moderate bilateral hydroureteronephrosis on admission  Pyelonephritis  Hypospadias  History of urinary retention  Patient has history of urinary retention and UTIs in the past.  CT with bilateral hydroureteronephrosis and bladder wall thickening consistent with cystitis.  Culp placed by urology, initially yellow urine followed by milky cloudy urine consistent with UTI/pyelo-.   Trial of voiding not successful.       Other:  Severe diarrhea  Colitis, possibly infectious     Acute on chronic anemia  Hemoglobin 7.9 today. EGD deferred 10/6, possible to be done tomorrow.      Cardiomyopathy with reduced EF     Plan:  1) add on hep B core donato and am order for quant gold for anticipation outpatient dialysis unit admission needs  2) for now keep temp internal jugular line in, likely tunneled line this next wek  3) continue daily weights, I/O's.   4) HD tomorrow, orders placed. Can be cancelled if suggestion of a plateau or recovery based on labs.     Cristóbal Rutledge,  DO  [facility]  Office: [phone]  Cell: [phone]        Interval History:      Pt seen eating breakfast. Appetite good. Using nasal cannula oxygen, no CPAP.     10/7 I/O's 1020/1650 urine. Today 1175 urine output recorded.    BP's  systolic.          Medications and Allergies:      - MEDICATION INSTRUCTIONS for Dialysis Patients -   Does not apply See Admin Instructions    aspirin  81 mg Oral Daily    atorvastatin  40 mg Oral QPM    diclofenac  2 g Topical 4x Daily    furosemide  80 mg Intravenous Q12H    heparin ANTICOAGULANT  5,000 Units Subcutaneous Q12H    hydrALAZINE  10 mg Oral TID    lidocaine  1 patch Transdermal Q24H    metoprolol succinate ER  25 mg Oral Daily    multivitamin RENAL  1 mg Oral Daily    pantoprazole  40 mg Intravenous BID    sodium chloride (PF)  10-40 mL Intracatheter Q8H    sodium chloride (PF)  3 mL Intracatheter Q8H        Allergies   Allergen Reactions    Ciprofloxacin Rash    Other Drug Allergy (See Comments)      Cough Syrup Abstracted from [facility] Chart( Hydrobromide)            Physical Exam:   Vitals were reviewed  BP 98/57 (BP Location: Left arm)   Pulse 98   Temp 98.3  F (36.8  C) (Oral)   Resp 16   Ht 1.829 m (6')   Wt 85.5 kg (188 lb 8 oz)   SpO2 99%   BMI 25.57 kg/m      Wt Readings from Last 3 Encounters:   10/08/23 85.5 kg (188 lb 8 oz)   09/27/23 90.7 kg (200 lb)   05/27/22 104 kg (229 lb 3.2 oz)       Intake/Output Summary (Last 24 hours) at 10/8/2023 0840  Last data filed at 10/8/2023 0754  Gross per 24 hour   Intake 780 ml   Output 2125 ml   Net -1345 ml       GENERAL APPEARANCE: Pt in no distress  HEENT:  Eyes/ears/nose grossly normal, neck supple  RESP: lungs clear at present  CV: regular rate and rhythm, normal S1 S2, no murmur, click or rub   ABDOMEN: soft, nontender, bowel sounds normal  EXTREMITIES/SKIN: 1+ ankle edema dependently, no rashes or lesions           Data:     [facility]  Recent Labs   Lab 10/08/23  0602 10/07/23  0531 10/06/23  0519  10/06/23  0430 10/05/23  1422 10/05/23  0650    142 138  --   --  140   POTASSIUM 3.9 3.9 3.9  --  3.7 3.4   CHLORIDE 102 104 101  --   --  103   CHANA 7.8* 7.6* 7.5*  --   --  7.5*   CO2 25 26 21*  --   --  25   BUN 42.6* 29.6* 49.2*  --   --  43.2*   CR 5.17* 3.79* 4.63*  --   --  3.93*   GLC 96 88 94 100*  --  98     CBC  Recent Labs   Lab 10/08/23  0602 10/07/23  0531 10/06/23  0519 10/05/23  0650   WBC 9.0 7.1 8.8 7.8   HGB 7.9* 7.0* 7.6* 7.6*   HCT 24.3* 22.0* 22.9* 23.5*   MCV 98 99 97 96    149* 157 142*     Lab Results   Component Value Date    AST 28 10/08/2023    ALT 39 10/08/2023    ALKPHOS 62 10/08/2023    BILITOTAL 0.4 10/08/2023     Lab Results   Component Value Date    INR 1.18 (H) 10/05/2023       Attestation:  I have reviewed today's vital signs, notes, medications, labs and imaging.    DO [facility] - [facility]phrology  Office: [phone]  Cell: [phone]

## 2023-10-09 ENCOUNTER — ANESTHESIA EVENT (OUTPATIENT)
Dept: GASTROENTEROLOGY | Facility: CLINIC | Age: 73
DRG: 871 | End: 2023-10-09
Payer: COMMERCIAL

## 2023-10-09 ENCOUNTER — ANESTHESIA (OUTPATIENT)
Dept: GASTROENTEROLOGY | Facility: CLINIC | Age: 73
DRG: 871 | End: 2023-10-09
Payer: COMMERCIAL

## 2023-10-09 PROBLEM — N17.0 ACUTE KIDNEY FAILURE WITH TUBULAR NECROSIS (H): Status: ACTIVE | Noted: 2023-10-09

## 2023-10-09 LAB
ALBUMIN SERPL BCG-MCNC: 3 G/DL (ref 3.5–5.2)
ALP SERPL-CCNC: 54 U/L (ref 35–129)
ALT SERPL W P-5'-P-CCNC: 36 U/L (ref 0–70)
ANION GAP SERPL CALCULATED.3IONS-SCNC: 16 MMOL/L (ref 7–15)
AST SERPL W P-5'-P-CCNC: 23 U/L (ref 0–45)
BILIRUB SERPL-MCNC: 0.4 MG/DL
BUN SERPL-MCNC: 50.5 MG/DL (ref 8–23)
CALCIUM SERPL-MCNC: 7.9 MG/DL (ref 8.8–10.2)
CHLORIDE SERPL-SCNC: 102 MMOL/L (ref 98–107)
CREAT SERPL-MCNC: 6.07 MG/DL (ref 0.51–1.17)
DEPRECATED HCO3 PLAS-SCNC: 25 MMOL/L (ref 22–29)
EGFRCR SERPLBLD CKD-EPI 2021: 9 ML/MIN/1.73M2
ERYTHROCYTE [DISTWIDTH] IN BLOOD BY AUTOMATED COUNT: 15.3 % (ref 10–15)
FLEXIBLE SIGMOIDOSCOPY: NORMAL
GLUCOSE SERPL-MCNC: 92 MG/DL (ref 70–99)
HBV CORE AB SERPL QL IA: NONREACTIVE
HCT VFR BLD AUTO: 24.4 % (ref 35–53)
HGB BLD-MCNC: 7.9 G/DL (ref 11.7–17.7)
MAGNESIUM SERPL-MCNC: 2 MG/DL (ref 1.7–2.3)
MCH RBC QN AUTO: 32 PG (ref 26.5–33)
MCHC RBC AUTO-ENTMCNC: 32.4 G/DL (ref 31.5–36.5)
MCV RBC AUTO: 99 FL (ref 78–100)
PHOSPHATE SERPL-MCNC: 5.3 MG/DL (ref 2.5–4.5)
PLATELET # BLD AUTO: 162 10E3/UL (ref 150–450)
POTASSIUM SERPL-SCNC: 3.8 MMOL/L (ref 3.4–5.3)
PROT SERPL-MCNC: 5.4 G/DL (ref 6.4–8.3)
RBC # BLD AUTO: 2.47 10E6/UL (ref 3.8–5.9)
SODIUM SERPL-SCNC: 143 MMOL/L (ref 135–145)
UPPER GI ENDOSCOPY: NORMAL
WBC # BLD AUTO: 7.3 10E3/UL (ref 4–11)

## 2023-10-09 PROCEDURE — 83735 ASSAY OF MAGNESIUM: CPT | Performed by: INTERNAL MEDICINE

## 2023-10-09 PROCEDURE — 86481 TB AG RESPONSE T-CELL SUSP: CPT | Performed by: INTERNAL MEDICINE

## 2023-10-09 PROCEDURE — 99232 SBSQ HOSP IP/OBS MODERATE 35: CPT | Performed by: INTERNAL MEDICINE

## 2023-10-09 PROCEDURE — 250N000011 HC RX IP 250 OP 636: Mod: JZ | Performed by: HOSPITALIST

## 2023-10-09 PROCEDURE — 250N000013 HC RX MED GY IP 250 OP 250 PS 637: Performed by: HOSPITALIST

## 2023-10-09 PROCEDURE — 45331 SIGMOIDOSCOPY AND BIOPSY: CPT | Performed by: INTERNAL MEDICINE

## 2023-10-09 PROCEDURE — 0DB78ZX EXCISION OF STOMACH, PYLORUS, VIA NATURAL OR ARTIFICIAL OPENING ENDOSCOPIC, DIAGNOSTIC: ICD-10-PCS | Performed by: INTERNAL MEDICINE

## 2023-10-09 PROCEDURE — 80053 COMPREHEN METABOLIC PANEL: CPT | Performed by: INTERNAL MEDICINE

## 2023-10-09 PROCEDURE — 120N000013 HC R&B IMCU

## 2023-10-09 PROCEDURE — 43239 EGD BIOPSY SINGLE/MULTIPLE: CPT | Performed by: INTERNAL MEDICINE

## 2023-10-09 PROCEDURE — 250N000011 HC RX IP 250 OP 636: Mod: JZ | Performed by: INTERNAL MEDICINE

## 2023-10-09 PROCEDURE — 85014 HEMATOCRIT: CPT | Performed by: INTERNAL MEDICINE

## 2023-10-09 PROCEDURE — 0DBG8ZX EXCISION OF LEFT LARGE INTESTINE, VIA NATURAL OR ARTIFICIAL OPENING ENDOSCOPIC, DIAGNOSTIC: ICD-10-PCS | Performed by: INTERNAL MEDICINE

## 2023-10-09 PROCEDURE — 999N000010 HC STATISTIC ANES STAT CODE-CRNA PER MINUTE: Performed by: INTERNAL MEDICINE

## 2023-10-09 PROCEDURE — 250N000011 HC RX IP 250 OP 636: Performed by: HOSPITALIST

## 2023-10-09 PROCEDURE — 84100 ASSAY OF PHOSPHORUS: CPT | Performed by: INTERNAL MEDICINE

## 2023-10-09 PROCEDURE — 258N000003 HC RX IP 258 OP 636: Performed by: ANESTHESIOLOGY

## 2023-10-09 PROCEDURE — 88342 IMHCHEM/IMCYTCHM 1ST ANTB: CPT | Mod: TC | Performed by: INTERNAL MEDICINE

## 2023-10-09 PROCEDURE — 250N000009 HC RX 250: Performed by: ANESTHESIOLOGY

## 2023-10-09 PROCEDURE — 250N000011 HC RX IP 250 OP 636: Performed by: ANESTHESIOLOGY

## 2023-10-09 PROCEDURE — 88342 IMHCHEM/IMCYTCHM 1ST ANTB: CPT | Mod: 26 | Performed by: PATHOLOGY

## 2023-10-09 PROCEDURE — 250N000013 HC RX MED GY IP 250 OP 250 PS 637: Performed by: INTERNAL MEDICINE

## 2023-10-09 PROCEDURE — 258N000003 HC RX IP 258 OP 636: Performed by: INTERNAL MEDICINE

## 2023-10-09 PROCEDURE — 88305 TISSUE EXAM BY PATHOLOGIST: CPT | Mod: 26 | Performed by: PATHOLOGY

## 2023-10-09 PROCEDURE — C9113 INJ PANTOPRAZOLE SODIUM, VIA: HCPCS | Mod: JZ | Performed by: HOSPITALIST

## 2023-10-09 PROCEDURE — 370N000017 HC ANESTHESIA TECHNICAL FEE, PER MIN: Performed by: INTERNAL MEDICINE

## 2023-10-09 PROCEDURE — 90937 HEMODIALYSIS REPEATED EVAL: CPT

## 2023-10-09 RX ORDER — PROPOFOL 10 MG/ML
INJECTION, EMULSION INTRAVENOUS PRN
Status: DISCONTINUED | OUTPATIENT
Start: 2023-10-09 | End: 2023-10-09

## 2023-10-09 RX ORDER — PROPOFOL 10 MG/ML
INJECTION, EMULSION INTRAVENOUS CONTINUOUS PRN
Status: DISCONTINUED | OUTPATIENT
Start: 2023-10-09 | End: 2023-10-09

## 2023-10-09 RX ORDER — SODIUM CHLORIDE 9 MG/ML
INJECTION, SOLUTION INTRAVENOUS CONTINUOUS PRN
Status: DISCONTINUED | OUTPATIENT
Start: 2023-10-09 | End: 2023-10-09

## 2023-10-09 RX ORDER — ONDANSETRON 2 MG/ML
INJECTION INTRAMUSCULAR; INTRAVENOUS PRN
Status: DISCONTINUED | OUTPATIENT
Start: 2023-10-09 | End: 2023-10-09

## 2023-10-09 RX ORDER — LIDOCAINE HYDROCHLORIDE 20 MG/ML
INJECTION, SOLUTION INFILTRATION; PERINEURAL PRN
Status: DISCONTINUED | OUTPATIENT
Start: 2023-10-09 | End: 2023-10-09

## 2023-10-09 RX ADMIN — ASPIRIN 81 MG: 81 TABLET, COATED ORAL at 08:47

## 2023-10-09 RX ADMIN — ONDANSETRON 4 MG: 2 INJECTION INTRAMUSCULAR; INTRAVENOUS at 11:19

## 2023-10-09 RX ADMIN — Medication: at 16:35

## 2023-10-09 RX ADMIN — DICLOFENAC SODIUM 2 G: 10 GEL TOPICAL at 08:46

## 2023-10-09 RX ADMIN — FUROSEMIDE 80 MG: 10 INJECTION, SOLUTION INTRAMUSCULAR; INTRAVENOUS at 00:15

## 2023-10-09 RX ADMIN — HEPARIN SODIUM 5000 UNITS: 5000 INJECTION, SOLUTION INTRAVENOUS; SUBCUTANEOUS at 20:31

## 2023-10-09 RX ADMIN — ATORVASTATIN CALCIUM 40 MG: 40 TABLET, FILM COATED ORAL at 20:31

## 2023-10-09 RX ADMIN — TOPICAL ANESTHETIC 1 EACH: 200 SPRAY DENTAL; PERIODONTAL at 11:22

## 2023-10-09 RX ADMIN — PHENYLEPHRINE HYDROCHLORIDE 100 MCG: 10 INJECTION INTRAVENOUS at 11:27

## 2023-10-09 RX ADMIN — DICLOFENAC SODIUM 2 G: 10 GEL TOPICAL at 12:47

## 2023-10-09 RX ADMIN — PROPOFOL 30 MG: 10 INJECTION, EMULSION INTRAVENOUS at 11:22

## 2023-10-09 RX ADMIN — PANTOPRAZOLE SODIUM 40 MG: 40 INJECTION, POWDER, FOR SOLUTION INTRAVENOUS at 20:31

## 2023-10-09 RX ADMIN — Medication 1 MG: at 08:47

## 2023-10-09 RX ADMIN — PHENYLEPHRINE HYDROCHLORIDE 100 MCG: 10 INJECTION INTRAVENOUS at 11:33

## 2023-10-09 RX ADMIN — SODIUM CHLORIDE 250 ML: 9 INJECTION, SOLUTION INTRAVENOUS at 16:34

## 2023-10-09 RX ADMIN — PHENYLEPHRINE HYDROCHLORIDE 100 MCG: 10 INJECTION INTRAVENOUS at 11:39

## 2023-10-09 RX ADMIN — SODIUM CHLORIDE 300 ML: 9 INJECTION, SOLUTION INTRAVENOUS at 16:34

## 2023-10-09 RX ADMIN — PANTOPRAZOLE SODIUM 40 MG: 40 INJECTION, POWDER, FOR SOLUTION INTRAVENOUS at 08:47

## 2023-10-09 RX ADMIN — DICLOFENAC SODIUM 2 G: 10 GEL TOPICAL at 20:32

## 2023-10-09 RX ADMIN — HEPARIN SODIUM 5000 UNITS: 5000 INJECTION, SOLUTION INTRAVENOUS; SUBCUTANEOUS at 08:47

## 2023-10-09 RX ADMIN — ACETAMINOPHEN 650 MG: 325 TABLET, FILM COATED ORAL at 16:27

## 2023-10-09 RX ADMIN — PROPOFOL 125 MCG/KG/MIN: 10 INJECTION, EMULSION INTRAVENOUS at 11:22

## 2023-10-09 RX ADMIN — LIDOCAINE HYDROCHLORIDE 50 MG: 20 INJECTION, SOLUTION INFILTRATION; PERINEURAL at 11:22

## 2023-10-09 RX ADMIN — SODIUM CHLORIDE: 9 INJECTION, SOLUTION INTRAVENOUS at 11:18

## 2023-10-09 ASSESSMENT — LIFESTYLE VARIABLES: TOBACCO_USE: 1

## 2023-10-09 ASSESSMENT — ACTIVITIES OF DAILY LIVING (ADL)
ADLS_ACUITY_SCORE: 26

## 2023-10-09 NOTE — ANESTHESIA CARE TRANSFER NOTE
Patient: Bret Fleming    Procedure: Procedure(s):  Esophagoscopy, gastroscopy, duodenoscopy (EGD), combined  Sigmoidoscopy flexible       Diagnosis: Anemia [D64.9]  Diagnosis Additional Information: No value filed.    Anesthesia Type:   MAC     Note:    Oropharynx: oropharynx clear of all foreign objects and spontaneously breathing  Level of Consciousness: drowsy  Oxygen Supplementation: nasal cannula  Level of Supplemental Oxygen (L/min / FiO2): 2  Independent Airway: airway patency satisfactory and stable  Dentition: dentition unchanged  Vital Signs Stable: post-procedure vital signs reviewed and stable  Report to RN Given: handoff report given  Patient transferred to: Phase II  Comments: After procedure in [facility] [place] under monitored anesthesia care (MAC), patient exhibited spontaneous respirations, oxygen via nasal cannula with oxygen saturation maintained greater than 95%, patient brought to [facility] [place] recovery bay for postprocedure recovery, SpO2, NiBP, and EKG monitors and alarms on and functioning, report on patient's clinical status given to recovery-trained endoscopy RN, RN questions answered, oxygen tubing connected to wall O2 in recovery room.        Handoff Report: Identifed the Patient, Identified the Reponsible Provider, Reviewed the pertinent medical history, Discussed the surgical course, Reviewed Intra-OP anesthesia mangement and issues during anesthesia, Set expectations for post-procedure period and Allowed opportunity for questions and acknowledgement of understanding      Vitals:  Vitals Value Taken Time   BP 94/55    Temp     Pulse 80 10/09/23 1142   Resp 8 10/09/23 1142   SpO2 96 % 10/09/23 1142   Vitals shown include unvalidated device data.    Electronically Signed By: Christine Marie Volp Hodgkins, CRNA, APRN CRNA  October 9, 2023  11:43 AM

## 2023-10-09 NOTE — PROGRESS NOTES
Potassium   Date Value Ref Range Status   10/09/2023 3.8 3.4 - 5.3 mmol/L Final   05/27/2022 4.4 3.5 - 5.0 mmol/L Final     Hemoglobin   Date Value Ref Range Status   10/09/2023 7.9 (L) 11.7 - 17.7 g/dL Final     Comment:     Sex Specific Reference Ranges:    Female  11.7-15.7  g/dL  Male    13.3-17.7   g/dL       Creatinine   Date Value Ref Range Status   10/09/2023 6.07 (H) 0.51 - 1.17 mg/dL Final     Comment:     Male and Female  0-2 Months    0.31-0.88 mg/dL  2-12 Months   0.16-0.39 mg/dL  1-2 Years     0.18-0.35 mg/dL  3-4 Years     0.26-0.42 mg/dL  5-6 Years     0.29-0.47 mg/dL  7-8 Years     0.34-0.53 mg/dL  9-10 Years    0.33-0.64 mg/dL  11-12 Years   0.44-0.68 mg/dL  13-14 Years   0.46-0.77 mg/dL    Female  15 Years and older  0.51-0.95 mg/dL    Male  15 Years and older  0.67-1.17 mg/dL         Urea Nitrogen   Date Value Ref Range Status   10/09/2023 50.5 (H) 8.0 - 23.0 mg/dL Final   05/27/2022 49 (H) 8 - 28 mg/dL Final     Sodium   Date Value Ref Range Status   10/09/2023 143 135 - 145 mmol/L Final     Comment:     Reference intervals for this test were updated on 09/26/2023 to more accurately reflect our healthy population. There may be differences in the flagging of prior results with similar values performed with this method. Interpretation of those prior results can be made in the context of the updated reference intervals.      INR   Date Value Ref Range Status   10/05/2023 1.18 (H) 0.85 - 1.15 Final      Latest Reference Range & Units 09/28/23 16:59   Hep B Surface Agn Nonreactive  Nonreactive   Hepatitis B Surface Antibody Instrument Value <8.00 m[IU]/mL 0.39   Hepatitis B Surface Antibody  Nonreactive       DIALYSIS PROCEDURE NOTE  Hepatitis status of previous patient on machine log was checked and verified ok to use with this patients hepatitis status.  Patient dialyzed for 3.5 hrs. on a K3 bath with a net fluid removal of  2L.  A BFR of 350 ml/min was obtained via a CVC   The treatment plan was  discussed with Dr. Mantilla pretreatment.    Total heparin received during the treatment: 0 units.     Line flushed, clamped and capped with heparin 1:1000 1.4 mL (1400 units) per lumen    Meds  given: acetaminophen for mild pain   Complications: System clotted with 40 minutes left and patient with severe cramping 400ml saline given. Floor nurse to get an updated weight as it is possible that pt has found dry weight.    Person educated: patient. Knowledge base minimal. Barriers to learning: none. Educated on procedure via verbal mode. Patient verbalized understanding.     ICEBOAT? Timeout performed pre-treatment  I: Patient was identified using 2 identifiers  C:  Consent Signed Yes  E: Equipment preventative maintenance is current and dialysis delivery system OK to use  B: Hepatitis B Surface Antigen: negative; Draw Date: 9/28/23      Hepatitis B Surface Antibody: Susceptible; Draw Date: 9/28/23  O: Dialysis orders present and complete prior to treatment  A: Vascular access verified and assessed prior to treatment  T: Treatment was performed at a clinically appropriate time  ?: Patient was allowed to ask questions and address concerns prior to treatment  See Adult Hemodialysis flowsheet in [email or web address] for further details and post assessment.  Machine water alarm in place and functioning. Transducer pods intact and checked every 15min.   Pt returned via bed.  Chlorine/Chloramine water system checked every 4 hours.  Outpatient Dialysis at [facility]    Patient repositioned every 2 hours during the treatment.  Post treatment report given to Dayana Aleman RN

## 2023-10-09 NOTE — PROGRESS NOTES
[facility] Progress Note     Interval History:  Off of bipap, on 2L NC. Denies SOB/chest pain.   Will start dialysis today.   Hgb stable over last 24 hours. No overt bleeding. Reports x1 loose stool a day.     Physical Exam:    /64   Pulse 90   Temp 98.4  F (36.9  C) (Oral)   Resp 14   Ht 1.829 m (6')   Wt 83.8 kg (184 lb 11.2 oz)   SpO2 95%   BMI 25.05 kg/m    Temp (24hrs), Av.4  F (36.9  C), Min:98.1  F (36.7  C), Max:98.6  F (37  C)    Patient Vitals for the past 72 hrs:   Weight   10/09/23 0533 83.8 kg (184 lb 11.2 oz)   10/08/23 0231 85.5 kg (188 lb 8 oz)   10/06/23 1823 84.8 kg (186 lb 15.2 oz)       Intake/Output Summary (Last 24 hours) at 10/9/2023 0831  Last data filed at 10/9/2023 0500  Gross per 24 hour   Intake 680 ml   Output 2050 ml   Net -1370 ml     Constitutional: No acute distress  Cardiovascular: RRR, normal S1/S2  Respiratory: Effort normal, CTA bilaterally anteriorly   Abdomen: Soft, nondistended, nontender    Laboratory Data  Recent Labs   Lab Test 10/09/23  0543 10/08/23  0602 10/07/23  0531 10/06/23  0519 10/05/23  1450 23  0538 23  0449 23  2344   WBC 7.3 9.0 7.1   < >  --    < > 13.0* 9.3   HGB 7.9* 7.9* 7.0*   < >  --    < > 6.9* 7.1*   MCV 99 98 99   < >  --    < > 92 93    155 149*   < >  --    < > 82* 81*   INR  --   --   --   --  1.18*  --  1.58* 1.56*    < > = values in this interval not displayed.     Recent Labs   Lab Test 10/09/23  0543 10/08/23  0602 10/07/23  05    140 142   POTASSIUM 3.8 3.9 3.9   CHLORIDE 102 102 104   CO2 25 25 26   BUN 50.5* 42.6* 29.6*   CR 6.07* 5.17* 3.79*   ANIONGAP 16* 13 12   CHANA 7.9* 7.8* 7.6*     Recent Labs   Lab Test 10/09/23  0543 10/08/23  0602 10/07/23  0531 10/01/23  0635 23  1550 23  2344 23  1446 22  1859 22  20322  185   ALBUMIN 3.0* 2.9* 2.9*   < >  --    < >  --  4.5  --   --  3.0*   BILITOTAL 0.4 0.4 0.4   < >  --    < >  --  0.5  --   --  0.4    DBIL  --   --   --   --   --   --   --  0.24  --   --  0.2   ALT 36 39 43   < >  --    < >  --  100*  --   --  18   AST 23 28 28   < >  --    < >  --  182*  --   --  13   ALKPHOS 54 62 53   < >  --    < >  --  84  --   --  75   PROTEIN  --   --   --   --  100*  --  100*  --  100*   < >  --    LIPASE  --   --   --   --   --   --   --  405*  --   --  52    < > = values in this interval not displayed.       Imaging  EXAM: CT ABDOMEN PELVIS W/O CONTRAST  LOCATION: [facility]  DATE: 9/28/2023    IMPRESSION:   1.  Pancolonic wall thickening with pericolonic fat stranding most prominently involving the splenic flexure and descending colon. Findings suspicious for infectious colitis with differential including pseudomembranous (C. difficile) colitis.  2.  Moderate bilateral hydroureteronephrosis which extends down to the level of the urinary bladder which is diffusely thickened. Bladder wall thickening highly suspicious for cystitis. Recommend correlation with urinalysis. Culp catheter within the   urinary bladder which is decompressed.  3.  Distended gallbladder with multiple layering tiny stones but without other secondary evidence of acute cholecystitis. Gallbladder distention may relate to fasting state. Pancreas is somewhat atrophic, otherwise unremarkable without CT evidence of   pancreatitis.  4.  Small to moderate sized right pleural effusion with compressive atelectasis.     Assessment & Plan:  73 year old male who was admitted 9/27 with SOB, found to have renal failure, with obstructive uropathy, cardiomyopathy with reduced EF, NSTEMI, ketoacidosis, who we were asked to see regarding subacute to chronic anemia without overt bleeding. MCV and ferritin normal. There is a hx of NSAID use. Last colonoscopy per consultation note was 8 years ago. He has never had an EGD.     Initially on presentation, he had n/v. He was scheduled for an EGD 10/6, which was deferred after he developed acute  respiratory distress requiring BIPAP. He is on 2L NC now without SOB or chest pain.     CT scan on admission showed pancolitis. C. Diff and enteric pathogens negative.     Plan  -Keep NPO  -PPI BID  -EGD today with MAC anesthesia  --Given colitis on CT scan with negative stool studies, will also pursue flexible sigmoidoscopy today for further evaluation. Pt agreeable. Tap water prep given renal failure. Discussed with pt and RN.   -Further recommendations to come after EGD/Flexible sigmoidoscopy    -Monitor stools    Will discuss with Dr. Odessa Larsen, [facility]  Cell: [phone] until 12PM  Office: [phone]

## 2023-10-09 NOTE — ANESTHESIA POSTPROCEDURE EVALUATION
Patient: Bret Fleming    Procedure: Procedure(s):  Esophagoscopy, gastroscopy, duodenoscopy (EGD), combined  Sigmoidoscopy flexible       Anesthesia Type:  MAC    Note:     Postop Pain Control: Uneventful            Sign Out: Well controlled pain   PONV: No   Neuro/Psych: Uneventful            Sign Out: Acceptable/Baseline neuro status   Airway/Respiratory: Uneventful            Sign Out: Acceptable/Baseline resp. status   CV/Hemodynamics: Uneventful            Sign Out: Acceptable CV status   Other NRE: NONE   DID A NON-ROUTINE EVENT OCCUR?            Last vitals:  Vitals Value Taken Time   BP 98/61 10/09/23 1232   Temp     Pulse 91 10/09/23 1253   Resp 17 10/09/23 1253   SpO2 95 % 10/09/23 1253   Vitals shown include unvalidated device data.    Electronically Signed By: Pato Delgadillo MD  October 9, 2023  12:54 PM

## 2023-10-09 NOTE — PROGRESS NOTES
Renal Medicine Progress Note            Assessment/Plan:     Assessment:  1) Acute Kidney injury, non-oliguric     Creatinine prior to admission ranging between 1.8-3.3 over the course of 2022, no recent labs.      Creatinine on admission 14.61 in the setting of encephalopathy, recent NSAID use, sepsis, UTI, and obstructive uropathy.  Most likely significant prerenal RICHARD, probable ATN at this point.  He did undergo dialysis at [facility] primarily for acidosis and severe hyperkalemia causing EKG changes.  HD 10/4 for hyperkalemia, 10/6 for hypervolemia (10/6 had worsening infiltrates on CXR) He is fragile with RICHARD on top of CKD, HFrEF.       Near anephric rise of creatinine between dialysis despite excellent urine output.  Continue to monitor for recovery.  Dialysis today.     2) Obstructive uropathy with Moderate bilateral hydroureteronephrosis on admission  Pyelonephritis  Hypospadias  History of urinary retention  Patient has history of urinary retention and UTIs in the past.  CT with bilateral hydroureteronephrosis and bladder wall thickening consistent with cystitis.  Culp placed by urology, initially yellow urine followed by milky cloudy urine consistent with UTI/pyelo-.   Trial of voiding not successful.       Other:  Severe diarrhea  Colitis, possibly infectious     Acute on chronic anemia  Hemoglobin 7.9 , EGD suggestive of duodenal ulcer as possible source of bleeding.  -EPO with dialysis.     Cardiomyopathy with reduced EF     Plan:  -Dialysis today as ordered.  -Start working on placement for outpatient dialysis.  -Daily renal panel to monitor for recovery.  -We will need tunneled dialysis catheter this week.  We will plan on Wednesday if no signs of recovery.            Interval History:     No acute issues overnight.  Noted EGD/colonoscopy completed and showed multiple ulcers in duodenum.  Currently comfortable.  Denies any shortness of breath, chest pain.  Good amount of urine with IV Lasix.   Creatinine continues to rise sharply in between dialysis.           Medications and Allergies:      - MEDICATION INSTRUCTIONS for Dialysis Patients -   Does not apply See Admin Instructions    aspirin  81 mg Oral Daily    atorvastatin  40 mg Oral QPM    diclofenac  2 g Topical 4x Daily    furosemide  80 mg Intravenous Q12H    heparin ANTICOAGULANT  5,000 Units Subcutaneous Q12H    hydrALAZINE  10 mg Oral TID    lidocaine  1 patch Transdermal Q24H    metoprolol succinate ER  25 mg Oral Daily    multivitamin RENAL  1 mg Oral Daily    - MEDICATION INSTRUCTIONS -   Does not apply Once    pantoprazole  40 mg Intravenous BID    sodium chloride (PF)  10-40 mL Intracatheter Q8H    sodium chloride 0.9%  250 mL Intravenous Once in dialysis/CRRT    sodium chloride 0.9%  300 mL Hemodialysis Machine Once        Allergies   Allergen Reactions    Ciprofloxacin Rash    Other Drug Allergy (See Comments)      Cough Syrup Abstracted from [facility] Chart( Hydrobromide)            Physical Exam:   Vitals were reviewed  BP 98/61   Pulse 89   Temp 98.4  F (36.9  C) (Oral)   Resp 13   Ht 1.829 m (6')   Wt 83.5 kg (184 lb)   SpO2 95%   BMI 24.95 kg/m      Wt Readings from Last 3 Encounters:   10/09/23 83.5 kg (184 lb)   09/27/23 90.7 kg (200 lb)   05/27/22 104 kg (229 lb 3.2 oz)     GENERAL APPEARANCE: Pt in no distress  HEENT:  Eyes/ears/nose grossly normal, neck supple  RESP: lungs clear at present  CV: regular rate and rhythm, normal S1 S2, no murmur, click or rub   ABDOMEN: soft, nontender, bowel sounds normal  EXTREMITIES/SKIN: 1+ ankle edema dependently, no rashes or lesions           Data:     BMP  Recent Labs   Lab 10/09/23  0543 10/08/23  0602 10/07/23  0531 10/06/23  0519    140 142 138   POTASSIUM 3.8 3.9 3.9 3.9   CHLORIDE 102 102 104 101   CHANA 7.9* 7.8* 7.6* 7.5*   CO2 25 25 26 21*   BUN 50.5* 42.6* 29.6* 49.2*   CR 6.07* 5.17* 3.79* 4.63*   GLC 92 96 88 94       CBC  Recent Labs   Lab 10/09/23  0543 10/08/23  0602  10/07/23  0531 10/06/23  0519   WBC 7.3 9.0 7.1 8.8   HGB 7.9* 7.9* 7.0* 7.6*   HCT 24.4* 24.3* 22.0* 22.9*   MCV 99 98 99 97    155 149* 157       Lab Results   Component Value Date    AST 23 10/09/2023    ALT 36 10/09/2023    ALKPHOS 54 10/09/2023    BILITOTAL 0.4 10/09/2023     Lab Results   Component Value Date    INR 1.18 (H) 10/05/2023       Attestation:  I have reviewed today's vital signs, notes, medications, labs and imaging.  Kia Mantilla MD  [facility] - Nephrology   [phone]

## 2023-10-09 NOTE — PROGRESS NOTES
PRE-PROCEDURE NOTE    CHIEF COMPLAINT / REASON FOR PROCEDURE:  anemia    History and Physical Reviewed:  yes    PRE-SEDATION ASSESSMENT:    Lung Exam:  Normal  Heart Exam:  Normal  Mallampati Airway Classification:  2   Previous reaction to anesthesia/sedation:   No  Sedation plan based on assessment:  MAC  Comment(s):  risks explained, patient did not receive enema  ASA Classification:  3    IMPRESSION:  rule out upper GI bleeding source, rule out colitis    PLAN:  egd with flex sig if possible without enema    Karolyn Saxena MD

## 2023-10-09 NOTE — PLAN OF CARE
Pt stable over night, VSS, no c/o Chest pain or sob.  VSS, tele:SR.  Pt is very quiet and withdrawn.  HGB this AM is 7.9.   He is NPO for possible EGD and will go to dialysis today.  No new issues noted, plan of care is on going.

## 2023-10-09 NOTE — PLAN OF CARE
Neuro: A&Ox4  Tele/Cardiac: SR  Resp: 2L NC  Activity: A1 gb  Pain: denies.   Drips/IV: SL  GI/: guido for retention  Skin: penis wound.   Diet: Diet: Room Service  Renal Diet (dialysis)  Fluid restriction 1500 ML FLUID     Test/Procedures: dialysis 10/9, possible EGD 10/9  Plan: Dialysis and possible EGD

## 2023-10-09 NOTE — PROGRESS NOTES
A&O x4. Pt reports R knee pain, scheduled Voltaren and t pump ordered. VSS, on 1-2L NC. Metop and hydral held this am for dialysis today. Up w/ 1+ gb and walker. Tele: SR. PICC in R arm. Dialysis cath in r internal jugular. Culp patent. Alarms on. Pt transfering to C floor after EDG, report called, belongings sent to new room. Wife called and updated. Continue to monitor.

## 2023-10-09 NOTE — ANESTHESIA PREPROCEDURE EVALUATION
Anesthesia Pre-Procedure Evaluation    Patient: Bret Fleming   MRN: [id number] : 1950        Procedure : Procedure(s):  Esophagoscopy, gastroscopy, duodenoscopy (EGD), combined  Sigmoidoscopy flexible          Past Medical History:   Diagnosis Date    Acute myocardial infarction     Hyperlipidemia     Hypertension       Past Surgical History:   Procedure Laterality Date    IR CVC TUNNEL PLACEMENT > 5 YRS OF AGE  2023    PICC TRIPLE LUMEN PLACEMENT  2023      Allergies   Allergen Reactions    Ciprofloxacin Rash    Other Drug Allergy (See Comments)      Cough Syrup Abstracted from [facility] Chart( Hydrobromide)      Social History     Tobacco Use    Smoking status: Former     Types: Cigarettes     Quit date: 1995     Years since quittin.4    Smokeless tobacco: Never   Substance Use Topics    Alcohol use: Not on file      Wt Readings from Last 1 Encounters:   10/09/23 83.5 kg (184 lb)        Anesthesia Evaluation   Pt has had prior anesthetic.     No history of anesthetic complications       ROS/MED HX  ENT/Pulmonary:     (+)                tobacco use (Quit in ), Past use,                      Neurologic:       Cardiovascular:     (+) Dyslipidemia hypertension- -  CAD - past MI - -      CHF                     valvular problems/murmurs type: MR Mod MR.    Previous cardiac testing   Echo: Date: 23 Results:  nterpretation Summary  The left ventricle is borderline dilated. The visual ejection fraction is 30-35%. Large area of hypokinesis and akinesis distal 2/3 septum/apex/infero-apex/lat walls. Some wall thinning. Suspect large LAD infarct pattern but cannot exclude stress cardiomyopathy There is moderate (2+) mitral regurgitation. Right ventricular systolic pressure is elevated, consistent with mild to moderate pulmonary hypertension. The rhythm was sinus tachycardia.      Left Ventricle  The left ventricle is borderline dilated. There is normal left ventricular wall thickness. The  visual ejection fraction is 30-35%. Left ventricular diastolic function is indeterminate. There are regional wall motion abnormalities as specified. Large area of hypokinesis and akinesis distal 2/3 septum/apex/infero-apex/lat walls. Some wall thinning. Suspect large LAD infarct pattern but cannot exclude stress cardiomyopathy. There is no thrombus seen in the left ventricle.     Right Ventricle  The right ventricle is normal in size and function.     Atria  The left atrium is mildly dilated. Right atrial size is normal.     Mitral Valve  There is moderate (2+) mitral regurgitation.     Tricuspid Valve  The right ventricular systolic pressure is approximated at 45mmHg plus the  right atrial pressure. There is trace tricuspid regurgitation. Right  ventricular systolic pressure is elevated, consistent with mild to moderate  pulmonary hypertension. IVC diameter <2.1 cm collapsing >50% with sniff  suggests a normal RA pressure of 3 mmHg.     Aortic Valve  There is trivial trileaflet aortic sclerosis.     Pulmonic Valve  The pulmonic valve is not well seen, but is grossly normal.     Vessels  The aortic root is normal size.     Pericardium  The pericardium appears normal.     Rhythm  The rhythm was sinus tachycardia.    Stress Test:  Date: Results:    ECG Reviewed:  Date: Results:    Cath:  Date: Results:      METS/Exercise Tolerance:     Hematologic: Comments: H/o thrombocytopenia      Musculoskeletal:       GI/Hepatic:       Renal/Genitourinary:     (+) renal disease (Stage 3), type: CRI and ARF,            Endo:       Psychiatric/Substance Use:       Infectious Disease:       Malignancy:       Other:               OUTSIDE LABS:  CBC:   Lab Results   Component Value Date    WBC 7.3 10/09/2023    WBC 9.0 10/08/2023    HGB 7.9 (L) 10/09/2023    HGB 7.9 (L) 10/08/2023    HCT 24.4 (L) 10/09/2023    HCT 24.3 (L) 10/08/2023     10/09/2023     10/08/2023     BMP:   Lab Results   Component Value Date      10/09/2023     10/08/2023    POTASSIUM 3.8 10/09/2023    POTASSIUM 3.9 10/08/2023    CHLORIDE 102 10/09/2023    CHLORIDE 102 10/08/2023    CO2 25 10/09/2023    CO2 25 10/08/2023    BUN 50.5 (H) 10/09/2023    BUN 42.6 (H) 10/08/2023    CR 6.07 (H) 10/09/2023    CR 5.17 (H) 10/08/2023    GLC 92 10/09/2023    GLC 96 10/08/2023     COAGS:   Lab Results   Component Value Date    PTT 34 09/27/2023    INR 1.18 (H) 10/05/2023    FIBR 335 09/27/2023     POC: No results found for: BGM, HCG, HCGS  HEPATIC:   Lab Results   Component Value Date    ALBUMIN 3.0 (L) 10/09/2023    PROTTOTAL 5.4 (L) 10/09/2023    ALT 36 10/09/2023    AST 23 10/09/2023    ALKPHOS 54 10/09/2023    BILITOTAL 0.4 10/09/2023     OTHER:   Lab Results   Component Value Date    PH 7.49 (H) 10/06/2023    LACT 0.9 10/06/2023    CHANA 7.9 (L) 10/09/2023    PHOS 5.3 (H) 10/09/2023    MAG 2.0 10/09/2023    LIPASE 405 (H) 09/27/2023    TSH 2.70 09/27/2023    CRP 13.7 (H) 02/05/2022     (H) 02/05/2022       Anesthesia Plan    ASA Status:  3    NPO Status:  NPO Appropriate    Anesthesia Type: MAC.     - Reason for MAC: straight local not clinically adequate              Consents    Anesthesia Plan(s) and associated risks, benefits, and realistic alternatives discussed. Questions answered and patient/representative(s) expressed understanding.     - Discussed:     - Discussed with:  Patient            Postoperative Care            Comments:                Pato Delgadillo MD

## 2023-10-09 NOTE — PROGRESS NOTES
[facility] Brief Note    EGD and Colonoscopy Completed.    Findings:  - multiple ulcers in the duodenum, likely the source of the anemia, they appear to be healing  - Colon views largely obscured by stool, but mucosa that was seen appeared normal, no eivdence of colitis.    A/P:  - await biopsies to rule out H.Pylori and microscopic colitis    Recommend EGD and Colonoscopy in 2 months to make sure ulcers have healed and patient due for screening colon.    Karolyn Saxena MD  [facility]  [phone]

## 2023-10-09 NOTE — PROGRESS NOTES
1237Arrived in room from Endoscopy per cart, A&Ox4.  Stood and walked short distance to bed from cart with 1-2 assists.  Denies dizziness or lightheadedness. BP soft, latest 98/61.  Hospitalist paged regarding ordered 80mg IV lasix, ordered to hold dose.  Renal diet resumed.   1357 Down to dialysis on bed with [employer] RN and transport NA.  Report given to dialysis RN.  1815 Back to room from dialysis.

## 2023-10-10 ENCOUNTER — APPOINTMENT (OUTPATIENT)
Dept: PHYSICAL THERAPY | Facility: CLINIC | Age: 73
DRG: 871 | End: 2023-10-10
Attending: INTERNAL MEDICINE
Payer: COMMERCIAL

## 2023-10-10 ENCOUNTER — APPOINTMENT (OUTPATIENT)
Dept: OCCUPATIONAL THERAPY | Facility: CLINIC | Age: 73
DRG: 871 | End: 2023-10-10
Attending: INTERNAL MEDICINE
Payer: COMMERCIAL

## 2023-10-10 LAB
ALBUMIN SERPL BCG-MCNC: 2.7 G/DL (ref 3.5–5.2)
ALP SERPL-CCNC: 57 U/L (ref 35–129)
ALT SERPL W P-5'-P-CCNC: 33 U/L (ref 0–70)
ANION GAP SERPL CALCULATED.3IONS-SCNC: 13 MMOL/L (ref 7–15)
AST SERPL W P-5'-P-CCNC: 23 U/L (ref 0–45)
BILIRUB SERPL-MCNC: 0.4 MG/DL
BUN SERPL-MCNC: 29 MG/DL (ref 8–23)
CALCIUM SERPL-MCNC: 7.6 MG/DL (ref 8.8–10.2)
CHLORIDE SERPL-SCNC: 102 MMOL/L (ref 98–107)
CREAT SERPL-MCNC: 4.14 MG/DL (ref 0.51–1.17)
DEPRECATED HCO3 PLAS-SCNC: 26 MMOL/L (ref 22–29)
EGFRCR SERPLBLD CKD-EPI 2021: 14 ML/MIN/1.73M2
ERYTHROCYTE [DISTWIDTH] IN BLOOD BY AUTOMATED COUNT: 15.1 % (ref 10–15)
GAMMA INTERFERON BACKGROUND BLD IA-ACNC: 0.02 IU/ML
GLUCOSE BLDC GLUCOMTR-MCNC: 110 MG/DL (ref 70–99)
GLUCOSE SERPL-MCNC: 88 MG/DL (ref 70–99)
HCT VFR BLD AUTO: 24.2 % (ref 35–53)
HGB BLD-MCNC: 7.8 G/DL (ref 11.7–17.7)
M TB IFN-G BLD-IMP: NEGATIVE
M TB IFN-G CD4+ BCKGRND COR BLD-ACNC: 3.9 IU/ML
MAGNESIUM SERPL-MCNC: 1.7 MG/DL (ref 1.7–2.3)
MCH RBC QN AUTO: 32.4 PG (ref 26.5–33)
MCHC RBC AUTO-ENTMCNC: 32.2 G/DL (ref 31.5–36.5)
MCV RBC AUTO: 100 FL (ref 78–100)
MITOGEN IGNF BCKGRD COR BLD-ACNC: -0.01 IU/ML
MITOGEN IGNF BCKGRD COR BLD-ACNC: -0.01 IU/ML
PHOSPHATE SERPL-MCNC: 3.9 MG/DL (ref 2.5–4.5)
PLATELET # BLD AUTO: 178 10E3/UL (ref 150–450)
POTASSIUM SERPL-SCNC: 4.1 MMOL/L (ref 3.4–5.3)
PROT SERPL-MCNC: 5.2 G/DL (ref 6.4–8.3)
QUANTIFERON MITOGEN: 3.92 IU/ML
QUANTIFERON NIL TUBE: 0.02 IU/ML
QUANTIFERON TB1 TUBE: 0.01 IU/ML
QUANTIFERON TB2 TUBE: 0.01
RBC # BLD AUTO: 2.41 10E6/UL (ref 3.8–5.9)
SODIUM SERPL-SCNC: 141 MMOL/L (ref 135–145)
WBC # BLD AUTO: 6.1 10E3/UL (ref 4–11)

## 2023-10-10 PROCEDURE — C9113 INJ PANTOPRAZOLE SODIUM, VIA: HCPCS | Mod: JZ | Performed by: HOSPITALIST

## 2023-10-10 PROCEDURE — 97530 THERAPEUTIC ACTIVITIES: CPT | Mod: GP | Performed by: PHYSICAL THERAPIST

## 2023-10-10 PROCEDURE — 80053 COMPREHEN METABOLIC PANEL: CPT | Performed by: INTERNAL MEDICINE

## 2023-10-10 PROCEDURE — 250N000013 HC RX MED GY IP 250 OP 250 PS 637: Performed by: HOSPITALIST

## 2023-10-10 PROCEDURE — 99232 SBSQ HOSP IP/OBS MODERATE 35: CPT | Performed by: INTERNAL MEDICINE

## 2023-10-10 PROCEDURE — 250N000011 HC RX IP 250 OP 636: Performed by: HOSPITALIST

## 2023-10-10 PROCEDURE — 83735 ASSAY OF MAGNESIUM: CPT | Performed by: INTERNAL MEDICINE

## 2023-10-10 PROCEDURE — 250N000013 HC RX MED GY IP 250 OP 250 PS 637: Performed by: INTERNAL MEDICINE

## 2023-10-10 PROCEDURE — 84100 ASSAY OF PHOSPHORUS: CPT | Performed by: INTERNAL MEDICINE

## 2023-10-10 PROCEDURE — 97535 SELF CARE MNGMENT TRAINING: CPT | Mod: GO

## 2023-10-10 PROCEDURE — 120N000013 HC R&B IMCU

## 2023-10-10 PROCEDURE — 99207 PR NO BILLABLE SERVICE THIS VISIT: CPT | Performed by: INTERNAL MEDICINE

## 2023-10-10 PROCEDURE — 250N000011 HC RX IP 250 OP 636: Mod: JZ | Performed by: HOSPITALIST

## 2023-10-10 PROCEDURE — 85027 COMPLETE CBC AUTOMATED: CPT | Performed by: INTERNAL MEDICINE

## 2023-10-10 RX ORDER — PANTOPRAZOLE SODIUM 40 MG/1
40 TABLET, DELAYED RELEASE ORAL
Status: DISCONTINUED | OUTPATIENT
Start: 2023-10-10 | End: 2023-10-13 | Stop reason: HOSPADM

## 2023-10-10 RX ADMIN — ATORVASTATIN CALCIUM 40 MG: 40 TABLET, FILM COATED ORAL at 20:28

## 2023-10-10 RX ADMIN — PANTOPRAZOLE SODIUM 40 MG: 40 INJECTION, POWDER, FOR SOLUTION INTRAVENOUS at 08:29

## 2023-10-10 RX ADMIN — HEPARIN SODIUM 5000 UNITS: 5000 INJECTION, SOLUTION INTRAVENOUS; SUBCUTANEOUS at 20:28

## 2023-10-10 RX ADMIN — ASPIRIN 81 MG: 81 TABLET, COATED ORAL at 08:28

## 2023-10-10 RX ADMIN — Medication 1 MG: at 08:28

## 2023-10-10 RX ADMIN — DICLOFENAC SODIUM 2 G: 10 GEL TOPICAL at 17:03

## 2023-10-10 RX ADMIN — HEPARIN SODIUM 5000 UNITS: 5000 INJECTION, SOLUTION INTRAVENOUS; SUBCUTANEOUS at 08:29

## 2023-10-10 RX ADMIN — DICLOFENAC SODIUM 2 G: 10 GEL TOPICAL at 08:35

## 2023-10-10 RX ADMIN — DICLOFENAC SODIUM 2 G: 10 GEL TOPICAL at 20:27

## 2023-10-10 RX ADMIN — PANTOPRAZOLE SODIUM 40 MG: 40 TABLET, DELAYED RELEASE ORAL at 17:02

## 2023-10-10 RX ADMIN — DICLOFENAC SODIUM 2 G: 10 GEL TOPICAL at 12:15

## 2023-10-10 ASSESSMENT — ACTIVITIES OF DAILY LIVING (ADL)
ADLS_ACUITY_SCORE: 26
ADLS_ACUITY_SCORE: 29
ADLS_ACUITY_SCORE: 29
ADLS_ACUITY_SCORE: 26

## 2023-10-10 NOTE — PLAN OF CARE
Orientations: A&Ox4   Vitals/Pain: VSS x hypotension (SBP 80s-90s most of day), RA, LS diminished. Pain in BLE (knees)   Tele: SR   Lines/Drains: PICC R SL, R internal jugular (dialysis)   Skin/Wounds: Pale, scattered bruising. Blanchable redness @ buttocks. Penile meatus wound   GI/: Culp in place (discharge w/ it), BM x2. Renal diet, tolerating well. NPO @ midnight.   Labs: Abnormal/Trends: BUN (29.0), Crt (4.14), GFR (14), Calcium (7.6), Albumin (2.7), Hgb (7.8), hematocrit (24.2), RBC (2.41)  Electrolyte Replacement- K no replacement needed, AM check 10/11   Ambulation/Assist: Assist x1/SB assist   Sleep Quality: On/off overnight   Plan: Monitor I&Os, penile meatus wound. Noted mild blood w/ wiping (pt had colonoscopy). NPO @ midnight for dialysis cath placement pending renal labs 10/11 AM. Per hospitalsit note, needs cardiac MRI and stress test prior to further heart interventions. SW met w/ pt and pending dialysis needs affects discharge planning. GI following and recommend EDG and colonoscopy outpt in 2 mo. PT/OT saw pt, pt lightheaded and dizzy (orthostatic), pt worked w/ therapy at bedside. Holding laxis per MD.

## 2023-10-10 NOTE — PROGRESS NOTES
[facility]    Medicine Progress Note - Hospitalist Service    Date of Admission:  9/27/2023    Summary of stay:  Mr. Fleming is a 73-year-old gentleman with a past medical history of hypertension, coronary artery disease, CKD stage III who initially presented to an outside hospital for shortness of breath and poor p.o. intake with some nausea and vomiting.  He was found to have a potassium of 7 and a creatinine of 14.6and a pH of 6.98..  He was initially given albuterol, 1 g of calcium gluconate and insulin for shifting for his hyperkalemia.  Nephrology was consulted and he was initiated on an emergent run of hemodialysis at the outside hospital.  Due to very difficult Culp catheter placement a urology consult was placed and a CT abdomen and pelvis discovered obstructive uropathy with bilateral hydronephrosis.  The CT also displayed colitis with bladder wall thickening.  Urine analysis was positive for infection and he was started on broad-spectrum antibiotics with vancomycin and Zosyn.  He was transferred to [facility] on 9/27/2023 for ongoing care of worsening renal failure, electrolyte abnormalities and a urinary tract infection causing sepsis.  During the hospital stay he was also found on echocardiogram a new LV systolic dysfunction [EF 30 to 35%] with wall motion abnormality suggesting LAD infarct, severe renal failure requiring hemodialysis, anemia, thrombocytopenia   - The patient underwent urgent hemodialysis with removal of 3 L for flash pulmonary edema which led to significant improvement in his symptoms. He is also making some urine and is currently being diuresed per recommendations of nephrology.   -From cardiac standpoint we do not have much room in terms of blood pressure for heart failure therapy. We are going to continue aspirin, Lipitor, low-dose metoprolol and hydralazine. If blood pressure remains needed hydralazine can be discontinued.  Cardiology plans for  outpatient follow-up and likely perform cardiac MRI with stress to assess viability in the LAD territory before sending him for coronary angiogram.      Assessment & Plan     1. Acute Kidney injury, non-oliguric     Creatinine prior to admission ranging between 1.8-3.3 over the course of 2022, no recent labs.      Creatinine on admission 14.61 in the setting of encephalopathy, recent NSAID use, sepsis, UTI, and obstructive uropathy.  Most likely significant prerenal RICHARD, probable ATN at this point.  He did undergo dialysis at [facility] primarily for acidosis and severe hyperkalemia causing EKG changes.  HD 10/4 for hyperkalemia, 10/6 for hypervolemia (10/6 had worsening infiltrates on CXR) He is fragile with RICHARD on top of CKD, HFrEF.       Near anephric rise of creatinine between dialysis despite excellent urine output.  Continue to monitor for recovery.    Last dialysis 10/9/23 - has been slightly hypotensive since and is being monitored closely    Nephrology continuing to follow - appreciate   IV lasix has been held, at this time    -Starting to work on placement for outpatient dialysis.  -Daily renal panel to monitor for recovery.  -We will need tunneled dialysis catheter this week if no signs of recovery (will decide 10/11/23)       2. Obstructive uropathy with Moderate bilateral hydroureteronephrosis on admission  Pyelonephritis  Hypospadias  History of urinary retention  Patient has history of urinary retention and UTIs in the past.  CT with bilateral hydroureteronephrosis and bladder wall thickening consistent with cystitis.     Culp placed by urology, initially yellow urine followed by milky cloudy urine consistent with UTI/pyelo - Patient received 6 days of antibiotics with IV Zosyn and vancomycin.  So far the only positive culture was cornebacterium stratum in urine which is a regular skin pathogen.  Discussed with infectious disease earlier in stay.  No indication to continue IV antibiotics at this  time.  Discontinued both Zosyn and vancomycin.  urine culture from 9/30/23 with NO GROWTH    Culp was placed by urology for obstruction.  Removed it on 10/5/2023.  Failed a trial of voiding with retention after removal of Culp.  Replaced on 10/6/2023.  Should be discharged with Culp in place and outpatient follow-up with urology.  Patient is a very difficult placement for Culp.       3.   Non-ST-elevation myocardial infarction   New moderate mitral valve regurgitation  Cardiomyopathy EF 30% - echo 9/28/23  Suspected coronary artery disease   TTE from 9/28/2023 reveals worsening heart failure with an EF of 30 to 35% with a large area of hypokinesis and akinesis distal to the septum with only suspected large LAD infarct pattern, mitral valve regurgitation and elevated right ventricular systolic pressure.  Continue aspirin, metoprolol and hydralazine   Cardiology has plan for outpatient evaluation for possible ischemia once his renal function is stable to avoid further contrast LAD injury and with anemia in focus (per notes reviewed - likely cardiac MRI with stress)   Medical management to continue  Reconsulted cardiology (10/6/23) in the setting of flash pulmonary edema  - no new recommendations     4.  Colitis with loose stool - resolved  Elevated liver transaminases   He and his wife describe flu like symptoms starting last week with nausea, vomiting, decreased appetite, generalized fatigue and weakness.   INR was elevated at 1.8 and liver enzymes AST/ALT were elevated in the 100s but are now improving. Stool panel/ clostridium difficile negative.  Continue to monitor   LFTs have returned to normal.  INR improved to 1.18    5.  Acute on chronic anemia       Duodenal ulcers  Hemoglobin 7.9 , EGD (10/9/23) suggestive of duodenal ulcer as possible source of bleeding.  Flex sigmoidoscopy limited d/t stool burden (10/9/23)  -EPO with dialysis       Patient received a unit of packed RBCs on 9/28/2023 when hemoglobin  fell to 6.9.  1 unit of packed RBCs given on 10/4/2023 when hemoglobin dropped to 5.7.  Posttransfusion hemoglobin at 8.5.    Will continue with PPI bid, for now  Awaiting h pylori testing from biopsies taken during EGD    6. Right knee pain likely osteoarthritis  10/6/2023-Patient is endorsing significant discomfort with right knee.  X-ray -Normal joint spaces and alignment. No fracture or joint effusion. Osteopenia. Atherosclerosis.     He was asking me the results of his xrays from 10/6/23 today (10/10/23) - reviewed with him and his family    -Lidocaine patch ordered.  Avoid NSAIDs.  Can use Tylenol.  -discussed further outpt work-up with ORTHO after hospital d/c       Medical Decision Making       48 MINUTES SPENT BY ME on the date of service doing chart review, history, exam, documentation & further activities per the note.        PPE Worn:  Mask, gloves     Diet: Room Service  Fluid restriction 1500 ML FLUID  Renal Diet (dialysis)    DVT Prophylaxis: Heparin SQ  Culp Catheter: PRESENT, indication: Retention, Retention  Lines: PRESENT      PICC 09/27/23 Triple Lumen Right Brachial vein lateral Vascular Access-Site Assessment: WDL  CVC Double Lumen Right Internal jugular Non - tunneled-Site Assessment: WDL      Cardiac Monitoring: ACTIVE order. Indication: Electrolyte Imbalance (24 hours)- Magnesium <1.3 mg/ml; Potassium < =2.8 or > 5.5 mg/ml  Code Status: Full Code      Clinically Significant Risk Factors              # Hypoalbuminemia: Lowest albumin = 2.3 g/dL at 10/4/2023  6:52 AM, will monitor as appropriate     # Hypertension: Noted on problem list  # Acute heart failure with reduced ejection fraction: last echo with EF <40% and receiving IV diuretics        # Moderate Malnutrition: based on nutrition assessment           Disposition Plan     Expected Discharge Date: 10/12/2023      Destination: other (comment) (Pt would like to return home with home care; he is not interested in TCU)  Discharge  "Comments: needs cabg          Zohra Isaac DO  Hospitalist Service  [facility]  Securely message with [employer] (more info)  Text page via [employer] Paging/Directory   ______________________________________________________________________    Interval History   Seen with wife and daughter at bedside.  Looking for HIPAA form to sign --waiting for SW.      SBP have been \"softer\" this am.     Denies lightheadedness or dizziness when up out of bed.  No CP, HA, N/V or F/C.  Felt weak post-dialysis treatment 10/9/23.  No further loose stools reported.  No epigastric pain today.    Hoping that his kidneys will recover and that he will not need long-term dialysis.    Right knee still bothersome at times - feels that he is not ambulating enough while in the hospital and that it is \"stiffening up\"    Physical Exam   Vital Signs: Temp: 98.3  F (36.8  C) Temp src: Oral BP: 91/52 Pulse: 95   Resp: 19 SpO2: 98 % O2 Device: Nasal cannula Oxygen Delivery: 2 LPM  Weight: 181 lbs 11.2 oz    GEN:  Alert, oriented x 3, appears comfortable, no overt respiratory distress.  HEENT:  Normocephalic/atraumatic, no scleral icterus, no nasal discharge, mouth and membranes appear moist.  CV:  Regular rate and rhythm, no clear loud murmur or JVD.  S1 + S2 noted, no S3 or S4.  LUNGS:  Clear to auscultation ant/lat bilaterally without rales/rhonchi/wheezing/retractions.  Symmetric chest rise on inhalation noted.  ABD:  Active bowel sounds, soft, non-tender/not significantly distended.  No rebound/guarding/rigidity.  EXT:  No pretibial edema.  No cyanosis.    No clear joint effusion to either knee on exam  No clear joint laxity to testing of the right knee to valgus/varus testing  SKIN:  Dry to touch, no exanthems noted in the visualized areas.    Medications    - MEDICATION INSTRUCTIONS -      - MEDICATION INSTRUCTIONS -        - MEDICATION INSTRUCTIONS for Dialysis Patients -   Does not apply See Admin Instructions "    aspirin  81 mg Oral Daily    atorvastatin  40 mg Oral QPM    diclofenac  2 g Topical 4x Daily    [Held by provider] furosemide  80 mg Intravenous Q12H    heparin ANTICOAGULANT  5,000 Units Subcutaneous Q12H    hydrALAZINE  10 mg Oral TID    lidocaine  1 patch Transdermal Q24H    metoprolol succinate ER  25 mg Oral Daily    multivitamin RENAL  1 mg Oral Daily    pantoprazole  40 mg Intravenous BID    sodium chloride (PF)  10-40 mL Intracatheter Q8H       Data     Labs and Imaging results below reviewed today.  Recent Labs   Lab 10/10/23  0557 10/09/23  0543 10/08/23  0602   WBC 6.1 7.3 9.0   HGB 7.8* 7.9* 7.9*   HCT 24.2* 24.4* 24.3*    99 98    162 155     Recent Labs   Lab 10/10/23  1132 10/10/23  0557 10/09/23  0543 10/08/23  0602   NA  --  141 143 140   POTASSIUM  --  4.1 3.8 3.9   CHLORIDE  --  102 102 102   CO2  --  26 25 25   ANIONGAP  --  13 16* 13   * 88 92 96   BUN  --  29.0* 50.5* 42.6*   CR  --  4.14* 6.07* 5.17*   GFRESTIMATED  --  14* 9* 11*   CHANA  --  7.6* 7.9* 7.8*     7-Day Micro Results       Collected Updated Procedure Result Status      10/09/2023 0543 10/10/2023 1002 Quantiferon TB Gold Plus Grey Tube [[id number]]   Blood from Arm, Right    Final result Component Value Units   Quantiferon Nil Tube 0.02 IU/mL            10/09/2023 0543 10/10/2023 1001 Quantiferon TB Gold Plus Green Tube [[id number]]   Blood from Arm, Right    Final result Component Value Units   Quantiferon TB1 Tube 0.01 IU/mL            10/09/2023 0543 10/10/2023 1001 Quantiferon TB Gold Plus Yellow Tube [[id number]]   Blood from Arm, Right    Final result Component Value   Quantiferon TB2 Tube 0.01            10/09/2023 0543 10/10/2023 1000 Quantiferon TB Gold Plus Purple Tube [[id number]]   Blood from Arm, Right    Final result Component Value Units   Quantiferon Mitogen 3.92 IU/mL            10/09/2023 0543 10/10/2023 1049 Quantiferon TB Gold Plus [[id number]]   Blood from Arm, Right    Final  result Component Value Units   Quantiferon-TB Gold Plus Negative    No interferon gamma response to M.tuberculosis antigens was detected. Infection with M.tuberculosis is unlikely, however a single negative result does not exclude infection. In patients at high risk for infection, a second test should be considered in accordance with the 2017 ATS/IDSA/CDC Clinical Pract  ice Guidelines for Diagnosis of Tuberculosis in Adults and Children    TB1 Ag minus Nil Value -0.01 IU/mL   TB2 Ag minus Nil Value -0.01 IU/mL   Mitogen minus Nil Result 3.90 IU/mL   Nil Result 0.02 IU/mL            10/06/2023 1846 10/10/2023 0032 Blood Culture Line, venous [[id number]]   Blood from Line, venous    Preliminary result Component Value   Culture No growth after 3 days  [P]                10/06/2023 0900 10/10/2023 1316 Blood Culture Hand, Left [[id number]]    Blood from Hand, Left    Preliminary result Component Value   Culture No growth after 4 days  [P]                10/06/2023 0544 10/06/2023 0649 Symptomatic Influenza A/B, RSV, & SARS-CoV2 PCR (COVID-19) Nose [[id number]]    Swab from Nose    Final result Component Value   Influenza A PCR Negative   Influenza B PCR Negative   RSV PCR Negative   SARS CoV2 PCR Negative   NEGATIVE: SARS-CoV-2 (COVID-19) RNA not detected, presumed negative.            10/06/2023 0544 10/06/2023 0956 MRSA MSSA PCR, Nasal Swab [[id number]]    Swab from Nares, Bilateral    Final result Component Value   MRSA Target DNA Negative   SA Target DNA Negative                  Recent Labs   Lab 10/10/23  1132 10/10/23  0557 10/09/23  0543 10/08/23  0602   NA  --  141 143 140   POTASSIUM  --  4.1 3.8 3.9   CHLORIDE  --  102 102 102   CO2  --  26 25 25   ANIONGAP  --  13 16* 13   * 88 92 96   BUN  --  29.0* 50.5* 42.6*   CR  --  4.14* 6.07* 5.17*   GFRESTIMATED  --  14* 9* 11*   CHANA  --  7.6* 7.9* 7.8*   MAG  --  1.7 2.0 2.0   PHOS  --  3.9 5.3* 4.8*   PROTTOTAL  --  5.2* 5.4* 5.4*   ALBUMIN  --   2.7* 3.0* 2.9*   BILITOTAL  --  0.4 0.4 0.4   ALKPHOS  --  57 54 62   AST  --  23 23 28   ALT  --  33 36 39       No results found for this or any previous visit (from the past 24 hour(s)).

## 2023-10-10 NOTE — PROGRESS NOTES
Care Management Follow Up    Length of Stay (days): 13    Expected Discharge Date: 10/15/2023     Concerns to be Addressed: discharge planning     Patient plan of care discussed at interdisciplinary rounds: Yes    Anticipated Discharge Disposition: Other (Comments) (Pt would like to return home with home care; he is not interested in TCU)     Anticipated Discharge Services: None   Anticipated Discharge DME:  None    Patient/family educated on Medicare website which has current facility and service quality ratings:  N/A  Education Provided on the Discharge Plan:  Provided  Patient/Family in Agreement with the Plan: other (see comments) (Pt would like to return home with home care; he is not interested in TCU)    Referrals Placed by CM/SW:    Private pay costs discussed: Not applicable    Additional Information:  In speaking with the RNCC this morning, patient may need dialysis on discharge. Because of this facilities are declining due to the complexity of care once patient is ready for discharge. Patient will need plan for dialysis in order for him to be accepted if this is needed. At this time patient's expected discharge date is 10/15. SW to follow for placement once needs are identified to plan around for discharge.    Mushtaq Ac [facility]  Care Management

## 2023-10-10 NOTE — PROGRESS NOTES
[facility] Progress Note     Interval History:  He reports a small amount of blood with wiping. No melena. No abd pain/n/v.     Physical Exam:    BP 90/61   Pulse 96   Temp 97.9  F (36.6  C) (Oral)   Resp 13   Ht 1.829 m (6')   Wt 82.4 kg (181 lb 11.2 oz)   SpO2 96%   BMI 24.64 kg/m    Temp (24hrs), Av.4  F (36.9  C), Min:98.1  F (36.7  C), Max:98.6  F (37  C)    Patient Vitals for the past 72 hrs:   Weight   10/10/23 0500 82.4 kg (181 lb 11.2 oz)   10/09/23 1000 83.5 kg (184 lb)   10/09/23 0533 83.8 kg (184 lb 11.2 oz)   10/08/23 0231 85.5 kg (188 lb 8 oz)         Intake/Output Summary (Last 24 hours) at 10/9/2023 0831  Last data filed at 10/9/2023 0500  Gross per 24 hour   Intake 680 ml   Output 2050 ml   Net -1370 ml     Constitutional: No acute distress  Cardiovascular: RRR, normal S1/S2  Respiratory: Effort normal, CTA bilaterally anteriorly   Abdomen: Soft, nondistended, nontender    Laboratory Data  Recent Labs   Lab Test 10/10/23  0557 10/09/23  0543 10/08/23  0602 10/06/23  0519 10/05/23  1450 23  0538 23  0449 23  2344   WBC 6.1 7.3 9.0   < >  --    < > 13.0* 9.3   HGB 7.8* 7.9* 7.9*   < >  --    < > 6.9* 7.1*    99 98   < >  --    < > 92 93    162 155   < >  --    < > 82* 81*   INR  --   --   --   --  1.18*  --  1.58* 1.56*    < > = values in this interval not displayed.       Recent Labs   Lab Test 10/10/23  0557 10/09/23  0543 10/08/23  0602    143 140   POTASSIUM 4.1 3.8 3.9   CHLORIDE 102 102 102   CO2 26 25 25   BUN 29.0* 50.5* 42.6*   CR 4.14* 6.07* 5.17*   ANIONGAP 13 16* 13   CHANA 7.6* 7.9* 7.8*       Recent Labs   Lab Test 10/10/23  0557 10/09/23  0543 10/08/23  0602 10/01/23  0635 23  1550 23  2344 23  1446 22  1859 22   ALBUMIN 2.7* 3.0* 2.9*   < >  --    < >  --  4.5  --   --  3.0*   BILITOTAL 0.4 0.4 0.4   < >  --    < >  --  0.5  --   --  0.4   DBIL  --   --   --   --   --   --   --   0.24  --   --  0.2   ALT 33 36 39   < >  --    < >  --  100*  --   --  18   AST 23 23 28   < >  --    < >  --  182*  --   --  13   ALKPHOS 57 54 62   < >  --    < >  --  84  --   --  75   PROTEIN  --   --   --   --  100*  --  100*  --  100*   < >  --    LIPASE  --   --   --   --   --   --   --  405*  --   --  52    < > = values in this interval not displayed.         Imaging  EXAM: CT ABDOMEN PELVIS W/O CONTRAST  LOCATION: [facility]  DATE: 9/28/2023    IMPRESSION:   1.  Pancolonic wall thickening with pericolonic fat stranding most prominently involving the splenic flexure and descending colon. Findings suspicious for infectious colitis with differential including pseudomembranous (C. difficile) colitis.  2.  Moderate bilateral hydroureteronephrosis which extends down to the level of the urinary bladder which is diffusely thickened. Bladder wall thickening highly suspicious for cystitis. Recommend correlation with urinalysis. Culp catheter within the   urinary bladder which is decompressed.  3.  Distended gallbladder with multiple layering tiny stones but without other secondary evidence of acute cholecystitis. Gallbladder distention may relate to fasting state. Pancreas is somewhat atrophic, otherwise unremarkable without CT evidence of   pancreatitis.  4.  Small to moderate sized right pleural effusion with compressive atelectasis.     Assessment & Plan:  73 year old male who was admitted 9/27 with SOB, found to have renal failure requiring dialysis, with obstructive uropathy, cardiomyopathy with reduced EF, NSTEMI, ketoacidosis, who we were asked to see regarding subacute to chronic anemia without overt bleeding. MCV and ferritin normal. There is a hx of NSAID use. Last colonoscopy per consultation note was 8 years ago.     CT scan on admission showed pancolitis. C. Diff and enteric pathogens negative.     EGD 10/9 showed multiple ulcers in the duodenum, likely the source of the  anemia, they appeared to be healing  Flexible sigmoidoscopy 10/9: largely obscured by stool, but mucosa that was seen appeared normal, no evidence of colitis. Path obtained to rule out microscopic colitis.     Plan  -ADAT   -PPI BID for 2 months, daily thereafter   -Awaiting path   -Recommend EGD and Colonoscopy in 2 months to make sure ulcers have healed and patient due for screening colon.  -Monitor stools  -Will peripherally follow; awaiting path     Will discuss with Dr. Odessa Larsen, [facility]  Cell: [phone] until 12PM  Office: [phone]

## 2023-10-10 NOTE — PLAN OF CARE
Goal Outcome Evaluation:    [id number]  COGNITION/MENTATION: A/O x 4   NEURO/CMS: baseline numbness to BLE   ABNL. VITALS: Soft BP   CARDIAC/TELE: SR w/inverted T wave   RESPIRATORY: On RA, LS clear   GI: BS+, incontinent of stool at times; 1 Large BM w/no blood however when patient wiped the tissue had small streak of blood   : Culp for retention, on hemodialysis   PAIN: Mild pain to R knee, voltaren gel applied   SKIN: scattered bruising: BUE and abdomen   DRAINS/LINES: PADMAJA triple lumen picc, cvc for dialysis R IJ   ACTIVITY: A of 1   DIET: Renal diet, 1500cc fluid restriction

## 2023-10-10 NOTE — PROVIDER NOTIFICATION
Contacted Dr. Man on call regarding soft BPs with Lasix 80mg due now.  Current BP is 93/56, afternoon dose was held due to hypotension per provider.  Pt reports feeling asymptomatic, other VSS.  Per MD, hold Lasix until assessed by Nephrology.   Problem: Wound:  Goal: Will show signs of wound healing; wound closure and no evidence of infection  Will show signs of wound healing; wound closure and no evidence of infection   Outcome: Ongoing      Problem: Venous:  Goal: Signs of wound healing will improve  Signs of wound healing will improve   Outcome: Ongoing      Problem: Compression therapy:  Goal: Will be free from complications associated with compression therapy  Will be free from complications associated with compression therapy   Outcome: Ongoing

## 2023-10-10 NOTE — PROGRESS NOTES
Renal Medicine Progress Note            Assessment/Plan:     Assessment:  1) Acute Kidney injury, non-oliguric     Creatinine prior to admission ranging between 1.8-3.3 over the course of 2022, no recent labs.      Creatinine on admission 14.61 in the setting of encephalopathy, recent NSAID use, sepsis, UTI, and obstructive uropathy.  Most likely significant prerenal RICHARD, probable ATN at this point.  He did undergo dialysis at [facility] primarily for acidosis and severe hyperkalemia causing EKG changes.  HD 10/4 for hyperkalemia, 10/6 for hypervolemia (10/6 had worsening infiltrates on CXR) He is fragile with RICHARD on top of CKD, HFrEF.       Monitor daily for recovery.  Will check creatinine in the morning to see interdialytic rise.  If no signs of improving clearance, plan to put tunneled dialysis catheter tomorrow.       2) Obstructive uropathy with Moderate bilateral hydroureteronephrosis on admission  Pyelonephritis  Hypospadias  History of urinary retention  Patient has history of urinary retention and UTIs in the past.  CT with bilateral hydroureteronephrosis and bladder wall thickening consistent with cystitis.  Culp placed by urology, initially yellow urine followed by milky cloudy urine consistent with UTI/pyelo-.   Trial of voiding not successful.       Other:  Severe diarrhea  Colitis, possibly infectious     Acute on chronic anemia  Hemoglobin 7.9 , EGD suggestive of duodenal ulcer as possible source of bleeding.  -EPO with dialysis.     Cardiomyopathy with reduced EF     Plan:  -Monitor daily for recovery.  Will check creatinine in the morning to see interdialytic rise.  If no signs of improving clearance, plan to put tunneled dialysis catheter tomorrow.  Keep n.p.o. at midnight  - aware to find outpatient dialysis spot  -Daily renal panel to monitor for recovery.            Interval History:     No acute issues overnight.    Dialysis yesterday.  700 cc UF.  Started cramping towards the  end.  Otherwise no issues.  Continues to have good urine output.  Creatinine down with dialysis to 4.1  Blood pressure on the lower side.           Medications and Allergies:      - MEDICATION INSTRUCTIONS for Dialysis Patients -   Does not apply See Admin Instructions    aspirin  81 mg Oral Daily    atorvastatin  40 mg Oral QPM    diclofenac  2 g Topical 4x Daily    [Held by provider] furosemide  80 mg Intravenous Q12H    heparin ANTICOAGULANT  5,000 Units Subcutaneous Q12H    hydrALAZINE  10 mg Oral TID    lidocaine  1 patch Transdermal Q24H    metoprolol succinate ER  25 mg Oral Daily    multivitamin RENAL  1 mg Oral Daily    pantoprazole  40 mg Oral BID AC    sodium chloride (PF)  10-40 mL Intracatheter Q8H        Allergies   Allergen Reactions    Ciprofloxacin Rash    Other Drug Allergy (See Comments)      Cough Syrup Abstracted from Regions Chart( Hydrobromide)            Physical Exam:   Vitals were reviewed  BP 90/61   Pulse 96   Temp 97.9  F (36.6  C) (Oral)   Resp 13   Ht 1.829 m (6')   Wt 82.4 kg (181 lb 11.2 oz)   SpO2 96%   BMI 24.64 kg/m      Wt Readings from Last 3 Encounters:   10/10/23 82.4 kg (181 lb 11.2 oz)   09/27/23 90.7 kg (200 lb)   05/27/22 104 kg (229 lb 3.2 oz)     GENERAL APPEARANCE: Pt in no distress  HEENT:  Eyes/ears/nose grossly normal, neck supple  RESP: lungs clear at present  CV: regular rate and rhythm, normal S1 S2, no murmur, click or rub   ABDOMEN: soft, nontender, bowel sounds normal  EXTREMITIES/SKIN: 1+ ankle edema dependently, no rashes or lesions           Data:     BMP  Recent Labs   Lab 10/10/23  1132 10/10/23  0557 10/09/23  0543 10/08/23  0602 10/07/23  0531   NA  --  141 143 140 142   POTASSIUM  --  4.1 3.8 3.9 3.9   CHLORIDE  --  102 102 102 104   CHANA  --  7.6* 7.9* 7.8* 7.6*   CO2  --  26 25 25 26   BUN  --  29.0* 50.5* 42.6* 29.6*   CR  --  4.14* 6.07* 5.17* 3.79*   * 88 92 96 88       CBC  Recent Labs   Lab 10/10/23  0557 10/09/23  0543  10/08/23  0602 10/07/23  0531   WBC 6.1 7.3 9.0 7.1   HGB 7.8* 7.9* 7.9* 7.0*   HCT 24.2* 24.4* 24.3* 22.0*    99 98 99    162 155 149*       Lab Results   Component Value Date    AST 23 10/10/2023    ALT 33 10/10/2023    ALKPHOS 57 10/10/2023    BILITOTAL 0.4 10/10/2023     Lab Results   Component Value Date    INR 1.18 (H) 10/05/2023       Attestation:  I have reviewed today's vital signs, notes, medications, labs and imaging.  Kia Mantilla MD  [facility] - Nephrology   [phone]

## 2023-10-10 NOTE — CONSULTS
Interventional Radiology - Progress Note  Inpatient - [facility]  10/10/2023    IR Brief Note    IR consulted for possible tunneled catheter placement in patient with existing temporary RIJ HD catheter for ongoing HD needs, pending AM labs. Other labs within procedural parameters. NPO after MN orders placed by Dr Mantilla. Will review in AM.     Bert Vanegas PA-C  Interventional Radiology  [phone] (IR)  [phone] ([facility] Office)

## 2023-10-10 NOTE — PLAN OF CARE
Goal Outcome Evaluation:    A/O, VSS ex persistent hypotension despite holding BP meds and Lasix.  Pt is asymptomatic at this time.  LS clear, NSR.  Culp for retention with adequate UOP, pale in color.  Positions self in be throughout night, denies pain.

## 2023-10-11 ENCOUNTER — APPOINTMENT (OUTPATIENT)
Dept: INTERVENTIONAL RADIOLOGY/VASCULAR | Facility: CLINIC | Age: 73
DRG: 871 | End: 2023-10-11
Attending: INTERNAL MEDICINE
Payer: COMMERCIAL

## 2023-10-11 LAB
ALBUMIN SERPL BCG-MCNC: 3 G/DL (ref 3.5–5.2)
ALP SERPL-CCNC: 67 U/L (ref 35–129)
ALT SERPL W P-5'-P-CCNC: 36 U/L (ref 0–70)
ANION GAP SERPL CALCULATED.3IONS-SCNC: 14 MMOL/L (ref 7–15)
AST SERPL W P-5'-P-CCNC: 25 U/L (ref 0–45)
BACTERIA BLD CULT: NO GROWTH
BILIRUB SERPL-MCNC: 0.4 MG/DL
BUN SERPL-MCNC: 40.9 MG/DL (ref 8–23)
CALCIUM SERPL-MCNC: 8.2 MG/DL (ref 8.8–10.2)
CHLORIDE SERPL-SCNC: 99 MMOL/L (ref 98–107)
CREAT SERPL-MCNC: 5.44 MG/DL (ref 0.51–1.17)
DEPRECATED HCO3 PLAS-SCNC: 24 MMOL/L (ref 22–29)
EGFRCR SERPLBLD CKD-EPI 2021: 10 ML/MIN/1.73M2
ERYTHROCYTE [DISTWIDTH] IN BLOOD BY AUTOMATED COUNT: 15.1 % (ref 10–15)
GLUCOSE SERPL-MCNC: 94 MG/DL (ref 70–99)
HCT VFR BLD AUTO: 25.6 % (ref 35–53)
HGB BLD-MCNC: 8.3 G/DL (ref 11.7–17.7)
MAGNESIUM SERPL-MCNC: 1.8 MG/DL (ref 1.7–2.3)
MCH RBC QN AUTO: 32.4 PG (ref 26.5–33)
MCHC RBC AUTO-ENTMCNC: 32.4 G/DL (ref 31.5–36.5)
MCV RBC AUTO: 100 FL (ref 78–100)
PATH REPORT.COMMENTS IMP SPEC: NORMAL
PATH REPORT.COMMENTS IMP SPEC: NORMAL
PATH REPORT.FINAL DX SPEC: NORMAL
PATH REPORT.GROSS SPEC: NORMAL
PATH REPORT.MICROSCOPIC SPEC OTHER STN: NORMAL
PATH REPORT.MICROSCOPIC SPEC OTHER STN: NORMAL
PATH REPORT.RELEVANT HX SPEC: NORMAL
PHOSPHATE SERPL-MCNC: 3.9 MG/DL (ref 2.5–4.5)
PHOTO IMAGE: NORMAL
PLATELET # BLD AUTO: 213 10E3/UL (ref 150–450)
POTASSIUM SERPL-SCNC: 4.2 MMOL/L (ref 3.4–5.3)
PROT SERPL-MCNC: 5.7 G/DL (ref 6.4–8.3)
RBC # BLD AUTO: 2.56 10E6/UL (ref 3.8–5.9)
SODIUM SERPL-SCNC: 137 MMOL/L (ref 135–145)
WBC # BLD AUTO: 5.9 10E3/UL (ref 4–11)

## 2023-10-11 PROCEDURE — 250N000011 HC RX IP 250 OP 636: Mod: JZ | Performed by: PHYSICIAN ASSISTANT

## 2023-10-11 PROCEDURE — 02H633Z INSERTION OF INFUSION DEVICE INTO RIGHT ATRIUM, PERCUTANEOUS APPROACH: ICD-10-PCS | Performed by: RADIOLOGY

## 2023-10-11 PROCEDURE — 83735 ASSAY OF MAGNESIUM: CPT | Performed by: INTERNAL MEDICINE

## 2023-10-11 PROCEDURE — 250N000013 HC RX MED GY IP 250 OP 250 PS 637: Performed by: HOSPITALIST

## 2023-10-11 PROCEDURE — 80053 COMPREHEN METABOLIC PANEL: CPT | Performed by: INTERNAL MEDICINE

## 2023-10-11 PROCEDURE — 76937 US GUIDE VASCULAR ACCESS: CPT

## 2023-10-11 PROCEDURE — C1750 CATH, HEMODIALYSIS,LONG-TERM: HCPCS

## 2023-10-11 PROCEDURE — 250N000009 HC RX 250: Performed by: PHYSICIAN ASSISTANT

## 2023-10-11 PROCEDURE — 90935 HEMODIALYSIS ONE EVALUATION: CPT | Performed by: INTERNAL MEDICINE

## 2023-10-11 PROCEDURE — 02HV33Z INSERTION OF INFUSION DEVICE INTO SUPERIOR VENA CAVA, PERCUTANEOUS APPROACH: ICD-10-PCS | Performed by: RADIOLOGY

## 2023-10-11 PROCEDURE — 250N000011 HC RX IP 250 OP 636: Performed by: RADIOLOGY

## 2023-10-11 PROCEDURE — 250N000011 HC RX IP 250 OP 636: Performed by: HOSPITALIST

## 2023-10-11 PROCEDURE — 85027 COMPLETE CBC AUTOMATED: CPT | Performed by: INTERNAL MEDICINE

## 2023-10-11 PROCEDURE — 250N000013 HC RX MED GY IP 250 OP 250 PS 637: Performed by: INTERNAL MEDICINE

## 2023-10-11 PROCEDURE — 272N000196 HC ACCESSORY CR5

## 2023-10-11 PROCEDURE — C1769 GUIDE WIRE: HCPCS

## 2023-10-11 PROCEDURE — 99233 SBSQ HOSP IP/OBS HIGH 50: CPT | Performed by: INTERNAL MEDICINE

## 2023-10-11 PROCEDURE — 120N000013 HC R&B IMCU

## 2023-10-11 PROCEDURE — 999N000040 HC STATISTIC CONSULT NO CHARGE VASC ACCESS

## 2023-10-11 PROCEDURE — 999N000190 HC STATISTIC VAT ROUNDS

## 2023-10-11 PROCEDURE — 90937 HEMODIALYSIS REPEATED EVAL: CPT

## 2023-10-11 PROCEDURE — 84100 ASSAY OF PHOSPHORUS: CPT | Performed by: INTERNAL MEDICINE

## 2023-10-11 PROCEDURE — 258N000003 HC RX IP 258 OP 636: Performed by: INTERNAL MEDICINE

## 2023-10-11 RX ORDER — NALOXONE HYDROCHLORIDE 0.4 MG/ML
0.2 INJECTION, SOLUTION INTRAMUSCULAR; INTRAVENOUS; SUBCUTANEOUS
Status: DISCONTINUED | OUTPATIENT
Start: 2023-10-11 | End: 2023-10-11

## 2023-10-11 RX ORDER — CEFAZOLIN SODIUM 2 G/100ML
2 INJECTION, SOLUTION INTRAVENOUS
Status: COMPLETED | OUTPATIENT
Start: 2023-10-11 | End: 2023-10-11

## 2023-10-11 RX ORDER — HEPARIN SODIUM 1000 [USP'U]/ML
1000-10000 INJECTION, SOLUTION INTRAVENOUS; SUBCUTANEOUS ONCE
Status: COMPLETED | OUTPATIENT
Start: 2023-10-11 | End: 2023-10-11

## 2023-10-11 RX ORDER — FLUMAZENIL 0.1 MG/ML
0.2 INJECTION, SOLUTION INTRAVENOUS
Status: DISCONTINUED | OUTPATIENT
Start: 2023-10-11 | End: 2023-10-11

## 2023-10-11 RX ORDER — FENTANYL CITRATE 50 UG/ML
25-50 INJECTION, SOLUTION INTRAMUSCULAR; INTRAVENOUS EVERY 5 MIN PRN
Status: DISCONTINUED | OUTPATIENT
Start: 2023-10-11 | End: 2023-10-11

## 2023-10-11 RX ORDER — NALOXONE HYDROCHLORIDE 0.4 MG/ML
0.4 INJECTION, SOLUTION INTRAMUSCULAR; INTRAVENOUS; SUBCUTANEOUS
Status: DISCONTINUED | OUTPATIENT
Start: 2023-10-11 | End: 2023-10-11

## 2023-10-11 RX ADMIN — HEPARIN SODIUM 2200 UNITS: 1000 INJECTION INTRAVENOUS; SUBCUTANEOUS at 15:51

## 2023-10-11 RX ADMIN — ACETAMINOPHEN 650 MG: 325 TABLET, FILM COATED ORAL at 06:43

## 2023-10-11 RX ADMIN — DICLOFENAC SODIUM 2 G: 10 GEL TOPICAL at 12:24

## 2023-10-11 RX ADMIN — ATORVASTATIN CALCIUM 40 MG: 40 TABLET, FILM COATED ORAL at 20:40

## 2023-10-11 RX ADMIN — DICLOFENAC SODIUM 2 G: 10 GEL TOPICAL at 17:02

## 2023-10-11 RX ADMIN — HEPARIN SODIUM 5000 UNITS: 5000 INJECTION, SOLUTION INTRAVENOUS; SUBCUTANEOUS at 20:39

## 2023-10-11 RX ADMIN — HEPARIN SODIUM 5000 UNITS: 5000 INJECTION, SOLUTION INTRAVENOUS; SUBCUTANEOUS at 08:24

## 2023-10-11 RX ADMIN — Medication 1 MG: at 08:24

## 2023-10-11 RX ADMIN — CEFAZOLIN SODIUM 2 G: 2 INJECTION, SOLUTION INTRAVENOUS at 09:08

## 2023-10-11 RX ADMIN — MIDAZOLAM 0.5 MG: 1 INJECTION INTRAMUSCULAR; INTRAVENOUS at 09:16

## 2023-10-11 RX ADMIN — LIDOCAINE HYDROCHLORIDE 20 ML: 10 INJECTION, SOLUTION INFILTRATION; PERINEURAL at 09:18

## 2023-10-11 RX ADMIN — HEPARIN SODIUM 4000 UNITS: 1000 INJECTION INTRAVENOUS; SUBCUTANEOUS at 09:29

## 2023-10-11 RX ADMIN — DICLOFENAC SODIUM 2 G: 10 GEL TOPICAL at 20:46

## 2023-10-11 RX ADMIN — LIDOCAINE 1 PATCH: 560 PATCH PERCUTANEOUS; TOPICAL; TRANSDERMAL at 08:25

## 2023-10-11 RX ADMIN — ASPIRIN 81 MG: 81 TABLET, COATED ORAL at 08:24

## 2023-10-11 RX ADMIN — HEPARIN SODIUM 1600 UNITS: 1000 INJECTION INTRAVENOUS; SUBCUTANEOUS at 15:51

## 2023-10-11 RX ADMIN — DICLOFENAC SODIUM 2 G: 10 GEL TOPICAL at 06:40

## 2023-10-11 RX ADMIN — FENTANYL CITRATE 50 MCG: 50 INJECTION, SOLUTION INTRAMUSCULAR; INTRAVENOUS at 09:14

## 2023-10-11 RX ADMIN — SODIUM CHLORIDE 250 ML: 9 INJECTION, SOLUTION INTRAVENOUS at 15:49

## 2023-10-11 RX ADMIN — SODIUM CHLORIDE 300 ML: 9 INJECTION, SOLUTION INTRAVENOUS at 15:50

## 2023-10-11 RX ADMIN — PANTOPRAZOLE SODIUM 40 MG: 40 TABLET, DELAYED RELEASE ORAL at 17:00

## 2023-10-11 RX ADMIN — PANTOPRAZOLE SODIUM 40 MG: 40 TABLET, DELAYED RELEASE ORAL at 06:40

## 2023-10-11 ASSESSMENT — ACTIVITIES OF DAILY LIVING (ADL)
ADLS_ACUITY_SCORE: 29

## 2023-10-11 NOTE — PRE-PROCEDURE
GENERAL PRE-PROCEDURE:   Procedure:  Tunneled dialysis catheter placement  Date/Time:  10/11/2023 8:37 AM    Written consent obtained?: Yes    Risks and benefits: Risks, benefits and alternatives were discussed    Consent given by:  Patient  Patient states understanding of procedure being performed: Yes    Patient's understanding of procedure matches consent: Yes    Procedure consent matches procedure scheduled: Yes    Expected level of sedation:  Moderate  Appropriately NPO:  Yes  ASA Class:  3  Mallampati  :  Grade 2- soft palate, base of uvula, tonsillar pillars, and portion of posterior pharyngeal wall visible  Lungs:  Lungs clear with good breath sounds bilaterally  Heart:  Normal heart sounds and rate  History & Physical reviewed:  History and physical reviewed and no updates needed  Statement of review:  I have reviewed the lab findings, diagnostic data, medications, and the plan for sedation    Bert Vanegas PA-C  Interventional Radiology  [phone] (IR)  [phone] ([facility] Office)

## 2023-10-11 NOTE — PLAN OF CARE
Goal Outcome Evaluation:      Plan of Care Reviewed With: patient    Overall Patient Progress: no changeOverall Patient Progress: no change    Orientations: A/Ox4  Vitals/Pain: Soft BP, all other VSS on RA. Pain managed with diclofenac gel on knees and R shin, tylenol.   Tele: ST/SR  LS: clear  Lines/Drains: R PICC and R internal jugular CDI  Skin/Wounds: Penile meatus wound, wound care x2. Blanchable redness on sacrum, mepilex applied  GI/: NPO at 0000. BM+. Adequate uo via guido.   Labs: K+ (4.2) Hgb (8.3) creat (5.44)  Ambulation/Assist: A1/SBA  Sleep Quality: Fair, per patient  Plan: Possible port placement today pending AM labs. Will need plans for dialysis if want to dx to TCU.

## 2023-10-11 NOTE — PROGRESS NOTES
Renal Medicine Progress Note            Assessment/Plan:     Assessment:  1) Acute Kidney injury, non-oliguric     Creatinine prior to admission ranging between 1.8-3.3 over the course of 2022, no recent labs.      Creatinine on admission 14.61 in the setting of encephalopathy, recent NSAID use, sepsis, UTI, and obstructive uropathy.  Most likely significant prerenal RICHARD, probable ATN at this point.  He did undergo dialysis at [facility] primarily for acidosis and severe hyperkalemia causing EKG changes.  HD 10/4 for hyperkalemia, 10/6 for hypervolemia (10/6 had worsening infiltrates on CXR) He is fragile with RICHARD on top of CKD, HFrEF.       Monitor daily for recovery. Still with anephric rise of Cr between dialysis. TDC placed today and pt is accepted at [facility] .      2) Obstructive uropathy with Moderate bilateral hydroureteronephrosis on admission  Pyelonephritis  Hypospadias  History of urinary retention  Patient has history of urinary retention and UTIs in the past.  CT with bilateral hydroureteronephrosis and bladder wall thickening consistent with cystitis.  Culp placed by urology, initially yellow urine followed by milky cloudy urine consistent with UTI/pyelo-.   Trial of voiding not successful.       Other:  Severe diarrhea  Colitis, possibly infectious     Acute on chronic anemia  Hemoglobin 8.3 , EGD suggestive of duodenal ulcer as possible source of bleeding.  -EPO with dialysis.     Cardiomyopathy with reduced EF     Next HD on Friday             Interval History:     No acute issues overnight.    Seen on dialysis . Had TDC placed earlier today . Working well   Minimal UF.   No complaints.   Vitals okay            Medications and Allergies:       - MEDICATION INSTRUCTIONS for Dialysis Patients -   Does not apply See Admin Instructions     aspirin  81 mg Oral Daily     atorvastatin  40 mg Oral QPM     diclofenac  2 g Topical 4x Daily     [Held by provider] furosemide  80 mg Intravenous  Q12H     sodium chloride (PF) 0.9%  1.3-2.6 mL Intracatheter Once    Followed by     heparin  3 mL Intracatheter Once     sodium chloride (PF) 0.9%  1.3-2.6 mL Intracatheter Once    Followed by     heparin  3 mL Intracatheter Once     heparin ANTICOAGULANT  5,000 Units Subcutaneous Q12H     hydrALAZINE  10 mg Oral TID     lidocaine  1 patch Transdermal Q24H     metoprolol succinate ER  25 mg Oral Daily     multivitamin RENAL  1 mg Oral Daily     pantoprazole  40 mg Oral BID AC     sodium chloride (PF)  10-40 mL Intracatheter Q8H        Allergies   Allergen Reactions     Ciprofloxacin Rash     Other Drug Allergy (See Comments)      Cough Syrup Abstracted from Regions Chart( Hydrobromide)            Physical Exam:   Vitals were reviewed  /59   Pulse 91   Temp 97.9  F (36.6  C) (Oral)   Resp (!) 37   Ht 1.829 m (6')   Wt 82.3 kg (181 lb 8 oz)   SpO2 98%   BMI 24.62 kg/m      Wt Readings from Last 3 Encounters:   10/11/23 82.3 kg (181 lb 8 oz)   09/27/23 90.7 kg (200 lb)   05/27/22 104 kg (229 lb 3.2 oz)     GENERAL APPEARANCE: Pt in no distress  HEENT:  Eyes/ears/nose grossly normal, neck supple  RESP: lungs clear at present  CV: regular rate and rhythm, normal S1 S2, no murmur, click or rub   ABDOMEN: soft, nontender, bowel sounds normal  EXTREMITIES/SKIN: 1+ ankle edema dependently, no rashes or lesions  Rt internal jugular TDC +           Data:     BMP  Recent Labs   Lab 10/11/23  0509 10/10/23  1132 10/10/23  0557 10/09/23  0543 10/08/23  0602     --  141 143 140   POTASSIUM 4.2  --  4.1 3.8 3.9   CHLORIDE 99  --  102 102 102   CHANA 8.2*  --  7.6* 7.9* 7.8*   CO2 24  --  26 25 25   BUN 40.9*  --  29.0* 50.5* 42.6*   CR 5.44*  --  4.14* 6.07* 5.17*   GLC 94 110* 88 92 96     CBC  Recent Labs   Lab 10/11/23  0509 10/10/23  0557 10/09/23  0543 10/08/23  0602   WBC 5.9 6.1 7.3 9.0   HGB 8.3* 7.8* 7.9* 7.9*   HCT 25.6* 24.2* 24.4* 24.3*    100 99 98    178 162 155     Lab Results    Component Value Date    AST 25 10/11/2023    ALT 36 10/11/2023    ALKPHOS 67 10/11/2023    BILITOTAL 0.4 10/11/2023     Lab Results   Component Value Date    INR 1.18 (H) 10/05/2023       Attestation:  I have reviewed today's vital signs, notes, medications, labs and imaging.  Kia Mantilla MD  [facility] - Nephrology   [phone]

## 2023-10-11 NOTE — PLAN OF CARE
"Orientations: A&Ox4   Vitals/Pain: VSS x hypotension (SBP 90- low 100s most of day), RA, LS diminished. Pain in BLE (knees and R shin)   Tele: SR   Lines/Drains: PICC R SL, R port (dialysis)  Skin/Wounds: Pale, scattered bruising. Blanchable redness and dry/scabby buttocks (pt asked for meplex to be removed - \"itchy and uncomfortable\"). Penile meatus abnormality   GI/: Culp in place, BM x2. Renal diet, and 1500 mL fluid restriction tolerating well.   Labs: Abnormal/Trends: BUN (40.9), Crt (5.44), GFR (10), Calcium (8.2), Albumin (3.0), Hgb (8.3), hematocrit (25.6), RBC (2.56)  Electrolyte Replacement- K no replacement needed, AM check 10/12  Ambulation/Assist: Assist x1/SB assist   Sleep Quality: On/off overnight   Plan: Monitor I&Os, penile meatus abnormality. Noted mild blood w/ wiping, mild and less than previous day. R dialysis port placed this AM, continue renal diet per IR and 1500 mL fludi restriction added. Dialysis today, 1/2 L off, stopped 8 mins early due to cramping pain. Per verbal from urology (Anthony), may attempt voiding trial prior to discharge. Off IMC today. Discharge pending due to outpt dialysis need. Per CC pt wanting to return home w/ home care and planning to discharge 10/15      "

## 2023-10-11 NOTE — PROGRESS NOTES
Care Management Follow Up   Additional Information:  Received  a call from Deborah from [facility] . Patient has been accepted at the [facility]  [facility]  [address], [place] [address]     Tueddie Sewell Sat 10:55 am      Length of Stay (days): 14    Expected Discharge Date: 10/15/2023     Concerns to be Addressed: discharge planning     Patient plan of care discussed at interdisciplinary rounds: Yes    Anticipated Discharge Disposition: Other (Comments) (Pt would like to return home with home care; he is not interested in TCU)     Anticipated Discharge Services:    Anticipated Discharge DME:      Patient/family educated on Medicare website which has current facility and service quality ratings:    Education Provided on the Discharge Plan:    Patient/Family in Agreement with the Plan: other (see comments) (Pt would like to return home with home care; he is not interested in TCU)    Referrals Placed by CM/SW:    Private pay costs discussed: Not applicable      Rosie Blas RN

## 2023-10-11 NOTE — PROGRESS NOTES
Cr continues to show anephric rise.  Plan to proceed with TDC placement by IR and dialysis today .       Kia Mantilla MD  [facility] Nephrology   [phone]

## 2023-10-11 NOTE — PROGRESS NOTES
Potassium   Date Value Ref Range Status   10/11/2023 4.2 3.4 - 5.3 mmol/L Final   05/27/2022 4.4 3.5 - 5.0 mmol/L Final     Hemoglobin   Date Value Ref Range Status   10/11/2023 8.3 (L) 11.7 - 17.7 g/dL Final     Comment:     Sex Specific Reference Ranges:    Female  11.7-15.7  g/dL  Male    13.3-17.7   g/dL       Creatinine   Date Value Ref Range Status   10/11/2023 5.44 (H) 0.51 - 1.17 mg/dL Final     Comment:     Male and Female  0-2 Months    0.31-0.88 mg/dL  2-12 Months   0.16-0.39 mg/dL  1-2 Years     0.18-0.35 mg/dL  3-4 Years     0.26-0.42 mg/dL  5-6 Years     0.29-0.47 mg/dL  7-8 Years     0.34-0.53 mg/dL  9-10 Years    0.33-0.64 mg/dL  11-12 Years   0.44-0.68 mg/dL  13-14 Years   0.46-0.77 mg/dL    Female  15 Years and older  0.51-0.95 mg/dL    Male  15 Years and older  0.67-1.17 mg/dL         Urea Nitrogen   Date Value Ref Range Status   10/11/2023 40.9 (H) 8.0 - 23.0 mg/dL Final   05/27/2022 49 (H) 8 - 28 mg/dL Final     Sodium   Date Value Ref Range Status   10/11/2023 137 135 - 145 mmol/L Final     Comment:     Reference intervals for this test were updated on 09/26/2023 to more accurately reflect our healthy population. There may be differences in the flagging of prior results with similar values performed with this method. Interpretation of those prior results can be made in the context of the updated reference intervals.      INR   Date Value Ref Range Status   10/05/2023 1.18 (H) 0.85 - 1.15 Final       DIALYSIS PROCEDURE NOTE  Hepatitis status of previous patient on machine log was checked and verified ok to use with this patients hepatitis status.  Patient dialyzed for 3 hrs. on a K3 bath with a net fluid removal of  0.5L.  A BFR of 350 ml/min was obtained via a CVC.      The treatment plan was discussed with Dr. Mantilla during the treatment.    Total heparin received during the treatment: 0 units.     Line flushed, clamped and capped with heparin 1:1000 1.6 mL (1600 units) per lumen    Meds   given: none   Complications: cramping: treatment shortened by 8 minutes due to cramping.      Person educated: pre-tx. Knowledge base minimal. Barriers to learning: none. Educated on procedure, access care via verbal mode. Patient/family verbalized understanding.     ICEBOAT? Timeout performed pre-treatment  I: Patient was identified using 2 identifiers  C:  Consent Signed Yes  E: Equipment preventative maintenance is current and dialysis delivery system OK to use  B:    Latest Reference Range & Units 09/28/23 16:59   Hep B Surface Agn Nonreactive  Nonreactive   Hepatitis B Surface Antibody Instrument Value <8.00 m[IU]/mL 0.39   Hepatitis B Surface Antibody  Nonreactive     O: Dialysis orders present and complete prior to treatment  A: Vascular access verified and assessed prior to treatment  T: Treatment was performed at a clinically appropriate time  ?: Patient was allowed to ask questions and address concerns prior to treatment  See Adult Hemodialysis flowsheet in [facility] for further details and post assessment.  Machine water alarm in place and functioning. Transducer pods intact and checked every 15min.   Pt returned via bed.  Chlorine/Chloramine water system checked every 4 hours.  Outpatient Dialysis at *not established    Patient repositioned every 2 hours during the treatment.  Post treatment report given to RANULFO Farr RN regarding 0.5L of fluid removed, last BP of 97/59, and patient pain rating of 4/10.

## 2023-10-11 NOTE — IR NOTE
Interventional Radiology Intra-procedural Nursing Note    Patient Name: Bret Fleming  Medical Record Number: [id number]  Today's Date: October 11, 2023    Procedure consented for :  Tunneled dialysis catheter placement with moderate sedation   Start time: 915  End time: 932  Report provided to: IMC RN  Patient depart time and location: 940 to 322    Note: Patient entered Interventional Radiology Suite number 2 via cart. Patient awake, alert and oriented. Assisted onto procedural table in supine position. Prepped and draped.  Dr. Yuen in room. Time out and procedure started. Vital signs stable. Telemetry reading SR.    Procedure well tolerated by patient without complications. Procedure end with debrief by Dr. Yuen.  Pressure held until hemostasis achieved. Biopatch tegaderem and gauze  dressing applied to insertion site.        Administered medication totals:  Lidocaine 1% 20 mL Intradermal  Heparin 4000 Units CVC  Versed 0.5 mg IVP  Fentanyl 50 mcg IVP    Last dose of sedation administered at 0918.

## 2023-10-11 NOTE — PROGRESS NOTES
[facility]    Medicine Progress Note - Hospitalist Service       Date of Admission:  9/27/2023    Assessment & Plan   Bret Fleming is a 73 year old male with hx of CAD, HTN, and CKD who initially presented to   [facility] ED on 9/27/2023 for evaluation of shortness of breath and nausea/vomiting with decreased PO intake, and was found to have acute renal failure (Cr 14.6), hyperkalemia (K 7), and acidosis (pH 6.98). Underwent emergent dialysis at outside hospital. Further evaluation also revealed obstructive uropathy with bilateral hydronephrosis, colitis, and UTI. Transferred to [facility] for further management of renal failure. During this admission, he was found to have worsening heart failure (EF 30-35%) with concern for NSTEMI as well as suspected GI bleed from duodenal ulcer. He remains HD-dependent, R IJ catheter placed by IR on 10/11. For now, Cardiology plans for outpatient follow-up with cardiac MRI to assess viability in the LAD territory prior to potential coronary angiogram.    Acute renal failure, non-oliguric  *Creatinine prior to admission ranging between 1.8-3.3 over the course of 2022, no recent labs.   *Creatinine on admission 14.61 in the setting of recent NSAID use, obstructive uropathy, nad new CHF  *Nephrology consulted this admission.  - Has remained dialysis-dependent, R IJ dialysis catheter placed by IR today, 10/11  - Initiated on IV Lasix per Nephrology this admission; currently on hold  - Nephrology following  - CC/SW following for coordination of outpatient hemodialysis. Pt not interested in TCU    Obstructive uropathy with bilateral hydroureteronephrosis   Pyuria  Hypospadias  History of urinary retention  History of recurrent UTIs  *Very difficult Culp catheter placement noted at outside facility. CT abd/pelvis at outside facility showed bilateral hydroureteronephrosis and bladder wall thickening. UA initially concerning for UTI, but urine culture grew only  cornebacterium stratum which is a regular skin pathogen. Completed 6-days of IV vancomycin and pip-tazo. Repeat urine culture on 9/30 was negative.  *Urology consulted this admission  *Culp catheter removed 10/5, but failed voiding trial; Culp replaced 10/6  - Culp in place, Urology to consider repeat trial of void prior to discharge  - Note, patient is very difficult Culp placement     NSTEMI, suspected CAD  Acute systolic CHF, new diagnosis  Moderate mitral valve regurgitation, new diagnosis  *TTE (9/28) revealed worsening heart failure with an EF of 30 to 35% with a large area of hypokinesis and akinesis distal to the septum with suspected large LAD infarct; new moderate MR, and elevated right ventricular systolic pressure.  *Cardiology consulted this admission; plan for outpatient ischemic evaluation with likely cardiac MRI once his renal function is stable to avoid further contrast nephropathy  *Home meds: ASA 81 mg daily, atenolol 80 mg daily, atorvastatin 80 mg daily  - Atenolol discontinued this admission; started on metoprolol ER 25 mg daily and hydralazine 10 mg TID per Cardiology  - Conts on PTA ASA and atorvastatin  - Cardiology as now signed off     Acute GI bleed with acute blood loss anemia  *Initial Hgb 8.7; gradually decreased to 5.7 this admission  *[facility] consulted this admission. EGD (10/9/23) showed multiple duodenal ulcers, likely source of anemia. Flex sig limited due to stool burden  *Received total 2 units pRBCs this admission  - Hgb now stable, 8.3 today - 10/11  - Conts on pantoprazole 40 mg BID  - Patient has been advised to avoid NSAIDs    Right knee pain  *Pt reported right knee pain this admission. XR (10/6) showed normal joint spaces and alignment. No fracture or joint effusion. Osteopenia. Atherosclerosis.   - Lidocaine patch, APAP prn ordered. Avoid NSAIDs.   - Follow up with Ortho after hospital discharge for further work-up    Colitis, Resolved  Elevated liver  transaminases, Resolved  Noted on admission. Pt described flu-like symptoms prior to admission with nausea, vomiting, decreased appetite, generalized fatigue and weakness. INR was elevated at 1.8 and liver enzymes AST/ALT were elevated in the 100s but subsequently normalized. Stool panel/C.diff negative.     Diet: Room Service  Fluid restriction 1500 ML FLUID  NPO for Medical/Clinical Reasons Except for: Meds, Ice Chips    DVT Prophylaxis: Pneumatic Compression Devices  Culp Catheter: PRESENT, indication: Retention, Retention  Code Status: Full Code         Disposition: Expected discharge home with home care once cleared by Nephrology and outpatient HD facility coordinated, ?10/13    The patient's care was discussed with the Patient.    Cari Galarza MD  Hospitalist Service  [facility]  Contact information available via [facility] Paging/Directory    ______________________________________________________________________    Interval History   No acute events overnight. Offers no complaints. Denies cp/sob, dizziness/lightheadedness. R IJ dialysis catheter placement today.    Data reviewed today: I reviewed all medications, new labs and imaging results over the last 24 hours. I personally reviewed no images or EKG's today.    Physical Exam   Vital Signs: Temp: 97.9  F (36.6  C) Temp src: Oral BP: 98/52 Pulse: 93   Resp: 22 SpO2: 94 % O2 Device: None (Room air)    Weight: 181 lbs 8 oz  Constitutional: Resting comfortably, NAD  HEENT: Sclera white, MMM  Respiratory: Breathing non-labored. Lungs CTAB - no wheezes, crackles, or rhonchi  Cardiovascular: Heart RRR, soft holosystolic murmur. No pedal edema  GI: +BS, abd soft/NT  Skin/Integument: No rash  Musculoskeletal: Normal muscle bulk and tone  Neuro: Alert and appropriate, GARCIA  Psych: Calm and cooperative    Data   Recent Labs   Lab 10/11/23  0509 10/10/23  1132 10/10/23  0557 10/09/23  0543 10/06/23  0430 10/05/23  1450   WBC 5.9  --  6.1  7.3   < >  --    HGB 8.3*  --  7.8* 7.9*   < >  --      --  100 99   < >  --      --  178 162   < >  --    INR  --   --   --   --   --  1.18*     --  141 143   < >  --    POTASSIUM 4.2  --  4.1 3.8   < >  --    CHLORIDE 99  --  102 102   < >  --    CO2 24  --  26 25   < >  --    BUN 40.9*  --  29.0* 50.5*   < >  --    CR 5.44*  --  4.14* 6.07*   < >  --    ANIONGAP 14  --  13 16*   < >  --    CHANA 8.2*  --  7.6* 7.9*   < >  --    GLC 94 110* 88 92   < >  --    ALBUMIN 3.0*  --  2.7* 3.0*   < >  --    PROTTOTAL 5.7*  --  5.2* 5.4*   < >  --    BILITOTAL 0.4  --  0.4 0.4   < >  --    ALKPHOS 67  --  57 54   < >  --    ALT 36  --  33 36   < >  --    AST 25  --  23 23   < >  --     < > = values in this interval not displayed.         No results found for this or any previous visit (from the past 24 hour(s)).    Medications    - MEDICATION INSTRUCTIONS -      - MEDICATION INSTRUCTIONS -        - MEDICATION INSTRUCTIONS for Dialysis Patients -   Does not apply See Admin Instructions    aspirin  81 mg Oral Daily    atorvastatin  40 mg Oral QPM    diclofenac  2 g Topical 4x Daily    [Held by provider] furosemide  80 mg Intravenous Q12H    heparin ANTICOAGULANT  5,000 Units Subcutaneous Q12H    hydrALAZINE  10 mg Oral TID    lidocaine  1 patch Transdermal Q24H    metoprolol succinate ER  25 mg Oral Daily    multivitamin RENAL  1 mg Oral Daily    pantoprazole  40 mg Oral BID AC    sodium chloride (PF)  10-40 mL Intracatheter Q8H

## 2023-10-11 NOTE — PLAN OF CARE
Problem: Oral Intake Inadequate  Goal: Improved Oral Intake  Outcome: Progressing  Intervention: Promote and Optimize Oral Intake  Recent Flowsheet Documentation  Taken 10/11/2023 1240 by Mague Reynolds  Nutrition Interventions: supplemental foods provided   Goal Outcome Evaluation:    100% intakes documented recently. Appetite improving. RD provided encouragement for protein intake. Added daily snack    Mague Reynolds RD, LD

## 2023-10-11 NOTE — PROGRESS NOTES
[facility]    Urology Progress Note  Date of Service: 10/11/2023      Interval History   Guido remains, tolerating.   Nephrology following. Planning dialysis.     Guido output: 600/1275    Assessment & Plan   73 year old male with renal failure and electrolyte derangements, cystitis with bilateral hydroureteronephrosis on CT without evidence of ureteral obstruction. Urinary retention s/p guido placement 9/27 by Dr. Clayton (uncomplicated other than hypospadias) for 600 ml.       The catheter was removed on 10/5/2023 per nephrology for trial of void.  Patient has not been able to void and required a straight cath on 10/6.  I was paged on 10/6 to assist with Guido catheter reinsertion as it was difficult per nursing staff - guido successfully placed without issues on 10/6. He is not a difficult placement, he does have hypospadias, though catheter was inserted without any issues at all.     - Continue guido catheter for bladder decompression.   - Monitor creatinine, nephrology consultation.  Dialysis per nephrology.  - Hydroureteronephrosis likely residual following recent guido placement and should improve with treatment of cystitis. Consider repeat imaging.   - Maintain guido to drainage and trend I/O.  Could attempt repeat trial of void once patient is more medically stable, prior to discharge.   - Recommend outpatient follow up with urology for PSA (once infection adequately treated) and PINA. AVS updated.    Physical Exam   Temp:  [97.7  F (36.5  C)-98.5  F (36.9  C)] 97.7  F (36.5  C)  Pulse:  [] 87  Resp:  [7-28] 12  BP: ()/(47-62) 95/58  SpO2:  [91 %-98 %] 96 %    Weights:   Vitals:    10/09/23 1000 10/10/23 0500 10/11/23 0400   Weight: 83.5 kg (184 lb) 82.4 kg (181 lb 11.2 oz) 82.3 kg (181 lb 8 oz)    Body mass index is 24.62 kg/m .    Constitutional: Alert. Pleasant. No acute distress.   Respiratory: No respiratory distress. Breathing unlabored.   GI: Soft,  nontender.  Male  guido with yellow urine. Hypospadias. Wound on shaft of penis healing.    Skin: Warm and dry.  Musculoskeletal: Moving all extremities approprietly.    Data   Recent Labs   Lab 10/11/23  0509 10/10/23  1132 10/10/23  0557 10/09/23  0543 10/06/23  0430 10/05/23  1450   WBC 5.9  --  6.1 7.3   < >  --    HGB 8.3*  --  7.8* 7.9*   < >  --      --  100 99   < >  --      --  178 162   < >  --    INR  --   --   --   --   --  1.18*     --  141 143   < >  --    POTASSIUM 4.2  --  4.1 3.8   < >  --    CHLORIDE 99  --  102 102   < >  --    CO2 24  --  26 25   < >  --    BUN 40.9*  --  29.0* 50.5*   < >  --    CR 5.44*  --  4.14* 6.07*   < >  --    ANIONGAP 14  --  13 16*   < >  --    CHANA 8.2*  --  7.6* 7.9*   < >  --    GLC 94 110* 88 92   < >  --    ALBUMIN 3.0*  --  2.7* 3.0*   < >  --    PROTTOTAL 5.7*  --  5.2* 5.4*   < >  --    BILITOTAL 0.4  --  0.4 0.4   < >  --    ALKPHOS 67  --  57 54   < >  --    ALT 36  --  33 36   < >  --    AST 25  --  23 23   < >  --     < > = values in this interval not displayed.       Recent Labs   Lab 10/11/23  0509 10/10/23  1132 10/10/23  0557 10/09/23  0543 10/08/23  0602 10/07/23  0531   GLC 94 110* 88 92 96 88        Unresulted Labs Ordered in the Past 30 Days of this Admission       Date and Time Order Name Status Description    10/6/2023  8:08 AM Blood Culture Hand, Left Preliminary     10/6/2023  5:59 AM Blood Culture Line, venous Preliminary              Imaging  No results found for this or any previous visit (from the past 24 hour(s)).     Kika Cruz PA-C on 10/11/2023 at 10:29 AM   [facility]

## 2023-10-11 NOTE — PROGRESS NOTES
"CLINICAL NUTRITION SERVICES - REASSESSMENT NOTE      Recommendations Ordered by Registered Dietitian (RD):   RD offered protein supplements, pt declined. Willing to have snack of cheese and crackers. Discussed importance of protein intake. Pt confirmed he tries to order a source of protein w/ each meal. Reviewed sources-- eggs, meats, fish, chicken. Provided menu handout for dialysis/renal room service menu. Pt stated it will be very helpful when picking out meals.     Ordered 2 pm snack of cheese and crackers      Malnutrition:   % Weight Loss:  Up to 20% in 1 year (moderate malnutrition) (10/1)  % Intake:  </= 50% for >/= 5 days (severe malnutrition) (10/1)  Subcutaneous Fat Loss:  Orbital region mild-moderate depletion and Upper arm region mild-moderate depletion depletion (10/6)  Muscle Loss:  Temporal region mild-moderate depletion depletion and Clavicle bone region mild-moderate depletion depletion (10/6)  Fluid Retention:  Does not meet criteria-- trace generalized edema     Malnutrition Diagnosis: Moderate malnutrition in the context of --  Acute illness or injury  Chronic illness or disease         EVALUATION OF PROGRESS TOWARD GOALS   Diet: NPO for Medical/Clinical Reasons Except for: Meds, Ice Chips      9/27: NPO   9/28: Renal/dialysis diet  10/6: NPO  10/7: Renal/dialysis diet   10/11: NPO    Intake/Tolerance:  Visited w/ pt this morning. Pt reported he is eating \"ok.\" Appetite is \"pretty good.\" RD offered protein supplements, pt declined. Willing to have snack of cheese and crackers. Discussed importance of protein intake. Pt confirmed he tries to order a source of protein w/ each meal. Reviewed sources-- eggs, meats, fish, chicken. Provided menu handout for dialysis/renal room service menu. Pt stated it will be very helpful when picking out meals.   NPO since midnight for dialysis catheter placement today  Pt was tolerating diet well per chart review. Good appetite noted.   Pt receiving 2-3 meals/day " per health touch. 100% intakes documented when diet allows per nursing flow sheet.      ASSESSED NUTRITION NEEDS: (10/11)  Dosing Weight 81 kg   Estimated Energy Needs: 6344-7628 kcals (25-30 Kcal/Kg)  Justification: maintenance  Estimated Protein Needs: 97+ grams protein (1.2+ g pro/Kg)  Justification: dialysis, wound healing, and preservation of lean body mass  Estimated Fluid Needs: per MD pending fluid status        NEW FINDINGS:   Weight: generally stable wt this admit, suspect some shifts d/t dialysis runs  Date/Time Weight Weight Method   10/11/23 0400 82.3 kg (181 lb 8 oz) --   10/10/23 0500 82.4 kg (181 lb 11.2 oz) Standing scale   10/09/23 1000 83.5 kg (184 lb) --   10/09/23 0600 -- Standing scale   10/09/23 0533 83.8 kg (184 lb 11.2 oz) Standing scale   10/08/23 0231 85.5 kg (188 lb 8 oz) Standing scale   10/06/23 1823 84.8 kg (186 lb 15.2 oz) Bed scale   10/06/23 0122 -- Standing scale   10/05/23 0924 87.6 kg (193 lb 2 oz) Standing scale   10/05/23 0615 192 kg (423 lb 4.5 oz) Abnormal  Standing scale   10/04/23 0347 91 kg (200 lb 9.9 oz) Bed scale   10/03/23 0857 87.5 kg (192 lb 14.4 oz) Standing scale   09/29/23 0640 81.2 kg (179 lb 0.2 oz) --   09/28/23 0700 82.6 kg (182 lb 1.6 oz) Bed scale   09/28/23 0000 81.6 kg (179 lb 14.3 oz) Bed scale     I/O: Net IO Since Admission: -17,870 mL [10/11/23 0936].  1x BM today so far, 2x yesterday, 1x on 10/9, 1x on 10/8, 2x on 10/7    Labs: reviewed    BUN 40.9 (H) 10/11/2023    CR 5.44 (H) 10/11/2023     Medications: reviewed    [Held by provider] furosemide  80 mg Intravenous Q12H    multivitamin RENAL  1 mg Oral Daily    pantoprazole  40 mg Oral BID AC     Skin: RN note = Penile meatus wound, wound care x2. Blanchable redness on sacrum      Previous Goals:   Diet adv and tolerance >FL vs nutrition support within 72 hrs    Evaluation: Met    Previous Nutrition Diagnosis:   Inadequate protein-energy intake related to recent change to NPO with worsening  respiratory status as evidenced by day 1 w/o PO   Evaluation: Improving        MALNUTRITION  % Weight Loss:  Up to 20% in 1 year (moderate malnutrition) (10/1)  % Intake:  </= 50% for >/= 5 days (severe malnutrition) (10/1)  Subcutaneous Fat Loss:  Orbital region mild-moderate depletion and Upper arm region mild-moderate depletion depletion (10/6)  Muscle Loss:  Temporal region mild-moderate depletion depletion and Clavicle bone region mild-moderate depletion depletion (10/6)  Fluid Retention:  Does not meet criteria-- trace generalized edema     Malnutrition Diagnosis: Moderate malnutrition in the context of --  Acute illness or injury  Chronic illness or disease        CURRENT NUTRITION DIAGNOSIS  Inadequate oral intake related to previously poor appetite as evidenced by wt loss of up to 20% over past year, variable intakes this admit x14 days      INTERVENTIONS  Recommendations / Nutrition Prescription  RD offered protein supplements, pt declined. Willing to have snack of cheese and crackers. Discussed importance of protein intake. Pt confirmed he tries to order a source of protein w/ each meal. Reviewed sources-- eggs, meats, fish, chicken. Provided menu handout for dialysis/renal room service menu. Pt stated it will be very helpful when picking out meals.   Ordered 2 pm snack of cheese and crackers       Implementation  Modify composition of meals/snacks  Nutrition Education     Goals  Pt to consume >75% of 2+ meals/day w/ protein at each   Wt >82 kg        MONITORING AND EVALUATION:  Progress towards goals will be monitored and evaluated per protocol and Practice Guidelines      Mague Reynolds RD, LD  Pager: [phone]

## 2023-10-12 ENCOUNTER — APPOINTMENT (OUTPATIENT)
Dept: PHYSICAL THERAPY | Facility: CLINIC | Age: 73
DRG: 871 | End: 2023-10-12
Attending: INTERNAL MEDICINE
Payer: COMMERCIAL

## 2023-10-12 ENCOUNTER — APPOINTMENT (OUTPATIENT)
Dept: OCCUPATIONAL THERAPY | Facility: CLINIC | Age: 73
DRG: 871 | End: 2023-10-12
Attending: INTERNAL MEDICINE
Payer: COMMERCIAL

## 2023-10-12 LAB
ANION GAP SERPL CALCULATED.3IONS-SCNC: 13 MMOL/L (ref 7–15)
BACTERIA BLD CULT: NO GROWTH
BUN SERPL-MCNC: 27.4 MG/DL (ref 8–23)
CALCIUM SERPL-MCNC: 8.1 MG/DL (ref 8.8–10.2)
CHLORIDE SERPL-SCNC: 100 MMOL/L (ref 98–107)
CREAT SERPL-MCNC: 4.02 MG/DL (ref 0.51–1.17)
DEPRECATED HCO3 PLAS-SCNC: 26 MMOL/L (ref 22–29)
EGFRCR SERPLBLD CKD-EPI 2021: 15 ML/MIN/1.73M2
GLUCOSE SERPL-MCNC: 92 MG/DL (ref 70–99)
HGB BLD-MCNC: 7.6 G/DL (ref 11.7–17.7)
MAGNESIUM SERPL-MCNC: 1.6 MG/DL (ref 1.7–2.3)
PHOSPHATE SERPL-MCNC: 3.5 MG/DL (ref 2.5–4.5)
POTASSIUM SERPL-SCNC: 4.1 MMOL/L (ref 3.4–5.3)
SODIUM SERPL-SCNC: 139 MMOL/L (ref 135–145)

## 2023-10-12 PROCEDURE — 250N000013 HC RX MED GY IP 250 OP 250 PS 637: Performed by: INTERNAL MEDICINE

## 2023-10-12 PROCEDURE — 250N000013 HC RX MED GY IP 250 OP 250 PS 637: Performed by: HOSPITALIST

## 2023-10-12 PROCEDURE — 250N000011 HC RX IP 250 OP 636: Performed by: HOSPITALIST

## 2023-10-12 PROCEDURE — 83735 ASSAY OF MAGNESIUM: CPT | Performed by: INTERNAL MEDICINE

## 2023-10-12 PROCEDURE — G0463 HOSPITAL OUTPT CLINIC VISIT: HCPCS

## 2023-10-12 PROCEDURE — 120N000013 HC R&B IMCU

## 2023-10-12 PROCEDURE — 97110 THERAPEUTIC EXERCISES: CPT | Mod: GP

## 2023-10-12 PROCEDURE — 80048 BASIC METABOLIC PNL TOTAL CA: CPT | Performed by: HOSPITALIST

## 2023-10-12 PROCEDURE — 97535 SELF CARE MNGMENT TRAINING: CPT | Mod: GO

## 2023-10-12 PROCEDURE — 85018 HEMOGLOBIN: CPT | Performed by: INTERNAL MEDICINE

## 2023-10-12 PROCEDURE — 99232 SBSQ HOSP IP/OBS MODERATE 35: CPT | Performed by: INTERNAL MEDICINE

## 2023-10-12 PROCEDURE — 97530 THERAPEUTIC ACTIVITIES: CPT | Mod: GP

## 2023-10-12 PROCEDURE — 97116 GAIT TRAINING THERAPY: CPT | Mod: GP

## 2023-10-12 PROCEDURE — 99233 SBSQ HOSP IP/OBS HIGH 50: CPT | Performed by: INTERNAL MEDICINE

## 2023-10-12 PROCEDURE — 84100 ASSAY OF PHOSPHORUS: CPT | Performed by: INTERNAL MEDICINE

## 2023-10-12 RX ADMIN — ASPIRIN 81 MG: 81 TABLET, COATED ORAL at 08:45

## 2023-10-12 RX ADMIN — PANTOPRAZOLE SODIUM 40 MG: 40 TABLET, DELAYED RELEASE ORAL at 17:33

## 2023-10-12 RX ADMIN — DICLOFENAC SODIUM 2 G: 10 GEL TOPICAL at 08:47

## 2023-10-12 RX ADMIN — HEPARIN SODIUM 5000 UNITS: 5000 INJECTION, SOLUTION INTRAVENOUS; SUBCUTANEOUS at 08:45

## 2023-10-12 RX ADMIN — PANTOPRAZOLE SODIUM 40 MG: 40 TABLET, DELAYED RELEASE ORAL at 08:45

## 2023-10-12 RX ADMIN — DICLOFENAC SODIUM 2 G: 10 GEL TOPICAL at 13:56

## 2023-10-12 RX ADMIN — ATORVASTATIN CALCIUM 40 MG: 40 TABLET, FILM COATED ORAL at 20:49

## 2023-10-12 RX ADMIN — DICLOFENAC SODIUM 2 G: 10 GEL TOPICAL at 22:05

## 2023-10-12 RX ADMIN — Medication 1 MG: at 08:45

## 2023-10-12 RX ADMIN — HEPARIN SODIUM 5000 UNITS: 5000 INJECTION, SOLUTION INTRAVENOUS; SUBCUTANEOUS at 20:51

## 2023-10-12 RX ADMIN — ACETAMINOPHEN 650 MG: 325 TABLET, FILM COATED ORAL at 01:00

## 2023-10-12 RX ADMIN — DICLOFENAC SODIUM 2 G: 10 GEL TOPICAL at 18:33

## 2023-10-12 ASSESSMENT — ACTIVITIES OF DAILY LIVING (ADL)
ADLS_ACUITY_SCORE: 30
ADLS_ACUITY_SCORE: 30
ADLS_ACUITY_SCORE: 29
ADLS_ACUITY_SCORE: 30
ADLS_ACUITY_SCORE: 29
ADLS_ACUITY_SCORE: 30

## 2023-10-12 NOTE — PLAN OF CARE
Goal Outcome Evaluation:      Plan of Care Reviewed With: patient    Overall Patient Progress: improvingOverall Patient Progress: improving      Orientations: A/Ox4  Vitals/Pain: Soft BP, all other VSS on RA  LS: Clear  Lines/Drains: R PICC, R dialysis port  Skin/Wounds: Blanchable redness on bottom, refused mepilex. Wound care on penis x1  GI/: Adequate uo via guido, BM- this shift per pt passing gas  Labs: none  Ambulation/Assist: A1/SBA GB W  Sleep Quality: Awake  Plan: Dx with home care and outpt dialysis

## 2023-10-12 NOTE — PROGRESS NOTES
Pt had tunnel cath for dialysis placed yesterday- intact.  Renal diet with fluid restriction, Up with ax1 and walker.  Complained of mild pain to dialysis catheter site, improved with PRN tylenol . Pt scoring green on the Aggression Stop Light Tool.  Plan:  discharge to TCU on 10/15.

## 2023-10-12 NOTE — PROGRESS NOTES
Care Management Follow Up    Length of Stay (days): 15    Expected Discharge Date: 10/13/2023     Concerns to be Addressed: discharge planning     Patient plan of care discussed at interdisciplinary rounds: Yes    Anticipated Discharge Disposition: Home Care     Anticipated Discharge Services:    Anticipated Discharge DME:      Patient/family educated on Medicare website which has current facility and service quality ratings: yes  Education Provided on the Discharge Plan: Yes  Patient/Family in Agreement with the Plan: yes    Referrals Placed by CM/SW: Homecare, Post Acute Facilities  Private pay costs discussed: Not applicable    Additional Information:  SW reviewed chart. Pt and spouse expressing interest in TCU. SW sent more referrals this morning and pt accepted clinically by [facility]. However, his [facility] has a 15% daily co-pay which could result in a $80 per day bill. KALEB met with pt. Explained this to him. Pt reports he would prefer going home with home care. PT/OT now recommending home with home care. His wife will drive him to dialysis. Her daughter checks on them daily. Pt had been accepted by [facility] on 10/2. Will update them on possible discharge Friday.     BARBARA Alford, [facility]  [phone] Desk phone  [phone] Cell/text (Preferred)  [facility]

## 2023-10-12 NOTE — PROGRESS NOTES
SPIRITUAL HEALTH SERVICES - Progress Note  [facility]    Referral Source: Length of stay visit    I met briefly with Shilpi to introduce myself and Spiritual Health Services. Shilpi did not express any spiritual or emotional needs at this time.    Plan: [facility] remains available to support as needed.    Yamile Monroe M.Ed.   Intern    [facility] routine referrals *[phone]  [facility] available 24/7 for emergent requests/referrals, either by paging the on-call  or by entering an ASAP/STAT consult in Epic (this will also page the on-call ).

## 2023-10-12 NOTE — PROGRESS NOTES
Renal Medicine Progress Note            Assessment/Plan:     Assessment:  1) Acute Kidney injury, non-oliguric     Creatinine prior to admission ranging between 1.8-3.3 over the course of 2022, no recent labs.      Creatinine on admission 14.61 in the setting of encephalopathy, recent NSAID use, sepsis, UTI, and obstructive uropathy.  Most likely significant prerenal RICHARD, probable ATN at this point.  He did undergo dialysis at [facility] primarily for acidosis and severe hyperkalemia causing EKG changes.  HD 10/4 for hyperkalemia, 10/6 for hypervolemia (10/6 had worsening infiltrates on CXR) He is fragile with RICHARD on top of CKD, HFrEF.       Monitor daily for recovery. Still with anephric rise of Cr between dialysis. TDC placed 10/11 and pt is accepted at [facility] .      2) Obstructive uropathy with Moderate bilateral hydroureteronephrosis on admission  Pyelonephritis  Hypospadias  History of urinary retention  Patient has history of urinary retention and UTIs in the past.  CT with bilateral hydroureteronephrosis and bladder wall thickening consistent with cystitis.  Culp placed by urology, initially yellow urine followed by milky cloudy urine consistent with UTI/pyelo-.   Trial of voiding not successful.       Other:  Severe diarrhea  Colitis, possibly infectious     Acute on chronic anemia  Hemoglobin 7.6 , EGD suggestive of duodenal ulcer as possible source of bleeding.  -EPO with dialysis.     Cardiomyopathy with reduced EF    Discussed with . Likely discharge tomorrow . OUtpt unit will not accept pt on Sat. He will get dialysis tomorrow and then start at outpt unit on Tuesday, pending recovery  Continue to hold furosemide given low BP           Interval History:     No acute issues overnight.    Dialysis yesterday without issues. 500 ml UF  TDC worked well.   No complaints today            Medications and Allergies:      - MEDICATION INSTRUCTIONS for Dialysis Patients -   Does not apply  See Admin Instructions    aspirin  81 mg Oral Daily    atorvastatin  40 mg Oral QPM    diclofenac  2 g Topical 4x Daily    [Held by provider] furosemide  80 mg Intravenous Q12H    sodium chloride (PF) 0.9%  1.3-2.6 mL Intracatheter Once    Followed by    heparin  3 mL Intracatheter Once    sodium chloride (PF) 0.9%  1.3-2.6 mL Intracatheter Once    Followed by    heparin  3 mL Intracatheter Once    heparin ANTICOAGULANT  5,000 Units Subcutaneous Q12H    hydrALAZINE  10 mg Oral TID    lidocaine  1 patch Transdermal Q24H    metoprolol succinate ER  25 mg Oral Daily    multivitamin RENAL  1 mg Oral Daily    pantoprazole  40 mg Oral BID AC    sodium chloride (PF)  10-40 mL Intracatheter Q8H        Allergies   Allergen Reactions    Ciprofloxacin Rash    Other Drug Allergy (See Comments)      Cough Syrup Abstracted from Regions Chart( Hydrobromide)            Physical Exam:   Vitals were reviewed  BP 91/57 (BP Location: Left arm)   Pulse 92   Temp 98.2  F (36.8  C) (Oral)   Resp 18   Ht 1.829 m (6')   Wt 82.3 kg (181 lb 8 oz)   SpO2 96%   BMI 24.62 kg/m      Wt Readings from Last 3 Encounters:   10/11/23 82.3 kg (181 lb 8 oz)   09/27/23 90.7 kg (200 lb)   05/27/22 104 kg (229 lb 3.2 oz)     GENERAL APPEARANCE: Pt in no distress  HEENT:  Eyes/ears/nose grossly normal, neck supple  RESP: lungs clear at present  CV: regular rate and rhythm, normal S1 S2, no murmur, click or rub   ABDOMEN: soft, nontender, bowel sounds normal  EXTREMITIES/SKIN: 1+ ankle edema dependently, no rashes or lesions  Rt internal jugular TDC +           Data:     BMP  Recent Labs   Lab 10/12/23  0603 10/11/23  0509 10/10/23  1132 10/10/23  0557 10/09/23  0543    137  --  141 143   POTASSIUM 4.1 4.2  --  4.1 3.8   CHLORIDE 100 99  --  102 102   CHANA 8.1* 8.2*  --  7.6* 7.9*   CO2 26 24  --  26 25   BUN 27.4* 40.9*  --  29.0* 50.5*   CR 4.02* 5.44*  --  4.14* 6.07*   GLC 92 94 110* 88 92     CBC  Recent Labs   Lab 10/12/23  0603  10/11/23  0509 10/10/23  0557 10/09/23  0543 10/08/23  0602   WBC  --  5.9 6.1 7.3 9.0   HGB 7.6* 8.3* 7.8* 7.9* 7.9*   HCT  --  25.6* 24.2* 24.4* 24.3*   MCV  --  100 100 99 98   PLT  --  213 178 162 155     Lab Results   Component Value Date    AST 25 10/11/2023    ALT 36 10/11/2023    ALKPHOS 67 10/11/2023    BILITOTAL 0.4 10/11/2023     Lab Results   Component Value Date    INR 1.18 (H) 10/05/2023       Attestation:  I have reviewed today's vital signs, notes, medications, labs and imaging.  Kia Mantilla MD  [facility] - Nephrology   [phone]

## 2023-10-12 NOTE — PLAN OF CARE
Neuro: A&O x4  Tele/cardiac: N/A  Respiration: on RA  Activity: A1  Pain: denies  Drips: PICC SL, R CVC  Drains/tubes: None  Skin: hypospadia, scattered bruises  GI/: guido  Aggression color: green  Isolation: none  Plan: hemodialysis tomorrow then possible discharge

## 2023-10-12 NOTE — PROGRESS NOTES
[facility]    Urology Progress Note     Assessment & Plan   73 year old male with renal failure and electrolyte derangements, cystitis with bilateral hydroureteronephrosis on CT without evidence of ureteral obstruction. Urinary retention s/p guido placement 9/27 by Dr. Clayton (uncomplicated other than hypospadias) for 600 ml. Failed TOV and guido replaced on 10/6. Hemodialysis for renal failure- nephrology following     Plan:   -Discussed plan for guido, will keep at discharge and manage as outpatient- AVS updated     Urology will sign off for now.   Please call with any questions or concerns.     Lisa Corea PA-C  [facility]   [email or web address]  Text Page (7:30am to 4:30pm)      Interval History   No overnight events   Guido with clear UOP     AVSS  Creat 4.02    Physical Exam   Temp: 98.2  F (36.8  C) Temp src: Oral BP: 91/57 Pulse: 92   Resp: 18 SpO2: 96 % O2 Device: None (Room air)    Vitals:    10/09/23 1000 10/10/23 0500 10/11/23 0400   Weight: 83.5 kg (184 lb) 82.4 kg (181 lb 11.2 oz) 82.3 kg (181 lb 8 oz)     Vital Signs with Ranges  Temp:  [97.6  F (36.4  C)-98.2  F (36.8  C)] 98.2  F (36.8  C)  Pulse:  [] 92  Resp:  [12-37] 18  BP: ()/(53-77) 91/57  SpO2:  [96 %-98 %] 96 %  I/O last 3 completed shifts:  In: 520 [P.O.:520]  Out: 1475 [Urine:975; Other:500]    Constitutional: Alert. Pleasant. No acute distress.   Respiratory: No respiratory distress. Breathing unlabored.   GI: Soft, nontender.  Male  guido with yellow urine.     Skin: Warm and dry.  Musculoskeletal: Moving all extremities approprietly.    Medications    - MEDICATION INSTRUCTIONS -      - MEDICATION INSTRUCTIONS -        - MEDICATION INSTRUCTIONS for Dialysis Patients -   Does not apply See Admin Instructions    aspirin  81 mg Oral Daily    atorvastatin  40 mg Oral QPM    diclofenac  2 g Topical 4x Daily    [Held by provider] furosemide  80 mg Intravenous Q12H     "sodium chloride (PF) 0.9%  1.3-2.6 mL Intracatheter Once    Followed by    heparin  3 mL Intracatheter Once    sodium chloride (PF) 0.9%  1.3-2.6 mL Intracatheter Once    Followed by    heparin  3 mL Intracatheter Once    heparin ANTICOAGULANT  5,000 Units Subcutaneous Q12H    hydrALAZINE  10 mg Oral TID    lidocaine  1 patch Transdermal Q24H    metoprolol succinate ER  25 mg Oral Daily    multivitamin RENAL  1 mg Oral Daily    pantoprazole  40 mg Oral BID AC    sodium chloride (PF)  10-40 mL Intracatheter Q8H       Data   Results for orders placed or performed during the hospital encounter of 09/27/23 (from the past 24 hour(s))   Magnesium   Result Value Ref Range    Magnesium 1.6 (L) 1.7 - 2.3 mg/dL   Phosphorus   Result Value Ref Range    Phosphorus 3.5 2.5 - 4.5 mg/dL   Basic metabolic panel   Result Value Ref Range    Sodium 139 135 - 145 mmol/L    Potassium 4.1 3.4 - 5.3 mmol/L    Chloride 100 98 - 107 mmol/L    Carbon Dioxide (CO2) 26 22 - 29 mmol/L    Anion Gap 13 7 - 15 mmol/L    Urea Nitrogen 27.4 (H) 8.0 - 23.0 mg/dL    Creatinine 4.02 (H) 0.51 - 1.17 mg/dL    GFR Estimate 15 (L) >60 mL/min/1.73m2    Calcium 8.1 (L) 8.8 - 10.2 mg/dL    Glucose 92 70 - 99 mg/dL    Narrative    The generation of reference intervals for this test is currently based on binary male or female sex. If the electronic health record information indicates another gender identity or if Legal Sex is recorded as \"Unknown\", both male and female reference intervals are provided where applicable, and should be considered according to the individual's appropriate clinical context.   Hemoglobin   Result Value Ref Range    Hemoglobin 7.6 (L) 11.7 - 17.7 g/dL    Narrative    The generation of reference intervals for this test is currently based on binary male or female sex. If the electronic health record information indicates another gender identity or if Legal Sex is recorded as \"Unknown\", both male and female reference intervals are " provided where applicable, and should be considered according to the individual's appropriate clinical context.

## 2023-10-12 NOTE — PROGRESS NOTES
[facility] GASTROENTEROLOGY PROGRESS NOTE    SUBJECTIVE:  Patient feeling ok, looking forward to discharge. Has noted red blood at times on the toilet paper after BM.  No Blood with stools.    OBJECTIVE:    BP 97/58 (BP Location: Left arm)   Pulse 99   Temp 97.9  F (36.6  C) (Oral)   Resp 18   Ht 1.829 m (6')   Wt 82.3 kg (181 lb 8 oz)   SpO2 94%   BMI 24.62 kg/m    Temp (24hrs), Av  F (36.7  C), Min:97.6  F (36.4  C), Max:98.2  F (36.8  C)    Patient Vitals for the past 72 hrs:   Weight   10/11/23 0400 82.3 kg (181 lb 8 oz)   10/10/23 0500 82.4 kg (181 lb 11.2 oz)       Intake/Output Summary (Last 24 hours) at 10/12/2023 1144  Last data filed at 10/12/2023 0738  Gross per 24 hour   Intake 280 ml   Output 2075 ml   Net -1795 ml         PHYSICAL EXAM    Constitutional: NAD, comfortable  Neuro: grossly  normal          Additional Comments:  ROS, FH, SH: See initial GI consult for details.    I have reviewed the patient's new clinical lab results:    Recent Labs   Lab Test 10/12/23  0603 10/11/23  0509 10/10/23  0557 10/09/23  0543 10/06/23  0519 10/05/23  1450 23  0538 23  0449 23  2344   WBC  --  5.9 6.1 7.3   < >  --    < > 13.0* 9.3   HGB 7.6* 8.3* 7.8* 7.9*   < >  --    < > 6.9* 7.1*   MCV  --  100 100 99   < >  --    < > 92 93   PLT  --  213 178 162   < >  --    < > 82* 81*   INR  --   --   --   --   --  1.18*  --  1.58* 1.56*    < > = values in this interval not displayed.     Recent Labs   Lab Test 10/12/23  0603 10/11/23  0509 10/10/23  0557    137 141   POTASSIUM 4.1 4.2 4.1   CHLORIDE 100 99 102   CO2 26 24 26   BUN 27.4* 40.9* 29.0*   CR 4.02* 5.44* 4.14*   ANIONGAP 13 14 13   CHANA 8.1* 8.2* 7.6*     Recent Labs   Lab Test 10/11/23  0509 10/10/23  0557 10/09/23  0543 10/01/23  0635 23  1550 23  2344 23  1446 22  1859 22  2031 22  1854   ALBUMIN 3.0* 2.7* 3.0*   < >  --    < >  --  4.5  --   --  3.0*   BILITOTAL 0.4 0.4 0.4   < >   --    < >  --  0.5  --   --  0.4   ALT 36 33 36   < >  --    < >  --  100*  --   --  18   AST 25 23 23   < >  --    < >  --  182*  --   --  13   ALKPHOS 67 57 54   < >  --    < >  --  84  --   --  75   PROTEIN  --   --   --   --  100*  --  100*  --  100*   < >  --    LIPASE  --   --   --   --   --   --   --  405*  --   --  52    < > = values in this interval not displayed.       EXAM: CT ABDOMEN PELVIS W/O CONTRAST  LOCATION: [facility]  DATE: 9/28/2023     IMPRESSION:   1.  Pancolonic wall thickening with pericolonic fat stranding most prominently involving the splenic flexure and descending colon. Findings suspicious for infectious colitis with differential including pseudomembranous (C. difficile) colitis.  2.  Moderate bilateral hydroureteronephrosis which extends down to the level of the urinary bladder which is diffusely thickened. Bladder wall thickening highly suspicious for cystitis. Recommend correlation with urinalysis. Culp catheter within the   urinary bladder which is decompressed.  3.  Distended gallbladder with multiple layering tiny stones but without other secondary evidence of acute cholecystitis. Gallbladder distention may relate to fasting state. Pancreas is somewhat atrophic, otherwise unremarkable without CT evidence of   pancreatitis.  4.  Small to moderate sized right pleural effusion with compressive atelectasis.     Assessment & Plan:  Blood with Stool  Assessment:  73 year old male who was admitted 9/27 with SOB, found to have renal failure, with obstructive uropathy, GI initially consulted for subacute to chronic anemia without overt bleeding and CT with concern of colitis. History of NSAID use prior to admission. Last colonoscopy per consultation note was 8 years ago.     EGD and Flex Sig completed 10/9/23.  EGD with multiple ulcers in the duodenal bulb.  Flex sig with suboptimal prep, especially in the rectum, but colonic mucosa which was seen appeared  normal. Scope could not be retroflexed due to stool in the colon.  Colon biopsies were normal, stomach biopsies showed gastritis, no H. Pylori.     GI called again 10/12/23 for report of blood with stools. Patient reports bowel movements are brown, but he has noted red blood on the toilet paper several times this week.    Likely outlet bleeding (hemorrhoids) or fissure as stool is not effected. No colitis noted on flex sig 10/8/23.  However, that exam was suboptimal given the presence of stool, and he is due for colonoscopy.  Stool is normal in color, bleeding seems very distal.    Plan:  -outpatient EGD and colon in about 4-5 weeks assuming renal stability and patient able to prep.  This will follow up on ulcers see at EGD, also we can better evaluate the colon when he able to fully prep for the exam with oral prep  - continue twice daily pantoprazole until EGD, then likely can reduce to once daily      Karolyn Saxena  [facility]  Office:  [phone]    Approximately 25 minutes of total time was spent providing patient care, including patient evaluation, reviewing documentation/test results, and .

## 2023-10-12 NOTE — PROGRESS NOTES
[facility]    Medicine Progress Note - Hospitalist Service       Date of Admission:  9/27/2023    Assessment & Plan   Bret Fleming is a 73 year old male with hx of CAD, HTN, and CKD who initially presented to   [facility] ED on 9/27/2023 for evaluation of shortness of breath and nausea/vomiting with decreased PO intake, and was found to have acute renal failure (Cr 14.6), hyperkalemia (K 7), and acidosis (pH 6.98). Underwent emergent dialysis at outside hospital. Further evaluation also revealed obstructive uropathy with bilateral hydronephrosis, colitis, and UTI. Transferred to [facility] for further management of renal failure. During this admission, he was found to have worsening heart failure (EF 30-35%) with concern for NSTEMI as well as suspected GI bleed from duodenal ulcer. He remains HD-dependent, R IJ catheter placed by IR on 10/11. For now, Cardiology plans for outpatient follow-up with cardiac MRI to assess viability in the LAD territory prior to potential coronary angiogram.    Acute renal failure, non-oliguric  *Creatinine prior to admission ranging between 1.8-3.3 over the course of 2022, no recent labs.   *Creatinine on admission 14.61 in the setting of recent NSAID use, obstructive uropathy, nad new CHF  *Nephrology consulted this admission.  *Continued to required HD this admission. R IJ tunneled catheter placed by IR on 10/11.   - Conts on HD as per Nephrology, next run tomorrow  - Plan for outpatient dialysis T/Th/S at [facility]  - Conts to make urine, but Lasix held this admission due to borderline hypotension  - Plan was to discharge to TCU, but placement has been very difficult. PT re-consulted for potential to discharge home    Obstructive uropathy with bilateral hydroureteronephrosis   Pyuria  Hypospadias  History of urinary retention  History of recurrent UTIs  *Very difficult Culp catheter placement noted at outside facility. CT abd/pelvis at outside  facility showed bilateral hydroureteronephrosis and bladder wall thickening. UA initially concerning for UTI, but urine culture grew only cornebacterium stratum which is a regular skin pathogen. Completed 6-days of IV vancomycin and pip-tazo. Repeat urine culture on 9/30 was negative.  *Urology consulted this admission  *Culp catheter removed 10/5, but failed voiding trial; Culp replaced 10/6  - Culp in place, 10/6 to present  - Plan is to discharge home with Culp in place and follow up with Urology after discharge     NSTEMI, suspected CAD  Acute systolic CHF, new diagnosis  Moderate mitral valve regurgitation, new diagnosis  *TTE (9/28) revealed worsening heart failure with an EF of 30 to 35% with a large area of hypokinesis and akinesis distal to the septum with suspected large LAD infarct; new moderate MR, and elevated right ventricular systolic pressure.  *Cardiology consulted this admission; plan for outpatient ischemic evaluation with likely cardiac MRI once his renal function is stable to avoid further contrast nephropathy  *Home meds: ASA 81 mg daily, atenolol 80 mg daily, atorvastatin 80 mg daily  - Atenolol discontinued this admission; started on metoprolol ER 25 mg daily and hydralazine 10 mg TID per Cardiology  - Conts on PTA ASA and atorvastatin  - Cardiology as now signed off     Acute GI bleed with acute blood loss anemia  *Initial Hgb 8.7; gradually decreased to 5.7.   *[facility] consulted this admission. EGD (10/9/23) showed multiple duodenal ulcers, likely source of anemia. Flex sig limited due to stool burden  *Received total 2 units pRBCs this admission  *10/12: bedside RN noted rectal bleeding. [facility] reconsulted - felt bleeding was related to hemorrhoids vs fissure. Recommended monitoring   - Hgb 7.6 today, will follow  - Conts on pantoprazole 40 mg BID  - Patient has been advised to avoid NSAIDs  - Plan for repeat EGD and colonoscopy in 4-5 weeks per [facility]    Right knee pain  *Pt reported  right knee pain this admission. XR (10/6) showed normal joint spaces and alignment. No fracture or joint effusion. Osteopenia. Atherosclerosis.   - Lidocaine patch, APAP prn ordered. Avoid NSAIDs.   - Follow up with Ortho after hospital discharge for further work-up    Colitis, Resolved  Elevated liver transaminases, Resolved  Noted on admission. Pt described flu-like symptoms prior to admission with nausea, vomiting, decreased appetite, generalized fatigue and weakness. INR was elevated at 1.8 and liver enzymes AST/ALT were elevated in the 100s but subsequently normalized. Stool panel/C.diff negative.     Diet: Room Service  Fluid restriction 1500 ML FLUID  Renal Diet (dialysis)  Fluid restriction 1500 ML FLUID    DVT Prophylaxis: Pneumatic Compression Devices  Culp Catheter: PRESENT, indication: Retention, Retention  Code Status: Full Code         Disposition: Expected discharge home tomorrow after dialysis pending PT consult    The patient's care was discussed with the Bedside Nurse, Care Coordinator/, and Patient.    Cari Galarza MD  Hospitalist Service  [facility]  Contact information available via [facility] Paging/Directory    ______________________________________________________________________    Interval History   No acute events overnight. Offers no complaints, but bedside RN noted blood with wiping and some blood in toilet. Pt denies cp/sob, dizziness/lightheadedness. GI re-consulted - felt rectal bleeding is likely due to hemorrhoids vs fissure; recommended monitoring and repeat scope in a few weeks. Discussed dispo plan with patient and CC/SW - unable to find TCU that will accept patient due to complex care needs and insurance. Pt feels he will be safe at home. PT reconsulted    Data reviewed today: I reviewed all medications, new labs and imaging results over the last 24 hours. I personally reviewed no images or EKG's today.    Physical Exam   Vital Signs:  Temp: 97.9  F (36.6  C) Temp src: Oral BP: 97/58 Pulse: 99   Resp: 18 SpO2: 94 % O2 Device: None (Room air)    Weight: 181 lbs 8 oz  Constitutional: Resting comfortably, NAD  HEENT: Sclera white, MMM  Respiratory: Breathing non-labored. Lungs CTAB - no wheezes, crackles, or rhonchi  Cardiovascular: Heart RRR, soft holosystolic murmur. No pedal edema  GI: +BS, abd soft/NT  Skin/Integument: No rash  Musculoskeletal: Normal muscle bulk and tone  Neuro: Alert and appropriate, GARCIA  Psych: Calm and cooperative    Data   Recent Labs   Lab 10/12/23  0603 10/11/23  0509 10/10/23  1132 10/10/23  0557 10/09/23  0543 10/06/23  0430 10/05/23  1450   WBC  --  5.9  --  6.1 7.3   < >  --    HGB 7.6* 8.3*  --  7.8* 7.9*   < >  --    MCV  --  100  --  100 99   < >  --    PLT  --  213  --  178 162   < >  --    INR  --   --   --   --   --   --  1.18*    137  --  141 143   < >  --    POTASSIUM 4.1 4.2  --  4.1 3.8   < >  --    CHLORIDE 100 99  --  102 102   < >  --    CO2 26 24  --  26 25   < >  --    BUN 27.4* 40.9*  --  29.0* 50.5*   < >  --    CR 4.02* 5.44*  --  4.14* 6.07*   < >  --    ANIONGAP 13 14  --  13 16*   < >  --    CHANA 8.1* 8.2*  --  7.6* 7.9*   < >  --    GLC 92 94 110* 88 92   < >  --    ALBUMIN  --  3.0*  --  2.7* 3.0*   < >  --    PROTTOTAL  --  5.7*  --  5.2* 5.4*   < >  --    BILITOTAL  --  0.4  --  0.4 0.4   < >  --    ALKPHOS  --  67  --  57 54   < >  --    ALT  --  36  --  33 36   < >  --    AST  --  25  --  23 23   < >  --     < > = values in this interval not displayed.         No results found for this or any previous visit (from the past 24 hour(s)).    Medications    - MEDICATION INSTRUCTIONS -      - MEDICATION INSTRUCTIONS -        - MEDICATION INSTRUCTIONS for Dialysis Patients -   Does not apply See Admin Instructions    aspirin  81 mg Oral Daily    atorvastatin  40 mg Oral QPM    diclofenac  2 g Topical 4x Daily    sodium chloride (PF) 0.9%  1.3-2.6 mL Intracatheter Once    Followed by    heparin   3 mL Intracatheter Once    sodium chloride (PF) 0.9%  1.3-2.6 mL Intracatheter Once    Followed by    heparin  3 mL Intracatheter Once    heparin ANTICOAGULANT  5,000 Units Subcutaneous Q12H    hydrALAZINE  10 mg Oral TID    lidocaine  1 patch Transdermal Q24H    metoprolol succinate ER  25 mg Oral Daily    multivitamin RENAL  1 mg Oral Daily    pantoprazole  40 mg Oral BID AC    sodium chloride (PF)  10-40 mL Intracatheter Q8H

## 2023-10-12 NOTE — PLAN OF CARE
Orientations: A&Ox4   Vitals/Pain: VSS x hypotension (SBP 90- low 100s most of day), RA, LS diminished. Pain in BLE (knees and R shin)   Tele: SR   Lines/Drains: PICC R SL, R port (dialysis)  Skin/Wounds: Pale, scattered bruising. Blanchable redness and dry/scabby buttocks. Penile meatus abnormality   GI/: Culp in place, BM x1. Renal diet, and 1500 mL fluid restriction tolerating well.   Labs: Abnormal/Trends: BUN (27.4), Crt (4.02), GFR (15), Calcium (8.1), Hgb (7.6)  Electrolyte Replacement- K no replacement needed, AM check 10/13  Ambulation/Assist: Assist x1/SB assist   Sleep Quality: On/off overnight   Plan: Monitor I&Os, small blood w/ wiping last 2 days, drips of blood w/ AM BM - GI consult placed. Discharge pending due to outpt dialysis need. Per SW pt wanting to return home w/ home care and planning to discharge 10/13 w/ outpt dialysis. [facility] called today and wanted discharge update, phone number to call back ([phone]), and fax discharge/dialysis paperwork fax # ([phone]). PT/OT and WOC saw pt today. Urology, nephrology, and GI following

## 2023-10-12 NOTE — PROGRESS NOTES
[facility] Nurse Inpatient Assessment     Consulted for: Penis    Summary: Pt with hypospadias and ulceration to ventral aspect. Unclear etiology of this wound as pt reports no catheter use until hospitalization. This is however friable tissue that can be damaged easily. Devitalized tissue covers the area, will apply a thin layer of triad paste to debride and protect. No acute s/sx of infection.   10/5: Improvement, NO triad in use at time of assessment, periwound skin macerated. Possible plan for guido removal 10/5.     Patient History (according to provider note(s):      74 yo male w/ hx of HTN, CAD who presented to OSH for SOB. Per chart review, patient thought he had flu ,and thus has had very limited PO intake for the past week. He also reports N/V as well as abdominal tenderness. He  has been taking Tylenol and ibuprofen, but unable to recall how much. No cough or diarrhea.      In the outside ED, he was given albuterol neb, 1 g calcium gluconate, and insulin for shifting. He was also started on  Zosyn and vanc, given 2.5 L NS, and 150 mg of bicarb.  Patient completed run of HD at OSH. Difficult guido placement and required urology consult who recommended STAT CT abdomen to assess for obstructive uropathy.     Assessment:      Areas visualized during today's visit:  penis    Wound location: Ventral Penis    Last photo: 10/5/23      10/5             Wound due to: Unknown Etiology  Wound history/plan of care: Erosion of mucosa with thin layer of devitalized tissue  Wound base: fibrinous tissue is softening, now yellow with red moist tissue exposed     Palpation of the wound bed: firm      Drainage: scant     Description of drainage: serous     Measurements (length x width x depth, in cm): 1.5  x 3 x  0.1 cm      Tunneling: N/A     Undermining: N/A  Periwound skin: Intact      Color: normal and consistent with surrounding tissue      Temperature: normal   Odor: none  Pain: mild,  intermittent and tender  Pain interventions prior to dressing change: slow and gentle cares   Treatment goal: Heal , Maintain (prevention of deterioration), Protection, and Remove necrotic tissue  STATUS: evolving and improving  Supplies ordered: at bedside, discussed with RN, and discussed with patient        Treatment Plan:     Ventral Penis wound(s): Daily   cleanse with cindy cleanse and protect and bob dry wipes/washcloths.   Ensure area is completely dry by blotting and using circular motions, do not wipe as this can cause trauma to the skin   Apply a thin layer (pea sized amt) of Triad paste (#[id number]) to wound base on underside of penis. Using a very small amount to the area will prevent it from caking and becoming messy.   Remove only soiled paste, then reapply thin layer. If complete removal is needed use baby/mineral oil (located in pharmacy).   *Avoid pre moisten wipes.   *Avoid use of brief  *Use single covidien pad and limit number of linens underneath patient. Covidien pad can be used as incontinence pad and lift pad     Orders: Reviewed    RECOMMEND PRIMARY TEAM ORDER: None, at this time  Education provided: importance of repositioning, plan of care, and wound progress  Discussed plan of care with: Patient and Nurse  WOC nurse follow-up plan: weekly  Notify WOC if wound(s) deteriorate.  Nursing to notify the Provider(s) and re-consult the WOC Nurse if new skin concern.    DATA:     Current support surface: Standard  Standard gel/foam mattress (IsoFlex, Atmos air, etc)  Containment of urine/stool: Incontinence Protocol, Incontinent pad in bed, and Indwelling catheter  BMI: Body mass index is 24.62 kg/m .   Active diet order: Orders Placed This Encounter      Renal Diet (dialysis)     Output: I/O last 3 completed shifts:  In: 520 [P.O.:520]  Out: 1475 [Urine:975; Other:500]     Labs:   Recent Labs   Lab 10/12/23  0603 10/11/23  0509 10/06/23  0519 10/05/23  1450   ALBUMIN  --  3.0*   < >  --    HGB  "7.6* 8.3*   < >  --    INR  --   --   --  1.18*   WBC  --  5.9   < >  --     < > = values in this interval not displayed.     Pressure injury risk assessment:   Sensory Perception: 4-->no impairment  Moisture: 3-->occasionally moist  Activity: 3-->walks occasionally  Mobility: 3-->slightly limited  Nutrition: 3-->adequate  Friction and Shear: 3-->no apparent problem  Milo Score: 19    Aparna Moran RN, CWON  Please contact via [address] at name or group \"[facility] nurse\"- M-F 8A-4P  Leave VM @ [phone] for non-urgent needs. Checked occasionally M-F.         "

## 2023-10-13 ENCOUNTER — APPOINTMENT (OUTPATIENT)
Dept: PHYSICAL THERAPY | Facility: CLINIC | Age: 73
DRG: 871 | End: 2023-10-13
Attending: INTERNAL MEDICINE
Payer: COMMERCIAL

## 2023-10-13 VITALS
SYSTOLIC BLOOD PRESSURE: 100 MMHG | WEIGHT: 181.5 LBS | DIASTOLIC BLOOD PRESSURE: 58 MMHG | BODY MASS INDEX: 24.58 KG/M2 | OXYGEN SATURATION: 100 % | TEMPERATURE: 97.9 F | HEIGHT: 72 IN | RESPIRATION RATE: 29 BRPM | HEART RATE: 94 BPM

## 2023-10-13 LAB
ANION GAP SERPL CALCULATED.3IONS-SCNC: 15 MMOL/L (ref 7–15)
BUN SERPL-MCNC: 38.5 MG/DL (ref 8–23)
CALCIUM SERPL-MCNC: 8.3 MG/DL (ref 8.8–10.2)
CHLORIDE SERPL-SCNC: 102 MMOL/L (ref 98–107)
CREAT SERPL-MCNC: 5.53 MG/DL (ref 0.51–1.17)
DEPRECATED HCO3 PLAS-SCNC: 25 MMOL/L (ref 22–29)
EGFRCR SERPLBLD CKD-EPI 2021: 10 ML/MIN/1.73M2
GLUCOSE SERPL-MCNC: 97 MG/DL (ref 70–99)
HGB BLD-MCNC: 7.8 G/DL (ref 11.7–17.7)
MAGNESIUM SERPL-MCNC: 1.7 MG/DL (ref 1.7–2.3)
PHOSPHATE SERPL-MCNC: 4.1 MG/DL (ref 2.5–4.5)
POTASSIUM SERPL-SCNC: 4.2 MMOL/L (ref 3.4–5.3)
SODIUM SERPL-SCNC: 142 MMOL/L (ref 135–145)

## 2023-10-13 PROCEDURE — 85018 HEMOGLOBIN: CPT | Performed by: INTERNAL MEDICINE

## 2023-10-13 PROCEDURE — 90935 HEMODIALYSIS ONE EVALUATION: CPT | Performed by: INTERNAL MEDICINE

## 2023-10-13 PROCEDURE — 250N000013 HC RX MED GY IP 250 OP 250 PS 637: Performed by: HOSPITALIST

## 2023-10-13 PROCEDURE — 97116 GAIT TRAINING THERAPY: CPT | Mod: GP

## 2023-10-13 PROCEDURE — 80048 BASIC METABOLIC PNL TOTAL CA: CPT | Performed by: HOSPITALIST

## 2023-10-13 PROCEDURE — 84100 ASSAY OF PHOSPHORUS: CPT | Performed by: INTERNAL MEDICINE

## 2023-10-13 PROCEDURE — 634N000001 HC RX 634: Mod: JZ | Performed by: INTERNAL MEDICINE

## 2023-10-13 PROCEDURE — 250N000013 HC RX MED GY IP 250 OP 250 PS 637: Performed by: INTERNAL MEDICINE

## 2023-10-13 PROCEDURE — 99239 HOSP IP/OBS DSCHRG MGMT >30: CPT | Performed by: INTERNAL MEDICINE

## 2023-10-13 PROCEDURE — 250N000011 HC RX IP 250 OP 636: Performed by: HOSPITALIST

## 2023-10-13 PROCEDURE — 90937 HEMODIALYSIS REPEATED EVAL: CPT

## 2023-10-13 PROCEDURE — 83735 ASSAY OF MAGNESIUM: CPT | Performed by: INTERNAL MEDICINE

## 2023-10-13 RX ORDER — LIDOCAINE 4 G/G
1 PATCH TOPICAL EVERY 24 HOURS
Qty: 30 PATCH | Refills: 0 | Status: ON HOLD | OUTPATIENT
Start: 2023-10-13 | End: 2024-08-22

## 2023-10-13 RX ORDER — NITROGLYCERIN 0.4 MG/1
TABLET SUBLINGUAL
Qty: 25 TABLET | Refills: 0 | Status: ON HOLD | OUTPATIENT
Start: 2023-10-13 | End: 2024-08-26

## 2023-10-13 RX ORDER — METOPROLOL SUCCINATE 25 MG/1
25 TABLET, EXTENDED RELEASE ORAL DAILY
Qty: 30 TABLET | Refills: 0 | Status: SHIPPED | OUTPATIENT
Start: 2023-10-13 | End: 2023-11-17

## 2023-10-13 RX ORDER — ATORVASTATIN CALCIUM 40 MG/1
40 TABLET, FILM COATED ORAL EVERY EVENING
Qty: 30 TABLET | Refills: 0 | Status: SHIPPED | OUTPATIENT
Start: 2023-10-13 | End: 2023-11-17

## 2023-10-13 RX ORDER — ASPIRIN 81 MG/1
81 TABLET ORAL DAILY
Qty: 30 TABLET | Refills: 0 | Status: ON HOLD | OUTPATIENT
Start: 2023-10-13 | End: 2024-08-26

## 2023-10-13 RX ORDER — HYDRALAZINE HYDROCHLORIDE 10 MG/1
10 TABLET, FILM COATED ORAL 4 TIMES DAILY
Status: DISCONTINUED | OUTPATIENT
Start: 2023-10-13 | End: 2023-10-13

## 2023-10-13 RX ORDER — HYDRALAZINE HYDROCHLORIDE 10 MG/1
10 TABLET, FILM COATED ORAL 3 TIMES DAILY
Qty: 90 TABLET | Refills: 0 | Status: SHIPPED | OUTPATIENT
Start: 2023-10-13 | End: 2023-11-17

## 2023-10-13 RX ORDER — HYDRALAZINE HYDROCHLORIDE 10 MG/1
10 TABLET, FILM COATED ORAL EVERY 8 HOURS SCHEDULED
Status: DISCONTINUED | OUTPATIENT
Start: 2023-10-13 | End: 2023-10-13 | Stop reason: HOSPADM

## 2023-10-13 RX ORDER — ACETAMINOPHEN 500 MG
1000 TABLET ORAL 3 TIMES DAILY
Qty: 100 TABLET | Refills: 0 | Status: ON HOLD | OUTPATIENT
Start: 2023-10-13 | End: 2024-08-26

## 2023-10-13 RX ORDER — B COMPLEX, C NO.20/FOLIC ACID 1 MG
1 CAPSULE ORAL DAILY
Qty: 30 CAPSULE | Refills: 0 | Status: SHIPPED | OUTPATIENT
Start: 2023-10-13 | End: 2024-08-09

## 2023-10-13 RX ORDER — PANTOPRAZOLE SODIUM 40 MG/1
40 TABLET, DELAYED RELEASE ORAL
Qty: 60 TABLET | Refills: 0 | Status: SHIPPED | OUTPATIENT
Start: 2023-10-13

## 2023-10-13 RX ADMIN — LIDOCAINE 1 PATCH: 560 PATCH PERCUTANEOUS; TOPICAL; TRANSDERMAL at 08:53

## 2023-10-13 RX ADMIN — PANTOPRAZOLE SODIUM 40 MG: 40 TABLET, DELAYED RELEASE ORAL at 06:47

## 2023-10-13 RX ADMIN — Medication 1 MG: at 08:54

## 2023-10-13 RX ADMIN — HEPARIN SODIUM 5000 UNITS: 5000 INJECTION, SOLUTION INTRAVENOUS; SUBCUTANEOUS at 08:54

## 2023-10-13 RX ADMIN — ASPIRIN 81 MG: 81 TABLET, COATED ORAL at 08:54

## 2023-10-13 RX ADMIN — DICLOFENAC SODIUM 2 G: 10 GEL TOPICAL at 12:49

## 2023-10-13 RX ADMIN — EPOETIN ALFA-EPBX 4000 UNITS: 4000 INJECTION, SOLUTION INTRAVENOUS; SUBCUTANEOUS at 11:32

## 2023-10-13 ASSESSMENT — ACTIVITIES OF DAILY LIVING (ADL)
ADLS_ACUITY_SCORE: 30
ADLS_ACUITY_SCORE: 29
ADLS_ACUITY_SCORE: 30

## 2023-10-13 NOTE — DISCHARGE INSTRUCTIONS
[facility]  [address], [place] [address]     Tue Thurs Sat 10:55 am  Please arrive on Tuesday 10/17 at 10:15 am

## 2023-10-13 NOTE — PROGRESS NOTES
Potassium   Date Value Ref Range Status   10/13/2023 4.2 3.4 - 5.3 mmol/L Final   05/27/2022 4.4 3.5 - 5.0 mmol/L Final     Hemoglobin   Date Value Ref Range Status   10/13/2023 7.8 (L) 11.7 - 17.7 g/dL Final     Comment:     Sex Specific Reference Ranges:    Female  11.7-15.7  g/dL  Male    13.3-17.7   g/dL       Creatinine   Date Value Ref Range Status   10/13/2023 5.53 (H) 0.51 - 1.17 mg/dL Final     Comment:     Male and Female  0-2 Months    0.31-0.88 mg/dL  2-12 Months   0.16-0.39 mg/dL  1-2 Years     0.18-0.35 mg/dL  3-4 Years     0.26-0.42 mg/dL  5-6 Years     0.29-0.47 mg/dL  7-8 Years     0.34-0.53 mg/dL  9-10 Years    0.33-0.64 mg/dL  11-12 Years   0.44-0.68 mg/dL  13-14 Years   0.46-0.77 mg/dL    Female  15 Years and older  0.51-0.95 mg/dL    Male  15 Years and older  0.67-1.17 mg/dL         Urea Nitrogen   Date Value Ref Range Status   10/13/2023 38.5 (H) 8.0 - 23.0 mg/dL Final   05/27/2022 49 (H) 8 - 28 mg/dL Final     Sodium   Date Value Ref Range Status   10/13/2023 142 135 - 145 mmol/L Final     Comment:     Reference intervals for this test were updated on 09/26/2023 to more accurately reflect our healthy population. There may be differences in the flagging of prior results with similar values performed with this method. Interpretation of those prior results can be made in the context of the updated reference intervals.      INR   Date Value Ref Range Status   10/05/2023 1.18 (H) 0.85 - 1.15 Final       DIALYSIS PROCEDURE NOTE  Hepatitis status of previous patient on machine log was checked and verified ok to use with this patients hepatitis status.  Patient dialyzed for 3 hrs. on a K3 bath with a net fluid removal of  0L.  A BFR of 400 ml/min was obtained via a CVC.      The treatment plan was discussed with Dr. Mantilla during the treatment.    Total heparin received during the treatment: 0 units.      Line flushed, clamped and capped with heparin 1:1000 1.6 mL (1600 units) per lumen     Meds   given: retacrit, see MAR  Complications: cramping: UF turned off due to cramping, net gain 350 ml      Person educated: pre-tx. Knowledge base minimal. Barriers to learning: none. Educated on procedure, access care via verbal mode. Patient/family verbalized understanding.      ICEBOAT? Timeout performed pre-treatment  I: Patient was identified using 2 identifiers  C:  Consent Signed Yes  E: Equipment preventative maintenance is current and dialysis delivery system OK to use  B:     Latest Reference Range & Units 09/28/23 16:59   Hep B Surface Agn Nonreactive  Nonreactive   Hepatitis B Surface Antibody Instrument Value <8.00 m[IU]/mL 0.39   Hepatitis B Surface Antibody   Nonreactive      O: Dialysis orders present and complete prior to treatment  A: Vascular access verified and assessed prior to treatment  T: Treatment was performed at a clinically appropriate time  ?: Patient was allowed to ask questions and address concerns prior to treatment  See Adult Hemodialysis flowsheet in NurseGrid for further details and post assessment.  Machine water alarm in place and functioning. Transducer pods intact and checked every 15min.   Pt returned via bed.  Chlorine/Chloramine water system checked every 4 hours.  Outpatient Dialysis at *not established     Patient repositioned every 2 hours during the treatment.  Post treatment report given to CALIXTO Ragsdale RN regarding 0L of fluid removed, last BP of 100/58, and patient pain rating of 6/10.

## 2023-10-13 NOTE — PLAN OF CARE
Cognitive/Mentation: A/O x4  VS: Soft BPs, on RA.  Tele: N/A.  GI: Continent, no BM this shift  : Culp with adequate urine output  Pain: Nick knee pain managed with Voltaren gel.   Drains/Lines: Culp, PICC, Dialysis port  Skin: Hypospadia, scattered bruises  Activity: Assist x1 - SBA.  Diet: Renal diet, 1500 liquid restriction. Takes pills whole.   Discharge: Possible discharge today after dialysis.  Aggression Stoplight Tool: Green  End of shift summary: No acute event overnight. Patient rested between cares overnight.

## 2023-10-13 NOTE — PROGRESS NOTES
Renal Medicine Progress Note            Assessment/Plan:     Assessment:  1) Acute Kidney injury, non-oliguric     Creatinine prior to admission ranging between 1.8-3.3 over the course of 2022, no recent labs.      Creatinine on admission 14.61 in the setting of encephalopathy, recent NSAID use, sepsis, UTI, and obstructive uropathy.  Most likely significant prerenal RICHARD, probable ATN at this point.  He did undergo dialysis at [facility] primarily for acidosis and severe hyperkalemia causing EKG changes.  HD 10/4 for hyperkalemia, 10/6 for hypervolemia (10/6 had worsening infiltrates on CXR) He is fragile with RICHARD on top of CKD, HFrEF.       Monitor daily for recovery. Still with anephric rise of Cr between dialysis. TDC placed 10/11 and pt is accepted at [facility] .      2) Obstructive uropathy with Moderate bilateral hydroureteronephrosis on admission  Pyelonephritis  Hypospadias  History of urinary retention  Patient has history of urinary retention and UTIs in the past.  CT with bilateral hydroureteronephrosis and bladder wall thickening consistent with cystitis.  Culp placed by urology, initially yellow urine followed by milky cloudy urine consistent with UTI/pyelo-.   Trial of voiding not successful.       Other:  Severe diarrhea  Colitis, possibly infectious     Acute on chronic anemia  Hemoglobin 7.8, EGD suggestive of duodenal ulcer as possible source of bleeding.  -EPO with dialysis.     Cardiomyopathy with reduced EF    Discussed with .  Possible discharge today.  We will call in dialysis orders to outpatient unit.  Okay to forego dialysis tomorrow and get next dialysis on Tuesday.  Patient advised on fluid restriction, low-salt intake, low potassium intake, low phosphorus intake, avoidance of nephrotoxic medications etc.  Needs to be monitored closely for recovery.  Has excellent urine output.        Interval History:   Seen in dialysis.  Minimal UF.  Reports some leg pain which  he attributes to laying on his back for too long.  Dialysis running okay.  TDC working well  Good urine output but creatinine continues to rise sharply in between dialysis.           Medications and Allergies:      - MEDICATION INSTRUCTIONS for Dialysis Patients -   Does not apply See Admin Instructions    aspirin  81 mg Oral Daily    atorvastatin  40 mg Oral QPM    diclofenac  2 g Topical 4x Daily    sodium chloride (PF) 0.9%  1.3-2.6 mL Intracatheter Once    Followed by    heparin  3 mL Intracatheter Once    sodium chloride (PF) 0.9%  1.3-2.6 mL Intracatheter Once    Followed by    heparin  3 mL Intracatheter Once    heparin ANTICOAGULANT  5,000 Units Subcutaneous Q12H    hydrALAZINE  10 mg Oral TID    lidocaine  1 patch Transdermal Q24H    metoprolol succinate ER  25 mg Oral Daily    multivitamin RENAL  1 mg Oral Daily    - MEDICATION INSTRUCTIONS -   Does not apply Once    pantoprazole  40 mg Oral BID AC    sodium chloride (PF)  10-40 mL Intracatheter Q8H    sodium chloride 0.9%  250 mL Intravenous Once in dialysis/CRRT    sodium chloride 0.9%  300 mL Hemodialysis Machine Once        Allergies   Allergen Reactions    Ciprofloxacin Rash    Other Drug Allergy (See Comments)      Cough Syrup Abstracted from [facility]ions Chart( Hydrobromide)            Physical Exam:   Vitals were reviewed  BP 96/61   Pulse 93   Temp 97.9  F (36.6  C) (Oral)   Resp 27   Ht 1.829 m (6')   Wt 82.3 kg (181 lb 8 oz)   SpO2 100%   BMI 24.62 kg/m      Wt Readings from Last 3 Encounters:   10/11/23 82.3 kg (181 lb 8 oz)   09/27/23 90.7 kg (200 lb)   05/27/22 104 kg (229 lb 3.2 oz)     GENERAL APPEARANCE: Pt in no distress  HEENT:  Eyes/ears/nose grossly normal, neck supple  RESP: lungs clear at present  CV: regular rate and rhythm, normal S1 S2, no murmur, click or rub   ABDOMEN: soft, nontender, bowel sounds normal  EXTREMITIES/SKIN: No edema  Rt internal jugular TDC +           Data:     [facility]  Recent Labs   Lab 10/13/23  0619  10/12/23  0603 10/11/23  0509 10/10/23  1132 10/10/23  0557    139 137  --  141   POTASSIUM 4.2 4.1 4.2  --  4.1   CHLORIDE 102 100 99  --  102   CHANA 8.3* 8.1* 8.2*  --  7.6*   CO2 25 26 24  --  26   BUN 38.5* 27.4* 40.9*  --  29.0*   CR 5.53* 4.02* 5.44*  --  4.14*   GLC 97 92 94 110* 88     CBC  Recent Labs   Lab 10/13/23  0619 10/12/23  0603 10/11/23  0509 10/10/23  0557 10/09/23  0543 10/08/23  0602   WBC  --   --  5.9 6.1 7.3 9.0   HGB 7.8* 7.6* 8.3* 7.8* 7.9* 7.9*   HCT  --   --  25.6* 24.2* 24.4* 24.3*   MCV  --   --  100 100 99 98   PLT  --   --  213 178 162 155     Lab Results   Component Value Date    AST 25 10/11/2023    ALT 36 10/11/2023    ALKPHOS 67 10/11/2023    BILITOTAL 0.4 10/11/2023     Lab Results   Component Value Date    INR 1.18 (H) 10/05/2023       Attestation:  I have reviewed today's vital signs, notes, medications, labs and imaging.  Kia Mantilla MD  [facility] - Nephrology   [phone]

## 2023-10-13 NOTE — PLAN OF CARE
Orientations: A&OX4  Vitals/Pain: VSS on RA except softer Bps during HD run. R Knee & hip pain controlled with lidocaine patches & Voltaren cream  Lines/Drains: PICC & PIV removed before discharge. Discharging with guido catheter & RIJ CVC.  Skin/Wounds: Hypospadia, scattered bruising.   GI/: Adequate UOP via guido. HD run completed today for 3 hrs & 0.3 L fluid removal. +BS, +flatus, -BM. Tolerating renal diet & 1,500 ml fluid restriction. Denies nausea  Labs: Abnormal/Trends, Electrolyte Replacement- K 4.2 WDl  Ambulation/Assist: SBA  Plan: Pt discharged back home with wife. All pharmacy supplied medications given & reviewed with patient bedside prior to discharge. Discharge education completed with patient. Guido education provided & education sheets given to pt.

## 2023-10-13 NOTE — PROGRESS NOTES
Writer removed RUE PICC. Tip intact and catheter length noted at 44cm. Vaseline gauze and occlusive dressing applied. Pressure applied to site. HOB flat and pt educated to remain flat for 30 minutes. Bedside FRANCES Sanchez updated.

## 2023-10-13 NOTE — DISCHARGE SUMMARY
[facility]  Hospitalist Discharge Summary      Date of Admission:  9/27/2023  Date of Discharge:  10/13/2023  3:00 PM  Discharging Provider: Cari Galarza MD    Discharge Diagnoses   Acute renal failure, non-oliguric  Obstructive uropathy with bilateral hydroureteronephrosis   Pyuria  Hypospadias  History of urinary retention  History of recurrent UTIs  NSTEMI, suspected CAD  Acute systolic CHF, new diagnosis  Moderate mitral valve regurgitation, new diagnosis  Acute GI bleed with acute blood loss anemia  Right knee pain  Colitis  Elevated liver transaminases    Follow-ups Needed After Discharge   Follow-up Appointments     Follow Up ([facility])      Please call to schedule follow up appointment with urologist, Dr. Louie Clayton, within 1 month of your hospital discharge.     [place] [facility] [address], [address]  [place] [address]  Phone: [phone]        Follow-up and recommended labs and tests       Follow up with primary care provider, Alisha Gloria, within 2 weeks   for hospital follow- up.   Follow up with Nephrology for kidney failure as scheduled  Follow up with Cardiology for evaluation of possible prior heart attack as   scheduled  Follow up with Urology for urinary retention as scheduled  Follow up with [facility] for GI bleed as scheduled  Above sub-specialists will call to arrange your appointments          Discharge Disposition   Discharged to home  Condition at discharge: Stable    Hospital Course   Bret Fleming is a 73 year old male with hx of CAD, HTN, and CKD who initially presented to   [facility] ED on 9/27/2023 for evaluation of shortness of breath and nausea/vomiting with decreased PO intake, and was found to have acute renal failure (Cr 14.6), hyperkalemia (K 7), and acidosis (pH 6.98). Underwent emergent dialysis at outside hospital. Further evaluation also revealed obstructive uropathy with  bilateral hydronephrosis, colitis, and UTI. Transferred to [facility] for further management of renal failure. During this admission, he was found to have worsening heart failure (EF 30-35%) with concern for NSTEMI as well as suspected GI bleed from duodenal ulcer. He remains HD-dependent, R IJ catheter placed by IR on 10/11. For now, Cardiology plans for outpatient follow-up with cardiac MRI to assess viability in the LAD territory prior to potential coronary angiogram. Plan was to discharge to TCU, but after being declined by multiple facilities, he was discharged home with care.     Acute renal failure, non-oliguric  *Creatinine prior to admission ranging between 1.8-3.3 over the course of 2022, no recent labs.   *Creatinine on admission 14.61 in the setting of recent NSAID use, obstructive uropathy, nad new CHF  *Nephrology consulted this admission.  *Continued to required HD this admission despite good UOP. R IJ tunneled catheter placed by IR on 10/11.   - He will continue outpatient dialysis T/Th/S at [facility]    Obstructive uropathy with bilateral hydroureteronephrosis   Pyuria  Hypospadias  History of urinary retention  History of recurrent UTIs  *Very difficult Culp catheter placement noted at outside facility. CT abd/pelvis at outside facility showed bilateral hydroureteronephrosis and bladder wall thickening. UA initially concerning for UTI, but urine culture grew only cornebacterium stratum which is a regular skin pathogen. Completed 6-days of IV vancomycin and pip-tazo. Repeat urine culture on 9/30 was negative.  *Urology consulted this admission  *Culp catheter removed 10/5, but failed voiding trial; Culp replaced 10/6  - Culp in place, 10/6 to present  - Plan is to discharge home with Culp in place and follow up with Urology after discharge     NSTEMI, suspected CAD  Acute systolic CHF, new diagnosis  Moderate mitral valve regurgitation, new diagnosis  *TTE (9/28) revealed worsening heart  failure with an EF of 30 to 35% with a large area of hypokinesis and akinesis distal to the septum with suspected large LAD infarct; new moderate MR, and elevated right ventricular systolic pressure.  *Cardiology consulted this admission; plan for outpatient ischemic evaluation with likely cardiac MRI once his renal function is stable to avoid further contrast nephropathy  *Home meds: ASA 81 mg daily, atenolol 80 mg daily, atorvastatin 80 mg daily  - Atenolol discontinued this admission; started on metoprolol ER 25 mg daily and hydralazine 10 mg TID per Cardiology  - Conts on PTA ASA and atorvastatin  - He will follow up with Cardiology after discharge for further ischemic work-up     Acute GI bleed with acute blood loss anemia  *Initial Hgb 8.7; gradually decreased to 5.7.   *[facility] consulted this admission. EGD (10/9/23) showed multiple duodenal ulcers, likely source of anemia. Flex sig limited due to stool burden  *Received total 2 units pRBCs this admission  *10/12: bedside RN noted rectal bleeding. [facility] reconsulted - felt bleeding was related to hemorrhoids vs fissure. Recommended monitoring   - Hgb stable in mid 7 range at the time of discharge  - He will be discharged on pantoprazole 40 mg BID  - Patient has been advised to avoid NSAIDs  - Plan for repeat EGD and colonoscopy in 4-5 weeks per [facility]    Right knee pain  *Pt reported right knee pain this admission. XR (10/6) showed normal joint spaces and alignment. No fracture or joint effusion. Osteopenia. Atherosclerosis.   - Lidocaine patch, APAP prn ordered. Avoid NSAIDs.   - Plan to follow up with Ortho after hospital discharge for further work-up    Colitis, Resolved  Elevated liver transaminases, Resolved  Noted on admission. Pt described flu-like symptoms prior to admission with nausea, vomiting, decreased appetite, generalized fatigue and weakness. INR was elevated at 1.8 and liver enzymes AST/ALT were elevated in the 100s but subsequently  normalized. Stool panel/C.diff negative.    Consultations This Hospital Stay   UROLOGY IP CONSULT  NEPHROLOGY IP CONSULT  CARDIOLOGY IP CONSULT  INFECTIOUS DISEASES IP CONSULT  INTERVENTIONAL RADIOLOGY ADULT/PEDS IP CONSULT  WOUND OSTOMY CONTINENCE NURSE  IP CONSULT  PHYSICAL THERAPY ADULT IP CONSULT  OCCUPATIONAL THERAPY ADULT IP CONSULT  SPEECH LANGUAGE PATH ADULT IP CONSULT  NUTRITION SERVICES ADULT IP CONSULT  CARE MANAGEMENT / SOCIAL WORK IP CONSULT    Code Status   Prior    Time Spent on this Encounter   I, Cari Galarza MD, personally saw the patient today and spent 55 minutes discharging this patient.       Cari Galarza MD  [facility]  [address] [place] [address]  Phone: [phone]  ______________________________________________________________________    Physical Exam   Vital Signs: Temp: 97.9  F (36.6  C) Temp src: Oral BP: 100/58 Pulse: 94   Resp: 29 SpO2: 100 % O2 Device: None (Room air)    Weight: 181 lbs 8 oz    Constitutional: Appears comfortable  HEENT: Sclera white, conjunctiva clear, EOMI, MMM  Respiratory: Breathing non-labored. Lungs CTAB - no wheezes/crackles/rhonchi  Cardiovascular: Heart RRR, soft holosystolic murmur. No pedal edema.   GI: +BS. Abd soft/NT  Skin: R IJ tunneled catheter exit site clean and dry.  Musculoskeletal: Normal muscle bulk and tone  Neurologic: Alert and appropriate. GARCIA  Psychiatric: Calm and cooperative    Primary Care Physician   Alisha Gloria    Discharge Orders      Follow-Up with Cardiology JIGNA      Home Care Referral      Follow Up ([facility])    Please call to schedule follow up appointment with urologist, Dr. Louie Clayton, within 1 month of your hospital discharge.     [place] [facility] - [facility] [place]  [address] [place] [address], [address]  [place] [address]  Phone: [phone]     Reason for your hospital stay    Kidney failure requiring initiation of dialysis     Follow-up and  recommended labs and tests     Follow up with primary care provider, Alisha Gloria, within 2 weeks for hospital follow- up.   Follow up with Nephrology for kidney failure as scheduled  Follow up with Cardiology for evaluation of possible prior heart attack as scheduled  Follow up with Urology for urinary retention as scheduled  Follow up with [facility] for GI bleed as scheduled  Above sub-specialists will call to arrange your appointments     Activity    Your activity upon discharge: activity as tolerated     Diet    Follow this diet upon discharge: Renal diet       Significant Results and Procedures   Most Recent 3 CBC's:  Recent Labs   Lab Test 10/13/23  0619 10/12/23  0603 10/11/23  0509 10/10/23  0557 10/09/23  0543   WBC  --   --  5.9 6.1 7.3   HGB 7.8* 7.6* 8.3* 7.8* 7.9*   MCV  --   --  100 100 99   PLT  --   --  213 178 162     Most Recent 3 BMP's:  Recent Labs   Lab Test 10/13/23  0619 10/12/23  0603 10/11/23  0509    139 137   POTASSIUM 4.2 4.1 4.2   CHLORIDE 102 100 99   CO2 25 26 24   BUN 38.5* 27.4* 40.9*   CR 5.53* 4.02* 5.44*   ANIONGAP 15 13 14   CHANA 8.3* 8.1* 8.2*   GLC 97 92 94   ,   Results for orders placed or performed during the hospital encounter of 09/27/23   XR Chest Port 1 View    Narrative    EXAM: XR CHEST PORT 1 VIEW  LOCATION: [facility]  DATE: 9/28/2023    INDICATION: SOB.  COMPARISON: 08/11/2019.      Impression    IMPRESSION: Right IJ central venous catheter and right PICC line with overlapping tip seen in the low SVC and near the cavoatrial junction. Heart size and pulmonary vascularity within normal limits. Mild hazy infiltrate right lower lung. Left lung is   clear. No pneumothorax.   CT Abdomen Pelvis w/o Contrast    Narrative    EXAM: CT ABDOMEN PELVIS W/O CONTRAST  LOCATION: [facility]  DATE: 9/28/2023    INDICATION: acute renal failure, elevated lipase  COMPARISON: CT abdomen/pelvis without contrast  02/05/2022  TECHNIQUE: CT scan of the abdomen and pelvis was performed without IV contrast. Multiplanar reformats were obtained. Dose reduction techniques were used.  CONTRAST: None.    FINDINGS:   LOWER CHEST: Small to moderate sized right pleural effusion with compressive atelectasis.    HEPATOBILIARY: Unremarkable noncontrast CT appearance of the liver. Distended gallbladder with multiple layering tiny stones. No definite wall thickening or pericholecystic fluid. Gallbladder distention may relate to fasting state.    PANCREAS: Somewhat atrophic, otherwise unremarkable.    SPLEEN: Normal.    ADRENAL GLANDS: Unremarkable.    KIDNEYS/BLADDER: Bilateral kidney cysts which do not require dedicated follow-up. Moderate bilateral hydroureteronephrosis which extends down to the level of the urinary bladder which is diffusely thickened. Culp catheter within the urinary bladder.    BOWEL: Pancolonic wall thickening with pericolonic fat stranding most prominently involving the splenic flexure and descending colon. No bowel obstruction.    LYMPH NODES: Prominent retroperitoneal lymph nodes likely reactive.    VASCULATURE: Scattered atherosclerotic calcifications of the aortoiliac vessels without evidence of aneurysmal dilatation.    PELVIC ORGANS: Mildly prominent prostate with central calcifications.    MUSCULOSKELETAL: Multilevel degenerative changes of the thoracic and lumbosacral spine. No acute osseous abnormality or suspicious bony lesion.      Impression    IMPRESSION:   1.  Pancolonic wall thickening with pericolonic fat stranding most prominently involving the splenic flexure and descending colon. Findings suspicious for infectious colitis with differential including pseudomembranous (C. difficile) colitis.  2.  Moderate bilateral hydroureteronephrosis which extends down to the level of the urinary bladder which is diffusely thickened. Bladder wall thickening highly suspicious for cystitis. Recommend correlation  with urinalysis. Culp catheter within the   urinary bladder which is decompressed.  3.  Distended gallbladder with multiple layering tiny stones but without other secondary evidence of acute cholecystitis. Gallbladder distention may relate to fasting state. Pancreas is somewhat atrophic, otherwise unremarkable without CT evidence of   pancreatitis.  4.  Small to moderate sized right pleural effusion with compressive atelectasis.     XR Chest Port 1 View    Narrative    EXAM: XR CHEST PORT 1 VIEW  LOCATION: [facility]  DATE: 10/6/2023    INDICATION: Respiratory distress; acute hypoxic respiratory failure  COMPARISON: 09/28/2023.    FINDINGS: Right IJ infusion catheter. Right PICC. No pneumothorax. The heart size is normal. The thoracic aorta is calcified. There are increasing bilateral pulmonary infiltrates, right greater than left. Degenerative disease in the spine.      Impression    IMPRESSION: Worsening bilateral pulmonary infiltrates.   XR Knee Right 3 Views    Narrative    EXAM: XR KNEE RIGHT 3 VIEWS  LOCATION: [facility]  DATE: 10/6/2023    INDICATION: Severe pain  COMPARISON: None.      Impression    IMPRESSION: Normal joint spaces and alignment. No fracture or joint effusion. Osteopenia. Atherosclerosis.   IR CVC Tunnel Placement > 5 Yrs of Age    Narrative    TUNNELED CATHETER PLACEMENT   10/11/2023 9:31 AM    INDICATION:  Chronic intravenous access.  73-year-old patient with  need for long-term hemodialysis. Patient has existing non-tunneled  hemodialysis catheter, request now made for tunneled catheter.    Location:  Right internal jugular vein.    PROCEDURE:   Ultrasound guidance:  Ultrasound was used to localize and document  patency of the internal jugular vein.  A permanent image of the vein  was recorded.      Maximal Sterile Barrier Technique Utilized: Cap AND mask AND sterile  gown AND sterile gloves AND sterile full body drape AND hand  hygiene  AND skin preparation 2% chlorhexidine for cutaneous antisepsis (or  acceptable alternative antiseptics).  Sterile Ultrasound Technique  Utilized ?Sterile gel AND sterile probe covers.    Local anesthesia was administered to the skin over the vein and a 4 mm  incision was made.  Ultrasound was used to guide placement of a 5F  dilator in the vein using standard technique.      Lidocaine was then administered in the subcutaneous tissue over the  clavicle to a point about 5 cm below the mid clavicle.  A short  incision was made at this point.  A 19 cm catheter was then brought  through the tract between the two incisions and inserted into the  jugular vein through a peel away sheath. The catheter was secured in  the subclavian region with two interrupted stitches of 2-0  polypropylene.  The neck incision was closed with Dermabond adhesive.       The catheter was initially placed into the right atrium, though  persistently, one of two lumens did not aspirate well. The catheter  was then slightly pulled back to the right atrial/SVC junction, were  both lumens were then able to aspirate consistently and successfully.  Uncertain if patient has a narrow RA/SVC junction with catheter  abutting vein wall. This may require different positioning for the  patient.    Complications:  None  Sedation: A moderate level of sedation was achieved with 0.5 mg  midazolam.                  50   mcg fentanyl.   Sedation time: 17 minutes.  Vital signs and sedation monitored by our nursing staff under my  supervision.   Fluoroscopy time:  0.5 minutes.  Air Kerma: 1.2 mGy  Contrast given:  None  Local anesthetic:  20 mL of 1% lidocaine    FINDINGS:  Fluoroscopic guidance with a permanent image confirmed the  catheter tip location in the distal SVC/right atrial junction,  confirmed with a single spot fluoroscopic image.      Impression    IMPRESSION:  1. Successful placement of right internal jugular tunneled 19 cm  Palindrome  hemodialysis catheter. The catheter is ready for immediate  use.  2. Existing non-tunneled catheter was removed and hemostasis achieved  with manual compression.      RASTA RUDOLPH MD         SYSTEM ID:  [id number]   Echocardiogram Complete     Value    LVEF  30-35%    Narrative    [id number]  [id number]  [id number]  [id number]^BERNARDA^ANGEL^VANESSA     [facility]  Echocardiography Laboratory  [facility] [place] [address]     Name: GUMARO CORONADO  MRN: [id number]  : 1950  Study Date: 2023 10:42 AM  Age: 73 yrs  Gender: Male  Patient Location: [facility]  Reason For Study: SOB  Ordering Physician: ANGEL MENCHACA  Referring Physician: RASTA MARS  Performed By: Brigette Man     BSA: 2.0 m2  Height: 72 in  Weight: 182 lb  HR: 106  BP: 112/57 mmHg  ______________________________________________________________________________  Procedure  Complete Echo Adult.  ______________________________________________________________________________  Interpretation Summary     The left ventricle is borderline dilated.  The visual ejection fraction is 30-35%.  Large area of hypokinesis and akinesis distal 2/3 septum/apex/infero-apex/lat  walls. Some wall thinning. Suspect large LAD infarct pattern but cannot  exclude stress cardiomyopathy  There is moderate (2+) mitral regurgitation.  Right ventricular systolic pressure is elevated, consistent with mild to  moderate pulmonary hypertension.  The rhythm was sinus tachycardia.  ______________________________________________________________________________  Left Ventricle  The left ventricle is borderline dilated. There is normal left ventricular  wall thickness. The visual ejection fraction is 30-35%. Left ventricular  diastolic function is indeterminate. There are regional wall motion  abnormalities as specified. Large area of hypokinesis and akinesis distal 2/3  septum/apex/infero-apex/lat walls. Some wall thinning. Suspect large  LAD  infarct pattern but cannot exclude stress cardiomyopathy. There is no thrombus  seen in the left ventricle.     Right Ventricle  The right ventricle is normal in size and function.     Atria  The left atrium is mildly dilated. Right atrial size is normal.     Mitral Valve  There is moderate (2+) mitral regurgitation.     Tricuspid Valve  The right ventricular systolic pressure is approximated at 45mmHg plus the  right atrial pressure. There is trace tricuspid regurgitation. Right  ventricular systolic pressure is elevated, consistent with mild to moderate  pulmonary hypertension. IVC diameter <2.1 cm collapsing >50% with sniff  suggests a normal RA pressure of 3 mmHg.     Aortic Valve  There is trivial trileaflet aortic sclerosis.     Pulmonic Valve  The pulmonic valve is not well seen, but is grossly normal.     Vessels  The aortic root is normal size.     Pericardium  The pericardium appears normal.     Rhythm  The rhythm was sinus tachycardia.  ______________________________________________________________________________  MMode/2D Measurements & Calculations  IVSd: 1.0 cm     LVIDd: 5.6 cm  LVIDs: 4.6 cm  LVPWd: 1.1 cm  FS: 17.6 %  LV mass(C)d: 234.9 grams  LV mass(C)dI: 114.8 grams/m2  Ao root diam: 3.5 cm  asc Aorta Diam: 3.6 cm  LVOT diam: 2.3 cm  LVOT area: 4.2 cm2  Ao root diam index Ht(cm/m): 1.9  Ao root diam index BSA (cm/m2): 1.7  Asc Ao diam index BSA (cm/m2): 1.8  Asc Ao diam index Ht(cm/m): 2.0  LA Volume (BP): 72.9 ml     LA Volume Index (BP): 35.6 ml/m2  RWT: 0.38     Doppler Measurements & Calculations  MV E max gwyn: 84.5 cm/sec  MV A max gwyn: 77.7 cm/sec  MV E/A: 1.1  MV dec time: 0.10 sec  Ao V2 max: 144.0 cm/sec  Ao max P.0 mmHg  Ao V2 mean: 98.8 cm/sec  Ao mean P.0 mmHg  Ao V2 VTI: 21.8 cm  JEREMIAH(I,D): 3.4 cm2  JEREMIAH(V,D): 3.5 cm2  LV V1 max P.6 mmHg  LV V1 max: 118.0 cm/sec  LV V1 VTI: 17.6 cm  MR PISA: 3.2 cm2  MR ERO: 0.21 cm2  MR volume: 24.4 ml  SV(LVOT): 74.3 ml  SI(LVOT):  36.3 ml/m2  PA V2 max: 105.3 cm/sec  PA max P.4 mmHg  PA acc time: 0.10 sec  AV Sae Ratio (DI): 0.82  JEREMIAH Index (cm2/m2): 1.7  E/E' av.2  Lateral E/e': 6.5     Medial E/e': 10.0     ______________________________________________________________________________  Report approved by: Ariel Lee 2023 11:35 AM             Discharge Medications   Discharge Medication List as of 10/13/2023 11:07 AM        START taking these medications    Details   Lidocaine (LIDOCARE) 4 % Patch Place 1 patch onto the skin every 24 hours To prevent lidocaine toxicity, patient should be patch free for 12 hrs daily.Disp-30 patch, [id number]      metoprolol succinate ER (TOPROL XL) 25 MG 24 hr tablet Take 1 tablet (25 mg) by mouth daily, Disp-30 tablet, R-0, E-Prescribe      multivitamin RENAL (TRIPHROCAPS) 1 capsule capsule Take 1 capsule by mouth daily, Disp-30 capsule, R-0, E-Prescribe      nitroGLYcerin (NITROSTAT) 0.4 MG sublingual tablet For chest pain place 1 tablet under the tongue every 5 minutes for 3 doses. If symptoms persist 5 minutes after 1st dose call 911., Disp-25 tablet, R-0, E-Prescribe      pantoprazole (PROTONIX) 40 MG EC tablet Take 1 tablet (40 mg) by mouth 2 times daily (before meals), Disp-60 tablet, R-0, E-Prescribe           CONTINUE these medications which have CHANGED    Details   acetaminophen (TYLENOL) 500 MG tablet Take 2 tablets (1,000 mg) by mouth 3 times daily, Disp-100 tablet, R-0, E-Prescribe      aspirin 81 MG EC tablet Take 1 tablet (81 mg) by mouth daily, Disp-30 tablet, R-0, E-Prescribe      atorvastatin (LIPITOR) 40 MG tablet Take 1 tablet (40 mg) by mouth every evening, Disp-30 tablet, R-0, E-Prescribe           STOP taking these medications       atenolol (TENORMIN) 100 MG tablet Comments:   Reason for Stopping:         fluticasone (FLONASE) 50 MCG/ACT nasal spray Comments:   Reason for Stopping:         guaiFENesin (MUCINEX) 600 MG 12 hr tablet Comments:   Reason for  Stopping:         ibuprofen (ADVIL/MOTRIN) 200 MG tablet Comments:   Reason for Stopping:         LISINOPRIL PO Comments:   Reason for Stopping:         Pseudoephedrine HCl (SUDAFED PO) Comments:   Reason for Stopping:             Allergies   Allergies   Allergen Reactions    Ciprofloxacin Rash    Other Drug Allergy (See Comments)      Cough Syrup Abstracted from [facility] Chart( Hydrobromide)

## 2023-10-13 NOTE — PROGRESS NOTES
Physical Therapy Discharge Summary    Reason for therapy discharge:    Discharged to home with home therapy.    Progress towards therapy goal(s). See goals on Care Plan in [facility] electronic health record for goal details.  Goals met    Therapy recommendation(s):    Continued therapy is recommended.  Rationale/Recommendations:  per below.       10/13/23 8226   Appointment Info   Signing Clinician's Name / Credentials (PT) Brittany Dressler, PT   Gait Training   Gait Training Minutes (29857) 8   Symptoms Noted During/After Treatment (Gait Training) none   Treatment Detail/Skilled Intervention 300' RW (distant) supervision for safety - slow, steady, light AD use, asx.             10 stairs up/down with 1-2 rails supervision for safety step-through pattern. Pt clarifies that he does have bars to hold on B to enter home. Asx   PT Discharge Planning   PT Plan Acute PT needs met.   PT Discharge Recommendation (DC Rec) home with assist;home with home care physical therapy   PT Rationale for DC Rec Pt appropriate to return home with RW (pt has one) and social support.   PT Brief overview of current status Supervisoin amb & stairs RW asx.

## 2023-10-15 ENCOUNTER — PATIENT OUTREACH (OUTPATIENT)
Dept: CARE COORDINATION | Facility: CLINIC | Age: 73
End: 2023-10-15
Payer: COMMERCIAL

## 2023-10-15 NOTE — PROGRESS NOTES
Clinic Care Coordination Contact  [facility]: Post-Discharge Note  SITUATION                                                      Admission:    Admission Date: 09/27/23   Reason for Admission: Kidney failure requiring initiation of dialysis  Discharge:   Discharge Date: 10/13/23  Discharge Diagnosis: 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    BACKGROUND                                                      Per hospital discharge summary and inpatient provider notes:  Bret Fleming is a 73 year old male with hx of CAD, HTN, and CKD who initially presented to   [facility] ED on 9/27/2023 for evaluation of shortness of breath and nausea/vomiting with decreased PO intake, and was found to have acute renal failure (Cr 14.6), hyperkalemia (K 7), and acidosis (pH 6.98). Underwent emergent dialysis at outside hospital. Further evaluation also revealed obstructive uropathy with bilateral hydronephrosis, colitis, and UTI. Transferred to [facility] for further management of renal failure. During this admission, he was found to have worsening heart failure (EF 30-35%) with concern for NSTEMI as well as suspected GI bleed from duodenal ulcer. He remains HD-dependent, R IJ catheter placed by IR on 10/11. For now, Cardiology plans for outpatient follow-up with cardiac MRI to assess viability in the LAD territory prior to potential coronary angiogram. Plan was to discharge to TCU, but after being declined by multiple facilities, he was discharged home with care.      ASSESSMENT           Discharge Assessment  How are you doing now that you are home?: good  How are your symptoms? (Red Flag symptoms escalate to triage hotline per guidelines): Improved  Do you feel your condition is stable enough to be safe at home until your provider visit?: Yes  Does the patient have their discharge instructions? : Yes  Does the patient have questions regarding their discharge  instructions? : No  Were you started on any new medications or were there changes to any of your previous medications? : Yes  Does the patient have all of their medications?: Yes  Do you have questions regarding any of your medications? : No  Do you have all of your needed medical supplies or equipment (DME)?  (i.e. oxygen tank, CPAP, cane, etc.): Yes  Discharge follow-up appointment scheduled within 14 calendar days? : No  Is patient agreeable to assistance with scheduling? : No                  PLAN                                                      Outpatient Plan:  Please call to schedule follow up appointment with urologist, Dr. Louie Clayton, within 1 month of your hospital discharge.      [facility]  [address] [place] [address], [address]  [place] [address]  Phone: [phone]    Follow up with primary care provider, Alisha Gloria, within 2 weeks for hospital follow- up.   Follow up with Nephrology for kidney failure as scheduled  Follow up with Cardiology for evaluation of possible prior heart attack as scheduled  Follow up with Urology for urinary retention as scheduled  Follow up with [facility] for GI bleed as scheduled  Above sub-specialists will call to arrange your appointments    No future appointments.      For any urgent concerns, please contact our 24 hour nurse triage line: [phone] ([phone])         Briana Graham MA

## 2023-10-16 ENCOUNTER — PATIENT OUTREACH (OUTPATIENT)
Dept: CARE COORDINATION | Facility: CLINIC | Age: 73
End: 2023-10-16
Payer: COMMERCIAL

## 2023-10-16 NOTE — PROGRESS NOTES
S-(situation): RN Clinical Product Navigator reviewing chart post-hospital discharge    B-(background): RN Clinical Product Navigator was notified by health plan of hospital admission; potential for outreach.    A-(assessment): Patient discharged on 10/13/23.  Patient received a successful outreach from connected care resource staff on 10/15/23.    R-(recommendations/plan): No further intervention at this time.    Melissa Behl BSN, RN, PHN, CCM  RN Clinical Product Navigator  [phone]

## 2023-10-17 ENCOUNTER — TELEPHONE (OUTPATIENT)
Dept: CARDIOLOGY | Facility: CLINIC | Age: 73
End: 2023-10-17
Payer: COMMERCIAL

## 2023-10-17 NOTE — TELEPHONE ENCOUNTER
Patient was admitted to [facility] on 9/27/23 who initially presented to [facility] ED on 9/27/2023 for evaluation of shortness of breath and nausea/vomiting with decreased PO intake, and was found to have new onset cardiomyopathy, acute renal failure (Cr 14.6), hyperkalemia (K 7), anemia (initial Hgb 8.7; gradually decreased to 5.7 due to GIB). and acidosis (pH 6.98). Underwent emergent dialysis at outside hospital. Further evaluation also revealed obstructive uropathy with bilateral hydronephrosis, colitis, and UTI. Transferred to [facility].    PMH: CAD, HTN, hypospadias, frequent UTI with urinary retention and CKD.     9/28/23: Echo showed EF of 30-35%. Large area of hypokinesis and akinesis distal 2/3 septum/apex/infero-apex/lat walls. Some wall thinning. Suspect large LAD infarct pattern but cannot exclude stress cardiomyopathy. There is moderate (2+) mitral regurgitation. Right ventricular systolic pressure is elevated, consistent with mild to moderate pulmonary hypertension. The rhythm was sinus tachycardia.    Cardiology consulted this admission; plan for outpatient ischemic evaluation with likely cardiac MRI once his renal function is stable to avoid further contrast nephropathy.    Urology consulted for obstructive uropathy with bilateral hydroureteronephrosis, hx of urinary retention and frequent UTI's. IV ABX's given and pt discharged with guido catheter in place with Urology follow up.    10/9/23: [facility] consulted. EGD showed multiple duodenal ulcers, likely source of anemia. Flex sig limited due to stool burden. Received total 2 units pRBCs this admission. Hgb stable in mid 7 range at the time of discharge.    10/11/23: Nephrology consulted. Continued to required HD this admission despite good UOP.  RIJ tunneled catheter placed by IR on 10/11/23 and pt will continue with OP HD TID.    Pt was started on Metoprolol, NTG and Protonix. PTA Lipitor dosage was decreased. ASA 81 mg po daily continued. Atenolol,  NSAIDS and Lisinopril were discontinued at time of discharge.    Called patient to discuss any post hospital d/c questions he may have, review medication changes, and confirm f/u appts. Patient denied any questions regarding new medications or changes to PTA medications.     Patient denied any SOB, chest pain, edema or light headedness.    RN confirmed with patient that he needs to be scheduled for an echo and cardiology VIOLA OV as recommended and ordered. Pt states he can not focus on that right now, as he needs to focus on his kidneys and urinary issues first. States he will reach out in the future.    Pt discharged to home with [facility] services, but pt states services were denied by insurance.    Patient advised to call clinic with any cardiac related questions or concerns prior to this viola't. Patient verbalized understanding and agreed with plan. CYNDI Melvin RN.

## 2023-10-23 ENCOUNTER — MYC MEDICAL ADVICE (OUTPATIENT)
Dept: FAMILY MEDICINE | Facility: CLINIC | Age: 73
End: 2023-10-23
Payer: COMMERCIAL

## 2023-11-09 ENCOUNTER — TELEPHONE (OUTPATIENT)
Dept: FAMILY MEDICINE | Facility: CLINIC | Age: 73
End: 2023-11-09
Payer: COMMERCIAL

## 2023-11-09 NOTE — TELEPHONE ENCOUNTER
wife dropped off [facility] paperwork to be filled out. Please fax once completed . Also please call once it is completed as well .

## 2023-11-09 NOTE — TELEPHONE ENCOUNTER
Patient needs to be seen for the completion of these forms. Patient has not been seen in-person since 08/18/2021    Visit on 02/16/2022 was VV.    Patient contacted via [employer] to schedule an appointment.    Form at writer's incomplete basket.

## 2023-11-16 ENCOUNTER — TRANSFERRED RECORDS (OUTPATIENT)
Dept: HEALTH INFORMATION MANAGEMENT | Facility: CLINIC | Age: 73
End: 2023-11-16

## 2023-11-17 ENCOUNTER — OFFICE VISIT (OUTPATIENT)
Dept: FAMILY MEDICINE | Facility: CLINIC | Age: 73
End: 2023-11-17
Payer: COMMERCIAL

## 2023-11-17 VITALS
HEIGHT: 72 IN | HEART RATE: 104 BPM | WEIGHT: 190.2 LBS | OXYGEN SATURATION: 99 % | SYSTOLIC BLOOD PRESSURE: 113 MMHG | TEMPERATURE: 98 F | RESPIRATION RATE: 22 BRPM | BODY MASS INDEX: 25.76 KG/M2 | DIASTOLIC BLOOD PRESSURE: 69 MMHG

## 2023-11-17 DIAGNOSIS — D64.9 ACUTE ON CHRONIC ANEMIA: ICD-10-CM

## 2023-11-17 DIAGNOSIS — I21.4 NSTEMI (NON-ST ELEVATED MYOCARDIAL INFARCTION) (H): ICD-10-CM

## 2023-11-17 DIAGNOSIS — R74.02 NONSPECIFIC ELEVATION OF LEVELS OF TRANSAMINASE AND LACTIC ACID DEHYDROGENASE (LDH): ICD-10-CM

## 2023-11-17 DIAGNOSIS — N18.30: Primary | ICD-10-CM

## 2023-11-17 DIAGNOSIS — Z01.00 EXAMINATION OF EYES AND VISION: ICD-10-CM

## 2023-11-17 DIAGNOSIS — R74.01 NONSPECIFIC ELEVATION OF LEVELS OF TRANSAMINASE AND LACTIC ACID DEHYDROGENASE (LDH): ICD-10-CM

## 2023-11-17 DIAGNOSIS — I25.83 CORONARY ATHEROSCLEROSIS DUE TO LIPID RICH PLAQUE: ICD-10-CM

## 2023-11-17 DIAGNOSIS — I50.21 ACUTE SYSTOLIC CONGESTIVE HEART FAILURE (H): ICD-10-CM

## 2023-11-17 DIAGNOSIS — Z29.11 NEED FOR VACCINATION AGAINST RESPIRATORY SYNCYTIAL VIRUS: ICD-10-CM

## 2023-11-17 DIAGNOSIS — M25.561 CHRONIC PAIN OF RIGHT KNEE: ICD-10-CM

## 2023-11-17 DIAGNOSIS — G89.29 CHRONIC PAIN OF RIGHT KNEE: ICD-10-CM

## 2023-11-17 DIAGNOSIS — I10 PRIMARY HYPERTENSION: ICD-10-CM

## 2023-11-17 DIAGNOSIS — N17.1: Primary | ICD-10-CM

## 2023-11-17 DIAGNOSIS — Z23 NEED FOR VACCINATION: ICD-10-CM

## 2023-11-17 DIAGNOSIS — I25.5 ISCHEMIC CARDIOMYOPATHY: ICD-10-CM

## 2023-11-17 DIAGNOSIS — E78.5 HYPERLIPIDEMIA, UNSPECIFIED HYPERLIPIDEMIA TYPE: ICD-10-CM

## 2023-11-17 DIAGNOSIS — K25.4 GASTROINTESTINAL HEMORRHAGE ASSOCIATED WITH GASTRIC ULCER: ICD-10-CM

## 2023-11-17 DIAGNOSIS — N17.0 ACUTE KIDNEY FAILURE WITH TUBULAR NECROSIS (H): ICD-10-CM

## 2023-11-17 PROBLEM — J96.01 ACUTE RESPIRATORY FAILURE WITH HYPOXIA (H): Status: RESOLVED | Noted: 2023-10-07 | Resolved: 2023-11-17

## 2023-11-17 PROBLEM — D69.6 THROMBOCYTOPENIA (H): Status: RESOLVED | Noted: 2023-10-07 | Resolved: 2023-11-17

## 2023-11-17 PROBLEM — E44.0 MODERATE MALNUTRITION (H): Status: RESOLVED | Noted: 2023-10-07 | Resolved: 2023-11-17

## 2023-11-17 PROBLEM — N13.30 BILATERAL HYDRONEPHROSIS: Status: RESOLVED | Noted: 2023-10-07 | Resolved: 2023-11-17

## 2023-11-17 LAB
ALT SERPL W P-5'-P-CCNC: 14 U/L (ref 0–70)
CHOLEST SERPL-MCNC: 105 MG/DL
ERYTHROCYTE [DISTWIDTH] IN BLOOD BY AUTOMATED COUNT: 14.1 % (ref 10–15)
HCT VFR BLD AUTO: 25.1 % (ref 35–53)
HDLC SERPL-MCNC: 39 MG/DL
HGB BLD-MCNC: 8.1 G/DL (ref 11.7–17.7)
LDLC SERPL CALC-MCNC: 51 MG/DL
MCH RBC QN AUTO: 34 PG (ref 26.5–33)
MCHC RBC AUTO-ENTMCNC: 32.3 G/DL (ref 31.5–36.5)
MCV RBC AUTO: 106 FL (ref 78–100)
NONHDLC SERPL-MCNC: 66 MG/DL
PLATELET # BLD AUTO: 212 10E3/UL (ref 150–450)
RBC # BLD AUTO: 2.38 10E6/UL (ref 3.8–5.9)
TRIGL SERPL-MCNC: 74 MG/DL
WBC # BLD AUTO: 6.7 10E3/UL (ref 4–11)

## 2023-11-17 PROCEDURE — 90480 ADMN SARSCOV2 VAC 1/ONLY CMP: CPT | Performed by: FAMILY MEDICINE

## 2023-11-17 PROCEDURE — 90678 RSV VACC PREF BIVALENT IM: CPT | Performed by: FAMILY MEDICINE

## 2023-11-17 PROCEDURE — 80061 LIPID PANEL: CPT | Performed by: FAMILY MEDICINE

## 2023-11-17 PROCEDURE — 90471 IMMUNIZATION ADMIN: CPT | Performed by: FAMILY MEDICINE

## 2023-11-17 PROCEDURE — 84460 ALANINE AMINO (ALT) (SGPT): CPT | Performed by: FAMILY MEDICINE

## 2023-11-17 PROCEDURE — 85027 COMPLETE CBC AUTOMATED: CPT | Performed by: FAMILY MEDICINE

## 2023-11-17 PROCEDURE — 99215 OFFICE O/P EST HI 40 MIN: CPT | Mod: 25 | Performed by: FAMILY MEDICINE

## 2023-11-17 PROCEDURE — 36415 COLL VENOUS BLD VENIPUNCTURE: CPT | Performed by: FAMILY MEDICINE

## 2023-11-17 PROCEDURE — 91320 SARSCV2 VAC 30MCG TRS-SUC IM: CPT | Performed by: FAMILY MEDICINE

## 2023-11-17 RX ORDER — RESPIRATORY SYNCYTIAL VIRUS VACCINE 120MCG/0.5
0.5 KIT INTRAMUSCULAR ONCE
Qty: 1 EACH | Refills: 0 | Status: SHIPPED | OUTPATIENT
Start: 2023-11-17 | End: 2023-11-17

## 2023-11-17 RX ORDER — METOPROLOL SUCCINATE 25 MG/1
25 TABLET, EXTENDED RELEASE ORAL DAILY
Qty: 90 TABLET | Refills: 0 | Status: SHIPPED | OUTPATIENT
Start: 2023-11-17 | End: 2024-02-23

## 2023-11-17 RX ORDER — HYDRALAZINE HYDROCHLORIDE 10 MG/1
10 TABLET, FILM COATED ORAL 3 TIMES DAILY
Qty: 270 TABLET | Refills: 0 | Status: SHIPPED | OUTPATIENT
Start: 2023-11-17 | End: 2024-03-29

## 2023-11-17 RX ORDER — RESPIRATORY SYNCYTIAL VIRUS VACCINE 120MCG/0.5
0.5 KIT INTRAMUSCULAR ONCE
Qty: 1 EACH | Refills: 0 | Status: CANCELLED | OUTPATIENT
Start: 2023-11-17 | End: 2023-11-17

## 2023-11-17 RX ORDER — ATORVASTATIN CALCIUM 40 MG/1
40 TABLET, FILM COATED ORAL EVERY EVENING
Qty: 90 TABLET | Refills: 0 | Status: SHIPPED | OUTPATIENT
Start: 2023-11-17 | End: 2024-02-23

## 2023-11-17 ASSESSMENT — PAIN SCALES - GENERAL: PAINLEVEL: NO PAIN (0)

## 2023-11-17 NOTE — PROGRESS NOTES
"  {PROVIDER CHARTING PREFERENCE:074297}    Jesus Dobbins is a 73 year old, presenting for the following health issues:  Hospital F/U and Forms (Back to work form, FMLA)        11/17/2023     2:03 PM   Additional Questions   Roomed by Vladimir MARTINEZ   Accompanied by Wife       {MA/LPN/RN Pre-Provider Visit Orders- hCG/UA/Strep (Optional):949645}    Hospital Follow-up Visit:    Hospital/Nursing Home/IP Rehab Facility: [facility]  Date of Admission: 09/27/2023  Date of Discharge: 10/13/2023    Reason(s) for Admission: 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     Was your hospitalization related to COVID-19? No   Problems taking medications regularly:  None    Medication changes since discharge:   START TAKING Lidocaine (LIDOCARE) 4 % Patch, metoprolol succinate ER (TOPROL XL) 25 MG 24 hr tablet, multivitamin RENAL (TRIPHROCAPS) 1 capsule capsule, nitroGLYcerin (NITROSTAT) 0.4 MG sublingual tablet, pantoprazole (PROTONIX) 40 MG EC tablet     STOP TAKING atenolol (TENORMIN) 100 MG tablet, fluticasone (FLONASE) 50 MCG/ACT nasal spray, guaiFENesin (MUCINEX) 600 MG 12 hr tablet, ibuprofen (ADVIL/MOTRIN) 200 MG tablet, LISINOPRIL PO, Pseudoephedrine HCl (SUDAFED PO)  Problems adhering to non-medication therapy:  None    Summary of hospitalization:  {:717816}  Diagnostic Tests/Treatments reviewed.  Follow up needed: {:989950:}  Other Healthcare Providers Involved in Patient s Care:         {those currently involved after discharge:653500::\"None\"}  Update since discharge: {:297833} {TIP  Include information from family/caregivers, SNF, Care Coordination :320004}        Plan of care communicated with {:166400}     {Reference  Coding guidelines- Moderate Complexity F2F/Video within 7 - 14 days of discharge 7992898, High Complexity F2F/Video within 7 days 1396206 or caoxao50 days 3782105 :229649}      {additonal problems for provider to add " (Optional):826220}  {additonal problems for provider to add (Optional):308786}      Review of Systems   {ROS COMP (Optional):107234}      Objective    /69 (BP Location: Right arm, Patient Position: Sitting, Cuff Size: Adult Regular)   Pulse 104   Temp 98  F (36.7  C) (Oral)   Resp 22   Ht 1.829 m (6')   Wt 86.3 kg (190 lb 3.2 oz)   SpO2 99%   BMI 25.80 kg/m    Body mass index is 25.8 kg/m .  Physical Exam   {Exam List (Optional):590572}    {Diagnostic Test Results (Optional):448784}    {AMBULATORY ATTESTATION (Optional):100590}

## 2023-11-17 NOTE — ASSESSMENT & PLAN NOTE
XR (10/6) showed normal joint spaces and alignment. No fracture or joint effusion.   - Lidocaine patch, APAP prn ordered. Avoid NSAIDs due to GI bleeding gastric ulcers.   - Plan to follow up with Ortho for further management

## 2023-11-17 NOTE — PROGRESS NOTES
{PROVIDER CHARTING PREFERENCE:534435}    Jesus Dobbins is a 73 year old, presenting for the following health issues:  Hospital F/U and Forms (Back to work form, FMLA)        11/17/2023     2:03 PM   Additional Questions   Roomed by Vladimir MARTINEZ   Accompanied by N/A       History of Present Illness       Reason for visit:  Hospital f/u    Shilpi Fleming eats 2-3 servings of fruits and vegetables daily.Shilpi Fleming consumes 0 sweetened beverage(s) daily.Shilpi Fleming exercises with enough effort to increase Shilpi Fleming's heart rate 20 to 29 minutes per day.  Shilpi Fleming exercises with enough effort to increase Shilpi Fleming's heart rate 3 or less days per week.   Shilpi Fleming is taking medications regularly.       {MA/LPN/RN Pre-Provider Visit Orders- hCG/UA/Strep (Optional):378406}    Hospital Follow-up Visit:    Hospital/Nursing Home/ Rehab Facility: [facility]  Date of Admission: 09/27/2023  Date of Discharge: 10/13/2023  Reason(s) for Admission: 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     Was your hospitalization related to COVID-19? No   Problems taking medications regularly:  None  Medication changes since discharge: START TAKING Lidocaine (LIDOCARE) 4 % Patch, metoprolol succinate ER (TOPROL XL) 25 MG 24 hr tablet, multivitamin RENAL (TRIPHROCAPS) 1 capsule capsule, nitroGLYcerin (NITROSTAT) 0.4 MG sublingual tablet, pantoprazole (PROTONIX) 40 MG EC tablet      STOP TAKING atenolol (TENORMIN) 100 MG tablet, fluticasone (FLONASE) 50 MCG/ACT nasal spray, guaiFENesin (MUCINEX) 600 MG 12 hr tablet, ibuprofen (ADVIL/MOTRIN) 200 MG tablet, LISINOPRIL PO, Pseudoephedrine HCl (SUDAFED PO)  Problems adhering to non-medication therapy:  None    Summary of hospitalization:  {:118845}  Diagnostic Tests/Treatments reviewed.  Follow up needed: {:369710:}  Other Healthcare Providers Involved in Patient s  "Care:         {those currently involved after discharge:899668::\"None\"}  Update since discharge: {:834723} {TIP  Include information from family/caregivers, SNF, Care Coordination :210189}        Plan of care communicated with {:145624}     {Reference  Coding guidelines- Moderate Complexity F2F/Video within 7 - 14 days of discharge 6138388, High Complexity F2F/Video within 7 days 4556037 or [id number] days 1130547 :820343}      {additonal problems for provider to add (Optional):009318}  {additonal problems for provider to add (Optional):329244}      Review of Systems   {ROS COMP (Optional):252896}      Objective    /69 (BP Location: Right arm, Patient Position: Sitting, Cuff Size: Adult Regular)   Pulse 104   Temp 98  F (36.7  C) (Oral)   Resp 22   Ht 1.829 m (6')   Wt 86.3 kg (190 lb 3.2 oz)   SpO2 99%   BMI 25.80 kg/m    Body mass index is 25.8 kg/m .  Physical Exam   {Exam List (Optional):710005}    {Diagnostic Test Results (Optional):639448}    {AMBULATORY ATTESTATION (Optional):738322}              " done

## 2023-11-17 NOTE — PROGRESS NOTES
"  {PROVIDER CHARTING PREFERENCE:393413}    Jesus Dobbins is a 73 year old, presenting for the following health issues:  Hospital F/U and Forms (Back to work form, FMLA)        11/17/2023     2:03 PM   Additional Questions   Roomed by Vladimir MARTINEZ   Accompanied by N/A       {MA/LPN/RN Pre-Provider Visit Orders- hCG/UA/Strep (Optional):664076}    Hospital Follow-up Visit:    Hospital/Nursing Home/IP Rehab Facility: [facility]  Date of Admission: 09/27/2023  Date of Discharge: 10/13/2023  Reason(s) for Admission: 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     Was your hospitalization related to COVID-19? No   Problems taking medications regularly:  None  Medication changes since discharge: START TAKING Lidocaine (LIDOCARE) 4 % Patch, metoprolol succinate ER (TOPROL XL) 25 MG 24 hr tablet, multivitamin RENAL (TRIPHROCAPS) 1 capsule capsule, nitroGLYcerin (NITROSTAT) 0.4 MG sublingual tablet, pantoprazole (PROTONIX) 40 MG EC tablet      STOP TAKING atenolol (TENORMIN) 100 MG tablet, fluticasone (FLONASE) 50 MCG/ACT nasal spray, guaiFENesin (MUCINEX) 600 MG 12 hr tablet, ibuprofen (ADVIL/MOTRIN) 200 MG tablet, LISINOPRIL PO, Pseudoephedrine HCl (SUDAFED PO)  Problems adhering to non-medication therapy:  None    Summary of hospitalization:  {:680648}  Diagnostic Tests/Treatments reviewed.  Follow up needed: {:698504:}  Other Healthcare Providers Involved in Patient s Care:         {those currently involved after discharge:857568::\"None\"}  Update since discharge: {:856352} {TIP  Include information from family/caregivers, SNF, Care Coordination :417370}        Plan of care communicated with {:114241}     {Reference  Coding guidelines- Moderate Complexity F2F/Video within 7 - 14 days of discharge 2556859, High Complexity F2F/Video within 7 days 1909074 or [id number] days 3753295 :851405}      {additonal problems for provider to add " (Optional):040952}  {additonal problems for provider to add (Optional):787378}      Review of Systems   {ROS COMP (Optional):535477}      Objective    /69 (BP Location: Right arm, Patient Position: Sitting, Cuff Size: Adult Regular)   Pulse 104   Temp 98  F (36.7  C) (Oral)   Resp 22   Ht 1.829 m (6')   Wt 86.3 kg (190 lb 3.2 oz)   SpO2 99%   BMI 25.80 kg/m    Body mass index is 25.8 kg/m .  Physical Exam   {Exam List (Optional):958410}    {Diagnostic Test Results (Optional):080899}    {AMBULATORY ATTESTATION (Optional):462885}

## 2023-11-17 NOTE — PROGRESS NOTES
Assessment & Plan   Problem List Items Addressed This Visit       NSTEMI (non-ST elevated myocardial infarction) (H)    Relevant Medications    hydrALAZINE (APRESOLINE) 10 MG tablet    atorvastatin (LIPITOR) 40 MG tablet    RESOLVED: 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 (H) - Primary    Relevant Medications    hydrALAZINE (APRESOLINE) 10 MG tablet    metoprolol succinate ER (TOPROL XL) 25 MG 24 hr tablet    atorvastatin (LIPITOR) 40 MG tablet    Coronary atherosclerosis    Relevant Orders    Lipid panel reflex to direct LDL Non-fasting    Hyperlipidemia     On atorvastatin 40 mg  Recent elevated transaminase during hospital admission    Recheck level.          Relevant Medications    atorvastatin (LIPITOR) 40 MG tablet    Acute systolic congestive heart failure - EF-30%       The left ventricle is borderline dilated.  The visual ejection fraction is 30-35%.  Large area of hypokinesis and akinesis distal 2/3 septum/apex/infero-apex/lat  walls. Some wall thinning. Suspect large LAD infarct pattern but cannot  exclude stress cardiomyopathy  There is moderate (2+) mitral regurgitation.  Right ventricular systolic pressure is elevated, consistent with mild to  moderate pulmonary hypertension.  The rhythm was sinus tachycardia.         HTN (hypertension)    Relevant Medications    hydrALAZINE (APRESOLINE) 10 MG tablet    Acute on chronic anemia    Relevant Orders    CBC with platelets    Acute kidney failure with tubular necrosis (H)     Due to obstructive uropathy/bilateral hydronephrosis  - having indwelling Culp catheter  - in HD for now done through the PICC line, until further direction by urology         Relevant Medications    hydrALAZINE (APRESOLINE) 10 MG tablet    Gastrointestinal hemorrhage associated with gastric ulcer     Hospital admission on 9/27/2023   Status post EGD/multiple gastric ulcers  to avoid NSAIDs  - Plan for repeat EGD and  colonoscopy in 4-5 weeks per [facility]         Chronic pain of right knee      XR (10/6) showed normal joint spaces and alignment. No fracture or joint effusion.   - Lidocaine patch, APAP prn ordered. Avoid NSAIDs due to GI bleeding gastric ulcers.   - Plan to follow up with Ortho for further management         Ischemic cardiomyopathy    Relevant Medications    hydrALAZINE (APRESOLINE) 10 MG tablet    metoprolol succinate ER (TOPROL XL) 25 MG 24 hr tablet    atorvastatin (LIPITOR) 40 MG tablet     Other Visit Diagnoses       Nonspecific elevation of levels of transaminase and lactic acid dehydrogenase (LDH)        Noted during hospital admission  Recheck level    Relevant Orders    ALT    Examination of eyes and vision        Relevant Orders    Adult Eye  Referral    Need for vaccination against respiratory syncytial virus        Need for vaccination        Relevant Medications    respiratory syncytial virus vaccine, bivalent (ABRYSVO) injection    Other Relevant Orders    COVID-19 12+ (2023-24) (PFIZER) (Completed)               I spent a total of 54 minutes on the day of the visit.   Time spent by me doing chart review, history and exam, documentation and further activities per the note       BMI:   Estimated body mass index is 25.8 kg/m  as calculated from the following:    Height as of this encounter: 1.829 m (6').    Weight as of this encounter: 86.3 kg (190 lb 3.2 oz).           Alisha Gloria MD  [facility] is a 73 year old, presenting for the following health issues:  Forms (Back to work form, FMLA) and Follow Up    Patient was admitted to hospital from 9/27/2023- 10/13/2023 presenting with shortness of breath, nausea/vomiting and decreased appetite and found to have  acute renal failure due to obstructive uropathy and bilateral hydro ureteral nephrosis, urinary retention, UTI/pyuria, and NSTEMI and acute systolic CHF of new diagnosis, acute GI bleed and  acute blood loss anemia and elevated liver transaminase.  During admission he was found to have worsening heart failure with a EF of 30-35% and a GI bleed from duodenal ulcer with drop in his hemoglobin down to 5.7, receiving 2 units of packed red blood cells.  He has completed a course of pantoprazole 40 mg twice daily and is no longer having any GI symptoms.    He is doing well following a discharge, and has an indwelling Culp catheter to remain until his next appointment with urology coming up in couple of weeks and gets hemodialysis through [facility] for now.  He also has a follow-up with outpatient cardiology appointment.          11/17/2023     2:03 PM   Additional Questions   Roomed by Vladimir MARTINEZ   Accompanied by N/A       History of Present Illness       Reason for visit:  Hospital f/u    Shilpi Fleming eats 2-3 servings of fruits and vegetables daily.Shilpi Fleming consumes 0 sweetened beverage(s) daily.Shilpi Fleming exercises with enough effort to increase Shilpi Fleming's heart rate 20 to 29 minutes per day.  Shilpi Fleming exercises with enough effort to increase Shilpi Fleming's heart rate 3 or less days per week.   Shilpi Fleming is taking medications regularly.       Review of Systems         Objective    /69 (BP Location: Right arm, Patient Position: Sitting, Cuff Size: Adult Regular)   Pulse 104   Temp 98  F (36.7  C) (Oral)   Resp 22   Ht 1.829 m (6')   Wt 86.3 kg (190 lb 3.2 oz)   SpO2 99%   BMI 25.80 kg/m    Body mass index is 25.8 kg/m .    Physical Exam   GENERAL: healthy, alert and no distress  RESP: lungs clear to auscultation - no rales, rhonchi or wheezes  CV: regular rate and rhythm, normal S1 S2, no S3 or S4, no murmur, click or rub, no peripheral edema and peripheral pulses strong  MS: no gross musculoskeletal defects noted, no edema

## 2023-11-17 NOTE — ASSESSMENT & PLAN NOTE
Due to obstructive uropathy/bilateral hydronephrosis  - having indwelling Culp catheter  - in HD for now done through the PICC line, until further direction by urology

## 2023-11-17 NOTE — ASSESSMENT & PLAN NOTE
The left ventricle is borderline dilated.  The visual ejection fraction is 30-35%.  Large area of hypokinesis and akinesis distal 2/3 septum/apex/infero-apex/lat  walls. Some wall thinning. Suspect large LAD infarct pattern but cannot  exclude stress cardiomyopathy  There is moderate (2+) mitral regurgitation.  Right ventricular systolic pressure is elevated, consistent with mild to  moderate pulmonary hypertension.  The rhythm was sinus tachycardia.

## 2023-11-17 NOTE — ASSESSMENT & PLAN NOTE
Hospital admission on 9/27/2023   Status post EGD/multiple gastric ulcers  to avoid NSAIDs  - Plan for repeat EGD and colonoscopy in 4-5 weeks per [facility]

## 2023-11-20 NOTE — TELEPHONE ENCOUNTER
Patient dropped off FMLA forms for Dr. GUTIERREZ to complete highlighted areas. Once completed please call patient to . Form placed into provider's mailbox.

## 2023-12-07 ENCOUNTER — TRANSFERRED RECORDS (OUTPATIENT)
Dept: HEALTH INFORMATION MANAGEMENT | Facility: CLINIC | Age: 73
End: 2023-12-07

## 2024-01-30 ENCOUNTER — TRANSFERRED RECORDS (OUTPATIENT)
Dept: HEALTH INFORMATION MANAGEMENT | Facility: CLINIC | Age: 74
End: 2024-01-30
Payer: COMMERCIAL

## 2024-01-30 ENCOUNTER — REFERRAL (OUTPATIENT)
Dept: TRANSPLANT | Facility: CLINIC | Age: 74
End: 2024-01-30

## 2024-01-30 DIAGNOSIS — I25.10 CARDIOVASCULAR DISEASE: ICD-10-CM

## 2024-01-30 DIAGNOSIS — N18.9 CHRONIC RENAL FAILURE: ICD-10-CM

## 2024-01-30 DIAGNOSIS — Z76.82 ORGAN TRANSPLANT CANDIDATE: ICD-10-CM

## 2024-01-30 DIAGNOSIS — Z01.818 PRE-TRANSPLANT EVALUATION FOR KIDNEY TRANSPLANT: ICD-10-CM

## 2024-01-30 DIAGNOSIS — N18.6 ESRD (END STAGE RENAL DISEASE) (H): Primary | ICD-10-CM

## 2024-01-30 DIAGNOSIS — G47.33 OBSTRUCTIVE SLEEP APNEA: ICD-10-CM

## 2024-01-30 DIAGNOSIS — N18.6 END STAGE RENAL DISEASE (H): ICD-10-CM

## 2024-01-30 DIAGNOSIS — E78.5 HYPERLIPIDEMIA: ICD-10-CM

## 2024-01-30 DIAGNOSIS — I10 ESSENTIAL HYPERTENSION: ICD-10-CM

## 2024-01-30 DIAGNOSIS — Z87.891 HISTORY OF TOBACCO USE: ICD-10-CM

## 2024-01-30 NOTE — LETTER
Bret Fleming  [address]  [place] [address]                February 13, 2024    John Queen,     It was a pleasure to speak with you over the phone today in preparation of your pre-kidney transplant evaluation. I am sending this email to review the pre-transplant information we covered.     A  from our Office will send your schedule in your My Chart for your pre-kidney transplant evaluation on May 1, 2024 starting at 7:30 AM. [facility]  [address] [place] [id number]    For parking options, please park in the open lot across the street from the front door of our Clinic.  Otherwise, enter the [facility] / arrival plaza from [facility] and attendants can assist you based on your needs.  parking is available for those with limited mobility M-F from 7:00 am to 5:00 pm.     All in-person provider appointments will be on the third floor, 3A. Additional evaluation tests will be on the first floor. There are help desks in the clinic that can provide additional information, if you should need assistance.    Please bring your designated care person(s) to your appointment day to help listen to the patient education and ask questions that are important to you. You can eat/drink normally on this day. Please do not fast, as the appointment day will most likely go until 3 PM. There is a coffee shop on street level for you to purchase food and you can also bring food from home. Please be aware there are no microwaves or refrigerators for patient use at the clinic. Also, take all your prescribed medications as ordered on this day. There are no medication lab tests ordered during your appointments.    Upon completion of your appointments, I will compile the outcomes and have your results reviewed at the Transplant Team Selection Committee on Wednesday of the following week. This is a medical  review meeting only, you will not be asked to attend. I will call you within a few days after this meeting to inform you of the outcomes and to assist in planning for the completion of your evaluation.     You will receive an email from our Transplant Office which contains a Receipt of Information consent and patient educational materials. Please read and electronically sign the Receipt of Information consent as well as read the educational materials prior to your evaluation appointments on May 1, 2024.     Please complete your pre-kidney transplant education on My Transplant Place. This is an online website that our Transplant Program uses for patient education, specifically for our Program. To find My Transplant Place you can google search for it or click on this link: [email or web address].  You will find information for kidney transplant videos under the Organ Recipient Patient Education (Adult).  Find kidney and select your preferred language.  The language link will send you to a [employer]in[employer] video player. There are 15 short videos that are included in the SOT Pre-Transplant Training/Kidney 1. Please complete viewing of these videos prior to your evaluation appointments.  This will give you a good knowledge base to then to ask questions with the providers.       Additional transplant resources are as follows:   www.unos.org. UNOS, or United Network of Organ Sharing, is the national organization in our country that maintains all the organ wait lists and is responsible for the rules and regulations for organ allocation. I recommend looking at the Transplant Living section as this area has content created for patients.   www.srtr.org SRTR, or the Scientific Registry for Transplant Recipients is a national data base that all Transplant Centers report their success and failure rate for all organ types twice per year. The results are public knowledge and do provide a good perspective  of organ transplant.     If the transplant providers tell you at your appointments that you should start to have live donors register with our Program to initiate their evaluations, please provide this registration website: [email or web address]   The donor will receive a detailed email response back with information and next steps specific to their situation. It is important that the donor responds to the email in order to move forward with their evaluation. Donors can also call our Office and ask to speak with a live donor coordinator in the event of questions at [phone].     Please let me know of any questions or concerns.     Thank you,    Deja Story RN; Pre-Kidney/Pancreas Transplant Coordinator

## 2024-01-30 NOTE — LETTER
Bret Fleming  [address] [place] [address]                February 5, 2024                                                                                        MEDICAL RECORDS REQUEST    [facility] Kidney, Kidney Pancreas Transplant Program Records Request                      Facility: [facility]    Thank you for referring your patient to the [facility] Kidney, Kidney Pancreas Program, in order to process the referral we will need the following information;      5578 form   Immunizations records        Please call our office at [phone] if you have any questions or concerns.                Please fax all paper records to [phone] within 3-5 business days.      Thank you,   The SOT Referral Intake Team     [facility]  Solid Organ Transplant Office  [address]  [place] [address]

## 2024-01-30 NOTE — LETTER
February 5, 2024     Bret Fleming  [address]  [place] [address]    Dear Shilpi,    Thank you for your interest in the Transplant Center at [facility]. We look forward to being a part of your care team and assisting you through the transplant process.    As we discussed, your transplant coordinator is Deja Story (Kidney).  You may call your coordinator at any time with questions or concerns.  Your first scheduled call will be on February 12, 2024 between 12:00 PM and 3:00 PM.  If this needs to change, call [phone].    Please complete the following.    Fill out and return the enclosed forms  Authorization for Electronic Communication  Authorization to Discuss Protected Health Information  Authorization for Release of Protected Health Information    Sign up for:  [employer], access to your electronic medical record (see enclosed pamphlet)  [employer], a transplant education website    You can use these tools to learn more about your transplant, communicate with your care team, and track your medical details           My Transplant Place      Sincerely,      Solid Organ Transplant  [facility]    cc: Referring Physician Dialysis Unit PCP

## 2024-02-05 VITALS — WEIGHT: 190 LBS | BODY MASS INDEX: 25.73 KG/M2 | HEIGHT: 72 IN

## 2024-02-05 NOTE — TELEPHONE ENCOUNTER
PCP: Alisha Gloria MD  Referring Organization: [facility]  Referring Provider: Dr. Jassi Dalal  Referring Diagnosis: ESRD    Is patient under the age of 65? no  Is patient diabetic? no  Is patient on insulin? no  Was patient offered a pancreas transplant referral? no    Is patient in a group home/assisted living? no  Does patient have a guardian? no    Referral intake process completed.  Patient is aware that after financial approval is received, medical records will be requested.   Patient confirmed for a callback from transplant coordinator on February 12, 2024. (within 2 weeks)  Tentative evaluation date May 1, 2024  slot 1 (within 4 weeks) if appointment is virtual, does patient have capabilities of setting this up?     Confirmed coordinator will discuss evaluation process in more detail at the time of their call.   Patient is aware of the need to arrange age appropriate cancer screening, vaccinations, and dental care.  Reminded patient to complete questionnaire, complete medical records release, and review packet prior to evaluation visit .  Assessed patient for special needs (ie-wheelchair, assistance, guardian, and ):  none   Patient instructed to call [phone] with questions.     Patient gave verbal consent during intake call to obtain medical records and documents outside of [facility]:  yes

## 2024-02-12 NOTE — TELEPHONE ENCOUNTER
Reviewed pt's chart for pre-kidney transplant evaluation planning. Coordinator first call on 02/12/2024. PKE STD on 05/01/2024 slot A. .      services required: No. Is pt able to attend virtual education class? Yes- patient prefers to watch videos independently first. Will call when ready to set up a class.     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 He was admitted to [facility] 09/27/2023-10/13/2023 with RICHARD related to obstruction with bilateral hydronephrosis, dialysis was initiated at that time. Upon chart review, appears that the obstruction was related to an enlarged prostate.  Patient has since seen urology and states that urology did not think this was the case.  Will investigate further. No biopsy performed.  Pt is on dialysis, which was initiated at the time of hospitalization, no 2729 form in chart so will request for official start date. Patient has no diabetic history.     Health hx: HTN, CAD, HFrEF.  On dialysis mostly for clearance as he still makes about 2L of urine a day and does not need much UF.  Heart hx: CAD.  States in 1995, he had a blocked artery in his heart and was given an experimental clot buster which did open up the artery but did he did experience myocardial tissue death nonetheless.  States he has not had any heart problems. However, on admission to [facility] last fall it was noted that he has an EF of 30%.  Unable to do ischemic workup at the time due to kidney function.  Let him know we will likely need more extensive workup to determine candidacy and he was open to this.  Lung hx: None. Surgical hx: Tonsillectomy as a young boy.  Pt is a former smoker, but quit in 1995/1996 after he had his MI. He does not consume alcohol, and does not use recreational drugs. Does not have current infection, non-healing wounds, or active cancer.    Health maintenance items: BMI 24.52 on 10/423962 (this is the most recent I could find in the chart).  Colonoscopy: Up to date.  Dental: Up to  date. Vaccinations: up to date pending virology panel. Pt is independent w/ ADLs.  Pt lives in [place], [place] w/ his wife.  He feels he has good support following transplant. Pt does not have any living donors at this time.     Imaging Available: CT A/P without done in our system from 02/05/2022.    Contacted patient and introduced myself as their Transplant Coordinator, also introduced the role of the Transplant Coordinator in the transplant process.  Explained the purpose of this call including reviewing next steps and answering questions.      Confirmed Referring Provider, Dr. Dalal; Dialysis Center, [facility]; and Primary Care Physician, Dr. Gloria. Explained to the patient of the importance of continued communication with referring providers and primary care physicians.      Reviewed components of transplant evaluation process including necessary appointments, tests, and procedures.  Instructed pt to bring 1-2 people with them to eval and to eat and drink and normally on eval day    Answered questions for patient regarding evaluation, provided my name and contact information and requested they call/message with any additional questions or concerns.  Informed patient they will receive a letter with information discussed in referral call. Determined that patient would like information regarding transplant by:  Email, [employer]    Informed pt about transplant educational website, asked to watch pre-kidney transplant videos or sign up for education class prior to evaluation. Link for educational videos were provided.  Additional informational web sites about transplant were discussed. Links provided to www.unos.org and www.srtr.org in letter sent to patient.  Pt expressed good understanding of all and were in good agreement with the plan.    Confirmed STD 05/01/2024. Informed pt they will hear from scheduling to arrange appointment times for evaluation day. As well as, receive an email from our Office  prior to eval with a Receipt of Info and educational materials - instructed to read materials and sign consent prior to eval. Smartset orders entered.

## 2024-02-23 DIAGNOSIS — I25.5 ISCHEMIC CARDIOMYOPATHY: ICD-10-CM

## 2024-02-23 DIAGNOSIS — I21.4 NSTEMI (NON-ST ELEVATED MYOCARDIAL INFARCTION) (H): ICD-10-CM

## 2024-02-23 NOTE — TELEPHONE ENCOUNTER
Reason for Call:  Medication refill:    Do you use a [facility]?  Oppelo of the pharmacy and phone number for the current request:    [facility] - [address]       Name of the medication requested:     atorvastatin (LIPITOR) 40 MG tablet   metoprolol succinate ER (TOPROL XL) 25 MG 24 hr tablet     Last Visit with PCP : 11/17/2023   Next Visit with PCP:  Nothing Scheduled      Call taken on 2/23/2024 at 10:05 AM by Starla Torres

## 2024-02-28 RX ORDER — ATORVASTATIN CALCIUM 40 MG/1
40 TABLET, FILM COATED ORAL EVERY EVENING
Qty: 90 TABLET | Refills: 1 | Status: SHIPPED | OUTPATIENT
Start: 2024-02-28 | End: 2024-06-27

## 2024-02-29 RX ORDER — METOPROLOL SUCCINATE 25 MG/1
25 TABLET, EXTENDED RELEASE ORAL DAILY
Qty: 90 TABLET | Refills: 0 | Status: SHIPPED | OUTPATIENT
Start: 2024-02-29 | End: 2024-06-27

## 2024-03-12 ENCOUNTER — DOCUMENTATION ONLY (OUTPATIENT)
Dept: TRANSPLANT | Facility: CLINIC | Age: 74
End: 2024-03-12
Payer: COMMERCIAL

## 2024-03-29 DIAGNOSIS — I21.4 NSTEMI (NON-ST ELEVATED MYOCARDIAL INFARCTION) (H): ICD-10-CM

## 2024-03-29 DIAGNOSIS — N17.0 ACUTE KIDNEY FAILURE WITH TUBULAR NECROSIS (H): ICD-10-CM

## 2024-03-29 DIAGNOSIS — I25.5 ISCHEMIC CARDIOMYOPATHY: ICD-10-CM

## 2024-03-29 RX ORDER — HYDRALAZINE HYDROCHLORIDE 10 MG/1
10 TABLET, FILM COATED ORAL 3 TIMES DAILY
Qty: 270 TABLET | Refills: 0 | Status: SHIPPED | OUTPATIENT
Start: 2024-03-29 | End: 2024-06-27

## 2024-04-01 ENCOUNTER — TRANSFERRED RECORDS (OUTPATIENT)
Dept: HEALTH INFORMATION MANAGEMENT | Facility: CLINIC | Age: 74
End: 2024-04-01

## 2024-04-10 ENCOUNTER — OFFICE VISIT (OUTPATIENT)
Dept: FAMILY MEDICINE | Facility: CLINIC | Age: 74
End: 2024-04-10
Payer: COMMERCIAL

## 2024-04-10 VITALS
TEMPERATURE: 98 F | DIASTOLIC BLOOD PRESSURE: 60 MMHG | RESPIRATION RATE: 17 BRPM | SYSTOLIC BLOOD PRESSURE: 136 MMHG | OXYGEN SATURATION: 95 % | HEART RATE: 68 BPM | HEIGHT: 72 IN | WEIGHT: 190 LBS | BODY MASS INDEX: 25.73 KG/M2

## 2024-04-10 DIAGNOSIS — Z01.818 PRE-OP EVALUATION: ICD-10-CM

## 2024-04-10 DIAGNOSIS — I50.22 CHRONIC SYSTOLIC CONGESTIVE HEART FAILURE (H): ICD-10-CM

## 2024-04-10 DIAGNOSIS — I15.8 OTHER SECONDARY HYPERTENSION: Primary | ICD-10-CM

## 2024-04-10 DIAGNOSIS — N18.6 END STAGE RENAL DISEASE (H): ICD-10-CM

## 2024-04-10 DIAGNOSIS — I25.5 ISCHEMIC CARDIOMYOPATHY: ICD-10-CM

## 2024-04-10 PROBLEM — N39.0 SEPSIS DUE TO GRAM-NEGATIVE UTI (H): Status: RESOLVED | Noted: 2023-10-07 | Resolved: 2024-04-10

## 2024-04-10 PROBLEM — A41.50 SEPSIS DUE TO GRAM-NEGATIVE UTI (H): Status: RESOLVED | Noted: 2023-10-07 | Resolved: 2024-04-10

## 2024-04-10 LAB
ANION GAP SERPL CALCULATED.3IONS-SCNC: 14 MMOL/L (ref 7–15)
BUN SERPL-MCNC: 42.1 MG/DL (ref 8–23)
CALCIUM SERPL-MCNC: 9.4 MG/DL (ref 8.8–10.2)
CHLORIDE SERPL-SCNC: 100 MMOL/L (ref 98–107)
CREAT SERPL-MCNC: 4.89 MG/DL (ref 0.51–1.17)
DEPRECATED HCO3 PLAS-SCNC: 25 MMOL/L (ref 22–29)
EGFRCR SERPLBLD CKD-EPI 2021: 12 ML/MIN/1.73M2
ERYTHROCYTE [DISTWIDTH] IN BLOOD BY AUTOMATED COUNT: 13.2 % (ref 10–15)
GLUCOSE SERPL-MCNC: 93 MG/DL (ref 70–99)
HCT VFR BLD AUTO: 40.7 % (ref 35–53)
HGB BLD-MCNC: 12.8 G/DL (ref 11.7–17.7)
MCH RBC QN AUTO: 33.1 PG (ref 26.5–33)
MCHC RBC AUTO-ENTMCNC: 31.4 G/DL (ref 31.5–36.5)
MCV RBC AUTO: 105 FL (ref 78–100)
PLATELET # BLD AUTO: 142 10E3/UL (ref 150–450)
POTASSIUM SERPL-SCNC: 5.3 MMOL/L (ref 3.4–5.3)
RBC # BLD AUTO: 3.87 10E6/UL (ref 3.8–5.9)
SODIUM SERPL-SCNC: 139 MMOL/L (ref 135–145)
WBC # BLD AUTO: 6.1 10E3/UL (ref 4–11)

## 2024-04-10 PROCEDURE — 36415 COLL VENOUS BLD VENIPUNCTURE: CPT | Performed by: FAMILY MEDICINE

## 2024-04-10 PROCEDURE — 85027 COMPLETE CBC AUTOMATED: CPT | Performed by: FAMILY MEDICINE

## 2024-04-10 PROCEDURE — 93000 ELECTROCARDIOGRAM COMPLETE: CPT | Mod: RTG | Performed by: INTERNAL MEDICINE

## 2024-04-10 PROCEDURE — 80048 BASIC METABOLIC PNL TOTAL CA: CPT | Performed by: FAMILY MEDICINE

## 2024-04-10 PROCEDURE — 93005 ELECTROCARDIOGRAM TRACING: CPT | Performed by: FAMILY MEDICINE

## 2024-04-10 PROCEDURE — 99215 OFFICE O/P EST HI 40 MIN: CPT | Mod: 25 | Performed by: FAMILY MEDICINE

## 2024-04-10 ASSESSMENT — PAIN SCALES - GENERAL: PAINLEVEL: NO PAIN (0)

## 2024-04-10 NOTE — PROGRESS NOTES
"Preoperative Evaluation  [address] [place] [id number]  Phone: [phone]  Fax: [phone]  Primary Provider: Alisha Gloria  Pre-op Performing Provider: ALISHA GLORIA  Apr 10, 2024       Shilpi is a 73 year old, presenting for the following:  Pre-Op Exam        4/10/2024    11:12 AM   Additional Questions   Roomed by Oz LAMA CMA     Surgical Information  Surgery/Procedure: FORMATION ARTERIOVENOUS FISTULA   Surgery Location: [facility]   Surgeon: Dr. Loving   Surgery Date: 04/23/24  Time of Surgery: Unknown   Where patient plans to recover: At home with family  Fax number for surgical facility: Note does not need to be faxed, will be available electronically in Epic.    Assessment & Plan     The proposed surgical procedure is considered INTERMEDIATE risk.    Problem List Items Addressed This Visit       Chronic systolic congestive heart failure (H)    Relevant Orders    [facility]  Referral    HTN (hypertension) - Primary    Ischemic cardiomyopathy      history of CAD s/p \"inferolateral MI\" 1996, LV dysfunction   Recent hospital in September '23 with EF 30-35% and regional wall motion abnormalities on echo.          Relevant Orders    [facility]  Referral     Other Visit Diagnoses       End stage renal disease (H)        Pre-op evaluation        Relevant Orders    EKG 12-lead, tracing only (Completed)    CBC with platelets (Completed)    Basic metabolic panel (Completed)              Recommendation  Patient referred to Cardiology for evaluation before surgery. Surgery approval pending completion of consultation.        The longitudinal plan of care for the diagnosis(es)/condition(s) as documented were addressed during this visit. Due to the added complexity in care, I will continue to support Shilpi in the subsequent management and with ongoing continuity of care.     Subjective       HPI related " to upcoming procedure:    patient is a 73-year-old male who has been dialyzed through a tunneled dialysis catheter for roughly 4 or 5 months,  and here for preop for hemodialysis access.        4/10/2024    11:15 AM   Preop Questions   1. Have you ever had a heart attack or stroke? YES -    2. Have you ever had surgery on your heart or blood vessels, such as a stent placement, a coronary artery bypass, or surgery on an artery in your head, neck, heart, or legs? No   3. Do you have chest pain with activity? No   4. Do you have a history of  heart failure? No   5. Do you currently have a cold, bronchitis or symptoms of other infection? No   6. Do you have a cough, shortness of breath, or wheezing? No   7. Do you or anyone in your family have previous history of blood clots? UNKNOWN -    8. Do you or does anyone in your family have a serious bleeding problem such as prolonged bleeding following surgeries or cuts? UNKNOWN -    9. Have you ever had problems with anemia or been told to take iron pills? YES -    10. Have you had any abnormal blood loss such as black, tarry or bloody stools, or abnormal vaginal bleeding? No   10. Have you had any abnormal blood loss such as black, tarry or bloody stools? No   11. Have you ever had a blood transfusion? YES -    11a. Have you ever had a transfusion reaction? No   12. Are you willing to have a blood transfusion if it is medically needed before, during, or after your surgery? Yes   13. Have you or any of your relatives ever had problems with anesthesia? No   14. Do you have sleep apnea, excessive snoring or daytime drowsiness? No   15. Do you have any artifical heart valves or other implanted medical devices like a pacemaker, defibrillator, or continuous glucose monitor? No   16. Do you have artificial joints? No   17. Are you allergic to latex? No       Preoperative Review of    reviewed - no record of controlled substances prescribed.          Patient Active Problem  List    Diagnosis Date Noted    Gastrointestinal hemorrhage associated with gastric ulcer 11/17/2023     Priority: Medium    Chronic pain of right knee 11/17/2023     Priority: Medium    Ischemic cardiomyopathy 11/17/2023     Priority: Medium    Acute kidney failure with tubular necrosis (H) 10/09/2023     Priority: Medium    Chronic systolic congestive heart failure (H) 10/07/2023     Priority: Medium    HTN (hypertension) 10/07/2023     Priority: Medium    Acute on chronic anemia 10/07/2023     Priority: Medium    Special screening for malignant neoplasms, colon 08/18/2021     Priority: Medium     Neg Cologuard test in 2019.       NSTEMI (non-ST elevated myocardial infarction) (H) 06/22/2020     Priority: Medium    Chest pain 08/11/2019     Priority: Medium    Coronary atherosclerosis 07/31/2000     Priority: Medium     Formatting of this note might be different from the original. MI feb '95.  .  angiogram LAD disease.  90% apical stenosis.  other lesions present. Epic      Hyperlipidemia 07/31/2000     Priority: Medium      Past Medical History:   Diagnosis Date    Acute myocardial infarction     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 (H)     Bilateral hydronephrosis     Hyperlipidemia     Hypertension     Sepsis due to gram-negative UTI (H) 10/07/2023     Past Surgical History:   Procedure Laterality Date    ESOPHAGOSCOPY, GASTROSCOPY, DUODENOSCOPY (EGD), COMBINED N/A 10/9/2023    Procedure: Esophagoscopy, gastroscopy, duodenoscopy (EGD), combined;  Surgeon: Karolyn Saxena MD;  Location:  GI    IR CVC TUNNEL PLACEMENT > 5 YRS OF AGE  9/27/2023    IR CVC TUNNEL PLACEMENT > 5 YRS OF AGE  10/11/2023    PICC TRIPLE LUMEN PLACEMENT  9/27/2023     Current Outpatient Medications   Medication Sig Dispense Refill    acetaminophen (TYLENOL) 500 MG tablet Take 2 tablets (1,000 mg) by mouth 3 times daily 100 tablet 0    aspirin 81 MG EC tablet  Take 1 tablet (81 mg) by mouth daily 30 tablet 0    atorvastatin (LIPITOR) 40 MG tablet Take 1 tablet (40 mg) by mouth every evening 90 tablet 1    hydrALAZINE (APRESOLINE) 10 MG tablet Take 1 tablet (10 mg) by mouth 3 times daily 270 tablet 0    Lidocaine (LIDOCARE) 4 % Patch Place 1 patch onto the skin every 24 hours To prevent lidocaine toxicity, patient should be patch free for 12 hrs daily. 30 patch 0    metoprolol succinate ER (TOPROL XL) 25 MG 24 hr tablet Take 1 tablet (25 mg) by mouth daily 90 tablet 0    multivitamin RENAL (TRIPHROCAPS) 1 capsule capsule Take 1 capsule by mouth daily 30 capsule 0    nitroGLYcerin (NITROSTAT) 0.4 MG sublingual tablet For chest pain place 1 tablet under the tongue every 5 minutes for 3 doses. If symptoms persist 5 minutes after 1st dose call 911. 25 tablet 0    pantoprazole (PROTONIX) 40 MG EC tablet Take 1 tablet (40 mg) by mouth 2 times daily (before meals) 60 tablet 0       Allergies   Allergen Reactions    Ciprofloxacin Rash    Other Drug Allergy (See Comments)      Cough Syrup Abstracted from [facility] Chart( Hydrobromide)        Social History     Tobacco Use    Smoking status: Former     Current packs/day: 0.00     Types: Cigarettes     Quit date: 1995     Years since quittin.9    Smokeless tobacco: Never   Substance Use Topics    Alcohol use: Yes     Comment: 1 per week       History   Drug Use Unknown         Review of Systems      Objective    /60 (BP Location: Right arm, Patient Position: Sitting, Cuff Size: Adult Regular)   Pulse 68   Temp 98  F (36.7  C) (Oral)   Resp 17   Ht 1.829 m (6')   Wt 86.2 kg (190 lb)   SpO2 95%   BMI 25.77 kg/m     Estimated body mass index is 25.77 kg/m  as calculated from the following:    Height as of this encounter: 1.829 m (6').    Weight as of this encounter: 86.2 kg (190 lb).    Physical Exam  GENERAL: alert and no distress  EYES: Eyes grossly normal to inspection, PERRL and conjunctivae and sclerae  normal  NECK: no adenopathy, no asymmetry, masses, or scars  RESP: lungs clear to auscultation - no rales, rhonchi or wheezes  CV: regular rates and rhythm, normal S1 S2, no S3 or S4, no murmur, click or rub, and no peripheral edema  ABDOMEN: soft, nontender, no hepatosplenomegaly, no masses and bowel sounds normal  MS: no gross musculoskeletal defects noted, no edema  NEURO: Normal strength and tone, sensory exam grossly normal, and mentation intact  PSYCH: mentation appears normal, affect normal/bright    Recent Labs   Lab Test 11/17/23  1523 10/13/23  0619 10/12/23  0603 10/11/23  0509 10/06/23  0519 10/05/23  1450 09/29/23  0538 09/28/23  0449   HGB 8.1* 7.8* 7.6* 8.3*   < >  --    < > 6.9*     --   --  213   < >  --    < > 82*   INR  --   --   --   --   --  1.18*  --  1.58*   NA  --  142 139 137   < >  --    < > 136   POTASSIUM  --  4.2 4.1 4.2   < >  --    < > 3.7   CR  --  5.53* 4.02* 5.44*   < >  --    < > 8.48*    < > = values in this interval not displayed.        Diagnostics  Recent Results (from the past 720 hour(s))   EKG 12-lead, tracing only    Collection Time: 04/10/24 11:42 AM   Result Value Ref Range    Systolic Blood Pressure  mmHg    Diastolic Blood Pressure  mmHg    Ventricular Rate 71 BPM    Atrial Rate 71 BPM    IN Interval 176 ms    QRS Duration 92 ms     ms    QTc 425 ms    P Axis 36 degrees    R AXIS 25 degrees    T Axis 54 degrees    Interpretation ECG       Sinus rhythm  Inferior infarct , age undetermined  T wave abnormality, consider lateral ischemia  Abnormal ECG  When compared with ECG of 28-SEP-2023 06:46,  Vent. rate has decreased BY  40 BPM  Inferior infarct is now Present  Nonspecific T wave abnormality, worse in Inferior leads  T wave inversion now evident in Anterior leads  Confirmed by KARLI ENRIQUEZ MD LOC:[facility] ([id number]) on 4/11/2024 9:02:43 AM     CBC with platelets    Collection Time: 04/10/24 11:48 AM   Result Value Ref Range    WBC Count 6.1 4.0 - 11.0 10e3/uL    RBC  Count 3.87 3.80 - 5.90 10e6/uL    Hemoglobin 12.8 11.7 - 17.7 g/dL    Hematocrit 40.7 35.0 - 53.0 %     (H) 78 - 100 fL    MCH 33.1 (H) 26.5 - 33.0 pg    MCHC 31.4 (L) 31.5 - 36.5 g/dL    RDW 13.2 10.0 - 15.0 %    Platelet Count 142 (L) 150 - 450 10e3/uL   Basic metabolic panel    Collection Time: 04/10/24 11:48 AM   Result Value Ref Range    Sodium 139 135 - 145 mmol/L    Potassium 5.3 3.4 - 5.3 mmol/L    Chloride 100 98 - 107 mmol/L    Carbon Dioxide (CO2) 25 22 - 29 mmol/L    Anion Gap 14 7 - 15 mmol/L    Urea Nitrogen 42.1 (H) 8.0 - 23.0 mg/dL    Creatinine 4.89 (H) 0.51 - 1.17 mg/dL    GFR Estimate 12 (L) >60 mL/min/1.73m2    Calcium 9.4 8.8 - 10.2 mg/dL    Glucose 93 70 - 99 mg/dL   Echocardiogram Complete    Collection Time: 04/17/24  3:07 PM   Result Value Ref Range    Biplane LVEF 40%           Revised Cardiac Risk Index (RCRI)  The patient has the following serious cardiovascular risks for perioperative complications:   - Coronary Artery Disease (MI, positive stress test, angina, Qs on EKG) = 1 point   - Serum Creatinine >2.0 mg/dl = 1 point     RCRI Interpretation: 2 points: Class III (moderate risk - 6.6% complication rate)                Signed Electronically by: Alisha Gloria MD  Copy of this evaluation report is provided to requesting physician.

## 2024-04-11 ENCOUNTER — PATIENT OUTREACH (OUTPATIENT)
Dept: CARE COORDINATION | Facility: CLINIC | Age: 74
End: 2024-04-11
Payer: COMMERCIAL

## 2024-04-11 LAB
ATRIAL RATE - MUSE: 71 BPM
DIASTOLIC BLOOD PRESSURE - MUSE: NORMAL MMHG
INTERPRETATION ECG - MUSE: NORMAL
P AXIS - MUSE: 36 DEGREES
PR INTERVAL - MUSE: 176 MS
QRS DURATION - MUSE: 92 MS
QT - MUSE: 392 MS
QTC - MUSE: 425 MS
R AXIS - MUSE: 25 DEGREES
SYSTOLIC BLOOD PRESSURE - MUSE: NORMAL MMHG
T AXIS - MUSE: 54 DEGREES
VENTRICULAR RATE- MUSE: 71 BPM

## 2024-04-11 PROCEDURE — 93010 ELECTROCARDIOGRAM REPORT: CPT | Performed by: INTERNAL MEDICINE

## 2024-04-11 NOTE — PROGRESS NOTES
Clinical Product Navigator RN reviewed chart; patient on payer product coverage.  Review results:   CPN Initial Information Gathering  Referral Source: Health Plan    Patient identified by his health plan for potential care management.  Note patient is currently being followed by SOT care coordination and care team and has future SOT appointments.  No additional needs identified at this time.    Melissa Behl BSN, RN, PHN, CCM  RN Clinical Product Navigator  [phone]

## 2024-04-17 ENCOUNTER — OFFICE VISIT (OUTPATIENT)
Dept: CARDIOLOGY | Facility: CLINIC | Age: 74
End: 2024-04-17
Attending: FAMILY MEDICINE
Payer: COMMERCIAL

## 2024-04-17 ENCOUNTER — ANCILLARY PROCEDURE (OUTPATIENT)
Dept: CARDIOLOGY | Facility: CLINIC | Age: 74
End: 2024-04-17
Attending: STUDENT IN AN ORGANIZED HEALTH CARE EDUCATION/TRAINING PROGRAM
Payer: COMMERCIAL

## 2024-04-17 ENCOUNTER — TRANSFERRED RECORDS (OUTPATIENT)
Dept: HEALTH INFORMATION MANAGEMENT | Facility: CLINIC | Age: 74
End: 2024-04-17

## 2024-04-17 VITALS
BODY MASS INDEX: 25.73 KG/M2 | HEART RATE: 77 BPM | HEIGHT: 72 IN | DIASTOLIC BLOOD PRESSURE: 60 MMHG | RESPIRATION RATE: 16 BRPM | WEIGHT: 190 LBS | SYSTOLIC BLOOD PRESSURE: 112 MMHG

## 2024-04-17 DIAGNOSIS — I10 PRIMARY HYPERTENSION: ICD-10-CM

## 2024-04-17 DIAGNOSIS — Z99.2 ESRD (END STAGE RENAL DISEASE) ON DIALYSIS (H): ICD-10-CM

## 2024-04-17 DIAGNOSIS — N18.6 ESRD (END STAGE RENAL DISEASE) ON DIALYSIS (H): ICD-10-CM

## 2024-04-17 DIAGNOSIS — I25.10 CORONARY ARTERY DISEASE INVOLVING NATIVE CORONARY ARTERY OF NATIVE HEART WITHOUT ANGINA PECTORIS: ICD-10-CM

## 2024-04-17 DIAGNOSIS — I51.9 LV DYSFUNCTION: ICD-10-CM

## 2024-04-17 DIAGNOSIS — Z01.810 PRE-OPERATIVE CARDIOVASCULAR EXAMINATION: Primary | ICD-10-CM

## 2024-04-17 LAB — BI-PLANE LVEF ECHO: NORMAL

## 2024-04-17 PROCEDURE — G2211 COMPLEX E/M VISIT ADD ON: HCPCS | Performed by: STUDENT IN AN ORGANIZED HEALTH CARE EDUCATION/TRAINING PROGRAM

## 2024-04-17 PROCEDURE — 93306 TTE W/DOPPLER COMPLETE: CPT | Performed by: INTERNAL MEDICINE

## 2024-04-17 PROCEDURE — 99204 OFFICE O/P NEW MOD 45 MIN: CPT | Performed by: STUDENT IN AN ORGANIZED HEALTH CARE EDUCATION/TRAINING PROGRAM

## 2024-04-17 NOTE — PATIENT INSTRUCTIONS
It was a pleasure meeting you today    In summary:    We will get an urgent echocardiogram to re-evaluate the heart function.     Please call my nurse Joseph Earl at [phone] if you have any questions or issues.    We will schedule a follow up visit in  6 months      Pato Akers DO [facility]  Non-invasive Cardiologist  [facility]

## 2024-04-17 NOTE — PROGRESS NOTES
"  [facility] HEART CARE   [address]  [place] [address]   [email or web address]   OFFICE: [phone]     CARDIOLOGY RAPID ACCESS CLINIC NOTE     Thank you, Alisha Carlos, for asking the [facility] Heart Care team to see   Bret Fleming to evaluate preoperative risk stratification.        History of Present Illness     Bret Fleming is a 73 year old adult with a significant past history of CAD s/p \"inferolateral MI\" 1996, LV dysfunction, ESRD on HD, HTN, HLD, who presents for preoperative risk stratification prior to an AV fistula formation.  The patient was recently admitted to the hospital in September and found to have EF 30-35% with regional wall motion abnormalities on echo.  The patient notes in 1996 he had an MI and was given an \"experimental clot buster\".  He has not followed with a cardiologist longitudinally  He has been asymptomatic and functional.  He lives in a 2 story house and can make it from the basement to the second floor without any dyspnea or chest pain.  He can walk about 1 mile on flat ground.  He walks with a cane due to knee pain.        Other than noted above,   Bernadette denies any chest pain/pressure/tightness, shortness of breath at rest or with exertion, light headedness/dizziness, pre-syncope, syncope, lower extremity swelling, palpitations, paroxysmal nocturnal dyspnea (PND), or orthopnea.       Medications  Allergies   Current Outpatient Medications   Medication Sig Dispense Refill    acetaminophen (TYLENOL) 500 MG tablet Take 2 tablets (1,000 mg) by mouth 3 times daily 100 tablet 0    aspirin 81 MG EC tablet Take 1 tablet (81 mg) by mouth daily 30 tablet 0    atorvastatin (LIPITOR) 40 MG tablet Take 1 tablet (40 mg) by mouth every evening 90 tablet 1    hydrALAZINE (APRESOLINE) 10 MG tablet Take 1 tablet (10 mg) by mouth 3 times daily 270 tablet 0    Lidocaine (LIDOCARE) 4 % Patch Place 1 patch onto the skin every 24 hours To " prevent lidocaine toxicity, patient should be patch free for 12 hrs daily. 30 patch 0    metoprolol succinate ER (TOPROL XL) 25 MG 24 hr tablet Take 1 tablet (25 mg) by mouth daily 90 tablet 0    multivitamin RENAL (TRIPHROCAPS) 1 capsule capsule Take 1 capsule by mouth daily 30 capsule 0    nitroGLYcerin (NITROSTAT) 0.4 MG sublingual tablet For chest pain place 1 tablet under the tongue every 5 minutes for 3 doses. If symptoms persist 5 minutes after 1st dose call 911. 25 tablet 0    pantoprazole (PROTONIX) 40 MG EC tablet Take 1 tablet (40 mg) by mouth 2 times daily (before meals) 60 tablet 0      Allergies   Allergen Reactions    Ciprofloxacin Rash    Other Drug Allergy (See Comments)      Cough Syrup Abstracted from [facility] Chart( Hydrobromide)        Physical Examination Review of Systems   Vitals: /60 (BP Location: Right arm, Patient Position: Sitting, Cuff Size: Adult Regular)   Pulse 77   Resp 16   Ht 1.829 m (6')   Wt 86.2 kg (190 lb)   BMI 25.77 kg/m    BMI= Body mass index is 25.77 kg/m .  Wt Readings from Last 3 Encounters:   04/17/24 86.2 kg (190 lb)   04/10/24 86.2 kg (190 lb)   02/05/24 86.2 kg (190 lb)       General: pleasant adult. No acute distress.   Neck: No JVD  Lungs: clear to auscultation  COR:  regular rate and rhythm, No murmurs, rubs, or gallops  Extrem: No edema        Please refer above for cardiac ROS details.       Past History     Family History:   Family History   Problem Relation Age of Onset    Cancer Father     Cancer Paternal Grandfather     Diabetes Paternal Grandfather     Hypertension No family hx of     Cerebrovascular Disease No family hx of     Thyroid Disease No family hx of     Glaucoma No family hx of     Macular Degeneration No family hx of         Social History:   Social History     Socioeconomic History    Marital status:      Spouse name: Not on file    Number of children: Not on file    Years of education: Not on file    Highest education level:  Not on file   Occupational History    Not on file   Tobacco Use    Smoking status: Former     Current packs/day: 0.00     Types: Cigarettes     Quit date: 1995     Years since quittin.9    Smokeless tobacco: Never   Substance and Sexual Activity    Alcohol use: Yes     Comment: 1 per week    Drug use: Never    Sexual activity: Not on file   Other Topics Concern    Not on file   Social History Narrative    Not on file     Social Determinants of Health     Financial Resource Strain: Low Risk  (2023)    Financial Resource Strain     Within the past 12 months, have you or your family members you live with been unable to get utilities (heat, electricity) when it was really needed?: No   Food Insecurity: Low Risk  (2023)    Food Insecurity     Within the past 12 months, did you worry that your food would run out before you got money to buy more?: No     Within the past 12 months, did the food you bought just not last and you didn t have money to get more?: No   Transportation Needs: Low Risk  (2023)    Transportation Needs     Within the past 12 months, has lack of transportation kept you from medical appointments, getting your medicines, non-medical meetings or appointments, work, or from getting things that you need?: No   Physical Activity: Not on file   Stress: Not on file   Social Connections: Not on file   Interpersonal Safety: Low Risk  (4/10/2024)    Interpersonal Safety     Do you feel physically and emotionally safe where you currently live?: Yes     Within the past 12 months, have you been hit, slapped, kicked or otherwise physically hurt by someone?: No     Within the past 12 months, have you been humiliated or emotionally abused in other ways by your partner or ex-partner?: No   Housing Stability: Low Risk  (2023)    Housing Stability     Do you have housing? : Yes     Are you worried about losing your housing?: No            Lab Results    Chemistry/lipid CBC Cardiac  "Enzymes/BNP/TSH/INR   Lab Results   Component Value Date    CHOL 105 11/17/2023    HDL 39 (L) 11/17/2023    TRIG 74 11/17/2023    BUN 42.1 (H) 04/10/2024     04/10/2024    CO2 25 04/10/2024    Lab Results   Component Value Date    WBC 6.1 04/10/2024    HGB 12.8 04/10/2024    HCT 40.7 04/10/2024     (H) 04/10/2024     (L) 04/10/2024    Lab Results   Component Value Date    TROPONINI <0.01 08/11/2019    TSH 2.70 09/27/2023    INR 1.18 (H) 10/05/2023          Cardiac Problems and Cardiac Diagnostics     Most Recent Cardiac testing:  ECG Personal interpretation  4/10/2024  NSR  Inferior Q waves lateral TWI    ECHO 9/28/2023  Interpretation Summary     The left ventricle is borderline dilated.  The visual ejection fraction is 30-35%.  Large area of hypokinesis and akinesis distal 2/3 septum/apex/infero-apex/lat  walls. Some wall thinning. Suspect large LAD infarct pattern but cannot  exclude stress cardiomyopathy  There is moderate (2+) mitral regurgitation.  Right ventricular systolic pressure is elevated, consistent with mild to  moderate pulmonary hypertension.  The rhythm was sinus tachycardia.         Assessment/Recommendations   Assessment:    Bret Fleming is a 73 year old adult with a significant past history of CAD s/p \"inferolateral MI\" 1996, LV dysfunction, ESRD on HD, HTN, HLD, who presents for preoperative risk stratification prior to an AV fistula formation.      Preoperative risk stratification   - Reviewed recent echocardiogram which showed depressed LVEF and regional wall motion abnormalities.  Notably, wall motion pattern could be consistent with Takotsubo CM or LAD distribution infarct.  His historical records indicate he had an inferolateral MI in the past and was found to have only 40% LAD stenosis on angiography in 1996, so wall motion pattern is not consistent with this.  In addition, pt had an elevated troponin to 1400 in September but this could be elevated either due to " Takotsubo or NSTEMI.   - Will repeat TTE.  If wall motion pattern persists then will plan on invasive angiography for further evaluation.     - Continue beta blocker perioperatively   - Will be able to complete preoperative risk assessment after testing is completed.    CAD   - Cont ASA 81mg   - LDL 51.  Cont atorvastatin    LV dysfunction   - RE-evaluate EF as above   - Continue metoprolol succinate   - Currently on hydralazine.  Given ESRD status could consider switching to ARB at next visit    HTN   - Well controlled   - Continue current management    RTC 6 months    Addendum:  TTE  Interpretation Summary     Left ventricular size is normal.  Biplane LVEF is 40%.  Right ventricular function, chamber size, wall motion, and thickness are  normal.  Pulmonary artery systolic pressure is normal.  The inferior vena cava is normal.  No pericardial effusion is present.  Previously noted wallmotion abnormality not seen in this study.    Previously seen WMA have improved, indicating likely Takotsubo cardiomyopathy.  Mildly reduced EF is in the same range as his EF after MI.  Given this, no new indication for invasive angiography.  Would proceed to surgery without any additional testing.         Pato Akers DO [facility]  Non-invasive Cardiologist  [facility]

## 2024-04-17 NOTE — LETTER
"4/17/2024    Alisha Gloria MD  [address]  [place] [address]    RE: Bret Fleming       Dear Colleague,     I had the pleasure of seeing Bret Fleming in the [facility] Heart [facility].    [facility] HEART CARE   [address]  [place] [address]   [email or web address]   OFFICE: [phone]     CARDIOLOGY RAPID ACCESS CLINIC NOTE     Thank you, Alisha Carlos, for asking the [facility] Heart Care team to see   Bret Fleming to evaluate preoperative risk stratification.        History of Present Illness     Bret Fleming is a 73 year old adult with a significant past history of CAD s/p \"inferolateral MI\" 1996, LV dysfunction, ESRD on HD, HTN, HLD, who presents for preoperative risk stratification prior to an AV fistula formation.  The patient was recently admitted to the hospital in September and found to have EF 30-35% with regional wall motion abnormalities on echo.  The patient notes in 1996 he had an MI and was given an \"experimental clot buster\".  He has not followed with a cardiologist longitudinally  He has been asymptomatic and functional.  He lives in a 2 story house and can make it from the basement to the second floor without any dyspnea or chest pain.  He can walk about 1 mile on flat ground.  He walks with a cane due to knee pain.        Other than noted above,   Bernadette denies any chest pain/pressure/tightness, shortness of breath at rest or with exertion, light headedness/dizziness, pre-syncope, syncope, lower extremity swelling, palpitations, paroxysmal nocturnal dyspnea (PND), or orthopnea.       Medications  Allergies   Current Outpatient Medications   Medication Sig Dispense Refill    acetaminophen (TYLENOL) 500 MG tablet Take 2 tablets (1,000 mg) by mouth 3 times daily 100 tablet 0    aspirin 81 MG EC tablet Take 1 tablet (81 mg) by mouth daily 30 tablet 0    atorvastatin (LIPITOR) 40 MG tablet Take 1 tablet (40 " mg) by mouth every evening 90 tablet 1    hydrALAZINE (APRESOLINE) 10 MG tablet Take 1 tablet (10 mg) by mouth 3 times daily 270 tablet 0    Lidocaine (LIDOCARE) 4 % Patch Place 1 patch onto the skin every 24 hours To prevent lidocaine toxicity, patient should be patch free for 12 hrs daily. 30 patch 0    metoprolol succinate ER (TOPROL XL) 25 MG 24 hr tablet Take 1 tablet (25 mg) by mouth daily 90 tablet 0    multivitamin RENAL (TRIPHROCAPS) 1 capsule capsule Take 1 capsule by mouth daily 30 capsule 0    nitroGLYcerin (NITROSTAT) 0.4 MG sublingual tablet For chest pain place 1 tablet under the tongue every 5 minutes for 3 doses. If symptoms persist 5 minutes after 1st dose call 911. 25 tablet 0    pantoprazole (PROTONIX) 40 MG EC tablet Take 1 tablet (40 mg) by mouth 2 times daily (before meals) 60 tablet 0      Allergies   Allergen Reactions    Ciprofloxacin Rash    Other Drug Allergy (See Comments)      Cough Syrup Abstracted from [facility] Chart( Hydrobromide)        Physical Examination Review of Systems   Vitals: /60 (BP Location: Right arm, Patient Position: Sitting, Cuff Size: Adult Regular)   Pulse 77   Resp 16   Ht 1.829 m (6')   Wt 86.2 kg (190 lb)   BMI 25.77 kg/m    BMI= Body mass index is 25.77 kg/m .  Wt Readings from Last 3 Encounters:   04/17/24 86.2 kg (190 lb)   04/10/24 86.2 kg (190 lb)   02/05/24 86.2 kg (190 lb)       General: pleasant adult. No acute distress.   Neck: No JVD  Lungs: clear to auscultation  COR:  regular rate and rhythm, No murmurs, rubs, or gallops  Extrem: No edema        Please refer above for cardiac ROS details.       Past History     Family History:   Family History   Problem Relation Age of Onset    Cancer Father     Cancer Paternal Grandfather     Diabetes Paternal Grandfather     Hypertension No family hx of     Cerebrovascular Disease No family hx of     Thyroid Disease No family hx of     Glaucoma No family hx of     Macular Degeneration No family hx of          Social History:   Social History     Socioeconomic History    Marital status:      Spouse name: Not on file    Number of children: Not on file    Years of education: Not on file    Highest education level: Not on file   Occupational History    Not on file   Tobacco Use    Smoking status: Former     Current packs/day: 0.00     Types: Cigarettes     Quit date: 1995     Years since quittin.9    Smokeless tobacco: Never   Substance and Sexual Activity    Alcohol use: Yes     Comment: 1 per week    Drug use: Never    Sexual activity: Not on file   Other Topics Concern    Not on file   Social History Narrative    Not on file     Social Determinants of Health     Financial Resource Strain: Low Risk  (2023)    Financial Resource Strain     Within the past 12 months, have you or your family members you live with been unable to get utilities (heat, electricity) when it was really needed?: No   Food Insecurity: Low Risk  (2023)    Food Insecurity     Within the past 12 months, did you worry that your food would run out before you got money to buy more?: No     Within the past 12 months, did the food you bought just not last and you didn t have money to get more?: No   Transportation Needs: Low Risk  (2023)    Transportation Needs     Within the past 12 months, has lack of transportation kept you from medical appointments, getting your medicines, non-medical meetings or appointments, work, or from getting things that you need?: No   Physical Activity: Not on file   Stress: Not on file   Social Connections: Not on file   Interpersonal Safety: Low Risk  (4/10/2024)    Interpersonal Safety     Do you feel physically and emotionally safe where you currently live?: Yes     Within the past 12 months, have you been hit, slapped, kicked or otherwise physically hurt by someone?: No     Within the past 12 months, have you been humiliated or emotionally abused in other ways by your partner or  "ex-partner?: No   Housing Stability: Low Risk  (11/17/2023)    Housing Stability     Do you have housing? : Yes     Are you worried about losing your housing?: No            Lab Results    Chemistry/lipid CBC Cardiac Enzymes/BNP/TSH/INR   Lab Results   Component Value Date    CHOL 105 11/17/2023    HDL 39 (L) 11/17/2023    TRIG 74 11/17/2023    BUN 42.1 (H) 04/10/2024     04/10/2024    CO2 25 04/10/2024    Lab Results   Component Value Date    WBC 6.1 04/10/2024    HGB 12.8 04/10/2024    HCT 40.7 04/10/2024     (H) 04/10/2024     (L) 04/10/2024    Lab Results   Component Value Date    TROPONINI <0.01 08/11/2019    TSH 2.70 09/27/2023    INR 1.18 (H) 10/05/2023          Cardiac Problems and Cardiac Diagnostics     Most Recent Cardiac testing:  ECG Personal interpretation  4/10/2024  NSR  Inferior Q waves lateral TWI    ECHO 9/28/2023  Interpretation Summary     The left ventricle is borderline dilated.  The visual ejection fraction is 30-35%.  Large area of hypokinesis and akinesis distal 2/3 septum/apex/infero-apex/lat  walls. Some wall thinning. Suspect large LAD infarct pattern but cannot  exclude stress cardiomyopathy  There is moderate (2+) mitral regurgitation.  Right ventricular systolic pressure is elevated, consistent with mild to  moderate pulmonary hypertension.  The rhythm was sinus tachycardia.         Assessment/Recommendations   Assessment:    Bret Fleming is a 73 year old adult with a significant past history of CAD s/p \"inferolateral MI\" 1996, LV dysfunction, ESRD on HD, HTN, HLD, who presents for preoperative risk stratification prior to an AV fistula formation.      Preoperative risk stratification   - Reviewed recent echocardiogram which showed depressed LVEF and regional wall motion abnormalities.  Notably, wall motion pattern could be consistent with Takotsubo CM or LAD distribution infarct.  His historical records indicate he had an inferolateral MI in the past and was " found to have only 40% LAD stenosis on angiography in 1996, so wall motion pattern is not consistent with this.  In addition, pt had an elevated troponin to 1400 in September but this could be elevated either due to Takotsubo or NSTEMI.   - Will repeat TTE.  If wall motion pattern persists then will plan on invasive angiography for further evaluation.     - Continue beta blocker perioperatively   - Will be able to complete preoperative risk assessment after testing is completed.    CAD   - Cont ASA 81mg   - LDL 51.  Cont atorvastatin    LV dysfunction   - RE-evaluate EF as above   - Continue metoprolol succinate   - Currently on hydralazine.  Given ESRD status could consider switching to ARB at next visit    HTN   - Well controlled   - Continue current management    RTC 6 months         Pato Akers DO [facility]  Non-invasive Cardiologist  [facility]           Thank you for allowing me to participate in the care of your patient.      Sincerely,     Pato Akers DO     [facility] Heart Care  cc:   Alisha Gloria MD  [address]  [place] [address]

## 2024-04-22 NOTE — ASSESSMENT & PLAN NOTE
" history of CAD s/p \"inferolateral MI\" 1996, LV dysfunction   Recent hospital in September '23 with EF 30-35% and regional wall motion abnormalities on echo.   "

## 2024-04-24 ENCOUNTER — TELEPHONE (OUTPATIENT)
Dept: TRANSPLANT | Facility: CLINIC | Age: 74
End: 2024-04-24
Payer: COMMERCIAL

## 2024-04-24 NOTE — TELEPHONE ENCOUNTER
Spoke with patient. Confirmed upcoming PKE appointments at [facility] on 5/1/24 starting at 0730. Instructed patient may eat breakfast and take regularly scheduled medications.

## 2024-04-30 LAB
ABO/RH(D): NORMAL
ANTIBODY SCREEN: NEGATIVE
SPECIMEN EXPIRATION DATE: NORMAL

## 2024-04-30 NOTE — PROGRESS NOTES
[facility] SOLID ORGAN TRANSPLANT  OUTPATIENT MNT: KIDNEY TRANSPLANT EVALUATION    Current BMI: 25.9 (HT 72 in,  lbs/87 kg)  BMI guideline for kidney transplant up to a BMI of 40 / per surgeon discretion     Frailty Assessment-- Frail (3/5 points)- unintended wt loss, low , low activity  Handgrip Strength: 28  *Pt does not appear frail and I do not personally have functional-related concerns  *He is active throughout the day but difficulty quantifying activity level in frailty flowsheet    Reference:  Score of 0-2 = Not Frail  Score of 3-5 = Frail      TIME SPENT: 15 minutes  VISIT TYPE: Initial   REFERRING PHYSICIAN: Judith   PT ACCOMPANIED BY: his wife, Riena     History of previous txp: none   Dialysis: HD 9/2023 1040 am     NUTRITION ASSESSMENT  Pt follows a low salt/potassium/phosphorus diet and reports it is going OK. Renal diet change ~9/2023.    - Meal prep & grocery shopping: pt does   - Previous RD education: not asked   - Appetite: good/baseline   - Food allergies/intolerances: no  - Issues chewing or swallowing: no  - N/V/D/C: no  - Food access concerns: no     Vitamins, Supplements, Pertinent Meds: renal MVI  Herbal Medicines/Supplements: none   Protein Supplements: stopped protein bars at dialysis d/t improved blood work     Weight hx // fluid retention:   - mild SHERI  - lost 60 lbs (gradual wt loss started 2019/2020)- unintentional - unsure why despite good appetite; wt loss occurred prior to dialysis start  - pt reports stable weight for at least 6 months - way before starting dialysis; stable vs wt loss slowing  Wt Readings from Last 10 Encounters:   05/01/24 86.6 kg (191 lb)   04/17/24 86.2 kg (190 lb)   04/10/24 86.2 kg (190 lb)   02/05/24 86.2 kg (190 lb)   11/17/23 86.3 kg (190 lb 3.2 oz)   10/11/23 82.3 kg (181 lb 8 oz)   09/27/23 90.7 kg (200 lb)   05/27/22 104 kg (229 lb 3.2 oz)   02/05/22 106.5 kg (234 lb 12.8 oz)   08/18/21 117.8 kg (259 lb 9.6 oz)     PHYSICAL ACTIVITY    Lives in 3-level home, up/down stairs, does laundry in basement, reports good energy with ADLs  Could walk 1 block w/o issue    DIET RECALL  Breakfast Bowl of cereal (almond milk), fruit (blueberries)   Lunch 2 eggs, tofu, english muffin with hummus    Dinner Rice & small portion of meat (pork/chicken- 4 oz), vegetables (broccoli, cabbage)   Snacks Grapes, deng crackers, saltines w/ cheese, sometimes a ginger cookie     Beverages Cranberry juice (< 8oz/day), hugo or green tea (main beverage of the day), ginger ale (1/day), some plain water     Alcohol 1 drink/week    Dining out 2x/month      LABS  4/10/24 K 5.3  1/9/24 Phos 4.2     NUTRITION DIAGNOSIS   No nutrition diagnosis identified at this time     NUTRITION INTERVENTION   Nutrition education provided:  Discussed sodium intake (low sodium foods and drinks, seasoning food without salt and tips for low sodium diet).  Reviewed wnl K/Phos levels. Reviewed adequate protein needs being on dialysis.     Reviewed post txp diet guidelines in brief (will review in further detail post txp):  (1) Review of proper food safety measures d/t immunosuppressant therapy post-op and increased risk for food-borne illness    (2) Avoid the following post txp d/t risk for rejection, unknown effects on the organs, and/or potential interactions with immunosuppressants:  - Herbal, Chinese, holistic, chiropractic, natural, alternative medicines and supplements  - Detoxes and cleanses  - Weight loss pills  - Protein powders or other products with extracts or herbs (ie green tea extract)    (3) Med regimen and possible side effects    Patient Understanding: Pt verbalized understanding of education provided.  Expected Engagement: Good  Follow-Up Plans: PRN     NUTRITION GOALS   No nutrition goals identified at this time     Sharon Lujan, RD, LD, CCTD

## 2024-05-01 ENCOUNTER — ANCILLARY PROCEDURE (OUTPATIENT)
Dept: GENERAL RADIOLOGY | Facility: CLINIC | Age: 74
End: 2024-05-01
Attending: NURSE PRACTITIONER
Payer: COMMERCIAL

## 2024-05-01 ENCOUNTER — LAB (OUTPATIENT)
Dept: LAB | Facility: CLINIC | Age: 74
End: 2024-05-01
Attending: NURSE PRACTITIONER
Payer: COMMERCIAL

## 2024-05-01 ENCOUNTER — OFFICE VISIT (OUTPATIENT)
Dept: TRANSPLANT | Facility: CLINIC | Age: 74
End: 2024-05-01
Attending: NURSE PRACTITIONER
Payer: COMMERCIAL

## 2024-05-01 ENCOUNTER — DOCUMENTATION ONLY (OUTPATIENT)
Dept: TRANSPLANT | Facility: CLINIC | Age: 74
End: 2024-05-01

## 2024-05-01 ENCOUNTER — ANCILLARY PROCEDURE (OUTPATIENT)
Dept: CARDIOLOGY | Facility: CLINIC | Age: 74
End: 2024-05-01
Attending: NURSE PRACTITIONER
Payer: COMMERCIAL

## 2024-05-01 VITALS
OXYGEN SATURATION: 98 % | DIASTOLIC BLOOD PRESSURE: 60 MMHG | WEIGHT: 191 LBS | BODY MASS INDEX: 25.87 KG/M2 | HEART RATE: 70 BPM | HEIGHT: 72 IN | SYSTOLIC BLOOD PRESSURE: 100 MMHG

## 2024-05-01 DIAGNOSIS — Z01.818 PRE-TRANSPLANT EVALUATION FOR KIDNEY TRANSPLANT: ICD-10-CM

## 2024-05-01 DIAGNOSIS — Z11.59 NEED FOR HEPATITIS C SCREENING TEST: ICD-10-CM

## 2024-05-01 DIAGNOSIS — N13.9 OBSTRUCTIVE UROPATHY: ICD-10-CM

## 2024-05-01 DIAGNOSIS — N18.9 CHRONIC RENAL FAILURE: ICD-10-CM

## 2024-05-01 DIAGNOSIS — N18.6 ESRD (END STAGE RENAL DISEASE) (H): Primary | ICD-10-CM

## 2024-05-01 DIAGNOSIS — E78.5 HYPERLIPIDEMIA: ICD-10-CM

## 2024-05-01 DIAGNOSIS — Z87.891 HISTORY OF TOBACCO USE: ICD-10-CM

## 2024-05-01 DIAGNOSIS — I25.10 CARDIOVASCULAR DISEASE: ICD-10-CM

## 2024-05-01 DIAGNOSIS — Z01.818 PRE-TRANSPLANT EVALUATION FOR KIDNEY TRANSPLANT: Primary | ICD-10-CM

## 2024-05-01 DIAGNOSIS — N18.6 END STAGE RENAL DISEASE (H): ICD-10-CM

## 2024-05-01 DIAGNOSIS — N18.6 ESRD (END STAGE RENAL DISEASE) (H): ICD-10-CM

## 2024-05-01 DIAGNOSIS — I13.2 BENIGN HYPERTENSIVE HEART AND KIDNEY DISEASE WITH HEART FAILURE AND CHRONIC KIDNEY DISEASE STAGE V OR END STAGE RENAL DISEASE(404.13) (H): ICD-10-CM

## 2024-05-01 DIAGNOSIS — Z76.82 ORGAN TRANSPLANT CANDIDATE: ICD-10-CM

## 2024-05-01 DIAGNOSIS — I10 PRIMARY HYPERTENSION: ICD-10-CM

## 2024-05-01 DIAGNOSIS — I10 ESSENTIAL HYPERTENSION: ICD-10-CM

## 2024-05-01 DIAGNOSIS — Z01.818 ENCOUNTER FOR PRE-TRANSPLANT EVALUATION FOR LUNG TRANSPLANT: ICD-10-CM

## 2024-05-01 DIAGNOSIS — N18.5 BENIGN HYPERTENSIVE HEART AND KIDNEY DISEASE WITH HEART FAILURE AND CHRONIC KIDNEY DISEASE STAGE V OR END STAGE RENAL DISEASE(404.13) (H): ICD-10-CM

## 2024-05-01 DIAGNOSIS — K25.4 GASTROINTESTINAL HEMORRHAGE ASSOCIATED WITH GASTRIC ULCER: ICD-10-CM

## 2024-05-01 DIAGNOSIS — G47.33 OBSTRUCTIVE SLEEP APNEA: ICD-10-CM

## 2024-05-01 DIAGNOSIS — Z76.82 ORGAN TRANSPLANT CANDIDATE: Primary | ICD-10-CM

## 2024-05-01 DIAGNOSIS — I13.2 HYPERTENSIVE HEART FAILURE WITH END STAGE RENAL DISEASE (H): Primary | ICD-10-CM

## 2024-05-01 DIAGNOSIS — N18.6 HYPERTENSIVE HEART FAILURE WITH END STAGE RENAL DISEASE (H): Primary | ICD-10-CM

## 2024-05-01 PROBLEM — R07.9 CHEST PAIN: Status: RESOLVED | Noted: 2019-08-11 | Resolved: 2024-05-01

## 2024-05-01 PROBLEM — Z12.11 SPECIAL SCREENING FOR MALIGNANT NEOPLASMS, COLON: Status: RESOLVED | Noted: 2021-08-18 | Resolved: 2024-05-01

## 2024-05-01 LAB
A1 AB TITR SERPL: 8 {TITER}
A1 AB TITR SERPL: 8 {TITER}
A2 AB TITR SERPL: 2 {TITER}
A2 AB TITR SERPL: 2 {TITER}
ABO/RH(D): NORMAL
ALBUMIN SERPL BCG-MCNC: 4.2 G/DL (ref 3.5–5.2)
ALBUMIN UR-MCNC: 10 MG/DL
ALP SERPL-CCNC: 95 U/L (ref 40–150)
ALT SERPL W P-5'-P-CCNC: 7 U/L (ref 0–70)
ANION GAP SERPL CALCULATED.3IONS-SCNC: 11 MMOL/L (ref 7–15)
ANTIBODY TITER IGM SCREEN: NEGATIVE
APPEARANCE UR: CLEAR
APTT PPP: 38 SECONDS (ref 22–38)
AST SERPL W P-5'-P-CCNC: 24 U/L (ref 0–45)
BASOPHILS # BLD AUTO: 0 10E3/UL (ref 0–0.2)
BASOPHILS NFR BLD AUTO: 1 %
BI-PLANE LVEF ECHO: NORMAL
BILIRUB SERPL-MCNC: 0.4 MG/DL
BILIRUB UR QL STRIP: NEGATIVE
BUN SERPL-MCNC: 38.7 MG/DL (ref 8–23)
CALCIUM SERPL-MCNC: 9.2 MG/DL (ref 8.8–10.2)
CHLORIDE SERPL-SCNC: 96 MMOL/L (ref 98–107)
COLOR UR AUTO: ABNORMAL
CREAT SERPL-MCNC: 4.33 MG/DL (ref 0.51–1.17)
DEPRECATED HCO3 PLAS-SCNC: 27 MMOL/L (ref 22–29)
EGFRCR SERPLBLD CKD-EPI 2021: 14 ML/MIN/1.73M2
EOSINOPHIL # BLD AUTO: 0.2 10E3/UL (ref 0–0.7)
EOSINOPHIL NFR BLD AUTO: 4 %
ERYTHROCYTE [DISTWIDTH] IN BLOOD BY AUTOMATED COUNT: 13.6 % (ref 10–15)
FACTOR 2 INTERPRETATION: NORMAL
FACTOR V INTERPRETATION: NORMAL
GLUCOSE SERPL-MCNC: 102 MG/DL (ref 70–99)
GLUCOSE UR STRIP-MCNC: NEGATIVE MG/DL
HBV CORE AB SERPL QL IA: NONREACTIVE
HBV SURFACE AB SERPL IA-ACNC: <3.5 M[IU]/ML
HBV SURFACE AB SERPL IA-ACNC: NONREACTIVE M[IU]/ML
HBV SURFACE AG SERPL QL IA: NONREACTIVE
HCT VFR BLD AUTO: 33.8 % (ref 35–53)
HCV AB SERPL QL IA: NONREACTIVE
HGB BLD-MCNC: 11.1 G/DL (ref 13.3–17.7)
HGB UR QL STRIP: ABNORMAL
IMM GRANULOCYTES # BLD: 0 10E3/UL
IMM GRANULOCYTES NFR BLD: 0 %
INR PPP: 1.27 (ref 0.85–1.15)
KETONES UR STRIP-MCNC: NEGATIVE MG/DL
LAB DIRECTOR COMMENTS: NORMAL
LAB DIRECTOR DISCLAIMER: NORMAL
LAB DIRECTOR INTERPRETATION: NORMAL
LAB DIRECTOR METHODOLOGY: NORMAL
LAB DIRECTOR RESULTS: NORMAL
LEUKOCYTE ESTERASE UR QL STRIP: ABNORMAL
LOCATION OF TASK: NORMAL
LYMPHOCYTES # BLD AUTO: 1.1 10E3/UL (ref 0.8–5.3)
LYMPHOCYTES NFR BLD AUTO: 21 %
MCH RBC QN AUTO: 33.3 PG (ref 26.5–33)
MCHC RBC AUTO-ENTMCNC: 32.8 G/DL (ref 31.5–36.5)
MCV RBC AUTO: 102 FL (ref 78–100)
MONOCYTES # BLD AUTO: 0.6 10E3/UL (ref 0–1.3)
MONOCYTES NFR BLD AUTO: 12 %
NEUTROPHILS # BLD AUTO: 3.3 10E3/UL (ref 1.6–8.3)
NEUTROPHILS NFR BLD AUTO: 62 %
NITRATE UR QL: NEGATIVE
NRBC # BLD AUTO: 0 10E3/UL
NRBC BLD AUTO-RTO: 0 /100
PH UR STRIP: 7.5 [PH] (ref 5–7)
PLATELET # BLD AUTO: 122 10E3/UL (ref 150–450)
POTASSIUM SERPL-SCNC: 4.8 MMOL/L (ref 3.4–5.3)
PROT SERPL-MCNC: 6.7 G/DL (ref 6.4–8.3)
RBC # BLD AUTO: 3.33 10E6/UL (ref 3.8–5.9)
RBC URINE: 2 /HPF
SODIUM SERPL-SCNC: 134 MMOL/L (ref 135–145)
SP GR UR STRIP: 1.01 (ref 1–1.03)
SPECIMEN DESCRIPTION: NORMAL
SPECIMEN EXPIRATION DATE: NORMAL
SPECIMEN EXPIRATION DATE: NORMAL
SQUAMOUS EPITHELIAL: 3 /HPF
T PALLIDUM AB SER QL: NONREACTIVE
UROBILINOGEN UR STRIP-MCNC: NORMAL MG/DL
WBC # BLD AUTO: 5.2 10E3/UL (ref 4–11)
WBC CLUMPS #/AREA URNS HPF: PRESENT /HPF
WBC URINE: 9 /HPF

## 2024-05-01 PROCEDURE — 86832 HLA CLASS I HIGH DEFIN QUAL: CPT | Performed by: NURSE PRACTITIONER

## 2024-05-01 PROCEDURE — G0452 MOLECULAR PATHOLOGY INTERPR: HCPCS | Mod: 26 | Performed by: STUDENT IN AN ORGANIZED HEALTH CARE EDUCATION/TRAINING PROGRAM

## 2024-05-01 PROCEDURE — 81378 HLA I & II TYPING HR: CPT | Performed by: NURSE PRACTITIONER

## 2024-05-01 PROCEDURE — 99205 OFFICE O/P NEW HI 60 MIN: CPT | Performed by: INTERNAL MEDICINE

## 2024-05-01 PROCEDURE — 81240 F2 GENE: CPT | Performed by: NURSE PRACTITIONER

## 2024-05-01 PROCEDURE — 87340 HEPATITIS B SURFACE AG IA: CPT | Performed by: NURSE PRACTITIONER

## 2024-05-01 PROCEDURE — 71046 X-RAY EXAM CHEST 2 VIEWS: CPT | Mod: GC | Performed by: RADIOLOGY

## 2024-05-01 PROCEDURE — 86644 CMV ANTIBODY: CPT | Performed by: NURSE PRACTITIONER

## 2024-05-01 PROCEDURE — 80053 COMPREHEN METABOLIC PANEL: CPT | Performed by: PATHOLOGY

## 2024-05-01 PROCEDURE — 86833 HLA CLASS II HIGH DEFIN QUAL: CPT | Performed by: NURSE PRACTITIONER

## 2024-05-01 PROCEDURE — 93308 TTE F-UP OR LMTD: CPT | Performed by: INTERNAL MEDICINE

## 2024-05-01 PROCEDURE — 87086 URINE CULTURE/COLONY COUNT: CPT | Performed by: NURSE PRACTITIONER

## 2024-05-01 PROCEDURE — 86780 TREPONEMA PALLIDUM: CPT | Performed by: NURSE PRACTITIONER

## 2024-05-01 PROCEDURE — 85610 PROTHROMBIN TIME: CPT | Performed by: PATHOLOGY

## 2024-05-01 PROCEDURE — 86147 CARDIOLIPIN ANTIBODY EA IG: CPT | Performed by: NURSE PRACTITIONER

## 2024-05-01 PROCEDURE — 85670 THROMBIN TIME PLASMA: CPT | Performed by: NURSE PRACTITIONER

## 2024-05-01 PROCEDURE — 93000 ELECTROCARDIOGRAM COMPLETE: CPT | Performed by: INTERNAL MEDICINE

## 2024-05-01 PROCEDURE — 99213 OFFICE O/P EST LOW 20 MIN: CPT | Performed by: TRANSPLANT SURGERY

## 2024-05-01 PROCEDURE — 85025 COMPLETE CBC W/AUTO DIFF WBC: CPT | Performed by: PATHOLOGY

## 2024-05-01 PROCEDURE — 85730 THROMBOPLASTIN TIME PARTIAL: CPT | Performed by: PATHOLOGY

## 2024-05-01 PROCEDURE — 86665 EPSTEIN-BARR CAPSID VCA: CPT | Performed by: NURSE PRACTITIONER

## 2024-05-01 PROCEDURE — 86900 BLOOD TYPING SEROLOGIC ABO: CPT | Performed by: NURSE PRACTITIONER

## 2024-05-01 PROCEDURE — 82043 UR ALBUMIN QUANTITATIVE: CPT | Performed by: NURSE PRACTITIONER

## 2024-05-01 PROCEDURE — 86481 TB AG RESPONSE T-CELL SUSP: CPT | Performed by: NURSE PRACTITIONER

## 2024-05-01 PROCEDURE — 86704 HEP B CORE ANTIBODY TOTAL: CPT | Performed by: NURSE PRACTITIONER

## 2024-05-01 PROCEDURE — 93325 DOPPLER ECHO COLOR FLOW MAPG: CPT | Performed by: INTERNAL MEDICINE

## 2024-05-01 PROCEDURE — 99204 OFFICE O/P NEW MOD 45 MIN: CPT | Performed by: TRANSPLANT SURGERY

## 2024-05-01 PROCEDURE — 36415 COLL VENOUS BLD VENIPUNCTURE: CPT | Performed by: PATHOLOGY

## 2024-05-01 PROCEDURE — 86850 RBC ANTIBODY SCREEN: CPT | Performed by: NURSE PRACTITIONER

## 2024-05-01 PROCEDURE — 93321 DOPPLER ECHO F-UP/LMTD STD: CPT | Performed by: INTERNAL MEDICINE

## 2024-05-01 PROCEDURE — 86803 HEPATITIS C AB TEST: CPT | Performed by: NURSE PRACTITIONER

## 2024-05-01 PROCEDURE — 85390 FIBRINOLYSINS SCREEN I&R: CPT | Mod: 26 | Performed by: PATHOLOGY

## 2024-05-01 PROCEDURE — 81001 URINALYSIS AUTO W/SCOPE: CPT | Performed by: PATHOLOGY

## 2024-05-01 PROCEDURE — 86706 HEP B SURFACE ANTIBODY: CPT | Performed by: NURSE PRACTITIONER

## 2024-05-01 PROCEDURE — 85613 RUSSELL VIPER VENOM DILUTED: CPT | Performed by: NURSE PRACTITIONER

## 2024-05-01 PROCEDURE — 86886 COOMBS TEST INDIRECT TITER: CPT | Performed by: NURSE PRACTITIONER

## 2024-05-01 PROCEDURE — 86787 VARICELLA-ZOSTER ANTIBODY: CPT | Performed by: NURSE PRACTITIONER

## 2024-05-01 PROCEDURE — 99000 SPECIMEN HANDLING OFFICE-LAB: CPT | Performed by: PATHOLOGY

## 2024-05-01 RX ORDER — HYDROCODONE BITARTRATE AND ACETAMINOPHEN 5; 325 MG/1; MG/1
1-2 TABLET ORAL EVERY 4 HOURS PRN
COMMUNITY
Start: 2024-04-23 | End: 2024-08-09

## 2024-05-01 NOTE — LETTER
5/1/2024         RE: Bret Fleming  [address]  [place] [address]        Dear Colleague,    Thank you for referring your patient, Bret Fleming, to the [facility]. Please see a copy of my visit note below.    TRANSPLANT NEPHROLOGY RECIPIENT EVALUATION NOTE    Assessment and Plan:  # Kidney Transplant Evaluation: Patient is a fair candidate overall. Benefits of a living donor transplant were discussed.   -Recommend cardiology consideration of coronary angiography   -Urology evaluation. He has seen a urologist at [facility]. 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 Dalal. Had LUE radiocephalic AVF placed 4/23/24    # Cardiac Risk:    -High risk due to age and decreased EF. Diagnosed with CAD in 1996 with inferolateral MI, found on cardiac catheterization at that time to have only 40% LAD stenosis.    -TTE 9/27/23 showed EF 30-35% with WMA, concern for large LAD infarct pattern but couldn't exclude stress cardiomyopathy.    -Repeat TTE after he saw cardiology on 4/17/24 showed EF 40%, no regional WMA, RVSP 23.5 above RAP, so thought that WMA were from stress induced cardiomyopathy and Dr. Akers did not recommend further workup prior to fistula placement.    -I recommend speaking with cardiology regarding performing cardiac catheterization prior to kidney transplant due to age, known CAD and previous MI and decreased EF   -As functional status is god I think we can perform transplant despite low EF as long as he is revascularized appropriately.    # Hydroureteronephrosis:   -CT A/P 9/27/23 showed moderate bilateral hydroureteronephrosis extending down to the bladder which was thickened, concerning for pyelo.    -He failed guido trial. I am unable to find any urology follow up  notes but he states taht he went to [facility] for catheter removal. He reportedly had a MRI A/P in April 2024 to monitor a 1cm lesion on one of his kidneys with the plan to repeat MRI in 6 months.    -Obtain records from [facility]. He reportedly had a normal cystoscopy.     # Tobacco abuse:   -Smoked pipe and cigar for 20y, quit 1996. Not a candidate for low dose chest CT.     # PAD Screening: Will obtain updated imaging for Transplant Surgery to review. Most recent CT 9/27/23 noting scattered atheroscerlotic calcifications of aortoiliac vessels    # Social support: Good support from wife.     # Health Maintenance: Colonoscopy: Not up to date    Discussed the risks and benefits of a transplant, including the risk of surgery and immunosuppression medications.  Patient's overall evaluation will be discussed in the Transplant Program's regular meeting with a final recommendation on the patients suitability for transplant to be made at that time.    Evaluation:  Bret Fleming was seen in consultation at the request of Dr. Jensen Wong for evaluation as a potential kidney transplant recipient.    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    History of Present Illness:  Mr. Fleming is a 73y biologic male with history of HTN, history of RICHARD on CKD IV (Scr 1-1.2 until 1/2019, Scr increasing to 3.1 in 2022) in 9/2023 at which time he presented at the hospital with K of 7, pH 6.98, Scr 14.6, lactic acid 3.4 attributed to ATN and obstructive uropathy with bilateral hydroureteronephrosis, duodenal ulcer leading to GI bleed, initiated on iHD in 9/2023 presenting for evaluation for kidney transplantation.     During his 9/2023 admission he had elevated troponin and was found to have EF 30-35% with large area of hypokinesis and akinesis distal to the septum with suspected large LAD infarct, new moderate MR and  elevated RVSP.     He had EGD due to Hb drop on 10/9/23 that showed multiple duodenal ulcers, received 2U PRBC. He was put on BID PPI.     Since this admission he has been on iHD TTS via tunneled line. He had LUE AVF placed on 4/27/24.          Kidney Disease Hx:        Presumed obstructive nephropathy       Kidney Disease Dx: Obstructive nephropathy       Biopsy Proven: No         On Dialysis: Yes, Date initiated: 9/2023       Primary Nephrologist: Dr. Dalal       H/o Kidney Stones: No       H/o Recurrent/Frequent UTI: No         Diabetic Hx: None           Cardiac/Vascular Disease Risk Factors:        Cardiac Risk Factors: Hypertension, CKD, and Age (Male > 55, Female > 65)       Known CAD: Yes; MI in 1996       Known PAD/Caludication Symptoms: No       Known Heart Failure: Decreased LVEF       Arrhythmia: No       Pulmonary Hypertension: No       Valvular Disease: No       Other: None         Viral Serology Status       CMV IgG Antibody: Unknown       EBV IgG Antibody: Unknown         Volume Status/Weight:        Volume status: Euvolemic       Weight:  Acceptable BMI       BMI: There is no height or weight on file to calculate BMI.         Functional Capacity/Frailty:        Good functional status    Fatigue/Decreased Energy: [x] No [] Yes    Chest Pain or SOB with Exertion: [x] No [] Yes    Significant Weight Change: [x] No [] Yes    Nausea, Vomiting or Diarrhea: [x] No [] Yes    Fever, Sweats or Chills:  [x] No [] Yes    Leg Swelling [x] No [] Yes        History of Cancer: None    Other Significant Medical Issues: None    Allergy Testing Questions:   Medication that caused a reaction Other drugs:  cipro caused rash      Antibiotics used that didn't give an allergic reaction?  Penicillin (Amoxicillin, Amoxicillin with clavulanic acid, Dicloxacillin), Cephalosporin (Cephalexin, Cefuroxime, Cefdinir, Cefpodoxime), and Vancomycin (intravenous)    COVID Vaccination Up To Date: Yes    Potential Living Kidney Donors:  Yes    Review of Systems:  A comprehensive review of systems was obtained and negative, except as noted in the HPI or PMH.    Past Medical History:   Medical record was reviewed and PMH was discussed with patient and noted below.  Past Medical History:   Diagnosis Date     Acute myocardial infarction      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 (H)      Bilateral hydronephrosis      Hyperlipidemia      Hypertension      Sepsis due to gram-negative UTI (H) 10/07/2023       Past Social History:   Past Surgical History:   Procedure Laterality Date     ESOPHAGOSCOPY, GASTROSCOPY, DUODENOSCOPY (EGD), COMBINED N/A 10/9/2023    Procedure: Esophagoscopy, gastroscopy, duodenoscopy (EGD), combined;  Surgeon: Karolyn Saxena MD;  Location: [facility]     IR CVC TUNNEL PLACEMENT > 5 YRS OF AGE  2023     IR CVC TUNNEL PLACEMENT > 5 YRS OF AGE  10/11/2023     PICC TRIPLE LUMEN PLACEMENT  2023     Personal history of bleeding or anesthesia problems: No    Family History:  Family History   Problem Relation Age of Onset     Cancer Father      Cancer Paternal Grandfather      Diabetes Paternal Grandfather      Hypertension No family hx of      Cerebrovascular Disease No family hx of      Thyroid Disease No family hx of      Glaucoma No family hx of      Macular Degeneration No family hx of        Personal History:   Social History     Socioeconomic History     Marital status:      Spouse name: Not on file     Number of children: Not on file     Years of education: Not on file     Highest education level: Not on file   Occupational History     Not on file   Tobacco Use     Smoking status: Former     Current packs/day: 0.00     Types: Cigarettes     Quit date: 1995     Years since quittin.0     Smokeless tobacco: Never   Substance and Sexual Activity     Alcohol use: Yes     Comment: 1 per week     Drug use: Never     Sexual activity: Not on file    Other Topics Concern     Not on file   Social History Narrative     Not on file     Social Determinants of Health     Financial Resource Strain: Low Risk  (11/17/2023)    Financial Resource Strain      Within the past 12 months, have you or your family members you live with been unable to get utilities (heat, electricity) when it was really needed?: No   Food Insecurity: Low Risk  (11/17/2023)    Food Insecurity      Within the past 12 months, did you worry that your food would run out before you got money to buy more?: No      Within the past 12 months, did the food you bought just not last and you didn t have money to get more?: No   Transportation Needs: Low Risk  (11/17/2023)    Transportation Needs      Within the past 12 months, has lack of transportation kept you from medical appointments, getting your medicines, non-medical meetings or appointments, work, or from getting things that you need?: No   Physical Activity: Not on file   Stress: Not on file   Social Connections: Not on file   Interpersonal Safety: Low Risk  (4/10/2024)    Interpersonal Safety      Do you feel physically and emotionally safe where you currently live?: Yes      Within the past 12 months, have you been hit, slapped, kicked or otherwise physically hurt by someone?: No      Within the past 12 months, have you been humiliated or emotionally abused in other ways by your partner or ex-partner?: No   Housing Stability: Low Risk  (11/17/2023)    Housing Stability      Do you have housing? : Yes      Are you worried about losing your housing?: No       Allergies:  Allergies   Allergen Reactions     Ciprofloxacin Rash     Other Drug Allergy (See Comments)      Cough Syrup Abstracted from [facility] Chart( Hydrobromide)       Medications:  Current Outpatient Medications   Medication Sig Dispense Refill     acetaminophen (TYLENOL) 500 MG tablet Take 2 tablets (1,000 mg) by mouth 3 times daily 100 tablet 0     aspirin 81 MG EC tablet Take 1 tablet  (81 mg) by mouth daily 30 tablet 0     atorvastatin (LIPITOR) 40 MG tablet Take 1 tablet (40 mg) by mouth every evening 90 tablet 1     hydrALAZINE (APRESOLINE) 10 MG tablet Take 1 tablet (10 mg) by mouth 3 times daily 270 tablet 0     HYDROcodone-acetaminophen (NORCO) 5-325 MG tablet Take 1-2 tablets by mouth every 4 hours as needed       Lidocaine (LIDOCARE) 4 % Patch Place 1 patch onto the skin every 24 hours To prevent lidocaine toxicity, patient should be patch free for 12 hrs daily. 30 patch 0     metoprolol succinate ER (TOPROL XL) 25 MG 24 hr tablet Take 1 tablet (25 mg) by mouth daily 90 tablet 0     multivitamin RENAL (TRIPHROCAPS) 1 capsule capsule Take 1 capsule by mouth daily 30 capsule 0     nitroGLYcerin (NITROSTAT) 0.4 MG sublingual tablet For chest pain place 1 tablet under the tongue every 5 minutes for 3 doses. If symptoms persist 5 minutes after 1st dose call 911. 25 tablet 0     pantoprazole (PROTONIX) 40 MG EC tablet Take 1 tablet (40 mg) by mouth 2 times daily (before meals) 60 tablet 0     No current facility-administered medications for this visit.       Vitals:  There were no vitals taken for this visit.    Exam:  GENERAL APPEARANCE: alert and no distress  HENT: mouth without ulcers or lesions  RESP: lungs clear to auscultation - no rales, rhonchi or wheezes  CV: regular rhythm, normal rate, no rub, no murmur  EDEMA: no LE edema bilaterally  ABDOMEN: soft, nondistended, nontender, bowel sounds normal  MS: extremities normal - no gross deformities noted, no evidence of inflammation in joints, no muscle tenderness  SKIN: no rash  NEURO: normal strength and tone, sensory exam grossly normal, mentation intact and speech normal  PSYCH: mentation appears normal and affect normal/bright  DIALYSIS ACCESS:  Right IJ tunneled catheter    Results:   Recent Results (from the past 336 hour(s))   Echocardiogram Complete    Collection Time: 04/17/24  3:07 PM   Result Value Ref Range    Biplane LVEF 40%               Again, thank you for allowing me to participate in the care of your patient.        Sincerely,        Manish Aguilar MD

## 2024-05-01 NOTE — PROGRESS NOTES
Kidney Transplant Referral - 1/30/2024  Patient attended appointments accompanied by wife  Patient completed AM appointments with all [facility] providers.  Time and location of PM appointments reviewed with patient.  Patient instructed next contact from Transplant Coordinator will be following Selection Committee  Patient stated understanding  Patient states Receipt of Information for Organ Transplant Recipient form signed via [facility] form signed and faxed to HIM  Patient states he watched some [facility] Transplant Place Pre Kidney Transplant videos and will complete watching this next week       Summary    Team s concerns/comments:   1) Cardiac risk assessment  2) PAD assessment  3) Hydroureteronephrosis  4) Previous tobacco use  5) Health maintenance      Candidacy category: Yellow    Action/Plan:   1) EKG, Echocardiogram today. Cardiology consult and clearance  2) A/P CT 9/23  3) Request most recent [facility] notes and testing  4) Not candidate for chest CT  5) Colonoscopy due    Expected Selection Meeting Discussion: 5/8/24

## 2024-05-01 NOTE — PROGRESS NOTES
Psychosocial Assessment  Patient Name/ Age: Bret Fleming 73 year old   Medical Record #: [id number]  Duration of Interview:     45min  Process:   Face-to-Face Interview                (counseling < 50%)   Present at Appointment: Shilpi jennifer carson <aggoe        :BARBARA Barnes, LICSW Date:  May 1, 2024        Type of transplant: Kidney     Donor type:   Shilpi indicated his sister may be interested in being a donor.   Cadaver and sister   Prior Transplants:    No Status of Transplant:       Current Living Situation    Location:   [address] [place] [address]  With Whom:  Reina       Family/ Social Support:    Reina has two adult daughters from a previous relationship that Shilpi considers part of his support system.  Amanda (54) and Veronika (50 both live in [place], [place].  They have ten grandchildren.  Shilpi has one brother ([place], [place]) and one sister ([place], [place]).   Available, helpful   Committed relationship:  Shilpi and Reina have been  for 40 years.   Stable/supportive   Other supports:   Friends Available, helpful       Activities/ Functional Ability    Current level:  Shilpi wears corrective lenses. Ambulatory with a cane, visually impaired, and independent with ADL's     Transportation Drives self       Vocational/Employment/Financial     Employment  [employer]   Full time   Job Description  [employer]       Income  Reina is retired   Salary/wages   Insurance  [employer] through employer and Medicare Part A.  Shilpi indicated he has applied for Medicare Part B    At this time, patient can afford medication costs:  Yes  Private Insurance and Medicare       Medical Status    Current mode of treatment for ESRD Dialysis   Complications - Non diabetic        Behavioral    Tobacco Use No Chemical Dependency No    Shilpi indicated he may have a shot of alcohol a week.     Psychiatric Impairment No    Reading ability Good  Education  Level: AA Degree Recent Legal History No    Coping Style/Strategies: Shilpi indicated when under stress he will read or watch videos.   Ability to Adhere to Complex Medical Regime: Yes     Adherence History:  Shilpi was open regarding not always following his physician's recommendations.  He indicated he has follow all of his physician's recommendations and understands the importance of compliance with transplant - pre and post.        Education  _X_ Medicare  _X_ Rehabilitation  _X_ Donor issues  _X_ Community resources  _X_ Post discharge housing  _X_ Financial resources  _X_ Medical insurance options  _X_ Psych adjustment  _X_ Family adjustment  _X_ Health Care Directive - Provided Education and Provided HCD Psychosocial Risks of Transplant Reviewed and Discussed:  _X_ Increased stress related to emotional,            family, social, employment or financial           situation  _X_ Affect on work and/or disability benefits  _X_ Affect on future life insurance  _X_ Transplant outcome expectations may           not be met  _X_ Mental Health Risks: anxiety,           depression, PTSD, guilt, grief and           chronic fatigue     Notable Items:   None noted.       Final Evaluation/Assessment   Patient seemed to process information well. Appeared well informed, motivated and able to follow post transplant requirements. Behavior was appropriate during interview. Has adequate income and insurance coverage. Adequate social support. No major contraindications noted for transplant.  At this time patient appears to understand the risks and benefits of transplant.      Recommendation  Acceptable    Selection Criteria Met:  Plan for support Yes   Chemical Dependence Yes   Smoking Yes   Mental Health Yes   Adequate Finances Yes    Signature: BARBARA Barnes, [facility]   Title: Clinical

## 2024-05-01 NOTE — PROGRESS NOTES
TRANSPLANT NEPHROLOGY RECIPIENT EVALUATION NOTE    Assessment and Plan:  # Kidney Transplant Evaluation: Patient is a fair candidate overall. Benefits of a living donor transplant were discussed.   -Recommend cardiology consideration of coronary angiography   -Urology evaluation. He has seen a urologist at [facility]. 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 Dalal. Had LUE radiocephalic AVF placed 4/23/24    # Cardiac Risk:    -High risk due to age and decreased EF. Diagnosed with CAD in 1996 with inferolateral MI, found on cardiac catheterization at that time to have only 40% LAD stenosis.    -TTE 9/27/23 showed EF 30-35% with WMA, concern for large LAD infarct pattern but couldn't exclude stress cardiomyopathy.    -Repeat TTE after he saw cardiology on 4/17/24 showed EF 40%, no regional WMA, RVSP 23.5 above RAP, so thought that WMA were from stress induced cardiomyopathy and Dr. Akers did not recommend further workup prior to fistula placement.    -I recommend speaking with cardiology regarding performing cardiac catheterization prior to kidney transplant due to age, known CAD and previous MI and decreased EF   -As functional status is god I think we can perform transplant despite low EF as long as he is revascularized appropriately.    # Hydroureteronephrosis:   -CT A/P 9/27/23 showed moderate bilateral hydroureteronephrosis extending down to the bladder which was thickened, concerning for pyelo.    -He failed guido trial. I am unable to find any urology follow up notes but he states taht he went to [facility] for catheter removal. He reportedly had a MRI A/P in April 2024 to monitor a 1cm lesion on one of his kidneys with the plan to repeat MRI in 6 months.    -Obtain records from [facility]. He  reportedly had a normal cystoscopy.     # Tobacco abuse:   -Smoked pipe and cigar for 20y, quit 1996. Not a candidate for low dose chest CT.     # PAD Screening: Will obtain updated imaging for Transplant Surgery to review. Most recent CT 9/27/23 noting scattered atheroscerlotic calcifications of aortoiliac vessels    # Social support: Good support from wife.     # Health Maintenance: Colonoscopy: Not up to date    Discussed the risks and benefits of a transplant, including the risk of surgery and immunosuppression medications.  Patient's overall evaluation will be discussed in the Transplant Program's regular meeting with a final recommendation on the patients suitability for transplant to be made at that time.    Evaluation:  Bret Fleming was seen in consultation at the request of Dr. Jensen Wong for evaluation as a potential kidney transplant recipient.    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    History of Present Illness:  Mr. Fleming is a 73y biologic male with history of HTN, history of RICHARD on CKD IV (Scr 1-1.2 until 1/2019, Scr increasing to 3.1 in 2022) in 9/2023 at which time he presented at the hospital with K of 7, pH 6.98, Scr 14.6, lactic acid 3.4 attributed to ATN and obstructive uropathy with bilateral hydroureteronephrosis, duodenal ulcer leading to GI bleed, initiated on iHD in 9/2023 presenting for evaluation for kidney transplantation.     During his 9/2023 admission he had elevated troponin and was found to have EF 30-35% with large area of hypokinesis and akinesis distal to the septum with suspected large LAD infarct, new moderate MR and elevated RVSP.     He had EGD due to Hb drop on 10/9/23 that showed multiple duodenal ulcers, received 2U PRBC. He was put on BID PPI.     Since this admission he has been on iHD TTS via tunneled line. He had LUE AVF placed on 4/27/24.           Kidney Disease Hx:        Presumed obstructive nephropathy       Kidney Disease Dx: Obstructive nephropathy       Biopsy Proven: No         On Dialysis: Yes, Date initiated: 9/2023       Primary Nephrologist: Dr. Dalal       H/o Kidney Stones: No       H/o Recurrent/Frequent UTI: No         Diabetic Hx: None           Cardiac/Vascular Disease Risk Factors:        Cardiac Risk Factors: Hypertension, CKD, and Age (Male > 55, Female > 65)       Known CAD: Yes; MI in 1996       Known PAD/Caludication Symptoms: No       Known Heart Failure: Decreased LVEF       Arrhythmia: No       Pulmonary Hypertension: No       Valvular Disease: No       Other: None         Viral Serology Status       CMV IgG Antibody: Unknown       EBV IgG Antibody: Unknown         Volume Status/Weight:        Volume status: Euvolemic       Weight:  Acceptable BMI       BMI: There is no height or weight on file to calculate BMI.         Functional Capacity/Frailty:        Good functional status    Fatigue/Decreased Energy: [x] No [] Yes    Chest Pain or SOB with Exertion: [x] No [] Yes    Significant Weight Change: [x] No [] Yes    Nausea, Vomiting or Diarrhea: [x] No [] Yes    Fever, Sweats or Chills:  [x] No [] Yes    Leg Swelling [x] No [] Yes        History of Cancer: None    Other Significant Medical Issues: None    Allergy Testing Questions:   Medication that caused a reaction Other drugs:  cipro caused rash      Antibiotics used that didn't give an allergic reaction?  Penicillin (Amoxicillin, Amoxicillin with clavulanic acid, Dicloxacillin), Cephalosporin (Cephalexin, Cefuroxime, Cefdinir, Cefpodoxime), and Vancomycin (intravenous)    COVID Vaccination Up To Date: Yes    Potential Living Kidney Donors: Yes    Review of Systems:  A comprehensive review of systems was obtained and negative, except as noted in the HPI or PMH.    Past Medical History:   Medical record was reviewed and PMH was discussed with patient and noted below.  Past Medical  History:   Diagnosis Date    Acute myocardial infarction     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 (H)     Bilateral hydronephrosis     Hyperlipidemia     Hypertension     Sepsis due to gram-negative UTI (H) 10/07/2023       Past Social History:   Past Surgical History:   Procedure Laterality Date    ESOPHAGOSCOPY, GASTROSCOPY, DUODENOSCOPY (EGD), COMBINED N/A 10/9/2023    Procedure: Esophagoscopy, gastroscopy, duodenoscopy (EGD), combined;  Surgeon: Karolyn Saxena MD;  Location: [facility]    IR CVC TUNNEL PLACEMENT > 5 YRS OF AGE  2023    IR CVC TUNNEL PLACEMENT > 5 YRS OF AGE  10/11/2023    PICC TRIPLE LUMEN PLACEMENT  2023     Personal history of bleeding or anesthesia problems: No    Family History:  Family History   Problem Relation Age of Onset    Cancer Father     Cancer Paternal Grandfather     Diabetes Paternal Grandfather     Hypertension No family hx of     Cerebrovascular Disease No family hx of     Thyroid Disease No family hx of     Glaucoma No family hx of     Macular Degeneration No family hx of        Personal History:   Social History     Socioeconomic History    Marital status:      Spouse name: Not on file    Number of children: Not on file    Years of education: Not on file    Highest education level: Not on file   Occupational History    Not on file   Tobacco Use    Smoking status: Former     Current packs/day: 0.00     Types: Cigarettes     Quit date: 1995     Years since quittin.0    Smokeless tobacco: Never   Substance and Sexual Activity    Alcohol use: Yes     Comment: 1 per week    Drug use: Never    Sexual activity: Not on file   Other Topics Concern    Not on file   Social History Narrative    Not on file     Social Determinants of Health     Financial Resource Strain: Low Risk  (2023)    Financial Resource Strain     Within the past 12 months, have you or your family members you live  with been unable to get utilities (heat, electricity) when it was really needed?: No   Food Insecurity: Low Risk  (11/17/2023)    Food Insecurity     Within the past 12 months, did you worry that your food would run out before you got money to buy more?: No     Within the past 12 months, did the food you bought just not last and you didn t have money to get more?: No   Transportation Needs: Low Risk  (11/17/2023)    Transportation Needs     Within the past 12 months, has lack of transportation kept you from medical appointments, getting your medicines, non-medical meetings or appointments, work, or from getting things that you need?: No   Physical Activity: Not on file   Stress: Not on file   Social Connections: Not on file   Interpersonal Safety: Low Risk  (4/10/2024)    Interpersonal Safety     Do you feel physically and emotionally safe where you currently live?: Yes     Within the past 12 months, have you been hit, slapped, kicked or otherwise physically hurt by someone?: No     Within the past 12 months, have you been humiliated or emotionally abused in other ways by your partner or ex-partner?: No   Housing Stability: Low Risk  (11/17/2023)    Housing Stability     Do you have housing? : Yes     Are you worried about losing your housing?: No       Allergies:  Allergies   Allergen Reactions    Ciprofloxacin Rash    Other Drug Allergy (See Comments)      Cough Syrup Abstracted from [facility]( Hydrobromide)       Medications:  Current Outpatient Medications   Medication Sig Dispense Refill    acetaminophen (TYLENOL) 500 MG tablet Take 2 tablets (1,000 mg) by mouth 3 times daily 100 tablet 0    aspirin 81 MG EC tablet Take 1 tablet (81 mg) by mouth daily 30 tablet 0    atorvastatin (LIPITOR) 40 MG tablet Take 1 tablet (40 mg) by mouth every evening 90 tablet 1    hydrALAZINE (APRESOLINE) 10 MG tablet Take 1 tablet (10 mg) by mouth 3 times daily 270 tablet 0    HYDROcodone-acetaminophen (NORCO) 5-325 MG tablet  Take 1-2 tablets by mouth every 4 hours as needed      Lidocaine (LIDOCARE) 4 % Patch Place 1 patch onto the skin every 24 hours To prevent lidocaine toxicity, patient should be patch free for 12 hrs daily. 30 patch 0    metoprolol succinate ER (TOPROL XL) 25 MG 24 hr tablet Take 1 tablet (25 mg) by mouth daily 90 tablet 0    multivitamin RENAL (TRIPHROCAPS) 1 capsule capsule Take 1 capsule by mouth daily 30 capsule 0    nitroGLYcerin (NITROSTAT) 0.4 MG sublingual tablet For chest pain place 1 tablet under the tongue every 5 minutes for 3 doses. If symptoms persist 5 minutes after 1st dose call 911. 25 tablet 0    pantoprazole (PROTONIX) 40 MG EC tablet Take 1 tablet (40 mg) by mouth 2 times daily (before meals) 60 tablet 0     No current facility-administered medications for this visit.       Vitals:  There were no vitals taken for this visit.    Exam:  GENERAL APPEARANCE: alert and no distress  HENT: mouth without ulcers or lesions  RESP: lungs clear to auscultation - no rales, rhonchi or wheezes  CV: regular rhythm, normal rate, no rub, no murmur  EDEMA: no LE edema bilaterally  ABDOMEN: soft, nondistended, nontender, bowel sounds normal  MS: extremities normal - no gross deformities noted, no evidence of inflammation in joints, no muscle tenderness  SKIN: no rash  NEURO: normal strength and tone, sensory exam grossly normal, mentation intact and speech normal  PSYCH: mentation appears normal and affect normal/bright  DIALYSIS ACCESS:  Right IJ tunneled catheter    Results:   Recent Results (from the past 336 hour(s))   Echocardiogram Complete    Collection Time: 04/17/24  3:07 PM   Result Value Ref Range    Biplane LVEF 40%

## 2024-05-01 NOTE — CONFIDENTIAL NOTE
Coronary angiogram requested per Dr. Aguilar. Orders placed. Becky Evans, APRN CNP on 5/1/2024 at 10:29 AM

## 2024-05-01 NOTE — LETTER
2024         RE: Bret Fleming  [address]  [place] [address]        Dear Colleague,    Thank you for referring your patient, Bret Fleming, to the [facility]. Please see a copy of my visit note below.    Transplant Surgery Consult Note     Medical record number: [id number]  YOB: 1950,   Consult requested for evaluation of kidney transplant candidacy.    Assessment and Recommendations:    Mr. Fleming was seen with his wife. He appears to be a fair candidate for kidney transplantation.  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    Dietician consult ordered: Yes  Social work consult ordered: Yes  Transplant listing labs ordered to include HLA, ABOx2, Cr, etc.  Cardiology consult for cardiac risk stratification to be ordered: Yes  Obtain past medical records  Up-to-date cancer screening    The majority of our visit was spent on counselling.                   We talked about the pros and cons of transplantation vs. dialysis.  We discussed the fact that it was important to think about the pros and cons of each treatment option and make an active decision.  We also discussed the fact that the two were interconnected and that if the transplant failed, it is possible that dialysis might be necessary before another transplant.                     We also discussed the fact that if choosing to have a transplant, a second important decision was a living versus a  donor transplant.  We talked about the pros and cons of each option - the advantage of a  donor transplant being not asking someone to go through the living donor operation, the disadvantage, 5-6 years waiting; the advantage of a living  "donor transplant, much shorter time to transplant and significantly better long-term outcomes, the disadvantage being the risk to the donor.  Although I didn't express an opinion regarding transplantation or dialysis, I suggested that if opting for a transplant, we would strongly recommend a living donor transplant.                    We discussed the fact that a living donor does not have to be a direct match and that if there was a donor who met the criteria but was not a match, there was an option of participating in the national paired exchange system.  I described how the system would work.    We also discussed the new ( donor) kidney (KDPI) scoring system. We discussed the advantages and disadvantages of accepting an \"expanded criteria\" donor kidney, and the latest data as to who potentially benefits - those >45 and/or having diabetes a cause for ESKD - by receiving an expanded criteria donor kidney versus waiting longer for a standard criteria donor.                I attempted to answer any remaining questions.  I describes our patient selection committee meeting and that her coordinator would call after the meeting; and that between now and then, she should write down any questions and ask the coordinator during the call.  I also said that we would be glad to answer any subsequent questions either over the phone or at another clinic visit.    Thank you for the opportunity to see him.                                      45 min spent on the date of the encounter in chart review, patient visit,  documentation and/or discussion with other providers about the issues documented above.          Jensen Wong MD  Surgical Professor, Kidney Transplantation                                                                                                    ---------------------------------------------------------------------------------------------------    HPI:   Bernadette has Chronic renal failure due to " obstruction and RICHARD. The patient is non-diabetic.       The patient is on dialysis.    Has potential kidney donors:  Doesn't know .  Interested in participation in paired exchange if a donor is willing: Doesn't know     The patient has the following pertinent history:       No    Yes  Dialysis:    []      [] via:       Blood Transfusion                  []      []  Number of units:   Most recently:  Pregnancy:    []      [] Number:       Previous Transplant:  []      [] Details:    Cancer    []      [] Comment:   Kidney stones  []      [] Comment:      Recurrent infections  []      []  Type:                  Bladder dysfunction  []      [] Cause:    Claudication   []      [] Distance:    Previous Amputation  []      [] Cause:     Chronic anticoagulation []      [] Indication:   Restoration  []      []      Past Medical History:   Diagnosis Date     Acute myocardial infarction      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 (H)      Bilateral hydronephrosis      Hyperlipidemia      Hypertension      Sepsis due to gram-negative UTI (H) 10/07/2023     Past Surgical History:   Procedure Laterality Date     ESOPHAGOSCOPY, GASTROSCOPY, DUODENOSCOPY (EGD), COMBINED N/A 10/9/2023    Procedure: Esophagoscopy, gastroscopy, duodenoscopy (EGD), combined;  Surgeon: Karolyn Saxena MD;  Location:  GI     IR CVC TUNNEL PLACEMENT > 5 YRS OF AGE  9/27/2023     IR CVC TUNNEL PLACEMENT > 5 YRS OF AGE  10/11/2023     PICC TRIPLE LUMEN PLACEMENT  9/27/2023     Family History   Problem Relation Age of Onset     Cancer Father      Cancer Paternal Grandfather      Diabetes Paternal Grandfather      Hypertension No family hx of      Cerebrovascular Disease No family hx of      Thyroid Disease No family hx of      Glaucoma No family hx of      Macular Degeneration No family hx of      Social History     Socioeconomic History     Marital status:      Spouse  name: Not on file     Number of children: Not on file     Years of education: Not on file     Highest education level: Not on file   Occupational History     Not on file   Tobacco Use     Smoking status: Former     Current packs/day: 0.00     Types: Cigarettes     Quit date: 1995     Years since quittin.0     Smokeless tobacco: Never   Substance and Sexual Activity     Alcohol use: Yes     Comment: 1 per week     Drug use: Never     Sexual activity: Not on file   Other Topics Concern     Not on file   Social History Narrative     Not on file     Social Determinants of Health     Financial Resource Strain: Low Risk  (2023)    Financial Resource Strain      Within the past 12 months, have you or your family members you live with been unable to get utilities (heat, electricity) when it was really needed?: No   Food Insecurity: Low Risk  (2023)    Food Insecurity      Within the past 12 months, did you worry that your food would run out before you got money to buy more?: No      Within the past 12 months, did the food you bought just not last and you didn t have money to get more?: No   Transportation Needs: Low Risk  (2023)    Transportation Needs      Within the past 12 months, has lack of transportation kept you from medical appointments, getting your medicines, non-medical meetings or appointments, work, or from getting things that you need?: No   Physical Activity: Not on file   Stress: Not on file   Social Connections: Not on file   Interpersonal Safety: Unknown (2024)    Received from [facility]    Humiliation, Afraid, Rape, and Kick questionnaire      Fear of Current or Ex-Partner: Not on file      Emotionally Abused: Not on file      Physically Abused: No      Sexually Abused: No   Housing Stability: Low Risk  (2023)    Housing Stability      Do you have housing? : Yes      Are you worried about losing your housing?: No       ROS:   CONSTITUTIONAL:  No fevers or  chills  EYES: negative for icterus  ENT:  negative for hearing loss, tinnitus and sore throat  RESPIRATORY:  negative for cough, sputum, dyspnea  CARDIOVASCULAR:  negative for chest pain  GASTROINTESTINAL:  negative for nausea, vomiting, diarrhea or constipation  GENITOURINARY:  negative for incontinence, dysuria, bladder emptying problems  HEME:  No easy bruising  INTEGUMENT:  negative for rash and pruritus  NEURO:  Negative for headache, seizure disorder  Allergies:   Allergies   Allergen Reactions     Ciprofloxacin Rash     Other Drug Allergy (See Comments)      Cough Syrup Abstracted from [facility]( Hydrobromide)     Medications:  Prescription Medications as of 5/8/2024         Rx Number Disp Refills Start End Last Dispensed Date Next Fill Date Owning Pharmacy    acetaminophen (TYLENOL) 500 MG tablet  100 tablet 0 10/13/2023 --   [place] [address]    Sig: Take 2 tablets (1,000 mg) by mouth 3 times daily    Class: E-Prescribe    Route: Oral    aspirin 81 MG EC tablet  30 tablet 0 10/13/2023 --   [place] [address]    Sig: Take 1 tablet (81 mg) by mouth daily    Class: E-Prescribe    Route: Oral    atorvastatin (LIPITOR) 40 MG tablet  90 tablet 1 2/28/2024 --   [place] [address] [place]     Sig: Take 1 tablet (40 mg) by mouth every evening    Class: E-Prescribe    Route: Oral    hydrALAZINE (APRESOLINE) 10 MG tablet  270 tablet 0 3/29/2024 --   [place] [address] [place]     Sig: Take 1 tablet (10 mg) by mouth 3 times daily    Class: E-Prescribe    Route: Oral    HYDROcodone-acetaminophen (NORCO) 5-325 MG tablet  -- -- 4/23/2024 --       Sig: Take 1-2 tablets by mouth every 4 hours as needed    Class: Historical    Route: Oral    Lidocaine (LIDOCARE) 4 % Patch  30 patch 0 10/13/2023 --   [place] [address]    Sig: Place 1 patch onto  the skin every 24 hours To prevent lidocaine toxicity, patient should be patch free for 12 hrs daily.    Class: E-Prescribe    Route: Transdermal    metoprolol succinate ER (TOPROL XL) 25 MG 24 hr tablet  90 tablet 0 2/29/2024 --   [facility] [address] [place] [address]    Sig: Take 1 tablet (25 mg) by mouth daily    Class: E-Prescribe    Notes to Pharmacy: Please inform patient: Follow-up reassessment in primary care is advised, this is a bridge fill    Route: Oral    multivitamin RENAL (TRIPHROCAPS) 1 capsule capsule  30 capsule 0 10/13/2023 --   [facility] [address] [address]    Sig: Take 1 capsule by mouth daily    Class: E-Prescribe    Route: Oral    nitroGLYcerin (NITROSTAT) 0.4 MG sublingual tablet  25 tablet 0 10/13/2023 --   [facility] [address] [place] [address]    Sig: For chest pain place 1 tablet under the tongue every 5 minutes for 3 doses. If symptoms persist 5 minutes after 1st dose call 911.    Class: E-Prescribe    pantoprazole (PROTONIX) 40 MG EC tablet  60 tablet 0 10/13/2023 --   [facility] [address] [place] [address]    Sig: Take 1 tablet (40 mg) by mouth 2 times daily (before meals)    Class: E-Prescribe    Route: Oral          Exam:   Constitutional - A&O in NAD.   Eyes - no redness or discharge.  Sclera anicteric  Respiratory - no cough, no labored breathing  Musculoskeletal - range of motion normal  Skin - no discoloration, no jaundice  Neurological - no tremors.  No facial droop or dysarthria  Psychiatric - normal mood and affect  The rest of a comprehensive physical examination is deferred due to PHE (public health emergency) video visit restrictions     Diagnostics:   Recent Results (from the past 672 hour(s))   Echocardiogram Complete    Collection Time: 04/17/24  3:07 PM   Result Value Ref Range    Biplane LVEF 40%    ABO and Rh    Collection Time: 05/01/24 12:22 PM   Result Value Ref Range     ABO/RH(D) B POS     SPECIMEN EXPIRATION DATE [id number]    Antibody titer red cell [RDP0670]    Collection Time: 05/01/24 12:24 PM   Result Value Ref Range    Anti-A1 IgG Titer 8     Anti-A1 IgM Titer 8     Anti-A2 IgG Titer 2     Anti-A2 IgM Titer 2     SPECIMEN EXPIRATION DATE [id number]     ANTIBODY TITER IGM SCREEN Negative    Comprehensive metabolic panel [LAB17]    Collection Time: 05/01/24 12:24 PM   Result Value Ref Range    Sodium 134 (L) 135 - 145 mmol/L    Potassium 4.8 3.4 - 5.3 mmol/L    Carbon Dioxide (CO2) 27 22 - 29 mmol/L    Anion Gap 11 7 - 15 mmol/L    Urea Nitrogen 38.7 (H) 8.0 - 23.0 mg/dL    Creatinine 4.33 (H) 0.51 - 1.17 mg/dL    GFR Estimate 14 (L) >60 mL/min/1.73m2    Calcium 9.2 8.8 - 10.2 mg/dL    Chloride 96 (L) 98 - 107 mmol/L    Glucose 102 (H) 70 - 99 mg/dL    Alkaline Phosphatase 95 40 - 150 U/L    AST 24 0 - 45 U/L    ALT 7 0 - 70 U/L    Protein Total 6.7 6.4 - 8.3 g/dL    Albumin 4.2 3.5 - 5.2 g/dL    Bilirubin Total 0.4 <=1.2 mg/dL   Cardiolipin Crissy IgG and IgM [LAB 6836]    Collection Time: 05/01/24 12:24 PM   Result Value Ref Range    Cardiolipin Crissy IgG Instrument Value <2.0 <10.0 GPL-U/mL    Cardiolipin Antibody IgG Negative Negative    Cardiolipin Crissy IgM Instrument Value <2.0 <10.0 MPL-U/mL    Cardiolipin Antibody IgM Negative Negative   Factor 2 and 5 mutation analysis    Collection Time: 05/01/24 12:24 PM   Result Value Ref Range    METHODOLOGY       The regions of genomic DNA containing the F5 gene mutation R506Q(1691G>A) and the Factor 2 (Prothrombin [id number]) gene mutation were simultaneously amplified using the polymerase chain reaction. The amplified products were digested with restriction endonuclease TaqI and products were analyzed by gel electrophoresis.        RESULTS         Factor V 1691G>A (Leiden)  RESULTS:  Mutation analyzed: 1691G>A  Factor V 1691G>A (Leiden)  Interpretation:  ABSENT  Factor V 1691G>A (Leiden) mutation  genotype:  G/G    FACTOR  2/PROTHROMBIN RESULTS:  Mutation analyzed: 01337L>A  Factor 2 Mutation Interpretation:  ABSENT  Factor 2 Mutation genotype:  G/G        INTERPRETATION       The patient is negative for the Factor V 1691G>A (Leiden) and negative for the Factor 2 mutation.  (Electronically signed by: JOSE RUSS MD May 6, 2024 6:47 PM)      COMMENTS       If a patient is the recipient of an allogeneic bone marrow transplant, this test must be done on a pre-transplant sample or buccal swab.  A previous allogeneic bone marrow transplant will interfere with test results.  Call the [facility] ([phone]) for instructions on sample collection for these patients.      DISCLAIMER       This test was developed and its performance characteristics determined by [facility]. It has not been cleared or approved by the FDA. The laboratory is regulated under CLIA as qualified to perform high-complexity testing. This test is used for clinical purposes. It should not be regarded as investigational or for research.    A resident/fellow in an accredited training program was involved in the selection of testing, review of laboratory data, and/or interpretation of this case.  I, as the senior physician, attest that I: (i) confirmed appropriate testing, (ii) examined the relevant raw data for the specimen(s); and (iii) rendered or confirmed the interpretation(s).        FACTOR 2 INTERPRETATION         Factor 2 Mutation Interpretation:  ABSENT      FACTOR V INTERPRETATION       Factor V 1691G>A (Leiden)  Interpretation:  ABSENT      Signout Location if Remote Report signed out at:   SSE1     Specimen Description       Blood: ACD     INR [[id number]]    Collection Time: 05/01/24 12:24 PM   Result Value Ref Range    INR 1.27 (H) 0.85 - 1.15   Lupus Anticoagulant Panel [[id number]]    Collection Time: 05/01/24 12:24 PM   Result Value Ref Range    PTT Ratio 1.27 (H) <1.21    Platelet Neutralization 2 (H)  <=0 Seconds    DRVVT Screen Ratio 0.96 <1.08    Lupus Result Positive (A) Negative    Lupus Interpretation       INR is elevated.  APTT ratio is elevated.  Platelet Neutralization is positive.  DRVVT Screen ratio is normal.  Thrombin time is normal.  POSITIVE TEST; THIS PATIENT HAS A  LUPUS ANTICOAGULANT.  Recommend repeat testing in 12 weeks to determine if the Lupus Anticoagulant is persistent or transient, since a transient one does not confer an increased thrombotic risk.  If the patient is on an anticoagulant, recommend repeat testing without anticoagulation interference to rule out a false positive lupus anticoagulant.  Patients with a lupus anticoagulant on warfarin therapy should be considered for monitoring with a factor 2 (factor II) level or chromogenic factor 10 (factor X) assay.  Clinical conditions resulting in a high C-reative protein (CRP) level may result in a positive Lupus Anticoagulant test.  Clinical correlation is recommended.    Jennifer Witt MD, PhD  [facility]         Partial thromboplastin time [LAB56]    Collection Time: 05/01/24 12:24 PM   Result Value Ref Range    aPTT 38 22 - 38 Seconds   Thrombin time [RWB033]    Collection Time: 05/01/24 12:24 PM   Result Value Ref Range    Thrombin Time 15.7 13.0 - 19.0 Seconds   CMV Antibody IgG [[id number]]    Collection Time: 05/01/24 12:24 PM   Result Value Ref Range    CMV Crissy IgG Instrument Value <0.20 <0.60 U/mL    CMV Antibody IgG No detectable antibody. No detectable antibody.    EBV Capsid Antibody IgG [[id number]]    Collection Time: 05/01/24 12:24 PM   Result Value Ref Range    EBV Capsid Crissy IgG Instrument Value >750.0 (H) <18.0 U/mL    EBV Capsid Antibody IgG Positive (A) No detectable antibody.   Hepatitis B core antibody [[id number]]    Collection Time: 05/01/24 12:24 PM   Result Value Ref Range    Hepatitis B Core Antibody Total Nonreactive Nonreactive   Hepatitis B Surface Antibody [[id number]]    Collection Time: 05/01/24 12:24 PM    Result Value Ref Range    Hepatitis B Surface Antibody Nonreactive     Hepatitis B Surface Antibody Instrument Value <3.50 <8.5 m[IU]/mL   Hepatitis B surface antigen [[id number]]    Collection Time: 05/01/24 12:24 PM   Result Value Ref Range    Hepatitis B Surface Antigen Nonreactive Nonreactive   Hepatitis C antibody [[id number]]    Collection Time: 05/01/24 12:24 PM   Result Value Ref Range    Hepatitis C Antibody Nonreactive Nonreactive   HIV Antigen Antibody Combo Pretransplant Cascade    Collection Time: 05/01/24 12:24 PM   Result Value Ref Range    HIV Antigen Antibody Combo Pretransplant Nonreactive Nonreactive   Treponema Abs w Reflex to RPR and Titer [[id number]]    Collection Time: 05/01/24 12:24 PM   Result Value Ref Range    Treponema Antibody Total Nonreactive Nonreactive   Varicella Zoster Virus Antibody IgG [[id number]]    Collection Time: 05/01/24 12:24 PM   Result Value Ref Range    VZV Crissy IgG Instrument Value 2,888.0 <135.0 Index    Varicella Zoster Antibody IgG Positive    Adult Type and Screen    Collection Time: 05/01/24 12:24 PM   Result Value Ref Range    ABO/RH(D) B POS     Antibody Screen Negative Negative    SPECIMEN EXPIRATION DATE [id number]    CBC with platelets and differential    Collection Time: 05/01/24 12:24 PM   Result Value Ref Range    WBC Count 5.2 4.0 - 11.0 10e3/uL    RBC Count 3.33 (L) 3.80 - 5.90 10e6/uL    Hemoglobin 11.1 (L) 13.3 - 17.7 g/dL    Hematocrit 33.8 (L) 35.0 - 53.0 %     (H) 78 - 100 fL    MCH 33.3 (H) 26.5 - 33.0 pg    MCHC 32.8 31.5 - 36.5 g/dL    RDW 13.6 10.0 - 15.0 %    Platelet Count 122 (L) 150 - 450 10e3/uL    % Neutrophils 62 %    % Lymphocytes 21 %    % Monocytes 12 %    % Eosinophils 4 %    % Basophils 1 %    % Immature Granulocytes 0 %    NRBCs per 100 WBC 0 <1 /100    Absolute Neutrophils 3.3 1.6 - 8.3 10e3/uL    Absolute Lymphocytes 1.1 0.8 - 5.3 10e3/uL    Absolute Monocytes 0.6 0.0 - 1.3 10e3/uL    Absolute Eosinophils 0.2 0.0 - 0.7 10e3/uL     Absolute Basophils 0.0 0.0 - 0.2 10e3/uL    Absolute Immature Granulocytes 0.0 <=0.4 10e3/uL    Absolute NRBCs 0.0 10e3/uL   Quantiferon TB Gold Plus Grey Tube    Collection Time: 05/01/24 12:24 PM    Specimen: Hand, Right; Blood   Result Value Ref Range    Quantiferon Nil Tube 0.01 IU/mL   Quantiferon TB Gold Plus Green Tube    Collection Time: 05/01/24 12:24 PM    Specimen: Hand, Right; Blood   Result Value Ref Range    Quantiferon TB1 Tube 0.01 IU/mL   Quantiferon TB Gold Plus Yellow Tube    Collection Time: 05/01/24 12:24 PM    Specimen: Hand, Right; Blood   Result Value Ref Range    Quantiferon TB2 Tube 0.02    Quantiferon TB Gold Plus Purple Tube    Collection Time: 05/01/24 12:24 PM    Specimen: Hand, Right; Blood   Result Value Ref Range    Quantiferon Mitogen 10.00 IU/mL   Quantiferon TB Gold Plus    Collection Time: 05/01/24 12:24 PM    Specimen: Hand, Right; Blood   Result Value Ref Range    Quantiferon-TB Gold Plus Negative Negative    TB1 Ag minus Nil Value 0.00 IU/mL    TB2 Ag minus Nil Value 0.01 IU/mL    Mitogen minus Nil Result 9.99 IU/mL    Nil Result 0.01 IU/mL   HLA-ABC Typing High Resolution    Collection Time: 05/01/24 12:24 PM   Result Value Ref Range    ABTEST METHOD NGS     hiresA-1 A*03:01     hiresA-1Equiv 3     hiresA-2 A*11:01     hiresA-2Equiv 11     hiresB-1 B*07:02     hiresB-1Equiv 7     hiresB-1NMDP CDHGG 02/381     hiresB-2 B*35:01     hiresB-2Equiv 35     hiresB-2NMDP APCWD 01/380     hiresC-1 C*04:01     hiresC-1Equiv 4     hiresC-2 C*07:02     hiresC-2Equiv 7     hiresBw-1 Bw*6    HLA-DR Typing High Resolution    Collection Time: 05/01/24 12:24 PM   Result Value Ref Range    DRhiresTestM NGS     hiresDPA1-1 DPA1*01:03     hiresDPB1-1 DPB1*04:01     hiresDQA1-1 DQA1*01:02     hiresDQA1-2 DQA1*01:04     hiresDQB1-1 DQB1*05:03     hiresDQB1-1Equiv 5     hiresDQB1-2 DQB1*06:02     hiresDQB1-2Equiv 6     hiresDRB1-1 DRB1*14:54     hiresDRB1-1Equiv 14     hiresDRB1-2 DRB1*15:01      hiresDRB1-2Equiv 15     hiresDRB3-1 DRB3*02:02     hiresDRB3-1Equiv 52     hiresDRB5-2 DRB5*01:01     hiresDRB5-2Equiv 51    EKG 12-lead, tracing only [EKG1]    Collection Time: 05/01/24 12:26 PM   Result Value Ref Range    Systolic Blood Pressure  mmHg    Diastolic Blood Pressure  mmHg    Ventricular Rate 63 BPM    Atrial Rate 63 BPM    WY Interval 180 ms    QRS Duration 90 ms     ms    QTc 407 ms    P Axis 57 degrees    R AXIS 26 degrees    T Axis 73 degrees    Interpretation ECG       Sinus rhythm  Low voltage QRS  Possible Inferior infarct (cited on or before 28-SEP-2023)  Abnormal ECG  When compared with ECG of 10-APR-2024 11:42,  No significant change was found    Confirmed by fellow Fernando Spencer ([id number]) on 5/2/2024 12:32:58 PM  Confirmed by MD FORDE HENRI ([id number]) on 5/3/2024 8:15:13 AM     UA with Microscopic reflex to Culture    Collection Time: 05/01/24 12:29 PM    Specimen: Urine, Midstream   Result Value Ref Range    Color Urine Light Yellow Colorless, Straw, Light Yellow, Yellow    Appearance Urine Clear Clear    Glucose Urine Negative Negative mg/dL    Bilirubin Urine Negative Negative    Ketones Urine Negative Negative mg/dL    Specific Gravity Urine 1.007 1.003 - 1.035    Blood Urine Trace (A) Negative    pH Urine 7.5 (H) 5.0 - 7.0    Protein Albumin Urine 10 (A) Negative mg/dL    Urobilinogen Urine Normal Normal, 2.0 mg/dL    Nitrite Urine Negative Negative    Leukocyte Esterase Urine Moderate (A) Negative    WBC Clumps Urine Present (A) None Seen /HPF    RBC Urine 2 <=2 /HPF    WBC Urine 9 (H) <=5 /HPF    Squamous Epithelials Urine 3 (H) <=1 /HPF   Albumin Random Urine Quantitative with Creat Ratio    Collection Time: 05/01/24 12:29 PM   Result Value Ref Range    Creatinine Urine mg/dL 34.5 mg/dL    Albumin Urine mg/L 37.0 mg/L    Albumin Urine mg/g Cr 107.25 (H) 0.00 - 25.00 mg/g Cr   Urine Culture    Collection Time: 05/01/24 12:29 PM    Specimen: Urine, Midstream   Result Value Ref  "Range    Culture 10,000-50,000 CFU/mL Mixture of Urogenital Aaliyah    Echocardiogram Limited    Collection Time: 05/01/24  1:55 PM   Result Value Ref Range    Biplane LVEF 42%      No results found for: \"CPRA\"      Again, thank you for allowing me to participate in the care of your patient.        Sincerely,        Jensen Wong MD  "

## 2024-05-02 LAB
BACTERIA UR CULT: NORMAL
CMV IGG SERPL IA-ACNC: <0.2 U/ML
CMV IGG SERPL IA-ACNC: NORMAL
CREAT UR-MCNC: 34.5 MG/DL
DRVVT SCREEN RATIO: 0.96
EBV VCA IGG SER IA-ACNC: >750 U/ML
EBV VCA IGG SER IA-ACNC: POSITIVE
GAMMA INTERFERON BACKGROUND BLD IA-ACNC: 0.01 IU/ML
HIV 1+2 AB+HIV1 P24 AG SERPL QL IA: NONREACTIVE
LA PPP-IMP: POSITIVE
LUPUS INTERPRETATION: ABNORMAL
M TB IFN-G BLD-IMP: NEGATIVE
M TB IFN-G CD4+ BCKGRND COR BLD-ACNC: 9.99 IU/ML
MICROALBUMIN UR-MCNC: 37 MG/L
MICROALBUMIN/CREAT UR: 107.25 MG/G CR (ref 0–25)
MITOGEN IGNF BCKGRD COR BLD-ACNC: 0 IU/ML
MITOGEN IGNF BCKGRD COR BLD-ACNC: 0.01 IU/ML
PLATELET NEUTRALIZATION: 2 SECONDS
PTT RATIO: 1.27
QUANTIFERON MITOGEN: 10 IU/ML
QUANTIFERON NIL TUBE: 0.01 IU/ML
QUANTIFERON TB1 TUBE: 0.01 IU/ML
QUANTIFERON TB2 TUBE: 0.02
THROMBIN TIME: 15.7 SECONDS (ref 13–19)
VZV IGG SER QL IA: 2888 INDEX
VZV IGG SER QL IA: POSITIVE

## 2024-05-03 LAB
ATRIAL RATE - MUSE: 63 BPM
DIASTOLIC BLOOD PRESSURE - MUSE: NORMAL MMHG
INTERPRETATION ECG - MUSE: NORMAL
P AXIS - MUSE: 57 DEGREES
PR INTERVAL - MUSE: 180 MS
QRS DURATION - MUSE: 90 MS
QT - MUSE: 398 MS
QTC - MUSE: 407 MS
R AXIS - MUSE: 26 DEGREES
SYSTOLIC BLOOD PRESSURE - MUSE: NORMAL MMHG
T AXIS - MUSE: 73 DEGREES
VENTRICULAR RATE- MUSE: 63 BPM

## 2024-05-04 LAB
CARDIOLIPIN IGG SER IA-ACNC: <2 GPL-U/ML
CARDIOLIPIN IGG SER IA-ACNC: NEGATIVE
CARDIOLIPIN IGM SER IA-ACNC: <2 MPL-U/ML
CARDIOLIPIN IGM SER IA-ACNC: NEGATIVE

## 2024-05-06 LAB
A*: NORMAL
A*LOCUS SEROLOGIC EQUIVALENT: 3
A*LOCUS: NORMAL
A*SEROLOGIC EQUIVALENT: 11
ABTEST METHOD: NORMAL
B*: NORMAL
B*LOCUS NMDP: NORMAL
B*LOCUS SEROLOGIC EQUIVALENT: 7
B*LOCUS: NORMAL
B*NMDP: NORMAL
B*SEROLOGIC EQUIVALENT: 35
BW-1: NORMAL
C*: NORMAL
C*LOCUS SEROLOGIC EQUIVALENT: 4
C*LOCUS: NORMAL
C*SEROLOGIC EQUIVALENT: 7
DPA1*: NORMAL
DPB1*: NORMAL
DQA1*: NORMAL
DQA1*LOCUS: NORMAL
DQB1*: NORMAL
DQB1*LOCUS SEROLOGIC EQUIVALENT: 5
DQB1*LOCUS: NORMAL
DQB1*SEROLOGIC EQUIVALENT: 6
DRB1*: NORMAL
DRB1*LOCUS SEROLOGIC EQUIVALENT: 14
DRB1*LOCUS: NORMAL
DRB1*SEROLOGIC EQUIVALENT: 15
DRB3*LOCUS SEROLOGIC EQUIVALENT: 52
DRB3*LOCUS: NORMAL
DRB5*: NORMAL
DRB5*SEROLOGIC EQUIVALENT: 51
DRSSO TEST METHOD: NORMAL

## 2024-05-08 ENCOUNTER — COMMITTEE REVIEW (OUTPATIENT)
Dept: TRANSPLANT | Facility: CLINIC | Age: 74
End: 2024-05-08
Payer: COMMERCIAL

## 2024-05-08 LAB
SA 1  COMMENTS: NORMAL
SA 1 CELL: NORMAL
SA 1 TEST METHOD: NORMAL
SA 2 CELL: NORMAL
SA 2 COMMENTS: NORMAL
SA 2 TEST METHOD: NORMAL
SA1 HI RISK ABY: NORMAL
SA1 MOD RISK ABY: NORMAL
SA2 HI RISK ABY: NORMAL
SA2 MOD RISK ABY: NORMAL
UNACCEPTABLE ANTIGENS: NORMAL
UNOS CPRA: 45

## 2024-05-08 NOTE — COMMITTEE REVIEW
Abdominal Committee Review Note     Evaluation Date: 5/1/2024  Committee Review Date: 5/8/2024    Organ being evaluated for: Kidney    Transplant Phase: Evaluation  Transplant Status: Active    Transplant Coordinator: Deja Story  Transplant Surgeon: Dr. Wong        Referring Physician: Jassi Dalal    Primary Diagnosis:  ESKD  Secondary Diagnosis: Obstructive nephropathy    Committee Review Members:  Cardiology Becky Evans, APRN [facility]   Nephrology Manish Aguilar MD, Stefania Humphreys MD, Eren Cantu, APRN CNP   Nurse Klaus Perdue, FRANCES   Nutrition Sharon Lujan, RD   Pharmacist Aparna Velásquez, [facility]    - Clinical Joseline Perez, MSW, Naa Lopez, [facility], Merrill Henderson, MSW, Pauline Oneill, MSW   Transplant AJ WHITE, RN, Carmencita Kerr, RN, Shikha Fonseca, RN, Bryanna Reid, RN, Sherine Dawson, RN, Dixie Millan, NP, Dixie Ross, RN, Yadira Smith, RN, Deja Kim, RN, Marie Murillo, FRANCES, Zee Campbell, RN   Transplant Surgery Sherie Scott, [facility], Lindsay Morales MD, MD       Transplant Eligibility: Irreversible chronic kidney disease treated w/dialysis or expected need for dialysis    Committee Review Decision: Needs Re-presentation    Relative Contraindications: None     Absolute Contraindications: None    Committee Chair Lindsay Morales MD verbally attested to the committee's decision.    Committee Discussion Details: Reviewed pt's medical status and evaluation results to date with multidisciplinary committee.    Recommended the following evaluation items:    Cardiology: Needs an angiogram  Urology: Need reports from [facility] and clearance  GI: Need repeat EGD/Colonoscopy  Imaging Recommended:  Iliac US  Health Maintenance:  Colonoscopy as above  KDPI and Receipt of Information: Needs to complete  [facility] Class Review:  Needs to complete     Patient should have live donors register now to initiate donor  evaluation: Yes    Committee determined that patient is a Good candidate for Kidney     Listing plan: Will not list at this time as patient is on dialysis    A2B Candidate: Yes but needs second set of titers and consent.     Patient will be called and summary letter will be sent.

## 2024-05-08 NOTE — PROGRESS NOTES
Transplant Surgery Consult Note     Medical record number: [id number]  YOB: 1950,   Consult requested for evaluation of kidney transplant candidacy.    Assessment and Recommendations:    Mr. Fleming was seen with his wife. He appears to be a fair candidate for kidney transplantation.  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    Dietician consult ordered: Yes  Social work consult ordered: Yes  Transplant listing labs ordered to include HLA, ABOx2, Cr, etc.  Cardiology consult for cardiac risk stratification to be ordered: Yes  Obtain past medical records  Up-to-date cancer screening    The majority of our visit was spent on counselling.                   We talked about the pros and cons of transplantation vs. dialysis.  We discussed the fact that it was important to think about the pros and cons of each treatment option and make an active decision.  We also discussed the fact that the two were interconnected and that if the transplant failed, it is possible that dialysis might be necessary before another transplant.                     We also discussed the fact that if choosing to have a transplant, a second important decision was a living versus a  donor transplant.  We talked about the pros and cons of each option - the advantage of a  donor transplant being not asking someone to go through the living donor operation, the disadvantage, 5-6 years waiting; the advantage of a living donor transplant, much shorter time to transplant and significantly better long-term outcomes, the disadvantage being the risk to the donor.  Although I didn't express an opinion regarding transplantation or dialysis, I suggested that if opting for a  "transplant, we would strongly recommend a living donor transplant.                    We discussed the fact that a living donor does not have to be a direct match and that if there was a donor who met the criteria but was not a match, there was an option of participating in the national paired exchange system.  I described how the system would work.    We also discussed the new ( donor) kidney (KDPI) scoring system. We discussed the advantages and disadvantages of accepting an \"expanded criteria\" donor kidney, and the latest data as to who potentially benefits - those >45 and/or having diabetes a cause for ESKD - by receiving an expanded criteria donor kidney versus waiting longer for a standard criteria donor.                I attempted to answer any remaining questions.  I describes our patient selection committee meeting and that her coordinator would call after the meeting; and that between now and then, she should write down any questions and ask the coordinator during the call.  I also said that we would be glad to answer any subsequent questions either over the phone or at another clinic visit.    Thank you for the opportunity to see him.                                      45 min spent on the date of the encounter in chart review, patient visit,  documentation and/or discussion with other providers about the issues documented above.          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 The patient is non-diabetic.       The patient is on dialysis.    Has potential kidney donors:  Doesn't know .  Interested in participation in paired exchange if a donor is willing: Doesn't know     The patient has the following " pertinent history:       No    Yes  Dialysis:    []      [] via:       Blood Transfusion                  []      []  Number of units:   Most recently:  Pregnancy:    []      [] Number:       Previous Transplant:  []      [] Details:    Cancer    []      [] Comment:   Kidney stones  []      [] Comment:      Recurrent infections  []      []  Type:                  Bladder dysfunction  []      [] Cause:    Claudication   []      [] Distance:    Previous Amputation  []      [] Cause:     Chronic anticoagulation []      [] Indication:   Alevism  []      []      Past Medical History:   Diagnosis Date    Acute myocardial infarction     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 (H)     Bilateral hydronephrosis     Hyperlipidemia     Hypertension     Sepsis due to gram-negative UTI (H) 10/07/2023     Past Surgical History:   Procedure Laterality Date    ESOPHAGOSCOPY, GASTROSCOPY, DUODENOSCOPY (EGD), COMBINED N/A 10/9/2023    Procedure: Esophagoscopy, gastroscopy, duodenoscopy (EGD), combined;  Surgeon: Karolyn Saxena MD;  Location:  GI    IR CVC TUNNEL PLACEMENT > 5 YRS OF AGE  9/27/2023    IR CVC TUNNEL PLACEMENT > 5 YRS OF AGE  10/11/2023    PICC TRIPLE LUMEN PLACEMENT  9/27/2023     Family History   Problem Relation Age of Onset    Cancer Father     Cancer Paternal Grandfather     Diabetes Paternal Grandfather     Hypertension No family hx of     Cerebrovascular Disease No family hx of     Thyroid Disease No family hx of     Glaucoma No family hx of     Macular Degeneration No family hx of      Social History     Socioeconomic History    Marital status:      Spouse name: Not on file    Number of children: Not on file    Years of education: Not on file    Highest education level: Not on file   Occupational History    Not on file   Tobacco Use    Smoking status: Former     Current packs/day: 0.00     Types: Cigarettes     Quit date:  1995     Years since quittin.0    Smokeless tobacco: Never   Substance and Sexual Activity    Alcohol use: Yes     Comment: 1 per week    Drug use: Never    Sexual activity: Not on file   Other Topics Concern    Not on file   Social History Narrative    Not on file     Social Determinants of Health     Financial Resource Strain: Low Risk  (2023)    Financial Resource Strain     Within the past 12 months, have you or your family members you live with been unable to get utilities (heat, electricity) when it was really needed?: No   Food Insecurity: Low Risk  (2023)    Food Insecurity     Within the past 12 months, did you worry that your food would run out before you got money to buy more?: No     Within the past 12 months, did the food you bought just not last and you didn t have money to get more?: No   Transportation Needs: Low Risk  (2023)    Transportation Needs     Within the past 12 months, has lack of transportation kept you from medical appointments, getting your medicines, non-medical meetings or appointments, work, or from getting things that you need?: No   Physical Activity: Not on file   Stress: Not on file   Social Connections: Not on file   Interpersonal Safety: Unknown (2024)    Received from [facility]    Humiliation, Afraid, Rape, and Kick questionnaire     Fear of Current or Ex-Partner: Not on file     Emotionally Abused: Not on file     Physically Abused: No     Sexually Abused: No   Housing Stability: Low Risk  (2023)    Housing Stability     Do you have housing? : Yes     Are you worried about losing your housing?: No       ROS:   CONSTITUTIONAL:  No fevers or chills  EYES: negative for icterus  ENT:  negative for hearing loss, tinnitus and sore throat  RESPIRATORY:  negative for cough, sputum, dyspnea  CARDIOVASCULAR:  negative for chest pain  GASTROINTESTINAL:  negative for nausea, vomiting, diarrhea or constipation  GENITOURINARY:  negative for  incontinence, dysuria, bladder emptying problems  HEME:  No easy bruising  INTEGUMENT:  negative for rash and pruritus  NEURO:  Negative for headache, seizure disorder  Allergies:   Allergies   Allergen Reactions    Ciprofloxacin Rash    Other Drug Allergy (See Comments)      Cough Syrup Abstracted from [facility]( Hydrobromide)     Medications:  Prescription Medications as of 5/8/2024         Rx Number Disp Refills Start End Last Dispensed Date Next Fill Date Owning Pharmacy    acetaminophen (TYLENOL) 500 MG tablet  100 tablet 0 10/13/2023 --   [place] [id number]    Sig: Take 2 tablets (1,000 mg) by mouth 3 times daily    Class: E-Prescribe    Route: Oral    aspirin 81 MG EC tablet  30 tablet 0 10/13/2023 --   [place] [address]    Sig: Take 1 tablet (81 mg) by mouth daily    Class: E-Prescribe    Route: Oral    atorvastatin (LIPITOR) 40 MG tablet  90 tablet 1 2/28/2024 --   [address]    Sig: Take 1 tablet (40 mg) by mouth every evening    Class: E-Prescribe    Route: Oral    hydrALAZINE (APRESOLINE) 10 MG tablet  270 tablet 0 3/29/2024 --   [address]    Sig: Take 1 tablet (10 mg) by mouth 3 times daily    Class: E-Prescribe    Route: Oral    HYDROcodone-acetaminophen (NORCO) 5-325 MG tablet  -- -- 4/23/2024 --       Sig: Take 1-2 tablets by mouth every 4 hours as needed    Class: Historical    Route: Oral    Lidocaine (LIDOCARE) 4 % Patch  30 patch 0 10/13/2023 --   [place] [address]    Sig: Place 1 patch onto the skin every 24 hours To prevent lidocaine toxicity, patient should be patch free for 12 hrs daily.    Class: E-Prescribe    Route: Transdermal    metoprolol succinate ER (TOPROL XL) 25 MG 24 hr tablet  90 tablet 0 2/29/2024 --   [address]    Sig: Take 1  tablet (25 mg) by mouth daily    Class: E-Prescribe    Notes to Pharmacy: Please inform patient: Follow-up reassessment in primary care is advised, this is a bridge fill    Route: Oral    multivitamin RENAL (TRIPHROCAPS) 1 capsule capsule  30 capsule 0 10/13/2023 --   [place] [id number]    Sig: Take 1 capsule by mouth daily    Class: E-Prescribe    Route: Oral    nitroGLYcerin (NITROSTAT) 0.4 MG sublingual tablet  25 tablet 0 10/13/2023 --   [place] [id number]    Sig: For chest pain place 1 tablet under the tongue every 5 minutes for 3 doses. If symptoms persist 5 minutes after 1st dose call 911.    Class: E-Prescribe    pantoprazole (PROTONIX) 40 MG EC tablet  60 tablet 0 10/13/2023 --   [place] [id number]    Sig: Take 1 tablet (40 mg) by mouth 2 times daily (before meals)    Class: E-Prescribe    Route: Oral          Exam:   Constitutional - A&O in NAD.   Eyes - no redness or discharge.  Sclera anicteric  Respiratory - no cough, no labored breathing  Musculoskeletal - range of motion normal  Skin - no discoloration, no jaundice  Neurological - no tremors.  No facial droop or dysarthria  Psychiatric - normal mood and affect  The rest of a comprehensive physical examination is deferred due to PHE (public health emergency) video visit restrictions     Diagnostics:   Recent Results (from the past 672 hour(s))   Echocardiogram Complete    Collection Time: 04/17/24  3:07 PM   Result Value Ref Range    Biplane LVEF 40%    ABO and Rh    Collection Time: 05/01/24 12:22 PM   Result Value Ref Range    ABO/RH(D) B POS     SPECIMEN EXPIRATION DATE [id number]    Antibody titer red cell [[id number]]    Collection Time: 05/01/24 12:24 PM   Result Value Ref Range    Anti-A1 IgG Titer 8     Anti-A1 IgM Titer 8     Anti-A2 IgG Titer 2     Anti-A2 IgM Titer 2     SPECIMEN EXPIRATION DATE  [id number]     ANTIBODY TITER IGM SCREEN Negative    Comprehensive metabolic panel [LAB17]    Collection Time: 05/01/24 12:24 PM   Result Value Ref Range    Sodium 134 (L) 135 - 145 mmol/L    Potassium 4.8 3.4 - 5.3 mmol/L    Carbon Dioxide (CO2) 27 22 - 29 mmol/L    Anion Gap 11 7 - 15 mmol/L    Urea Nitrogen 38.7 (H) 8.0 - 23.0 mg/dL    Creatinine 4.33 (H) 0.51 - 1.17 mg/dL    GFR Estimate 14 (L) >60 mL/min/1.73m2    Calcium 9.2 8.8 - 10.2 mg/dL    Chloride 96 (L) 98 - 107 mmol/L    Glucose 102 (H) 70 - 99 mg/dL    Alkaline Phosphatase 95 40 - 150 U/L    AST 24 0 - 45 U/L    ALT 7 0 - 70 U/L    Protein Total 6.7 6.4 - 8.3 g/dL    Albumin 4.2 3.5 - 5.2 g/dL    Bilirubin Total 0.4 <=1.2 mg/dL   Cardiolipin Crissy IgG and IgM [LAB 6836]    Collection Time: 05/01/24 12:24 PM   Result Value Ref Range    Cardiolipin Crissy IgG Instrument Value <2.0 <10.0 GPL-U/mL    Cardiolipin Antibody IgG Negative Negative    Cardiolipin Crissy IgM Instrument Value <2.0 <10.0 MPL-U/mL    Cardiolipin Antibody IgM Negative Negative   Factor 2 and 5 mutation analysis    Collection Time: 05/01/24 12:24 PM   Result Value Ref Range    METHODOLOGY       The regions of genomic DNA containing the F5 gene mutation R506Q(1691G>A) and the Factor 2 (Prothrombin [id number]) gene mutation were simultaneously amplified using the polymerase chain reaction. The amplified products were digested with restriction endonuclease TaqI and products were analyzed by gel electrophoresis.        RESULTS         Factor V 1691G>A ([place])  RESULTS:  Mutation analyzed: 1691G>A  Factor V 1691G>A ([place])  Interpretation:  ABSENT  Factor V 1691G>A ([place]) mutation  genotype:  G/G    FACTOR 2/PROTHROMBIN RESULTS:  Mutation analyzed: [id number]>A  Factor 2 Mutation Interpretation:  ABSENT  Factor 2 Mutation genotype:  G/G        INTERPRETATION       The patient is negative for the Factor V 1691G>A ([place]) and negative for the Factor 2 mutation.  (Electronically signed by:  JOSE RUSS MD May 6, 2024 6:47 PM)      COMMENTS       If a patient is the recipient of an allogeneic bone marrow transplant, this test must be done on a pre-transplant sample or buccal swab.  A previous allogeneic bone marrow transplant will interfere with test results.  Call the [facility] ([phone]) for instructions on sample collection for these patients.      DISCLAIMER       This test was developed and its performance characteristics determined by [facility]. It has not been cleared or approved by the FDA. The laboratory is regulated under CLIA as qualified to perform high-complexity testing. This test is used for clinical purposes. It should not be regarded as investigational or for research.    A resident/fellow in an accredited training program was involved in the selection of testing, review of laboratory data, and/or interpretation of this case.  I, as the senior physician, attest that I: (i) confirmed appropriate testing, (ii) examined the relevant raw data for the specimen(s); and (iii) rendered or confirmed the interpretation(s).        FACTOR 2 INTERPRETATION         Factor 2 Mutation Interpretation:  ABSENT      FACTOR V INTERPRETATION       Factor V 1691G>A ([place])  Interpretation:  ABSENT      Signout Location if Remote Report signed out at:   SSE1     Specimen Description       Blood: ACD     INR [[id number]]    Collection Time: 05/01/24 12:24 PM   Result Value Ref Range    INR 1.27 (H) 0.85 - 1.15   Lupus Anticoagulant Panel [[id number]]    Collection Time: 05/01/24 12:24 PM   Result Value Ref Range    PTT Ratio 1.27 (H) <1.21    Platelet Neutralization 2 (H) <=0 Seconds    DRVVT Screen Ratio 0.96 <1.08    Lupus Result Positive (A) Negative    Lupus Interpretation       INR is elevated.  APTT ratio is elevated.  Platelet Neutralization is positive.  DRVVT Screen ratio is normal.  Thrombin time is normal.  POSITIVE TEST; THIS PATIENT HAS  A  LUPUS ANTICOAGULANT.  Recommend repeat testing in 12 weeks to determine if the Lupus Anticoagulant is persistent or transient, since a transient one does not confer an increased thrombotic risk.  If the patient is on an anticoagulant, recommend repeat testing without anticoagulation interference to rule out a false positive lupus anticoagulant.  Patients with a lupus anticoagulant on warfarin therapy should be considered for monitoring with a factor 2 (factor II) level or chromogenic factor 10 (factor X) assay.  Clinical conditions resulting in a high C-reative protein (CRP) level may result in a positive Lupus Anticoagulant test.  Clinical correlation is recommended.    Jennifer Witt MD, PhD  [facility]         Partial thromboplastin time [LAB56]    Collection Time: 05/01/24 12:24 PM   Result Value Ref Range    aPTT 38 22 - 38 Seconds   Thrombin time [RHQ468]    Collection Time: 05/01/24 12:24 PM   Result Value Ref Range    Thrombin Time 15.7 13.0 - 19.0 Seconds   CMV Antibody IgG [[id number]]    Collection Time: 05/01/24 12:24 PM   Result Value Ref Range    CMV Crissy IgG Instrument Value <0.20 <0.60 U/mL    CMV Antibody IgG No detectable antibody. No detectable antibody.    EBV Capsid Antibody IgG [[id number]]    Collection Time: 05/01/24 12:24 PM   Result Value Ref Range    EBV Capsid Crissy IgG Instrument Value >750.0 (H) <18.0 U/mL    EBV Capsid Antibody IgG Positive (A) No detectable antibody.   Hepatitis B core antibody [[id number]]    Collection Time: 05/01/24 12:24 PM   Result Value Ref Range    Hepatitis B Core Antibody Total Nonreactive Nonreactive   Hepatitis B Surface Antibody [[id number]]    Collection Time: 05/01/24 12:24 PM   Result Value Ref Range    Hepatitis B Surface Antibody Nonreactive     Hepatitis B Surface Antibody Instrument Value <3.50 <8.5 m[IU]/mL   Hepatitis B surface antigen [[id number]]    Collection Time: 05/01/24 12:24 PM   Result Value Ref Range    Hepatitis B Surface Antigen Nonreactive  Nonreactive   Hepatitis C antibody [[id number]]    Collection Time: 05/01/24 12:24 PM   Result Value Ref Range    Hepatitis C Antibody Nonreactive Nonreactive   HIV Antigen Antibody Combo Pretransplant Cascade    Collection Time: 05/01/24 12:24 PM   Result Value Ref Range    HIV Antigen Antibody Combo Pretransplant Nonreactive Nonreactive   Treponema Abs w Reflex to RPR and Titer [[id number]]    Collection Time: 05/01/24 12:24 PM   Result Value Ref Range    Treponema Antibody Total Nonreactive Nonreactive   Varicella Zoster Virus Antibody IgG [[id number]]    Collection Time: 05/01/24 12:24 PM   Result Value Ref Range    VZV Crissy IgG Instrument Value 2,888.0 <135.0 Index    Varicella Zoster Antibody IgG Positive    Adult Type and Screen    Collection Time: 05/01/24 12:24 PM   Result Value Ref Range    ABO/RH(D) B POS     Antibody Screen Negative Negative    SPECIMEN EXPIRATION DATE [id number]    CBC with platelets and differential    Collection Time: 05/01/24 12:24 PM   Result Value Ref Range    WBC Count 5.2 4.0 - 11.0 10e3/uL    RBC Count 3.33 (L) 3.80 - 5.90 10e6/uL    Hemoglobin 11.1 (L) 13.3 - 17.7 g/dL    Hematocrit 33.8 (L) 35.0 - 53.0 %     (H) 78 - 100 fL    MCH 33.3 (H) 26.5 - 33.0 pg    MCHC 32.8 31.5 - 36.5 g/dL    RDW 13.6 10.0 - 15.0 %    Platelet Count 122 (L) 150 - 450 10e3/uL    % Neutrophils 62 %    % Lymphocytes 21 %    % Monocytes 12 %    % Eosinophils 4 %    % Basophils 1 %    % Immature Granulocytes 0 %    NRBCs per 100 WBC 0 <1 /100    Absolute Neutrophils 3.3 1.6 - 8.3 10e3/uL    Absolute Lymphocytes 1.1 0.8 - 5.3 10e3/uL    Absolute Monocytes 0.6 0.0 - 1.3 10e3/uL    Absolute Eosinophils 0.2 0.0 - 0.7 10e3/uL    Absolute Basophils 0.0 0.0 - 0.2 10e3/uL    Absolute Immature Granulocytes 0.0 <=0.4 10e3/uL    Absolute NRBCs 0.0 10e3/uL   Quantiferon TB Gold Plus Grey Tube    Collection Time: 05/01/24 12:24 PM    Specimen: Hand, Right; Blood   Result Value Ref Range    Quantiferon Nil Tube 0.01  IU/mL   Quantiferon TB Gold Plus Green Tube    Collection Time: 05/01/24 12:24 PM    Specimen: Hand, Right; Blood   Result Value Ref Range    Quantiferon TB1 Tube 0.01 IU/mL   Quantiferon TB Gold Plus Yellow Tube    Collection Time: 05/01/24 12:24 PM    Specimen: Hand, Right; Blood   Result Value Ref Range    Quantiferon TB2 Tube 0.02    Quantiferon TB Gold Plus Purple Tube    Collection Time: 05/01/24 12:24 PM    Specimen: Hand, Right; Blood   Result Value Ref Range    Quantiferon Mitogen 10.00 IU/mL   Quantiferon TB Gold Plus    Collection Time: 05/01/24 12:24 PM    Specimen: Hand, Right; Blood   Result Value Ref Range    Quantiferon-TB Gold Plus Negative Negative    TB1 Ag minus Nil Value 0.00 IU/mL    TB2 Ag minus Nil Value 0.01 IU/mL    Mitogen minus Nil Result 9.99 IU/mL    Nil Result 0.01 IU/mL   HLA-ABC Typing High Resolution    Collection Time: 05/01/24 12:24 PM   Result Value Ref Range    ABTEST METHOD NGS     hiresA-1 A*03:01     hiresA-1Equiv 3     hiresA-2 A*11:01     hiresA-2Equiv 11     hiresB-1 B*07:02     hiresB-1Equiv 7     hiresB-1NMDP CDHGG 02/381     hiresB-2 B*35:01     hiresB-2Equiv 35     hiresB-2NMDP APCWD 01/380     hiresC-1 C*04:01     hiresC-1Equiv 4     hiresC-2 C*07:02     hiresC-2Equiv 7     hiresBw-1 Bw*6    HLA-DR Typing High Resolution    Collection Time: 05/01/24 12:24 PM   Result Value Ref Range    DRhiresTestM NGS     hiresDPA1-1 DPA1*01:03     hiresDPB1-1 DPB1*04:01     hiresDQA1-1 DQA1*01:02     hiresDQA1-2 DQA1*01:04     hiresDQB1-1 DQB1*05:03     hiresDQB1-1Equiv 5     hiresDQB1-2 DQB1*06:02     hiresDQB1-2Equiv 6     hiresDRB1-1 DRB1*14:54     hiresDRB1-1Equiv 14     hiresDRB1-2 DRB1*15:01     hiresDRB1-2Equiv 15     hiresDRB3-1 DRB3*02:02     hiresDRB3-1Equiv 52     hiresDRB5-2 DRB5*01:01     hiresDRB5-2Equiv 51    EKG 12-lead, tracing only [EKG1]    Collection Time: 05/01/24 12:26 PM   Result Value Ref Range    Systolic Blood Pressure  mmHg    Diastolic Blood Pressure   "mmHg    Ventricular Rate 63 BPM    Atrial Rate 63 BPM    HI Interval 180 ms    QRS Duration 90 ms     ms    QTc 407 ms    P Axis 57 degrees    R AXIS 26 degrees    T Axis 73 degrees    Interpretation ECG       Sinus rhythm  Low voltage QRS  Possible Inferior infarct (cited on or before 28-SEP-2023)  Abnormal ECG  When compared with ECG of 10-APR-2024 11:42,  No significant change was found    Confirmed by fellow Fernando Spencer ([id number]) on 5/2/2024 12:32:58 PM  Confirmed by MD EULA, JARED ([id number]) on 5/3/2024 8:15:13 AM     UA with Microscopic reflex to Culture    Collection Time: 05/01/24 12:29 PM    Specimen: Urine, Midstream   Result Value Ref Range    Color Urine Light Yellow Colorless, Straw, Light Yellow, Yellow    Appearance Urine Clear Clear    Glucose Urine Negative Negative mg/dL    Bilirubin Urine Negative Negative    Ketones Urine Negative Negative mg/dL    Specific Gravity Urine 1.007 1.003 - 1.035    Blood Urine Trace (A) Negative    pH Urine 7.5 (H) 5.0 - 7.0    Protein Albumin Urine 10 (A) Negative mg/dL    Urobilinogen Urine Normal Normal, 2.0 mg/dL    Nitrite Urine Negative Negative    Leukocyte Esterase Urine Moderate (A) Negative    WBC Clumps Urine Present (A) None Seen /HPF    RBC Urine 2 <=2 /HPF    WBC Urine 9 (H) <=5 /HPF    Squamous Epithelials Urine 3 (H) <=1 /HPF   Albumin Random Urine Quantitative with Creat Ratio    Collection Time: 05/01/24 12:29 PM   Result Value Ref Range    Creatinine Urine mg/dL 34.5 mg/dL    Albumin Urine mg/L 37.0 mg/L    Albumin Urine mg/g Cr 107.25 (H) 0.00 - 25.00 mg/g Cr   Urine Culture    Collection Time: 05/01/24 12:29 PM    Specimen: Urine, Midstream   Result Value Ref Range    Culture 10,000-50,000 CFU/mL Mixture of Urogenital Aaliyah    Echocardiogram Limited    Collection Time: 05/01/24  1:55 PM   Result Value Ref Range    Biplane LVEF 42%      No results found for: \"CPRA\"  "

## 2024-05-09 ENCOUNTER — TELEPHONE (OUTPATIENT)
Dept: TRANSPLANT | Facility: CLINIC | Age: 74
End: 2024-05-09
Payer: COMMERCIAL

## 2024-05-09 DIAGNOSIS — Z76.82 ORGAN TRANSPLANT CANDIDATE: ICD-10-CM

## 2024-05-09 DIAGNOSIS — N18.6 END STAGE RENAL DISEASE (H): ICD-10-CM

## 2024-05-09 DIAGNOSIS — Z01.818 PRE-TRANSPLANT EVALUATION FOR KIDNEY TRANSPLANT: ICD-10-CM

## 2024-05-09 DIAGNOSIS — N18.9 CHRONIC RENAL FAILURE: Primary | ICD-10-CM

## 2024-05-09 NOTE — TELEPHONE ENCOUNTER
Calling patient at this time to discuss the results of the Selection Committee from yesterday. Let him know that he is approved for living donor only given his blood type and age.  He expresses good understanding of this.  He needs to see cardiology for a risk assessment and angiogram.  He also needs to have iliac ultrasound performed.  He needs to complete the MTP videos and let me know when this is complete so I can go over the education with him.  He will call his PCP for referral for EGD and Colonoscopy. I have requested the records from [facility] which can be reviewed with the team.

## 2024-05-09 NOTE — LETTER
05/09/24        Bret Fleming  [address]  [place] [address]      Dear Bret,    It was a pleasure to see you recently for consideration of kidney transplantation. Your pre-transplant evaluation results were reviewed at our Multidisciplinary Selection Committee on 05/08/2024. The committee has deemed you a candidate for LIVING DONOR ONLY.  This means  that you are only eligible for kidney transplant if you have an approved living donor.     You will need the following items as part of your evaluation:     We are asking you to see a cardiologist for a risk assessment and have an angiogram. This is to ensure that your heart is strong enough to withstand the stress of a transplant. Someone from our scheduling team will reach out to you to get this scheduled.   You will need to call [facility] and set up to have your EGD and Colonoscopy repeated.   We are asking that you have an iliac ultrasound performed. This allows the surgeons to assess the blood flow through the vital blood vessels supplying the kidneys. Someone from our scheduling team will reach out to you to get you scheduled.     For any questions, please contact the Transplant Office at [phone].    Sincerely,  Deja Story RN   Pre-Kidney Transplant Coordinator  Direct line: [phone]    Solid Organ Transplant  [facility]

## 2024-05-23 ENCOUNTER — TELEPHONE (OUTPATIENT)
Dept: TRANSPLANT | Facility: CLINIC | Age: 74
End: 2024-05-23
Payer: COMMERCIAL

## 2024-05-23 NOTE — TELEPHONE ENCOUNTER
Returning call to patient. He was wondering about getting a provider order for colonoscopy at [facility]. Let him know that the referral should come from his PCP. He will reach out to them to get referral.

## 2024-06-14 ENCOUNTER — ANCILLARY PROCEDURE (OUTPATIENT)
Dept: ULTRASOUND IMAGING | Facility: CLINIC | Age: 74
End: 2024-06-14
Attending: NURSE PRACTITIONER
Payer: COMMERCIAL

## 2024-06-14 DIAGNOSIS — Z01.818 PRE-TRANSPLANT EVALUATION FOR KIDNEY TRANSPLANT: ICD-10-CM

## 2024-06-14 DIAGNOSIS — N18.6 END STAGE RENAL DISEASE (H): ICD-10-CM

## 2024-06-14 DIAGNOSIS — N18.9 CHRONIC RENAL FAILURE: ICD-10-CM

## 2024-06-14 DIAGNOSIS — Z76.82 ORGAN TRANSPLANT CANDIDATE: ICD-10-CM

## 2024-06-14 PROCEDURE — 93978 VASCULAR STUDY: CPT | Performed by: RADIOLOGY

## 2024-06-27 DIAGNOSIS — I25.5 ISCHEMIC CARDIOMYOPATHY: ICD-10-CM

## 2024-06-27 DIAGNOSIS — I21.4 NSTEMI (NON-ST ELEVATED MYOCARDIAL INFARCTION) (H): ICD-10-CM

## 2024-06-27 DIAGNOSIS — N17.0 ACUTE KIDNEY FAILURE WITH TUBULAR NECROSIS (H): ICD-10-CM

## 2024-06-27 NOTE — TELEPHONE ENCOUNTER
[facility] - Advance Refill Request    Patient received last refill for prescription.  This is an advance request to ensure uninterrupted availability of this prescription.     metoprolol succinate ER (TOPROL XL) 25 MG 24 hr tablet   atorvastatin (LIPITOR) 40 MG tablet   hydrALAZINE (APRESOLINE) 10 MG tablet     Last Visit with PCP = 04/10/24

## 2024-06-28 RX ORDER — HYDRALAZINE HYDROCHLORIDE 10 MG/1
10 TABLET, FILM COATED ORAL 3 TIMES DAILY
Qty: 270 TABLET | Refills: 2 | Status: ON HOLD | OUTPATIENT
Start: 2024-06-28 | End: 2024-08-26

## 2024-06-28 RX ORDER — ATORVASTATIN CALCIUM 40 MG/1
40 TABLET, FILM COATED ORAL EVERY EVENING
Qty: 90 TABLET | Refills: 2 | Status: SHIPPED | OUTPATIENT
Start: 2024-06-28

## 2024-06-28 RX ORDER — METOPROLOL SUCCINATE 25 MG/1
25 TABLET, EXTENDED RELEASE ORAL DAILY
Qty: 90 TABLET | Refills: 0 | Status: ON HOLD | OUTPATIENT
Start: 2024-06-28 | End: 2024-08-26

## 2024-07-01 ENCOUNTER — MYC MEDICAL ADVICE (OUTPATIENT)
Dept: CARDIOLOGY | Facility: CLINIC | Age: 74
End: 2024-07-01
Payer: COMMERCIAL

## 2024-07-01 DIAGNOSIS — I25.5 ISCHEMIC CARDIOMYOPATHY: ICD-10-CM

## 2024-07-01 DIAGNOSIS — I25.83 CORONARY ATHEROSCLEROSIS DUE TO LIPID RICH PLAQUE: ICD-10-CM

## 2024-07-01 DIAGNOSIS — I50.22 CHRONIC SYSTOLIC CONGESTIVE HEART FAILURE (H): ICD-10-CM

## 2024-07-01 DIAGNOSIS — I21.4 NSTEMI (NON-ST ELEVATED MYOCARDIAL INFARCTION) (H): Primary | ICD-10-CM

## 2024-07-01 RX ORDER — LIDOCAINE 40 MG/G
CREAM TOPICAL
Status: CANCELLED | OUTPATIENT
Start: 2024-07-01

## 2024-07-01 RX ORDER — ASPIRIN 325 MG
325 TABLET ORAL ONCE
Status: CANCELLED | OUTPATIENT
Start: 2024-07-01 | End: 2024-07-01

## 2024-07-01 RX ORDER — ASPIRIN 81 MG/1
243 TABLET, CHEWABLE ORAL ONCE
Status: CANCELLED | OUTPATIENT
Start: 2024-07-01

## 2024-07-01 RX ORDER — POTASSIUM CHLORIDE 1500 MG/1
40 TABLET, EXTENDED RELEASE ORAL
Status: CANCELLED | OUTPATIENT
Start: 2024-07-01

## 2024-07-01 RX ORDER — SODIUM CHLORIDE 9 MG/ML
INJECTION, SOLUTION INTRAVENOUS CONTINUOUS
Status: CANCELLED | OUTPATIENT
Start: 2024-07-01

## 2024-07-01 RX ORDER — POTASSIUM CHLORIDE 1500 MG/1
20 TABLET, EXTENDED RELEASE ORAL
Status: CANCELLED | OUTPATIENT
Start: 2024-07-01

## 2024-07-03 ENCOUNTER — TELEPHONE (OUTPATIENT)
Dept: CARDIOLOGY | Facility: CLINIC | Age: 74
End: 2024-07-03
Payer: COMMERCIAL

## 2024-07-03 NOTE — TELEPHONE ENCOUNTER
Called [facility] to go over angiogram instructions. A written copy also sent on [employer]. Pt had no questions at this time     Pre-procedure instructions - Coronary Angiogram  Patient Education    Your arrival time is 1130 on 07/08.  Location is [id number] [place] [address] Waiting Room  Please plan on being at the hospital all day.  At any time, emergencies and/or urgent cases may come up which could delay the start of your procedure.    Pre-procedure instructions - Coronary Angiogram  Shower in the evening before or the morning of the procedure  No solid food for 8 hours prior and nothing to drink 2 hours prior to arrival time  You can take your morning medications (except for diabetic and blood thinners) with sips of water.  Take 325 mg of Aspirin the night before your procedure and 81mg the morning of procedure.  You will need to arrange a ride to drop you off and , as you will be unable to drive home. Prior to discharge you may be required to lay flat for approximately 2-6 hours in the recovery unit to ensure proper clotting of the artery. Please note: You cannot take an Uber/Taxi/etc unless you are accompanied by someone.              Diabetic Medication Instructions  Hold oral diabetic medication in morning of your procedure and for 48 hours after IV contrast is given  Typical instructions for insulin diabetic medication holding are below. However, please reach out to your Primary Care Provider or Endocrinologist for specific instructions  DO NOT take any oral diabetic medication, short-acting diabetes medications/insulin, humalog or regular insulin the morning of your test  Take   dose of long-acting insulin (Lantus, Levemir) the day of your test  Remember to bring your glucometer and insulin with you to take after your test if needed  GLP-1 Agonists Instructions  DO NOT take injectable GLP-1 agonists semaglutide (Ozempic, Wegovy),  dulaglutide (Trulicity), exenatide ER (Bydureon), tirzepatide (Mounjaro), or oral semaglutide (Rybelsus) for 7 days prior your procedure  Hold once daily injectable GLP-1 agonists exenatide (Byetta), liraglutide (Saxenda, Victoza), lixisenatide (Soligua) the day before and day of your procedure                  Anticoagulation Medication Instructions   NA

## 2024-07-08 ENCOUNTER — APPOINTMENT (OUTPATIENT)
Dept: LAB | Facility: CLINIC | Age: 74
End: 2024-07-08
Attending: INTERNAL MEDICINE
Payer: COMMERCIAL

## 2024-07-08 ENCOUNTER — HOSPITAL ENCOUNTER (OUTPATIENT)
Facility: CLINIC | Age: 74
Discharge: HOME | End: 2024-07-08
Attending: INTERNAL MEDICINE | Admitting: INTERNAL MEDICINE
Payer: COMMERCIAL

## 2024-07-08 ENCOUNTER — APPOINTMENT (OUTPATIENT)
Dept: MEDSURG UNIT | Facility: CLINIC | Age: 74
End: 2024-07-08
Attending: INTERNAL MEDICINE
Payer: COMMERCIAL

## 2024-07-08 VITALS
TEMPERATURE: 97.9 F | SYSTOLIC BLOOD PRESSURE: 117 MMHG | OXYGEN SATURATION: 99 % | WEIGHT: 186.29 LBS | HEIGHT: 72 IN | DIASTOLIC BLOOD PRESSURE: 69 MMHG | HEART RATE: 68 BPM | RESPIRATION RATE: 12 BRPM | BODY MASS INDEX: 25.23 KG/M2

## 2024-07-08 DIAGNOSIS — I13.2 HYPERTENSIVE HEART FAILURE WITH END STAGE RENAL DISEASE (H): ICD-10-CM

## 2024-07-08 DIAGNOSIS — N18.6 HYPERTENSIVE HEART FAILURE WITH END STAGE RENAL DISEASE (H): ICD-10-CM

## 2024-07-08 DIAGNOSIS — I25.5 ISCHEMIC CARDIOMYOPATHY: ICD-10-CM

## 2024-07-08 DIAGNOSIS — I21.4 NSTEMI (NON-ST ELEVATED MYOCARDIAL INFARCTION) (H): ICD-10-CM

## 2024-07-08 DIAGNOSIS — Z01.818 PRE-TRANSPLANT EVALUATION FOR KIDNEY TRANSPLANT: ICD-10-CM

## 2024-07-08 DIAGNOSIS — I25.83 CORONARY ATHEROSCLEROSIS DUE TO LIPID RICH PLAQUE: ICD-10-CM

## 2024-07-08 DIAGNOSIS — I50.22 CHRONIC SYSTOLIC CONGESTIVE HEART FAILURE (H): ICD-10-CM

## 2024-07-08 PROBLEM — Z98.890 STATUS POST CORONARY ANGIOGRAM: Status: ACTIVE | Noted: 2024-07-08

## 2024-07-08 LAB
ANION GAP SERPL CALCULATED.3IONS-SCNC: 14 MMOL/L (ref 7–15)
APTT PPP: 36 SECONDS (ref 22–38)
BUN SERPL-MCNC: 47.6 MG/DL (ref 8–23)
CALCIUM SERPL-MCNC: 9.1 MG/DL (ref 8.8–10.2)
CHLORIDE SERPL-SCNC: 95 MMOL/L (ref 98–107)
CREAT SERPL-MCNC: 4.93 MG/DL (ref 0.51–1.17)
DEPRECATED HCO3 PLAS-SCNC: 23 MMOL/L (ref 22–29)
EGFRCR SERPLBLD CKD-EPI 2021: 12 ML/MIN/1.73M2
ERYTHROCYTE [DISTWIDTH] IN BLOOD BY AUTOMATED COUNT: 13.3 % (ref 10–15)
GLUCOSE SERPL-MCNC: 97 MG/DL (ref 70–99)
HCT VFR BLD AUTO: 31.2 % (ref 35–53)
HGB BLD-MCNC: 10.6 G/DL (ref 13.3–17.7)
INR PPP: 1.19 (ref 0.85–1.15)
MCH RBC QN AUTO: 34.5 PG (ref 26.5–33)
MCHC RBC AUTO-ENTMCNC: 34 G/DL (ref 31.5–36.5)
MCV RBC AUTO: 102 FL (ref 78–100)
PLATELET # BLD AUTO: 163 10E3/UL (ref 150–450)
POTASSIUM SERPL-SCNC: 4.5 MMOL/L (ref 3.4–5.3)
RBC # BLD AUTO: 3.07 10E6/UL (ref 3.8–5.9)
SODIUM SERPL-SCNC: 132 MMOL/L (ref 135–145)
WBC # BLD AUTO: 5.1 10E3/UL (ref 4–11)

## 2024-07-08 PROCEDURE — 93454 CORONARY ARTERY ANGIO S&I: CPT | Performed by: INTERNAL MEDICINE

## 2024-07-08 PROCEDURE — 85610 PROTHROMBIN TIME: CPT

## 2024-07-08 PROCEDURE — 999N000134 HC STATISTIC PP CARE STAGE 3

## 2024-07-08 PROCEDURE — 80048 BASIC METABOLIC PNL TOTAL CA: CPT

## 2024-07-08 PROCEDURE — 258N000003 HC RX IP 258 OP 636

## 2024-07-08 PROCEDURE — 999N000054 HC STATISTIC EKG NON-CHARGEABLE

## 2024-07-08 PROCEDURE — 85027 COMPLETE CBC AUTOMATED: CPT

## 2024-07-08 PROCEDURE — 99152 MOD SED SAME PHYS/QHP 5/>YRS: CPT | Performed by: INTERNAL MEDICINE

## 2024-07-08 PROCEDURE — 999N000142 HC STATISTIC PROCEDURE PREP ONLY

## 2024-07-08 PROCEDURE — 85730 THROMBOPLASTIN TIME PARTIAL: CPT

## 2024-07-08 PROCEDURE — 272N000001 HC OR GENERAL SUPPLY STERILE: Performed by: INTERNAL MEDICINE

## 2024-07-08 PROCEDURE — 99152 MOD SED SAME PHYS/QHP 5/>YRS: CPT | Mod: GC | Performed by: INTERNAL MEDICINE

## 2024-07-08 PROCEDURE — 250N000011 HC RX IP 250 OP 636: Performed by: INTERNAL MEDICINE

## 2024-07-08 PROCEDURE — 250N000009 HC RX 250: Performed by: INTERNAL MEDICINE

## 2024-07-08 PROCEDURE — C1887 CATHETER, GUIDING: HCPCS | Performed by: INTERNAL MEDICINE

## 2024-07-08 PROCEDURE — 250N000013 HC RX MED GY IP 250 OP 250 PS 637

## 2024-07-08 PROCEDURE — 93454 CORONARY ARTERY ANGIO S&I: CPT | Mod: 26 | Performed by: INTERNAL MEDICINE

## 2024-07-08 PROCEDURE — C1894 INTRO/SHEATH, NON-LASER: HCPCS | Performed by: INTERNAL MEDICINE

## 2024-07-08 RX ORDER — HEPARIN SODIUM 1000 [USP'U]/ML
INJECTION, SOLUTION INTRAVENOUS; SUBCUTANEOUS
Status: DISCONTINUED | OUTPATIENT
Start: 2024-07-08 | End: 2024-07-08 | Stop reason: HOSPADM

## 2024-07-08 RX ORDER — NALOXONE HYDROCHLORIDE 0.4 MG/ML
0.2 INJECTION, SOLUTION INTRAMUSCULAR; INTRAVENOUS; SUBCUTANEOUS
Status: DISCONTINUED | OUTPATIENT
Start: 2024-07-08 | End: 2024-07-08 | Stop reason: HOSPADM

## 2024-07-08 RX ORDER — FENTANYL CITRATE 50 UG/ML
INJECTION, SOLUTION INTRAMUSCULAR; INTRAVENOUS
Status: DISCONTINUED | OUTPATIENT
Start: 2024-07-08 | End: 2024-07-08 | Stop reason: HOSPADM

## 2024-07-08 RX ORDER — ASPIRIN 81 MG/1
243 TABLET, CHEWABLE ORAL ONCE
Status: COMPLETED | OUTPATIENT
Start: 2024-07-08 | End: 2024-07-08

## 2024-07-08 RX ORDER — FLUMAZENIL 0.1 MG/ML
0.2 INJECTION, SOLUTION INTRAVENOUS
Status: DISCONTINUED | OUTPATIENT
Start: 2024-07-08 | End: 2024-07-08 | Stop reason: HOSPADM

## 2024-07-08 RX ORDER — NICARDIPINE HYDROCHLORIDE 2.5 MG/ML
INJECTION INTRAVENOUS
Status: DISCONTINUED | OUTPATIENT
Start: 2024-07-08 | End: 2024-07-08 | Stop reason: HOSPADM

## 2024-07-08 RX ORDER — ACETAMINOPHEN 325 MG/1
650 TABLET ORAL EVERY 4 HOURS PRN
Status: DISCONTINUED | OUTPATIENT
Start: 2024-07-08 | End: 2024-07-08 | Stop reason: HOSPADM

## 2024-07-08 RX ORDER — LIDOCAINE 40 MG/G
CREAM TOPICAL
Status: DISCONTINUED | OUTPATIENT
Start: 2024-07-08 | End: 2024-07-08 | Stop reason: HOSPADM

## 2024-07-08 RX ORDER — POTASSIUM CHLORIDE 750 MG/1
20 TABLET, EXTENDED RELEASE ORAL
Status: DISCONTINUED | OUTPATIENT
Start: 2024-07-08 | End: 2024-07-08 | Stop reason: HOSPADM

## 2024-07-08 RX ORDER — NALOXONE HYDROCHLORIDE 0.4 MG/ML
0.4 INJECTION, SOLUTION INTRAMUSCULAR; INTRAVENOUS; SUBCUTANEOUS
Status: DISCONTINUED | OUTPATIENT
Start: 2024-07-08 | End: 2024-07-08 | Stop reason: HOSPADM

## 2024-07-08 RX ORDER — IOPAMIDOL 755 MG/ML
INJECTION, SOLUTION INTRAVASCULAR
Status: DISCONTINUED | OUTPATIENT
Start: 2024-07-08 | End: 2024-07-08 | Stop reason: HOSPADM

## 2024-07-08 RX ORDER — ASPIRIN 325 MG
325 TABLET ORAL ONCE
Status: COMPLETED | OUTPATIENT
Start: 2024-07-08 | End: 2024-07-08

## 2024-07-08 RX ORDER — POTASSIUM CHLORIDE 750 MG/1
40 TABLET, EXTENDED RELEASE ORAL
Status: DISCONTINUED | OUTPATIENT
Start: 2024-07-08 | End: 2024-07-08 | Stop reason: HOSPADM

## 2024-07-08 RX ORDER — SODIUM CHLORIDE 9 MG/ML
INJECTION, SOLUTION INTRAVENOUS CONTINUOUS
Status: DISCONTINUED | OUTPATIENT
Start: 2024-07-08 | End: 2024-07-08 | Stop reason: HOSPADM

## 2024-07-08 RX ORDER — NITROGLYCERIN 5 MG/ML
VIAL (ML) INTRAVENOUS
Status: DISCONTINUED | OUTPATIENT
Start: 2024-07-08 | End: 2024-07-08 | Stop reason: HOSPADM

## 2024-07-08 RX ADMIN — SODIUM CHLORIDE: 9 INJECTION, SOLUTION INTRAVENOUS at 12:52

## 2024-07-08 RX ADMIN — ASPIRIN 81 MG CHEWABLE TABLET 243 MG: 81 TABLET CHEWABLE at 12:48

## 2024-07-08 ASSESSMENT — ACTIVITIES OF DAILY LIVING (ADL)
ADLS_ACUITY_SCORE: 38

## 2024-07-08 NOTE — PRE-PROCEDURE
GENERAL PRE-PROCEDURE:   Procedure:  Coronary angiogram with possible intervention  Date/Time:  7/8/2024 12:42 PM    Written consent obtained?: Yes    Risks and benefits: Risks, benefits and alternatives were discussed    DC Plan: Appropriate discharge home plan in place for patients who are going home after procedure   Consent given by:  Patient  Patient states understanding of procedure being performed: Yes    Patient's understanding of procedure matches consent: Yes    Procedure consent matches procedure scheduled: Yes    Expected level of sedation:  Moderate  Appropriately NPO:  Yes  ASA Class:  2  Mallampati  :  Grade 2- soft palate, base of uvula, tonsillar pillars, and portion of posterior pharyngeal wall visible  Lungs:  Lungs clear with good breath sounds bilaterally  Heart:  Normal heart sounds and rate  History & Physical reviewed:  History and physical reviewed and no updates needed  Statement of review:  I have reviewed the lab findings, diagnostic data, medications, and the plan for sedation

## 2024-07-08 NOTE — PRE-PROCEDURE
Consenting/Education for Cardiology Procedure: Coronary angiogram with possible intervention     Patient understands we would like to perform the listed procedure(s) due to kidney transplant work up     The patient understands the following:      The procedure was described to the patient in detail.     Moderate sedation is planned for this procedure. Patient understands risks and complications of the procedure which include but are not limited to bruising/swelling around the incision site, infection, bleeding, allergic reaction to local anesthetic, air embolism, arterial puncture, stroke, heart attack, need for emergency heart surgery, death.        Patient verbalized understanding of risks and benefits and has elected to proceed with the procedure or procedures listed above.    Elis PEREYRA CNP  Cardiology ICU  Send message Securely via [employer] with the Vocera Web Console

## 2024-07-08 NOTE — PROGRESS NOTES
Pt returned from coronary angiogram procedure.  Cardiothoracic surgery consult placed. CVTS MD Rebecca Huitron paged, states that the team is unable to see pt before he discharges tonight. Rebecca Huitron will send message to coordinators. Pt informed about plan of care and verbalized understanding.

## 2024-07-08 NOTE — PROGRESS NOTES
Pt has 1 ml remaining on TR Band. Site WNL. No bleeding or hematoma. Pulse present. VSS. BPs on pt's right leg d/t TR band placement and left arm fistula. Discharge instructions reviewed with pt. Pt verbalized understanding. Handoff report given to Cari GARRIDO RN.

## 2024-07-08 NOTE — PROGRESS NOTES
D/I/A: Pt roomed on 2A in Room 2. Arrived via litter and accompanied by CCL RN Mimi. Off monitor.  VSSA.  Rhythm upon arrival: Sinus rhythm on monitor.  Denies pain or sob. Reviewed activity restrictions and when to notify RN, ie-changes to breathing or increased chest pressure or chest pain.  CCL access:  TR band on right radial. Site WNL. No bleeding or hematoma. Pulses present. Deflation of TR band @ 1730.     P: Continue to monitor pt status and notify MD with any changes or concerns.  Discharge to home once meeting criteria.

## 2024-07-08 NOTE — DISCHARGE INSTRUCTIONS
Going Home after an Coronary Angiogram  ______________________________________________      After you go home:  Have an adult stay with you for 24 hours.  Drink plenty of fluids.  You may eat your normal diet, unless your doctor tells you otherwise.  For 24 hours:  Relax and take it easy.  Do NOT smoke.  Do NOT make any important or legal decisions.  Do NOT drive or operate machines at home or at work.  Do NOT drink alcohol.  Remove the Band-Aid after 24 hours. If there is minor oozing, apply another Band-aid and remove it after 12 hours.  For 2 days, do NOT have sex or do any heavy exercise.  Do NOT take a bath, or use a hot tub or pool for at least 3 days. You may shower.    Care of wrist or arm site  It is normal to have soreness at the puncture site and mild tingling in your hand for up to 3 days.  For 2 days, do not use your hand or arm to support your weight (such as rising from a chair) or bend your wrist (such as lifting a garage door).  For 2 days, do not lift more than 5 pounds or exercise your arm (tennis, golf or bowling).    If you start bleeding from the site in your arm:  Sit down and press firmly on the site with your fingers for 10 minutes. Call your doctor as soon as you can.  If the bleeding stops, sit still and keep your wrist straight for 2 hours.    Medicines  If you have started taking Plavix or Effient, do not stop taking it until you talk to your heart doctor (cardiologist).  If you are on metformin (Glucophage), do not restart it until you have blood tests (within 2 to 3 days after discharge). When your doctor tells you it is safe, you may restart the metformin.  If you have stopped any other medicines, check with your nurse or provider about when to restart them.    Call 911 right away if you have bleeding that is heavy or does not stop.    Call your doctor if:  You have a large or growing hard lump around the site.  The site is red, swollen, hot or tender.  Blood or fluid is draining  from the site.  You have chills or a fever greater than 101 F (38 C).  Your leg or arm feels numb or cool.  You have hives, a rash or unusual itching.    One of the coordinators from the Cardiothoracic surgery team will reach out to you.     [facility] [place]:  [phone] (7 days a week)

## 2024-07-08 NOTE — PROGRESS NOTES
Pt prepped for coronary angiogram w/possible intervention. Pt alert and oriented. VSS. Sats >92% on RA. Pt denies any pain. Aspirin 243 mg given. Fistula on left arm. Dialysis port on right chest. Radial pulses present. Bilateral groin sites prepped, pulses present and marked. NaCl infusing @ 10 ml/hr. EKG completed: SR. Labs resulted. Consent signed. Pt's wife Reina will transport pt home post procedure.

## 2024-07-09 ENCOUNTER — TELEPHONE (OUTPATIENT)
Dept: CARDIOLOGY | Facility: CLINIC | Age: 74
End: 2024-07-09
Payer: COMMERCIAL

## 2024-07-09 LAB
ATRIAL RATE - MUSE: 71 BPM
DIASTOLIC BLOOD PRESSURE - MUSE: NORMAL MMHG
INTERPRETATION ECG - MUSE: NORMAL
P AXIS - MUSE: 47 DEGREES
PR INTERVAL - MUSE: 196 MS
QRS DURATION - MUSE: 98 MS
QT - MUSE: 406 MS
QTC - MUSE: 441 MS
R AXIS - MUSE: 22 DEGREES
SYSTOLIC BLOOD PRESSURE - MUSE: NORMAL MMHG
T AXIS - MUSE: 74 DEGREES
VENTRICULAR RATE- MUSE: 71 BPM

## 2024-07-09 NOTE — TELEPHONE ENCOUNTER
This writer received a referral for CABG and spoke with patient's spouse; appointment was made for 7/15/24 with Jah Solares for consultation.

## 2024-07-09 NOTE — PROGRESS NOTES
TR band deflated and removed without issue. Site is soft and flat to palpation with no bleeding or hematoma. He has ambulated, voided, and tolerated a regular diet. He is alert and oriented x 4 and able to make needs known. PIV removed. Patient and wife both verbalized understanding of all discharge instructions, no questions asked. He declined offer of wheelchair and ambulated to main entrance with all belongings accompanied by his wife to get car from [facility] for discharge home. Dialysis tomorrow.

## 2024-07-10 NOTE — TELEPHONE ENCOUNTER
Post-Angiogram Discharge Call    How are you feeling since you got home? Do you understand your results?  Severe calcified triple vessel disease. Calcified high grade LAD/D2 bifurcation lesion, as well as two severe distal LAD lesions. Circumflex with small occluded first obtuse marginal and 70% stenosis of the proximal CX after the first OM. RCA with severe proximal stenosis. Intermediate stenoses medially and distally.   Yes - Results discussed    How is your groin/wrist/incision site? Can you please describe it?  Patient states he has no problems with his right wrist, it is flat and dry      Do you have any questions regarding your restrictions and when to resume normal activity?  No    Do you have the anti-platelet medication you were prescribed?  N/A    Any questions about the other medications you were prescribed?  N/A    Has cardiac rehab reached out to you?  N/A - PCTA not performed or not indicated    Do you have any other questions about the discharge instructions?  No    Has a follow up appointment been made within 1-2 weeks?  YES - With Dr Solares on July / 15 / 2024

## 2024-07-15 ENCOUNTER — OFFICE VISIT (OUTPATIENT)
Dept: CARDIOLOGY | Facility: CLINIC | Age: 74
End: 2024-07-15
Attending: SURGERY
Payer: COMMERCIAL

## 2024-07-15 VITALS
HEART RATE: 73 BPM | OXYGEN SATURATION: 98 % | SYSTOLIC BLOOD PRESSURE: 114 MMHG | BODY MASS INDEX: 26.04 KG/M2 | DIASTOLIC BLOOD PRESSURE: 66 MMHG | WEIGHT: 192 LBS

## 2024-07-15 DIAGNOSIS — I25.83 CORONARY ATHEROSCLEROSIS DUE TO LIPID RICH PLAQUE: Primary | ICD-10-CM

## 2024-07-15 PROCEDURE — 99211 OFF/OP EST MAY X REQ PHY/QHP: CPT | Performed by: SURGERY

## 2024-07-15 PROCEDURE — 99204 OFFICE O/P NEW MOD 45 MIN: CPT | Performed by: SURGERY

## 2024-07-15 ASSESSMENT — PAIN SCALES - GENERAL: PAINLEVEL: NO PAIN (0)

## 2024-07-15 NOTE — PATIENT INSTRUCTIONS
You were seen today in the [facility]                     Cardiothoracic Surgery Clinic    Your surgeon was MD Dr. Beck Arango recommends:    1. CABG  2. Our  will call patient on Friday to discuss surgery date options  3. Surgery will be scheduled on Wednesday or Friday  4. Pre operative tests will be done at [facility] two weeks prior to surgery       Please feel free to call me with any questions or concerns.    Henrik Friend RN Care Coordinator  Cardiothoracic Surgery Division  [facility]  [phone]

## 2024-07-15 NOTE — LETTER
"7/15/2024      RE: Bret Fleming  [address]  [place] [address]       Dear Colleague,    Thank you for the opportunity to participate in the care of your patient, Bret Fleming, at the [facility] [place] [facility]. Please see a copy of my visit note below.    HISTORY:  Bret Fleming is a 73 year old adult with a significant past history of CAD s/p \"inferolateral MI\" 1996, LV dysfunction, ESRD on HD, HTN, HLD, who presents for preoperative risk stratification prior to an AV fistula formation.  The patient was recently admitted to the hospital in September and found to have EF 30-35% with regional wall motion abnormalities on echo.  The patient notes in 1996 he had an MI and was given an \"experimental clot buster\".  He has not followed with a cardiologist longitudinally  He has been asymptomatic and functional.  He lives in a 2 story house and can make it from the basement to the second floor without any dyspnea or chest pain.  He can walk about 1 mile on flat ground.  He walks with a cane due to knee pain.          Other than noted above,   Bernadette denies any chest pain/pressure/tightness, shortness of breath at rest or with exertion, light headedness/dizziness, pre-syncope, syncope, lower extremity swelling, palpitations, paroxysmal nocturnal dyspnea (PND), or orthopnea.    MEDICATIONS:  Active Medications[]Expand by Default          Current Outpatient Medications   Medication Sig Dispense Refill     acetaminophen (TYLENOL) 500 MG tablet Take 2 tablets (1,000 mg) by mouth 3 times daily 100 tablet 0     aspirin 81 MG EC tablet Take 1 tablet (81 mg) by mouth daily 30 tablet 0     atorvastatin (LIPITOR) 40 MG tablet Take 1 tablet (40 mg) by mouth every evening 90 tablet 1     hydrALAZINE (APRESOLINE) 10 MG tablet Take 1 tablet (10 mg) by mouth 3 times daily 270 tablet 0     Lidocaine (LIDOCARE) 4 % Patch Place 1 patch onto the skin " every 24 hours To prevent lidocaine toxicity, patient should be patch free for 12 hrs daily. 30 patch 0     metoprolol succinate ER (TOPROL XL) 25 MG 24 hr tablet Take 1 tablet (25 mg) by mouth daily 90 tablet 0     multivitamin RENAL (TRIPHROCAPS) 1 capsule capsule Take 1 capsule by mouth daily 30 capsule 0     nitroGLYcerin (NITROSTAT) 0.4 MG sublingual tablet For chest pain place 1 tablet under the tongue every 5 minutes for 3 doses. If symptoms persist 5 minutes after 1st dose call 911. 25 tablet 0     pantoprazole (PROTONIX) 40 MG EC tablet Take 1 tablet (40 mg) by mouth 2 times daily (before meals) 60 tablet 0         ALLERGIES:  Allergies[]Expand by Default         Allergies   Allergen Reactions     Ciprofloxacin Rash     Other Drug Allergy (See Comments)         Cough Syrup Abstracted from [facility] Chart( Hydrobromide)       Family History:   Family History         Family History   Problem Relation Age of Onset     Cancer Father       Cancer Paternal Grandfather       Diabetes Paternal Grandfather       Hypertension No family hx of       Cerebrovascular Disease No family hx of       Thyroid Disease No family hx of       Glaucoma No family hx of       Macular Degeneration No family hx of              Social History:   Social History[]Expand by Default   Social History            Socioeconomic History     Marital status:        Spouse name: Not on file     Number of children: Not on file     Years of education: Not on file     Highest education level: Not on file   Occupational History     Not on file   Tobacco Use     Smoking status: Former       Current packs/day: 0.00       Types: Cigarettes       Quit date: 1995       Years since quittin.9     Smokeless tobacco: Never   Substance and Sexual Activity     Alcohol use: Yes       Comment: 1 per week     Drug use: Never     Sexual activity: Not on file   Other Topics Concern     Not on file   Social History Narrative     Not on file      Social  Determinants of Health           Financial Resource Strain: Low Risk  (11/17/2023)     Financial Resource Strain       Within the past 12 months, have you or your family members you live with been unable to get utilities (heat, electricity) when it was really needed?: No   Food Insecurity: Low Risk  (11/17/2023)     Food Insecurity       Within the past 12 months, did you worry that your food would run out before you got money to buy more?: No       Within the past 12 months, did the food you bought just not last and you didn t have money to get more?: No   Transportation Needs: Low Risk  (11/17/2023)     Transportation Needs       Within the past 12 months, has lack of transportation kept you from medical appointments, getting your medicines, non-medical meetings or appointments, work, or from getting things that you need?: No   Physical Activity: Not on file   Stress: Not on file   Social Connections: Not on file   Interpersonal Safety: Low Risk  (4/10/2024)     Interpersonal Safety       Do you feel physically and emotionally safe where you currently live?: Yes       Within the past 12 months, have you been hit, slapped, kicked or otherwise physically hurt by someone?: No       Within the past 12 months, have you been humiliated or emotionally abused in other ways by your partner or ex-partner?: No   Housing Stability: Low Risk  (11/17/2023)     Housing Stability       Do you have housing? : Yes       Are you worried about losing your housing?: No               ROS:  Please refer above for cardiac ROS details.     Physical Examination    Vitals: /60 (BP Location: Right arm, Patient Position: Sitting, Cuff Size: Adult Regular)   Pulse 77   Resp 16   Ht 1.829 m (6')   Wt 86.2 kg (190 lb)   BMI 25.77 kg/m    BMI= Body mass index is 25.77 kg/m .      Wt Readings from Last 3 Encounters:   04/17/24 86.2 kg (190 lb)   04/10/24 86.2 kg (190 lb)   02/05/24 86.2 kg (190 lb)         General: pleasant adult. No  acute distress.   Neck: No JVD  Lungs: clear to auscultation  COR:  regular rate and rhythm, No murmurs, rubs, or gallops  Extrem: No edema          Lab Results     Chemistry/lipid CBC Cardiac Enzymes/BNP/TSH/INR         Lab Results   Component Value Date     CHOL 105 11/17/2023     HDL 39 (L) 11/17/2023     TRIG 74 11/17/2023     BUN 42.1 (H) 04/10/2024      04/10/2024     CO2 25 04/10/2024          Lab Results   Component Value Date     WBC 6.1 04/10/2024     HGB 12.8 04/10/2024     HCT 40.7 04/10/2024      (H) 04/10/2024      (L) 04/10/2024          Lab Results   Component Value Date     TROPONINI <0.01 08/11/2019     TSH 2.70 09/27/2023     INR 1.18 (H) 10/05/2023            Cardiac Problems and Cardiac Diagnostics      Most Recent Cardiac testing:  ECG Personal interpretation  4/10/2024  NSR  Inferior Q waves lateral TWI     ECHO 9/28/2023  Interpretation Summary     The left ventricle is borderline dilated.  The visual ejection fraction is 30-35%.  Large area of hypokinesis and akinesis distal 2/3 septum/apex/infero-apex/lat  walls. Some wall thinning. Suspect large LAD infarct pattern but cannot  exclude stress cardiomyopathy  There is moderate (2+) mitral regurgitation.  Right ventricular systolic pressure is elevated, consistent with mild to  moderate pulmonary hypertension.  The rhythm was sinus tachycardia.   Interpretation Summary  Biplane LVEF is 42%.  Right ventricular function, chamber size, wall motion, and thickness are  normal.  Pulmonary artery systolic pressure is normal.  The inferior vena cava is normal.  No pericardial effusion is present.  ______________________________________________________________________________  Left Ventricle  Biplane LVEF is 42%. No regional wall motion abnormalities are seen.     Right Ventricle  Right ventricular function, chamber size, wall motion, and thickness are  normal.     Mitral Valve  Mild mitral insufficiency is present.     Tricuspid  Valve  Trace tricuspid insufficiency is present. The right ventricular systolic  pressure is approximated at 22.6 mmHg plus the right atrial pressure.  Pulmonary artery systolic pressure is normal.     Vessels  The inferior vena cava is normal.     Pericardium  No pericardial effusion is present.  ______________________________________________________________________________  MMode/2D Measurements & Calculations  IVSd: 1.2 cm  LVIDd: 5.5 cm  LVIDs: 4.1 cm  LVPWd: 1.00 cm  FS: 25.9 %  LV mass(C)d: 249.6 grams  LV mass(C)dI: 119.5 grams/m2  asc Aorta Diam: 3.7 cm  Asc Ao diam index BSA (cm/m2): 1.8  Asc Ao diam index Ht(cm/m): 2.0  EF Biplane: 41.7 %  RV Base: 4.7 cm  RWT: 0.36  TAPSE: 1.8 cm     Doppler Measurements & Calculations  MR PISA: 2.2 cm2  MR ERO: 0.13 cm2  MR volume: 26.2 ml  TR max sae: 237.5 cm/sec  TR max P.6 mmHg  RV S Sae: 12.1 cm/sec     ______________________________________________________________________________  Report approved by: Ariel Pillai 2024 02:24 PM  Coronary Findings    Diagnostic  Dominance: Right  Left Anterior Descending   Prox LAD to Mid LAD lesion is 85% stenosed. The lesion is type C - high risk, located at the bifurcation and bifurcated. Smith Classification for bifurcation: 1,1,1. The lesion is calcified.   Mid LAD lesion is 80% stenosed. The lesion is type C - high risk. The lesion is calcified.   Dist LAD lesion is 90% stenosed. The lesion is type B2 - medium risk. The lesion is calcified.      First Diagonal Branch   There is severe diffuse disease throughout the vessel.   1st Diag lesion is 80% stenosed. The lesion is type C - high risk.      Left Circumflex   The vessel is large.   Prox Cx to Mid Cx lesion is 70% stenosed. The lesion is type B2 - medium risk. The lesion is calcified.      First Obtuse Marginal Branch   The vessel is small. There is moderate diffuse disease throughout the vessel.   1st Mrg lesion is 100% stenosed. The lesion is chronic total  "occlusion.      Second Obtuse Marginal Branch   The vessel is large. There is severe diffuse disease throughout the vessel.   Collaterals   2nd Mrg filled by collaterals from 1st Mrg.         Right Coronary Artery   Prox RCA-1 lesion is 80% stenosed. The lesion is type B2 - medium risk. The lesion is calcified.   Prox RCA-2 lesion is 50% stenosed. The lesion is type B2 - medium risk. The lesion is calcified.   Dist RCA lesion is 60% stenosed. The lesion is type B2 - medium risk.         Intervention     No interventions have been documented.     Summary    Detailed    Evaluation before possible renal transplant     Pressures Phase: Baseline     Time Systolic (mmHg) Diastolic (mmHg) Mean (mmHg) A Wave (mmHg) V Wave (mmHg) EDP (mmHg) Max dp/dt (mmHg/sec) HR (bpm) Content (mL/dL) SAT (%)   AO Pressures  4:13     53    75        72          Hemodynamic Waveforms -- Encounter Level:    Hemodynamic Waveforms: None found at the encounter l    Assessment:    Bret Fleming is a 73 year old adult with a significant past history of CAD s/p \"inferolateral MI\" 1996, LV dysfunction, ESRD on HD, HTN, HLD, who presents for evaluation for CABG. I agree with this recommendation.  I discussed the risks and benefits of CABG including the risks of death, bleeding, stroke, infection and arrhythmias. He understands and is willing to proceed with surgery.       Please do not hesitate to contact me if you have any questions/concerns.     Sincerely,     Jah Solares MD  "

## 2024-07-15 NOTE — NURSING NOTE
Chief Complaint   Patient presents with    New Patient     NEW CV SURGERY - CABG   Vitals were taken and medications were reconciled.    Kalin Frye, Visit Facilitator Clinic  3:35 PM

## 2024-07-18 NOTE — PROGRESS NOTES
"HISTORY:  Bret Fleming is a 73 year old adult with a significant past history of CAD s/p \"inferolateral MI\" 1996, LV dysfunction, ESRD on HD, HTN, HLD, who presents for preoperative risk stratification prior to an AV fistula formation.  The patient was recently admitted to the hospital in September and found to have EF 30-35% with regional wall motion abnormalities on echo.  The patient notes in 1996 he had an MI and was given an \"experimental clot buster\".  He has not followed with a cardiologist longitudinally  He has been asymptomatic and functional.  He lives in a 2 story house and can make it from the basement to the second floor without any dyspnea or chest pain.  He can walk about 1 mile on flat ground.  He walks with a cane due to knee pain.          Other than noted above,   Bernadette denies any chest pain/pressure/tightness, shortness of breath at rest or with exertion, light headedness/dizziness, pre-syncope, syncope, lower extremity swelling, palpitations, paroxysmal nocturnal dyspnea (PND), or orthopnea.    MEDICATIONS:  Active Medications[]Expand by Default          Current Outpatient Medications   Medication Sig Dispense Refill    acetaminophen (TYLENOL) 500 MG tablet Take 2 tablets (1,000 mg) by mouth 3 times daily 100 tablet 0    aspirin 81 MG EC tablet Take 1 tablet (81 mg) by mouth daily 30 tablet 0    atorvastatin (LIPITOR) 40 MG tablet Take 1 tablet (40 mg) by mouth every evening 90 tablet 1    hydrALAZINE (APRESOLINE) 10 MG tablet Take 1 tablet (10 mg) by mouth 3 times daily 270 tablet 0    Lidocaine (LIDOCARE) 4 % Patch Place 1 patch onto the skin every 24 hours To prevent lidocaine toxicity, patient should be patch free for 12 hrs daily. 30 patch 0    metoprolol succinate ER (TOPROL XL) 25 MG 24 hr tablet Take 1 tablet (25 mg) by mouth daily 90 tablet 0    multivitamin RENAL (TRIPHROCAPS) 1 capsule capsule Take 1 capsule by mouth daily 30 capsule 0    nitroGLYcerin (NITROSTAT) 0.4 MG " sublingual tablet For chest pain place 1 tablet under the tongue every 5 minutes for 3 doses. If symptoms persist 5 minutes after 1st dose call 911. 25 tablet 0    pantoprazole (PROTONIX) 40 MG EC tablet Take 1 tablet (40 mg) by mouth 2 times daily (before meals) 60 tablet 0         ALLERGIES:  Allergies[]Expand by Default         Allergies   Allergen Reactions    Ciprofloxacin Rash    Other Drug Allergy (See Comments)         Cough Syrup Abstracted from [facility] Chart( Hydrobromide)       Family History:   Family History         Family History   Problem Relation Age of Onset    Cancer Father      Cancer Paternal Grandfather      Diabetes Paternal Grandfather      Hypertension No family hx of      Cerebrovascular Disease No family hx of      Thyroid Disease No family hx of      Glaucoma No family hx of      Macular Degeneration No family hx of              Social History:   Social History[]Expand by Default   Social History            Socioeconomic History    Marital status:        Spouse name: Not on file    Number of children: Not on file    Years of education: Not on file    Highest education level: Not on file   Occupational History    Not on file   Tobacco Use    Smoking status: Former       Current packs/day: 0.00       Types: Cigarettes       Quit date: 1995       Years since quittin.9    Smokeless tobacco: Never   Substance and Sexual Activity    Alcohol use: Yes       Comment: 1 per week    Drug use: Never    Sexual activity: Not on file   Other Topics Concern    Not on file   Social History Narrative    Not on file      Social Determinants of Health           Financial Resource Strain: Low Risk  (2023)     Financial Resource Strain      Within the past 12 months, have you or your family members you live with been unable to get utilities (heat, electricity) when it was really needed?: No   Food Insecurity: Low Risk  (2023)     Food Insecurity      Within the past 12 months, did  you worry that your food would run out before you got money to buy more?: No      Within the past 12 months, did the food you bought just not last and you didn t have money to get more?: No   Transportation Needs: Low Risk  (11/17/2023)     Transportation Needs      Within the past 12 months, has lack of transportation kept you from medical appointments, getting your medicines, non-medical meetings or appointments, work, or from getting things that you need?: No   Physical Activity: Not on file   Stress: Not on file   Social Connections: Not on file   Interpersonal Safety: Low Risk  (4/10/2024)     Interpersonal Safety      Do you feel physically and emotionally safe where you currently live?: Yes      Within the past 12 months, have you been hit, slapped, kicked or otherwise physically hurt by someone?: No      Within the past 12 months, have you been humiliated or emotionally abused in other ways by your partner or ex-partner?: No   Housing Stability: Low Risk  (11/17/2023)     Housing Stability      Do you have housing? : Yes      Are you worried about losing your housing?: No               ROS:  Please refer above for cardiac ROS details.     Physical Examination    Vitals: /60 (BP Location: Right arm, Patient Position: Sitting, Cuff Size: Adult Regular)   Pulse 77   Resp 16   Ht 1.829 m (6')   Wt 86.2 kg (190 lb)   BMI 25.77 kg/m    BMI= Body mass index is 25.77 kg/m .      Wt Readings from Last 3 Encounters:   04/17/24 86.2 kg (190 lb)   04/10/24 86.2 kg (190 lb)   02/05/24 86.2 kg (190 lb)         General: pleasant adult. No acute distress.   Neck: No JVD  Lungs: clear to auscultation  COR:  regular rate and rhythm, No murmurs, rubs, or gallops  Extrem: No edema          Lab Results     Chemistry/lipid CBC Cardiac Enzymes/BNP/TSH/INR         Lab Results   Component Value Date     CHOL 105 11/17/2023     HDL 39 (L) 11/17/2023     TRIG 74 11/17/2023     BUN 42.1 (H) 04/10/2024      04/10/2024      CO2 25 04/10/2024          Lab Results   Component Value Date     WBC 6.1 04/10/2024     HGB 12.8 04/10/2024     HCT 40.7 04/10/2024      (H) 04/10/2024      (L) 04/10/2024          Lab Results   Component Value Date     TROPONINI <0.01 08/11/2019     TSH 2.70 09/27/2023     INR 1.18 (H) 10/05/2023            Cardiac Problems and Cardiac Diagnostics      Most Recent Cardiac testing:  ECG Personal interpretation  4/10/2024  NSR  Inferior Q waves lateral TWI     ECHO 9/28/2023  Interpretation Summary     The left ventricle is borderline dilated.  The visual ejection fraction is 30-35%.  Large area of hypokinesis and akinesis distal 2/3 septum/apex/infero-apex/lat  walls. Some wall thinning. Suspect large LAD infarct pattern but cannot  exclude stress cardiomyopathy  There is moderate (2+) mitral regurgitation.  Right ventricular systolic pressure is elevated, consistent with mild to  moderate pulmonary hypertension.  The rhythm was sinus tachycardia.   Interpretation Summary  Biplane LVEF is 42%.  Right ventricular function, chamber size, wall motion, and thickness are  normal.  Pulmonary artery systolic pressure is normal.  The inferior vena cava is normal.  No pericardial effusion is present.  ______________________________________________________________________________  Left Ventricle  Biplane LVEF is 42%. No regional wall motion abnormalities are seen.     Right Ventricle  Right ventricular function, chamber size, wall motion, and thickness are  normal.     Mitral Valve  Mild mitral insufficiency is present.     Tricuspid Valve  Trace tricuspid insufficiency is present. The right ventricular systolic  pressure is approximated at 22.6 mmHg plus the right atrial pressure.  Pulmonary artery systolic pressure is normal.     Vessels  The inferior vena cava is normal.     Pericardium  No pericardial effusion is  present.  ______________________________________________________________________________  MMode/2D Measurements & Calculations  IVSd: 1.2 cm  LVIDd: 5.5 cm  LVIDs: 4.1 cm  LVPWd: 1.00 cm  FS: 25.9 %  LV mass(C)d: 249.6 grams  LV mass(C)dI: 119.5 grams/m2  asc Aorta Diam: 3.7 cm  Asc Ao diam index BSA (cm/m2): 1.8  Asc Ao diam index Ht(cm/m): 2.0  EF Biplane: 41.7 %  RV Base: 4.7 cm  RWT: 0.36  TAPSE: 1.8 cm     Doppler Measurements & Calculations  MR PISA: 2.2 cm2  MR ERO: 0.13 cm2  MR volume: 26.2 ml  TR max sae: 237.5 cm/sec  TR max P.6 mmHg  RV S Sae: 12.1 cm/sec     ______________________________________________________________________________  Report approved by: Ariel Pillai 2024 02:24 PM  Coronary Findings    Diagnostic  Dominance: Right  Left Anterior Descending   Prox LAD to Mid LAD lesion is 85% stenosed. The lesion is type C - high risk, located at the bifurcation and bifurcated. Smith Classification for bifurcation: 1,1,1. The lesion is calcified.   Mid LAD lesion is 80% stenosed. The lesion is type C - high risk. The lesion is calcified.   Dist LAD lesion is 90% stenosed. The lesion is type B2 - medium risk. The lesion is calcified.      First Diagonal Branch   There is severe diffuse disease throughout the vessel.   1st Diag lesion is 80% stenosed. The lesion is type C - high risk.      Left Circumflex   The vessel is large.   Prox Cx to Mid Cx lesion is 70% stenosed. The lesion is type B2 - medium risk. The lesion is calcified.      First Obtuse Marginal Branch   The vessel is small. There is moderate diffuse disease throughout the vessel.   1st Mrg lesion is 100% stenosed. The lesion is chronic total occlusion.      Second Obtuse Marginal Branch   The vessel is large. There is severe diffuse disease throughout the vessel.   Collaterals   2nd Mrg filled by collaterals from 1st Mrg.         Right Coronary Artery   Prox RCA-1 lesion is 80% stenosed. The lesion is type B2 - medium risk.  "The lesion is calcified.   Prox RCA-2 lesion is 50% stenosed. The lesion is type B2 - medium risk. The lesion is calcified.   Dist RCA lesion is 60% stenosed. The lesion is type B2 - medium risk.         Intervention     No interventions have been documented.     Summary    Detailed    Evaluation before possible renal transplant     Pressures Phase: Baseline     Time Systolic (mmHg) Diastolic (mmHg) Mean (mmHg) A Wave (mmHg) V Wave (mmHg) EDP (mmHg) Max dp/dt (mmHg/sec) HR (bpm) Content (mL/dL) SAT (%)   AO Pressures  4:13     53    75        72          Hemodynamic Waveforms -- Encounter Level:    Hemodynamic Waveforms: None found at the encounter l    Assessment:    Bret Fleming is a 73 year old adult with a significant past history of CAD s/p \"inferolateral MI\" 1996, LV dysfunction, ESRD on HD, HTN, HLD, who presents for evaluation for CABG. I agree with this recommendation.  I discussed the risks and benefits of CABG including the risks of death, bleeding, stroke, infection and arrhythmias. He understands and is willing to proceed with surgery.     "

## 2024-07-19 ENCOUNTER — TELEPHONE (OUTPATIENT)
Dept: CARDIOLOGY | Facility: CLINIC | Age: 74
End: 2024-07-19
Payer: COMMERCIAL

## 2024-07-19 ENCOUNTER — PREP FOR PROCEDURE (OUTPATIENT)
Dept: CARDIOLOGY | Facility: CLINIC | Age: 74
End: 2024-07-19
Payer: COMMERCIAL

## 2024-07-19 DIAGNOSIS — I25.10 CAD (CORONARY ARTERY DISEASE): Primary | ICD-10-CM

## 2024-07-19 DIAGNOSIS — Z01.810 ENCOUNTER FOR PREPROCEDURAL CARDIOVASCULAR EXAMINATION: ICD-10-CM

## 2024-07-19 DIAGNOSIS — R09.89 OTHER SPECIFIED SYMPTOMS AND SIGNS INVOLVING THE CIRCULATORY AND RESPIRATORY SYSTEMS: ICD-10-CM

## 2024-07-19 DIAGNOSIS — R79.9 ABNORMAL FINDING OF BLOOD CHEMISTRY, UNSPECIFIED: ICD-10-CM

## 2024-07-19 DIAGNOSIS — I99.8 OTHER DISORDER OF CIRCULATORY SYSTEM: ICD-10-CM

## 2024-07-19 NOTE — TELEPHONE ENCOUNTER
Per task, pt needs to schedule surgery with Dr. Solares. Talked with pt and scheduled surgery for 8/16. Will call if anything changes

## 2024-07-22 ENCOUNTER — TELEPHONE (OUTPATIENT)
Dept: CARDIOLOGY | Facility: CLINIC | Age: 74
End: 2024-07-22
Payer: COMMERCIAL

## 2024-07-22 NOTE — TELEPHONE ENCOUNTER
Scheduled pt for pre op appts. Spoke with pt and went over times and location. Pt agreed to the plan

## 2024-07-23 ENCOUNTER — TELEPHONE (OUTPATIENT)
Dept: CARDIOLOGY | Facility: CLINIC | Age: 74
End: 2024-07-23
Payer: COMMERCIAL

## 2024-07-23 NOTE — TELEPHONE ENCOUNTER
FUTURE VISIT INFORMATION      SURGERY INFORMATION:  Date: 24  Location: [facility]  Surgeon:  Jah Solares MD   Anesthesia Type:  general  Procedure: CORONARY ARTERY BYPASS GRAFT AND ANY ASSOCIATED PROCEDURES   Consult: ov 7/15/24    RECORDS REQUESTED FROM:       Primary Care Provider: Alisha Gloria MD - [facility]    Pertinent Medical History: NSTEMI, coronary atherosclerosis, chronic systolic congestive heart failure     Most recent EKG+ Tracin24    Most recent ECHO: 24    Most recent Coronary Angiogram: 24

## 2024-07-26 NOTE — TELEPHONE ENCOUNTER
Due to a change in the providers schedule, pts 8/16 surgery needs to be rescheduled. Talked with pt and explained the reason for the reschedule, offered pt a new date 8/19. Ok with change, will call if anything changes

## 2024-08-02 ENCOUNTER — TELEPHONE (OUTPATIENT)
Dept: CARDIOLOGY | Facility: CLINIC | Age: 74
End: 2024-08-02
Payer: COMMERCIAL

## 2024-08-02 NOTE — TELEPHONE ENCOUNTER
This writer spoke with patient and confirmed surgery date of 8/21/24. Patient was moved to Wednesday so he can have his dialysis on Tuesday, day prior to surgery. Patient verbalized understanding and agreed to the plan.

## 2024-08-08 LAB
ABO/RH(D): NORMAL
ANTIBODY SCREEN: NEGATIVE
SPECIMEN EXPIRATION DATE: NORMAL

## 2024-08-09 ENCOUNTER — ALLIED HEALTH/NURSE VISIT (OUTPATIENT)
Dept: RESEARCH | Facility: CLINIC | Age: 74
End: 2024-08-09

## 2024-08-09 ENCOUNTER — ANESTHESIA EVENT (OUTPATIENT)
Dept: SURGERY | Facility: CLINIC | Age: 74
DRG: 235 | End: 2024-08-09
Payer: COMMERCIAL

## 2024-08-09 ENCOUNTER — ANCILLARY PROCEDURE (OUTPATIENT)
Dept: CT IMAGING | Facility: CLINIC | Age: 74
End: 2024-08-09
Attending: SURGERY
Payer: COMMERCIAL

## 2024-08-09 ENCOUNTER — OFFICE VISIT (OUTPATIENT)
Dept: SURGERY | Facility: CLINIC | Age: 74
End: 2024-08-09
Payer: COMMERCIAL

## 2024-08-09 ENCOUNTER — LAB (OUTPATIENT)
Dept: LAB | Facility: CLINIC | Age: 74
End: 2024-08-09
Payer: COMMERCIAL

## 2024-08-09 ENCOUNTER — ANCILLARY PROCEDURE (OUTPATIENT)
Dept: ULTRASOUND IMAGING | Facility: CLINIC | Age: 74
End: 2024-08-09
Attending: SURGERY
Payer: COMMERCIAL

## 2024-08-09 ENCOUNTER — PRE VISIT (OUTPATIENT)
Dept: SURGERY | Facility: CLINIC | Age: 74
End: 2024-08-09

## 2024-08-09 VITALS
SYSTOLIC BLOOD PRESSURE: 104 MMHG | HEIGHT: 72 IN | HEART RATE: 73 BPM | OXYGEN SATURATION: 98 % | WEIGHT: 191 LBS | TEMPERATURE: 98 F | DIASTOLIC BLOOD PRESSURE: 64 MMHG | BODY MASS INDEX: 25.87 KG/M2

## 2024-08-09 DIAGNOSIS — I25.10 CAD (CORONARY ARTERY DISEASE): ICD-10-CM

## 2024-08-09 DIAGNOSIS — Z01.818 PREOP EXAMINATION: Primary | ICD-10-CM

## 2024-08-09 DIAGNOSIS — Z01.810 ENCOUNTER FOR PREPROCEDURAL CARDIOVASCULAR EXAMINATION: ICD-10-CM

## 2024-08-09 DIAGNOSIS — I25.10 ATHEROSCLEROSIS OF NATIVE CORONARY ARTERY OF NATIVE HEART WITHOUT ANGINA PECTORIS: ICD-10-CM

## 2024-08-09 DIAGNOSIS — Z00.6 EXAMINATION OF PARTICIPANT OR CONTROL IN CLINICAL RESEARCH: Primary | ICD-10-CM

## 2024-08-09 DIAGNOSIS — I99.8 OTHER DISORDER OF CIRCULATORY SYSTEM: ICD-10-CM

## 2024-08-09 DIAGNOSIS — R79.9 ABNORMAL FINDING OF BLOOD CHEMISTRY, UNSPECIFIED: ICD-10-CM

## 2024-08-09 DIAGNOSIS — R09.89 OTHER SPECIFIED SYMPTOMS AND SIGNS INVOLVING THE CIRCULATORY AND RESPIRATORY SYSTEMS: ICD-10-CM

## 2024-08-09 LAB
ALBUMIN SERPL BCG-MCNC: 4.3 G/DL (ref 3.5–5.2)
ALBUMIN UR-MCNC: 30 MG/DL
ALP SERPL-CCNC: 79 U/L (ref 40–150)
ALT SERPL W P-5'-P-CCNC: 14 U/L (ref 0–70)
ANION GAP SERPL CALCULATED.3IONS-SCNC: 12 MMOL/L (ref 7–15)
APPEARANCE UR: ABNORMAL
APTT PPP: 36 SECONDS (ref 22–38)
AST SERPL W P-5'-P-CCNC: 20 U/L (ref 0–45)
BILIRUB SERPL-MCNC: 0.4 MG/DL
BILIRUB UR QL STRIP: NEGATIVE
BUN SERPL-MCNC: 47.8 MG/DL (ref 8–23)
CALCIUM SERPL-MCNC: 9.2 MG/DL (ref 8.8–10.4)
CHLORIDE SERPL-SCNC: 102 MMOL/L (ref 98–107)
COLOR UR AUTO: YELLOW
CREAT SERPL-MCNC: 4.5 MG/DL (ref 0.51–1.17)
EGFRCR SERPLBLD CKD-EPI 2021: 13 ML/MIN/1.73M2
ERYTHROCYTE [DISTWIDTH] IN BLOOD BY AUTOMATED COUNT: 13.2 % (ref 10–15)
GLUCOSE SERPL-MCNC: 95 MG/DL (ref 70–99)
GLUCOSE UR STRIP-MCNC: NEGATIVE MG/DL
HBA1C MFR BLD: <4.2 %
HCO3 SERPL-SCNC: 25 MMOL/L (ref 22–29)
HCT VFR BLD AUTO: 36.5 % (ref 35–53)
HGB BLD-MCNC: 11.9 G/DL (ref 13.3–17.7)
HGB UR QL STRIP: ABNORMAL
INR PPP: 1.17 (ref 0.85–1.15)
KETONES UR STRIP-MCNC: NEGATIVE MG/DL
LEUKOCYTE ESTERASE UR QL STRIP: ABNORMAL
MCH RBC QN AUTO: 34.7 PG (ref 26.5–33)
MCHC RBC AUTO-ENTMCNC: 32.6 G/DL (ref 31.5–36.5)
MCV RBC AUTO: 106 FL (ref 78–100)
NITRATE UR QL: NEGATIVE
PH UR STRIP: 7.5 [PH] (ref 5–7)
PLATELET # BLD AUTO: 126 10E3/UL (ref 150–450)
POTASSIUM SERPL-SCNC: 4.9 MMOL/L (ref 3.4–5.3)
PREALB SERPL-MCNC: 28.4 MG/DL (ref 20–40)
PROT SERPL-MCNC: 6.8 G/DL (ref 6.4–8.3)
RBC # BLD AUTO: 3.43 10E6/UL (ref 3.8–5.9)
RBC URINE: 66 /HPF
SODIUM SERPL-SCNC: 139 MMOL/L (ref 135–145)
SP GR UR STRIP: 1.01 (ref 1–1.03)
SQUAMOUS EPITHELIAL: 2 /HPF
UROBILINOGEN UR STRIP-MCNC: NORMAL MG/DL
WBC # BLD AUTO: 4.9 10E3/UL (ref 4–11)
WBC URINE: >182 /HPF

## 2024-08-09 PROCEDURE — 85027 COMPLETE CBC AUTOMATED: CPT | Performed by: PATHOLOGY

## 2024-08-09 PROCEDURE — 36415 COLL VENOUS BLD VENIPUNCTURE: CPT | Performed by: PATHOLOGY

## 2024-08-09 PROCEDURE — 93970 EXTREMITY STUDY: CPT | Performed by: RADIOLOGY

## 2024-08-09 PROCEDURE — 99000 SPECIMEN HANDLING OFFICE-LAB: CPT | Performed by: PATHOLOGY

## 2024-08-09 PROCEDURE — 80053 COMPREHEN METABOLIC PANEL: CPT | Performed by: PATHOLOGY

## 2024-08-09 PROCEDURE — 93000 ELECTROCARDIOGRAM COMPLETE: CPT | Performed by: INTERNAL MEDICINE

## 2024-08-09 PROCEDURE — 86900 BLOOD TYPING SEROLOGIC ABO: CPT

## 2024-08-09 PROCEDURE — 71250 CT THORAX DX C-: CPT | Mod: GC | Performed by: RADIOLOGY

## 2024-08-09 PROCEDURE — 87086 URINE CULTURE/COLONY COUNT: CPT | Performed by: SURGERY

## 2024-08-09 PROCEDURE — 84134 ASSAY OF PREALBUMIN: CPT | Performed by: SURGERY

## 2024-08-09 PROCEDURE — 85610 PROTHROMBIN TIME: CPT | Performed by: PATHOLOGY

## 2024-08-09 PROCEDURE — 99204 OFFICE O/P NEW MOD 45 MIN: CPT | Performed by: CLINICAL NURSE SPECIALIST

## 2024-08-09 PROCEDURE — 93880 EXTRACRANIAL BILAT STUDY: CPT | Performed by: RADIOLOGY

## 2024-08-09 PROCEDURE — 85730 THROMBOPLASTIN TIME PARTIAL: CPT | Performed by: PATHOLOGY

## 2024-08-09 PROCEDURE — 81001 URINALYSIS AUTO W/SCOPE: CPT | Performed by: PATHOLOGY

## 2024-08-09 PROCEDURE — 83036 HEMOGLOBIN GLYCOSYLATED A1C: CPT | Performed by: SURGERY

## 2024-08-09 ASSESSMENT — LIFESTYLE VARIABLES: TOBACCO_USE: 0

## 2024-08-09 ASSESSMENT — ENCOUNTER SYMPTOMS
SEIZURES: 0
DYSRHYTHMIAS: 0

## 2024-08-09 ASSESSMENT — PAIN SCALES - GENERAL: PAINLEVEL: MILD PAIN (2)

## 2024-08-09 NOTE — PROGRESS NOTES
Surgery Clinical Trials Office Informed Consent Process Documentation  Proteomics of Postoperative Complications and Near-Term Adverse Outcomes in Patients Undergoing Coronary Artery Bypass Graft Surgery  IRB#[id number]    ICF Version Date 05/16/2024  IRB Approval Date: 05/22/2024     Date of Consent : 08/09/24    The subject was screened and meets all of the inclusion criteria and none of the exclusion criteria is met.This note is documenting that the patient was contacted by the study team and consented to the study.    The subject was told:  -that the study involves research   -the purpose of the research study  -the expected duration of the study and the approximate number of subject sought  -of procedures that are identified as experimental  -of reasonably foreseeable risks or discomforts to the subject  -of any benefits to the subject or others that may be expected from the research  -of alternative procedures and/or treatment  -how the confidentiality of records would be maintained  -whether or not compensation and medical treatments are available should injury occur as a result of the study  -who to contact if they have questions related to the research study or questions regarding research subjects' rights  -that participation is completely voluntary and that their decision to or not to participate will have no impact on their relationships with the N and the staff    No study procedures were completed prior to the consent being obtained.  The use of historical information (lab or assessments) used for the purpose of the study was approved by subject.  The subject was fully aware that we would be reviewing their medical record for the study.  The subject demonstrated an understanding of what the study involved.  Specifically, how this study differed from standard of care at our center and what was required of the subject as part of the study.  The subject reviewed the consent form and was given the  opportunity to ask questions before signing.  Questions and concerns were answered by the study staff and/or study physician.    A copy of the signed informed consent document was offered to the subject:  [x] Yes [] No     The consent required the use of a :   [] Yes [x] No     A 'short form' consent was used:     [] Yes [x] No   If yes, provide name of witness:     This subject was previously mailed a consent form and contacted by the research team via phone:  [] Yes [x] No     An eConsent was used: [] Yes [x] No     Questions to Evaluate Subject Comprehension of Study:     Question: Adequate Response? If No, explain what actions were taken   What is being studied? [x] Yes  [] No   If you participate, what will be different than if you decide not to participate?  [x] Yes  [] No   How long will the study last; will you be required to return for visits? [x] Yes  [] No   What kinds of risks are there? [x] Yes  [] No      Do you understand that you can withdraw consent at any time and for any reason while participating in the study? [x] Yes  [] No      Study Coordinators:  Payam Smallwood  [phone]  [email or web address]  Lorraine Vences  [phone]  [email or web address]     : Jose C Cage        [phone]     [email or web address]   PI: Juan Pablo Jennings, PhD, [facility] [phone]  [email or web address]    Note:       Sign: Payam Smallwood on 8/9/2024 at 1:03 PM

## 2024-08-09 NOTE — PATIENT INSTRUCTIONS
Preparing for Your Surgery      Name:  Bret Fleming   MRN:  [id number]   :  1950   Today's Date:  2024       Arriving for surgery:  Surgery date:  24  Arrival time:  5:00 am  Surgery time: 7:30 am    Please come to:     Please come to:       [facility]    [address] [place]  [address]     The [facility] Patient/Visitor Ramp is at [address]. Patients and visitors who self-park will receive the reduced hospital parking rate. If the Patient /Visitor Ramp is full, please follow the signs to the [employer] car park located at the main hospital entrance.       parking is available (24 hours/ 7 days a week)      Discounted parking pass options are available for patients and visitors. They can be purchased at the [employer] desk at the main hospital entrance.     -    Stop at the security desk and they will direct surgery patients to the Surgery [facility]. [phone]        - If you need directions, a wheelchair or an escort please stop at the Information/security desk in the lobby.     What can I eat or drink?  -  You may eat and drink normally up to 8 hours prior to arrival time. (Until 9:00 pm on 24)  -  You may have clear liquids until 2 hours prior to arrival time. (Until 3:00 am on 24)    Examples of clear liquids:  Water  Clear broth  Juices (apple, white grape, white cranberry  and cider) without pulp  Noncarbonated, powder based beverages  (lemonade and Gilles-Aid)  Sodas (Sprite, 7-Up, ginger ale and seltzer)  Coffee or tea (without milk or cream)  Gatorade    -  No Alcohol or cannabis products for at least 24 hours before surgery.     Which medicines can I take?    Hold Multivitamins for 7 days before surgery.  Hold Supplements for 7 days before surgery.  Hold Ibuprofen (Advil, Motrin) for 7 day(s) before surgery--unless otherwise  directed by surgeon.  Hold Naproxen (Aleve) for 7 days before surgery.  Aspirin - last dose to be 8/20/24    -  PLEASE TAKE these medications per your usual routine:  Acetaminophen (Tylenol)  Atorvastatin (Lipitor)  Hydralazine (Apresoline)  Hydrocodone-Acetaminophen (Norco) if needed  Metoprolol (Toprol)  Pantoprazole (Protonix)    How do I prepare myself?  - Please take 2 showers (one the night prior to surgery and one the morning of surgery) using Scrubcare or Hibiclens soap.    Use this soap only from the neck to your toes.     Leave the soap on your skin for one minute--then rinse thoroughly.      You may use your own shampoo and conditioner. No other hair products.      Sleep in clean sheets and wear clean clothes.  - Please remove all jewelry and body piercings.  - No lotions, deodorants or fragrance.  - No makeup or fingernail polish.   - Bring your ID and insurance card.    -If you use a CPAP machine, please bring the CPAP machine, tubing, and mask to hospital.    -If you have a Deep Brain Stimulator, Spinal Cord Stimulator, or any Neuro Stimulator device---you must bring the remote control to the hospital.      ALL PATIENTS GOING HOME THE SAME DAY OF SURGERY ARE REQUIRED TO HAVE A RESPONSIBLE ADULT TO DRIVE AND BE IN ATTENDANCE WITH THEM FOR 24 HOURS FOLLOWING SURGERY.    Covid testing policy as of 12/06/2022  Your surgeon will notify and schedule you for a COVID test if one is needed before surgery--please direct any questions or COVID symptoms to your surgeon      Questions or Concerns:    - For any questions regarding the day of surgery or your hospital stay, please contact the Pre Admission Nursing Office at [phone].       - If you have health changes between today and your surgery, please call your surgeon.       - For questions after surgery, please call your surgeons office.           Current Visitor Guidelines    You may have 2 visitors in the pre op area.    Visiting hours: 8 a.m. to 8:30  p.m.    Patients confirmed or suspected to have symptoms of COVID 19 or flu:     No visitors allowed for adult patients.   Children (under age 18) can have 1 named visitor.     People who are sick or showing symptoms of COVID 19 or flu:    Are not allowed to visit patients--we can only make exceptions in special situations.       Please follow these guidelines for your visit:          Please maintain social distance          Masking is optional--however at times you may be asked to wear a mask for the safety of yourself and others     Clean your hands with alcohol hand . Do this when you arrive at and leave the building and patient room,    And again after you touch your mask or anything in the room.     Go directly to and from the room you are visiting.     Stay in the patient s room during your visit. Limit going to other places in the hospital as much as possible     Leave bags and jackets at home or in the car.     For everyone s health, please don t come and go during your visit. That includes for smoking   during your visit.

## 2024-08-09 NOTE — H&P
Pre-Operative H & P     CC:  Preoperative exam to assess for increased cardiopulmonary risk while undergoing surgery and anesthesia.    Date of Encounter: 8/9/2024  Primary Care Physician:  Alisha Gloria     Reason for visit:   Encounter Diagnoses   Name Primary?    Preop examination Yes    Atherosclerosis of native coronary artery of native heart without angina pectoris        HPI  Bret Fleming is a 74 year old adult who presents for pre-operative H & P in preparation for  Procedure Information       Case: [id number] Date/Time: 08/21/24 0730    Procedure: CORONARY ARTERY BYPASS GRAFT AND ANY ASSOCIATED PROCEDURES (Chest)    Anesthesia type: General    Diagnosis: CAD (coronary artery disease) [I25.10]    Pre-op diagnosis: CAD (coronary artery disease) [I25.10]    Location:  OR [address] [place] [address] OR    Providers: Jah Solares MD          History is obtained from the patient and chart review    Patient who was recently evaluated by Dr. Solares for significant history of coronary artery disease status post MI in 1996, LV dysfunction, HYPERTENSION, HLD, and ESRD on HD. The patient was recently admitted to the hospital in September and found to have EF of 30-35% with regional wall motion abnormalities on echo. He had been asymptomatic and functional. Coronary angiogram on 7/8/24, showed severe calcified triple vessel disease, with calcified high grade LAD/D2 bifurcation lesion, as well as two severe distal LAD lesions. He has been counseled for above procedures.     Patient's history is otherwise significant for history of GI bleed and transfusion       Hx of abnormal bleeding or anti-platelet use: ASA 81 mg daily      Past Medical History  Past Medical History:   Diagnosis Date    Acute myocardial infarction     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 (H)     Bilateral hydronephrosis     CAD (coronary artery disease)     Hyperlipidemia     Hypertension      Ischemic cardiomyopathy     Sepsis due to gram-negative UTI (H) 10/07/2023       Past Surgical History  Past Surgical History:   Procedure Laterality Date    CV CORONARY ANGIOGRAM N/A 7/8/2024    Procedure: Coronary Angiogram;  Surgeon: Kevin Thomas MD;  Location:  [facility]    ESOPHAGOSCOPY, GASTROSCOPY, DUODENOSCOPY (EGD), COMBINED N/A 10/9/2023    Procedure: Esophagoscopy, gastroscopy, duodenoscopy (EGD), combined;  Surgeon: Karolyn Saxena MD;  Location:  GI    IR CVC TUNNEL PLACEMENT > 5 YRS OF AGE  9/27/2023    IR CVC TUNNEL PLACEMENT > 5 YRS OF AGE  10/11/2023    PICC TRIPLE LUMEN PLACEMENT  9/27/2023       Prior to Admission Medications  Current Outpatient Medications   Medication Sig Dispense Refill    acetaminophen (TYLENOL) 500 MG tablet Take 2 tablets (1,000 mg) by mouth 3 times daily 100 tablet 0    aspirin 81 MG EC tablet Take 1 tablet (81 mg) by mouth daily (Patient taking differently: Take 81 mg by mouth every morning) 30 tablet 0    atorvastatin (LIPITOR) 40 MG tablet Take 1 tablet (40 mg) by mouth every evening 90 tablet 2    hydrALAZINE (APRESOLINE) 10 MG tablet Take 1 tablet (10 mg) by mouth 3 times daily 270 tablet 2    metoprolol succinate ER (TOPROL XL) 25 MG 24 hr tablet Take 1 tablet (25 mg) by mouth daily (Patient taking differently: Take 25 mg by mouth every morning) 90 tablet 0    MULTIPLE VITAMIN PO Take 1 tablet by mouth every morning      nitroGLYcerin (NITROSTAT) 0.4 MG sublingual tablet For chest pain place 1 tablet under the tongue every 5 minutes for 3 doses. If symptoms persist 5 minutes after 1st dose call 911. 25 tablet 0    pantoprazole (PROTONIX) 40 MG EC tablet Take 1 tablet (40 mg) by mouth 2 times daily (before meals) 60 tablet 0    Lidocaine (LIDOCARE) 4 % Patch Place 1 patch onto the skin every 24 hours To prevent lidocaine toxicity, patient should be patch free for 12 hrs daily. (Patient not taking: Reported on 8/9/2024) 30 patch 0        Allergies  Allergies   Allergen Reactions    Ciprofloxacin Rash    Other Drug Allergy (See Comments)      Cough Syrup Abstracted from [facility]( Hydrobromide)       Social History  Social History     Socioeconomic History    Marital status:      Spouse name: Not on file    Number of children: Not on file    Years of education: Not on file    Highest education level: Not on file   Occupational History    Not on file   Tobacco Use    Smoking status: Former     Current packs/day: 0.00     Types: Cigarettes     Quit date: 1995     Years since quittin.2     Passive exposure: Past    Smokeless tobacco: Never   Vaping Use    Vaping status: Not on file   Substance and Sexual Activity    Alcohol use: Yes     Comment: 1  drink per week    Drug use: Never    Sexual activity: Not on file   Other Topics Concern    Not on file   Social History Narrative    Not on file     Social Determinants of Health     Financial Resource Strain: Low Risk  (2023)    Financial Resource Strain     Within the past 12 months, have you or your family members you live with been unable to get utilities (heat, electricity) when it was really needed?: No   Food Insecurity: Low Risk  (2023)    Food Insecurity     Within the past 12 months, did you worry that your food would run out before you got money to buy more?: No     Within the past 12 months, did the food you bought just not last and you didn t have money to get more?: No   Transportation Needs: Low Risk  (2023)    Transportation Needs     Within the past 12 months, has lack of transportation kept you from medical appointments, getting your medicines, non-medical meetings or appointments, work, or from getting things that you need?: No   Physical Activity: Not on file   Stress: Not on file   Social Connections: Not on file   Interpersonal Safety: Unknown (2024)    Received from [facility]    Humiliation, Afraid, Rape, and Kick questionnaire      Fear of Current or Ex-Partner: Not on file     Emotionally Abused: Not on file     Physically Abused: No     Sexually Abused: No   Housing Stability: Low Risk  (11/17/2023)    Housing Stability     Do you have housing? : Yes     Are you worried about losing your housing?: No       Family History  Family History   Problem Relation Age of Onset    Cancer Father     Cancer Paternal Grandfather     Diabetes Paternal Grandfather     Hypertension No family hx of     Cerebrovascular Disease No family hx of     Thyroid Disease No family hx of     Glaucoma No family hx of     Macular Degeneration No family hx of     Anesthesia Reaction No family hx of     Clotting Disorder No family hx of     Bleeding Disorder No family hx of        Review of Systems  The complete review of systems is negative other than noted in the HPI or here.   Anesthesia Evaluation   Pt has had prior anesthetic. Type: MAC.    No history of anesthetic complications       ROS/MED HX  ENT/Pulmonary:     (+)     ANTHONY risk factors,  hypertension,                              (-) tobacco use and recent URI   Neurologic:    (-) no seizures and no CVA   Cardiovascular:     (+) Dyslipidemia hypertension- -  CAD - past MI - -   Taking blood thinners Pt has received instructions: Instructions Given to patient: Will remain on up to DOS. CHF etiology: ischemic Last EF: 42% date: 5/1/24                        Previous cardiac testing   Echo: Date: 5/1/24 Results:    Stress Test:  Date: Results:    ECG Reviewed:  Date: 8/9/24 prelim Results:  Sinus rhythm, low voltage QRS, possible inferior infarct    Cath:  Date: 7/8/24 Results:   (-) RODRIGUEZ and arrhythmias   METS/Exercise Tolerance: 4 - Raking leaves, gardening    Hematologic:       Musculoskeletal:       GI/Hepatic:     (+) GERD, Asymptomatic on medication,                  Renal/Genitourinary:     (+) renal disease, type: ESRD, Pt requires dialysis, type: Hemodialysis,          Endo:  - neg endo ROS      Psychiatric/Substance Use:    (-) psychiatric history   Infectious Disease:  - neg infectious disease ROS     Malignancy:  - neg malignancy ROS     Other:  - neg other ROS          /64 (BP Location: Right arm, Patient Position: Sitting, Cuff Size: Adult Large)   Pulse 73   Temp 98  F (36.7  C) (Oral)   Ht 1.829 m (6')   Wt 86.6 kg (191 lb)   SpO2 98%   BMI 25.90 kg/m      Physical Exam   Constitutional: Awake, alert, cooperative, no apparent distress, and appears stated age.  Eyes: Pupils equal, round and reactive to light, extra ocular muscles intact, sclera clear, conjunctiva normal. Glasses on.  HENT: Normocephalic, oral pharynx with moist mucus membranes, good dentition. No goiter appreciated.   Respiratory: Clear to auscultation bilaterally, no crackles or wheezing. No cough or obvious dyspnea.  Cardiovascular: Regular rate and rhythm, normal S1 and S2, and no murmur noted.  Carotids +2, no bruits. No edema. Palpable pulses to radial  DP and PT arteries.   GI: Normal bowel sounds, soft, non-distended, non-tender, no masses palpated, no hepatosplenomegaly.   Lymph/Hematologic: No cervical lymphadenopathy and no supraclavicular lymphadenopathy.  Genitourinary:  Deferred.   Skin: Warm and dry.  No rashes at anticipated surgical site. Left inner forearm AVF, thrill present. Right upper chest tunneled cath  Musculoskeletal: Full ROM of neck. There is no redness, warmth, or swelling of the joints. Gross motor strength is normal.    Neurologic: Awake, alert, oriented to name, place and time. Cranial nerves II-XII are grossly intact. Gait is normal.   Neuropsychiatric: Calm, cooperative. Normal affect.     Prior Labs/Diagnostic Studies   All labs and imaging personally reviewed   Lab Results   Component Value Date    WBC 4.9 08/09/2024     Lab Results   Component Value Date    RBC 3.43 08/09/2024     Lab Results   Component Value Date    HGB 11.9 08/09/2024     Lab Results   Component Value Date    HCT  36.5 08/09/2024     Lab Results   Component Value Date     08/09/2024     Lab Results   Component Value Date    MCH 34.7 08/09/2024     Lab Results   Component Value Date    MCHC 32.6 08/09/2024     Lab Results   Component Value Date    RDW 13.2 08/09/2024     Lab Results   Component Value Date     08/09/2024     Last Comprehensive Metabolic Panel:  Sodium   Date Value Ref Range Status   08/09/2024 139 135 - 145 mmol/L Final     Potassium   Date Value Ref Range Status   08/09/2024 4.9 3.4 - 5.3 mmol/L Final   05/27/2022 4.4 3.5 - 5.0 mmol/L Final     Chloride   Date Value Ref Range Status   08/09/2024 102 98 - 107 mmol/L Final   05/27/2022 107 98 - 107 mmol/L Final     Carbon Dioxide (CO2)   Date Value Ref Range Status   08/09/2024 25 22 - 29 mmol/L Final   05/27/2022 21 (L) 22 - 31 mmol/L Final     Anion Gap   Date Value Ref Range Status   08/09/2024 12 7 - 15 mmol/L Final   05/27/2022 12 5 - 18 mmol/L Final     Glucose   Date Value Ref Range Status   08/09/2024 95 70 - 99 mg/dL Final   05/27/2022 129 (H) 70 - 125 mg/dL Final     GLUCOSE BY METER POCT   Date Value Ref Range Status   10/10/2023 110 (H) 70 - 99 mg/dL Final     Urea Nitrogen   Date Value Ref Range Status   08/09/2024 47.8 (H) 8.0 - 23.0 mg/dL Final   05/27/2022 49 (H) 8 - 28 mg/dL Final     Creatinine   Date Value Ref Range Status   08/09/2024 4.50 (H) 0.51 - 1.17 mg/dL Final     Comment:     Male and Female  0-2 Months    0.31-0.88 mg/dL  2-12 Months   0.16-0.39 mg/dL  1-2 Years     0.18-0.35 mg/dL  3-4 Years     0.26-0.42 mg/dL  5-6 Years     0.29-0.47 mg/dL  7-8 Years     0.34-0.53 mg/dL  9-10 Years    0.33-0.64 mg/dL  11-12 Years   0.44-0.68 mg/dL  13-14 Years   0.46-0.77 mg/dL    Female  15 Years and older  0.51-0.95 mg/dL    Male  15 Years and older  0.67-1.17 mg/dL         GFR Estimate   Date Value Ref Range Status   08/09/2024 13 (L) >60 mL/min/1.73m2 Final     Comment:     The generation of the estimated GFR is currently based  "on binary male or female sex. If the electronic health record information indicates another gender identity or if Legal Sex is recorded as \"Unknown\", GFR estimates are not automatically calculated, and application of GFR equations or a direct GFR measurement should be considered according to the individual's appropriate clinical context.  eGFR calculated using  CKD-EPI equation.   2020 55 (L) >60 mL/min/1.73m2 Final     Calcium   Date Value Ref Range Status   2024 9.2 8.8 - 10.4 mg/dL Final     Comment:     Reference intervals for this test were updated on 2024 to reflect our healthy population more accurately. There may be differences in the flagging of prior results with similar values performed with this method. Those prior results can be interpreted in the context of the updated reference intervals.     Bilirubin Total   Date Value Ref Range Status   2024 0.4 <=1.2 mg/dL Final     Alkaline Phosphatase   Date Value Ref Range Status   2024 79 40 - 150 U/L Final     Comment:     Female:   0-15 days     U/L  15d-1 year   122-469 U/L  1-10 years   142-335 U/L  10-13 years  129-417 U/L  13-15 years   U/L  15-17 years   U/L  17-19 years  45-87 U/L  19 years and older   U/L      Male:  0-15 days     U/L  15d-1 year   122-469 U/L  1-10 years   142-335 U/L  10-13 years  129-417 U/L  13-15 years  116-468 U/L  15-17 years   U/L  17-19 years   U/L  19 years and older   U/L       ALT   Date Value Ref Range Status   2024 14 0 - 70 U/L Final     Comment:     Female   All ages       0-50 U/L     Male   0-20 Years     0-50 U/L  20-Unsp. Years 0-70 U/L         AST   Date Value Ref Range Status   2024 20 0 - 45 U/L Final     INR 1.17  PTT 36  A1C <4.2    EK24 prelim Sinus rhythm, low voltage QRS, possible inferior infarct    Coronary angiogram 24     1st Diag lesion is 80% stenosed.    Mid LAD lesion is 80% stenosed.    Prox Cx " to Mid Cx lesion is 70% stenosed.    Dist LAD lesion is 90% stenosed.    Prox LAD to Mid LAD lesion is 85% stenosed.    Prox RCA-1 lesion is 80% stenosed.    Prox RCA-2 lesion is 50% stenosed.    Dist RCA lesion is 60% stenosed.    1st Mrg lesion is 100% stenosed.     Severe calcified triple vessel disease. Calcified high grade LAD/D2 bifurcation lesion, as well as two severe distal LAD lesions. Circumflex with small occluded first obtuse marginal and 70% stenosis of the proximal CX after the first OM. RCA with severe proximal stenosis. Intermediate stenoses medially and distally.    Echocardiogram 5/1/24  Interpretation Summary  Biplane LVEF is 42%.  Right ventricular function, chamber size, wall motion, and thickness are  normal.  Pulmonary artery systolic pressure is normal.  The inferior vena cava is normal.  No pericardial effusion is present.     US Carotid, bilateral 8/9/24                                                            IMPRESSION:     1. RIGHT ICA: Less than 50% diameter narrowing by grayscale imaging  and sonographic velocity criteria.     2. LEFT ICA:  Normal.    CT Chest 8/9/24                                                  Impression:   1. Sub-6 mm pulmonary nodules. If the patient is low risk for lung  cancer, no follow-up is required. If the patient is high risk,  consider follow-up chest CT in 12 months per Fleischner Society  Guidelines.  2. Multivessel coronary artery calcifications and thoracoabdominal  calcifications.  3. Small hiatal hernia.    The patient's records and results personally reviewed by this provider.     Outside records reviewed from: Care Everywhere      Assessment    Bret Fleming is a 74 year old adult seen as a PAC referral for risk assessment and optimization for anesthesia.    Plan/Recommendations  Pt will be optimized for the proposed procedure.  See below for details on the assessment, risk, and preoperative recommendations    NEUROLOGY  - No history of TIA,  CVA or seizure    -Post Op delirium risk factors:  High co-morbid index    ENT  - No current airway concerns.  Will need to be reassessed day of surgery.  Mallampati: II  TM: > 3    Upper left side bridge    CARDIAC  HLD.  Atorvastatin at at bedtime Hypertension controlled. Will take metoprolol and hydralazine day of surgery. CAD status post MI in 1996, LV dysfunction, last EF 42%.  On ASA 81 mg and will take up to day of surgery.  All testing above.  Denies chest pain, irregular heart rate, or dyspnea on exertion. Goes up and down stairs many times during his workday without difficulty.  - METS (Metabolic Equivalents)>4    PULMONARY  Denies asthma, cough or use of inhaler  Intermediate risk for ANTHONY    - Tobacco History    History   Smoking Status    Former    Types: Cigarettes   Smokeless Tobacco    Never       GI: GERD controlled with Protonix and will take on day of surgery  PONV Low Risk  Total Score: 1           1 AN PONV: Patient is not a current smoker      /RENAL  - Baseline Creatinine 4.50  End-stage renal disease due to obstruction and RICHARD, currently on hemodialysis on Monday Wednesday and Fridays.  He has both a right chest tunneled cath and left AV fistula that they are starting to use.    Current transplant evaluation    ENDOCRINE    - BMI: Estimated body mass index is 25.9 kg/m  as calculated from the following:    Height as of this encounter: 1.829 m (6').    Weight as of this encounter: 86.6 kg (191 lb).  Overweight (BMI 25.0-29.9)  - No history of Diabetes Mellitus    HEME  VTE Low Risk 0.26%             Total Score: 0      Denies personal or family history of blood clots  Denies personal or family history of bleeding disorder  History of blood transfusion     Chronic anemia. Last Hgb 11.9.  Is receiving iron infusion through dialysis center.    Type and screen drawn by surgery service today    MSK: Frailty score 1/5  Knee pain  Walking with a cane for balance    Different anesthesia methods/types  have been discussed with the patient, but they are aware that the final plan will be decided by the assigned anesthesia provider on the date of service.    The patient is optimized for their procedure. AVS with information on surgery time/arrival time, meds and NPO status given by nursing staff. No further diagnostic testing indicated.      On the day of service:     Prep time: 13 minutes  Visit time: 12 minutes  Documentation time: 15 minutes  ------------------------------------------  Total time: 40 minutes      RODDY Lin CNS  Preoperative Assessment Center  [facility]  [facility]  Phone: [phone]  Fax: [phone]

## 2024-08-10 LAB — BACTERIA UR CULT: NORMAL

## 2024-08-14 LAB
ATRIAL RATE - MUSE: 75 BPM
DIASTOLIC BLOOD PRESSURE - MUSE: NORMAL MMHG
INTERPRETATION ECG - MUSE: NORMAL
P AXIS - MUSE: 37 DEGREES
PR INTERVAL - MUSE: 198 MS
QRS DURATION - MUSE: 90 MS
QT - MUSE: 382 MS
QTC - MUSE: 426 MS
R AXIS - MUSE: 23 DEGREES
SYSTOLIC BLOOD PRESSURE - MUSE: NORMAL MMHG
T AXIS - MUSE: 76 DEGREES
VENTRICULAR RATE- MUSE: 75 BPM

## 2024-08-19 NOTE — H&P
CV ICU H&P    ASSESSMENT: Bret Fleming is a 74 year old adult with PMH of CAD s/p MI in 1996, HTN, ESRD on HD, ICM w/ EF 30-35%, 3v CAD who presents to [facility] for CABG on by Dr. Solares.    Signout:   - Airway: Easy mask, easy airway ETT 8.0 @ 22 cm  - Access: RIJ MAC, L Axillary A-line, PIV x1, CT x3 (1 med, 2 pleural), V-wires backup 50 bpm  - Meds: Epi 0.01, NE 0.02, Prop gtt 50, insulin 3 units/hr, Fent 750 mcg, Dilaudid 1 mg, Precedex 32 mcg, sugammadex, Ancef 2g x2  - I/O: LR 2L,  ml, Cell Saver 205 ml  - PVB bilateral, research study  - Shock coming off bypass. RV mildly dilated coming off, keep small epi on      CO-MORBIDITIES:   Patient Active Problem List   Diagnosis    NSTEMI (non-ST elevated myocardial infarction) (H)    Coronary atherosclerosis    Hyperlipidemia    Chronic systolic congestive heart failure (H)    HTN (hypertension)    Acute on chronic anemia    ESRD (end stage renal disease) (H)    Gastrointestinal hemorrhage associated with gastric ulcer    Chronic pain of right knee    Ischemic cardiomyopathy    Status post coronary angiogram    Coronary atherosclerosis due to lipid rich plaque    Hypertensive heart failure with end stage renal disease (H)    Pre-transplant evaluation for kidney transplant         PLAN:  Neuro/ pain/ sedation:  #Acute postoperative pain  - Scheduled: Tylenol  - PRN: Tylenol, oxycodone, Dilaudid  #Sedation  - Gtt: prop 50    Pulmonary care:   #Mechanical ventilation      - Goal SpO2 > 92%  - Encourage IS q15-30 minutes when awake.    Cardiovascular:    #Cardiogenic shock  #CAD s/p MI in 1996  #HTN  #ICM w/ EF 30-35%  #S/p CABG  Pre: EF 45%, mild MR  Post: EF 50-55%, mild RV dilation  - Epi, NE, Vaso gtts for inotropic and pressor support, wean as able  - Goal MAP >65, SBP <140, monitor hemodynamics  - Hold PTA atorvastatin 40 mg, ASA 81 mg, hydralazine 10 mg, metoprolol 25 mg  - ASA: start tomorrow    GI care / Nutrition:   - NPO, bedside swallow eval once  extubated  - Bowel regimen: MiraLAX, senna  - PPI    Renal / Fluids / Electrolytes:   #ESRD on HD  BL creat appears to be ~ 5.0-6.0  - Strict I/O, daily weights, avoid/limit nephrotoxins  - Replete lytes PRN per protocol    Endocrine:    #Stress hyperglycemia  Preop A1c 4.2  - Insulin gtt  - Goal BG <180 for optimal healing    ID / Antibiotics:  #Stress induced leukocytosis  - To complete perioperative regimen  - Monitor fever curve, WBC, and inflammatory markers as appropriate    Heme:     #Acute blood loss anemia  #Acute blood loss thrombocytopenia  No s/sx active bleeding  - CBC   - Hgb goal > 7.0, currently 9.7    MSK / Skin:  #Sternotomy  #Surgical Incision  - Sternal precautions, postop incision management protocol  - PT/OT/CR    Prophylaxis:     - Mechanical DVT ppx  - Chemical DVT ppx: start SQH tomorrow  - PPI    Lines / Tubes / Drains:  - ETT  - RIJ CVC, PA catheter  - Arterial Line  - CTs x3 (1 meds, 2 pleural)  - Culp  - OG    Disposition:  - CVICU      Patient seen, findings and plan discussed with CVICU staff and cardiothoracic surgeons and fellow.    Hector Gonzalez MD  Anesthesiology Resident, CA-2, PGY-3        ====================================    HPI:   Bret Fleming is a 74 year old adult with PMH of CAD s/p MI in 1996, HTN, ESRD on HD, ICM w/ EF 30-35%, 3v CAD who presents to [facility] for CABG on by Dr. Solares. Presents to CVICU intubated and sedated.      PAST MEDICAL HISTORY:   Past Medical History:   Diagnosis Date    Acute myocardial infarction     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 (H)     Bilateral hydronephrosis     CAD (coronary artery disease)     Hyperlipidemia     Hypertension     Ischemic cardiomyopathy     Sepsis due to gram-negative UTI (H) 10/07/2023       PAST SURGICAL HISTORY:   Past Surgical History:   Procedure Laterality Date    CV CORONARY ANGIOGRAM N/A 7/8/2024    Procedure: Coronary Angiogram;  Surgeon:  Kevin Thomas MD;  Location:  HEART CARDIAC CATH LAB    ESOPHAGOSCOPY, GASTROSCOPY, DUODENOSCOPY (EGD), COMBINED N/A 10/9/2023    Procedure: Esophagoscopy, gastroscopy, duodenoscopy (EGD), combined;  Surgeon: Karolyn Saxena MD;  Location:  GI    IR CVC TUNNEL PLACEMENT > 5 YRS OF AGE  2023    IR CVC TUNNEL PLACEMENT > 5 YRS OF AGE  10/11/2023    PICC TRIPLE LUMEN PLACEMENT  2023       FAMILY HISTORY:   Family History   Problem Relation Age of Onset    Cancer Father     Cancer Paternal Grandfather     Diabetes Paternal Grandfather     Hypertension No family hx of     Cerebrovascular Disease No family hx of     Thyroid Disease No family hx of     Glaucoma No family hx of     Macular Degeneration No family hx of     Anesthesia Reaction No family hx of     Clotting Disorder No family hx of     Bleeding Disorder No family hx of        SOCIAL HISTORY:   Social History     Tobacco Use    Smoking status: Former     Current packs/day: 0.00     Types: Cigarettes     Quit date: 1995     Years since quittin.3     Passive exposure: Past    Smokeless tobacco: Never   Substance Use Topics    Alcohol use: Yes     Comment: 1  drink per week         OBJECTIVE:  1. VITAL SIGNS:               2. INTAKE/ OUTPUT:   No intake/output data recorded.      3. PHYSICAL EXAMINATION:   General: sedated  Neuro: sedated  Resp: intubated, ventilated  CV: S1, S2, RRR, no m/r/g   Abdomen: Soft, non-distended, non-tender  Incisions: c/d/i  Extremities: warm and well perfused  CT: To suction, serosang output, no airleak or crepitus      4. INVESTIGATIONS:   Arterial Blood Gases   No lab results found in last 7 days.  Complete Blood Count   No lab results found in last 7 days.  Basic Metabolic Panel  No lab results found in last 7 days.  Liver Function Tests  No lab results found in last 7 days.  Pancreatic Enzymes  No lab results found in last 7 days.  Coagulation Profile  No lab results found in last 7  days.      5. RADIOLOGY:   No results found for this or any previous visit (from the past 24 hour(s)).    =========================================

## 2024-08-20 NOTE — ANESTHESIA PREPROCEDURE EVALUATION
Anesthesia Pre-Procedure Evaluation    Patient: Bret Fleming   MRN: [id number] : 1950        Procedure : Procedure(s):  CORONARY ARTERY BYPASS GRAFT AND ANY ASSOCIATED PROCEDURES          Past Medical History:   Diagnosis Date    Acute myocardial infarction     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 (H)     Bilateral hydronephrosis     CAD (coronary artery disease)     Hyperlipidemia     Hypertension     Ischemic cardiomyopathy     Sepsis due to gram-negative UTI (H) 10/07/2023      Past Surgical History:   Procedure Laterality Date    CV CORONARY ANGIOGRAM N/A 2024    Procedure: Coronary Angiogram;  Surgeon: Kevin Thomas MD;  Location: [facility] CARDIAC CATH LAB    ESOPHAGOSCOPY, GASTROSCOPY, DUODENOSCOPY (EGD), COMBINED N/A 10/9/2023    Procedure: Esophagoscopy, gastroscopy, duodenoscopy (EGD), combined;  Surgeon: Karolyn Saxena MD;  Location:  GI    IR CVC TUNNEL PLACEMENT > 5 YRS OF AGE  2023    IR CVC TUNNEL PLACEMENT > 5 YRS OF AGE  10/11/2023    PICC TRIPLE LUMEN PLACEMENT  2023      Allergies   Allergen Reactions    Ciprofloxacin Rash    Other Drug Allergy (See Comments)      Cough Syrup Abstracted from [facility] Chart( Hydrobromide)      Social History     Tobacco Use    Smoking status: Former     Current packs/day: 0.00     Types: Cigarettes     Quit date: 1995     Years since quittin.3     Passive exposure: Past    Smokeless tobacco: Never   Substance Use Topics    Alcohol use: Yes     Comment: 1  drink per week      Wt Readings from Last 1 Encounters:   24 86.6 kg (191 lb)        Anesthesia Evaluation   Pt has had prior anesthetic. Type: MAC.    No history of anesthetic complications       ROS/MED HX  ENT/Pulmonary:  - neg pulmonary ROS     Neurologic:  - neg neurologic ROS     Cardiovascular:     (+) Dyslipidemia hypertension- -  CAD -  - -      CHF etiology: ischemic Last EF: 42        "                       METS/Exercise Tolerance: 4 - Raking leaves, gardening    Hematologic:     (+)       history of blood transfusion, no previous transfusion reaction,        Musculoskeletal:  - neg musculoskeletal ROS     GI/Hepatic:     (+)      hiatal hernia,              Renal/Genitourinary:     (+) renal disease, type: ESRD, Pt requires dialysis, type: Hemodialysis,    BPH,      Endo:       Psychiatric/Substance Use:  - neg psychiatric ROS     Infectious Disease:       Malignancy:       Other:  - neg other ROS          Physical Exam    Airway  airway exam normal      Mallampati: I   TM distance: > 3 FB   Neck ROM: full   Mouth opening: > 3 cm    Respiratory Devices and Support         Dental       (+) Multiple crowns, permanant bridges    B=Bridge, C=Chipped, L=Loose, M=Missing    Cardiovascular   cardiovascular exam normal          Pulmonary   pulmonary exam normal                OUTSIDE LABS:  CBC:   Lab Results   Component Value Date    WBC 4.9 08/09/2024    WBC 5.1 07/08/2024    HGB 11.9 (L) 08/09/2024    HGB 10.6 (L) 07/08/2024    HCT 36.5 08/09/2024    HCT 31.2 (L) 07/08/2024     (L) 08/09/2024     07/08/2024     BMP:   Lab Results   Component Value Date     08/09/2024     (L) 07/08/2024    POTASSIUM 4.9 08/09/2024    POTASSIUM 4.5 07/08/2024    CHLORIDE 102 08/09/2024    CHLORIDE 95 (L) 07/08/2024    CO2 25 08/09/2024    CO2 23 07/08/2024    BUN 47.8 (H) 08/09/2024    BUN 47.6 (H) 07/08/2024    CR 4.50 (H) 08/09/2024    CR 4.93 (H) 07/08/2024    GLC 95 08/09/2024    GLC 97 07/08/2024     COAGS:   Lab Results   Component Value Date    PTT 36 08/09/2024    INR 1.17 (H) 08/09/2024    FIBR 335 09/27/2023     POC: No results found for: \"BGM\", \"HCG\", \"HCGS\"  HEPATIC:   Lab Results   Component Value Date    ALBUMIN 4.3 08/09/2024    PROTTOTAL 6.8 08/09/2024    ALT 14 08/09/2024    AST 20 08/09/2024    ALKPHOS 79 08/09/2024    BILITOTAL 0.4 08/09/2024     OTHER:   Lab Results "   Component Value Date    PH 7.49 (H) 10/06/2023    LACT 0.9 10/06/2023    A1C <4.2 08/09/2024    CHANA 9.2 08/09/2024    PHOS 4.1 10/13/2023    MAG 1.7 10/13/2023    LIPASE 405 (H) 09/27/2023    TSH 2.70 09/27/2023    CRP 13.7 (H) 02/05/2022     (H) 02/05/2022       Anesthesia Plan    ASA Status:  4    NPO Status:  NPO Appropriate    Anesthesia Type: General.     - Airway: ETT   Induction: Propofol.   Maintenance: Balanced.   Techniques and Equipment:     - Airway: Video-Laryngoscope, Shoulder Jah/Ramp     - Lines/Monitors: Arterial Line, Central Line, PAC, CVP, BIS, NIRS, BECCA     - Blood: Blood in Room, Cell Saver, PRBC, PLT, T&C     - Drips/Meds: Epinephrine, Norepi, Nicardipine, Dexmed. infusion, Vasopressin     Consents    Anesthesia Plan(s) and associated risks, benefits, and realistic alternatives discussed. Questions answered and patient/representative(s) expressed understanding.     - Discussed: Risks, Benefits and Alternatives for BOTH SEDATION and the PROCEDURE were discussed     - Discussed with:  Patient      - Extended Intubation/Ventilatory Support Discussed: Yes.      - Patient is DNR/DNI Status: No     Use of blood products discussed: Yes.     - Discussed with: Patient.     - Consented: consented to blood products     Postoperative Care    Pain management: IV analgesics, Multi-modal analgesia.        Comments:    Other Comments: DDAVP, 2 PRBC, 2 platlets available.            Conrad Barnett,     I have reviewed the pertinent notes and labs in the chart from the past 30 days and (re)examined the patient.  Any updates or changes from those notes are reflected in this note.            # Coagulation Defect: INR = 1.17 (Ref range: 0.85 - 1.15) and/or PTT = 36 Seconds (Ref range: 22 - 38 Seconds), will monitor for bleeding  # Thrombocytopenia: Lowest platelets = 126 in last 30 days, will monitor for bleeding  # Overweight: Estimated body mass index is 25.9 kg/m  as calculated from the following:     Height as of 8/9/24: 1.829 m (6').    Weight as of 8/9/24: 86.6 kg (191 lb).

## 2024-08-21 ENCOUNTER — HOSPITAL ENCOUNTER (INPATIENT)
Facility: CLINIC | Age: 74
LOS: 5 days | Discharge: HOME | DRG: 235 | End: 2024-08-26
Attending: SURGERY | Admitting: ANESTHESIOLOGY
Payer: COMMERCIAL

## 2024-08-21 ENCOUNTER — ANESTHESIA (OUTPATIENT)
Dept: SURGERY | Facility: CLINIC | Age: 74
DRG: 235 | End: 2024-08-21
Payer: COMMERCIAL

## 2024-08-21 ENCOUNTER — APPOINTMENT (OUTPATIENT)
Dept: GENERAL RADIOLOGY | Facility: CLINIC | Age: 74
DRG: 235 | End: 2024-08-21
Attending: ANESTHESIOLOGY
Payer: COMMERCIAL

## 2024-08-21 DIAGNOSIS — I21.4 NSTEMI (NON-ST ELEVATED MYOCARDIAL INFARCTION) (H): ICD-10-CM

## 2024-08-21 DIAGNOSIS — N17.0 ACUTE KIDNEY FAILURE WITH TUBULAR NECROSIS (H): ICD-10-CM

## 2024-08-21 DIAGNOSIS — R42 DIZZINESS: ICD-10-CM

## 2024-08-21 DIAGNOSIS — Z95.1 S/P CABG (CORONARY ARTERY BYPASS GRAFT): ICD-10-CM

## 2024-08-21 DIAGNOSIS — I25.83 CORONARY ATHEROSCLEROSIS DUE TO LIPID RICH PLAQUE: Primary | ICD-10-CM

## 2024-08-21 DIAGNOSIS — I25.5 ISCHEMIC CARDIOMYOPATHY: ICD-10-CM

## 2024-08-21 LAB
ALBUMIN SERPL BCG-MCNC: 3.7 G/DL (ref 3.5–5.2)
ALBUMIN SERPL BCG-MCNC: 4 G/DL (ref 3.5–5.2)
ALLEN'S TEST: ABNORMAL
ALP SERPL-CCNC: 53 U/L (ref 40–150)
ALP SERPL-CCNC: 55 U/L (ref 40–150)
ALT SERPL W P-5'-P-CCNC: 11 U/L (ref 0–70)
ALT SERPL W P-5'-P-CCNC: 11 U/L (ref 0–70)
ANION GAP SERPL CALCULATED.3IONS-SCNC: 13 MMOL/L (ref 7–15)
ANION GAP SERPL CALCULATED.3IONS-SCNC: 15 MMOL/L (ref 7–15)
APTT PPP: 34 SECONDS (ref 22–38)
APTT PPP: 36 SECONDS (ref 22–38)
AST SERPL W P-5'-P-CCNC: 22 U/L (ref 0–45)
AST SERPL W P-5'-P-CCNC: 36 U/L (ref 0–45)
BASE EXCESS BLDA CALC-SCNC: -0.4 MMOL/L (ref -3–3)
BASE EXCESS BLDA CALC-SCNC: -0.8 MMOL/L (ref -3–3)
BASE EXCESS BLDA CALC-SCNC: -1.1 MMOL/L (ref -3–3)
BASE EXCESS BLDA CALC-SCNC: -1.9 MMOL/L (ref -3–3)
BASE EXCESS BLDA CALC-SCNC: -2.3 MMOL/L (ref -3–3)
BASE EXCESS BLDA CALC-SCNC: 0.5 MMOL/L (ref -3–3)
BASE EXCESS BLDA CALC-SCNC: 0.7 MMOL/L (ref -3–3)
BASE EXCESS BLDA CALC-SCNC: 0.8 MMOL/L (ref -3–3)
BASE EXCESS BLDA CALC-SCNC: 1.1 MMOL/L (ref -3–3)
BASE EXCESS BLDA CALC-SCNC: 1.4 MMOL/L (ref -3–3)
BASE EXCESS BLDV CALC-SCNC: 2.3 MMOL/L (ref -3–3)
BILIRUB SERPL-MCNC: 0.3 MG/DL
BILIRUB SERPL-MCNC: 0.3 MG/DL
BLD PROD TYP BPU: NORMAL
BLOOD COMPONENT TYPE: NORMAL
BUN SERPL-MCNC: 51.2 MG/DL (ref 8–23)
BUN SERPL-MCNC: 55 MG/DL (ref 8–23)
CA-I BLD-MCNC: 4 MG/DL (ref 4.4–5.2)
CA-I BLD-MCNC: 4.1 MG/DL (ref 4.4–5.2)
CA-I BLD-MCNC: 4.2 MG/DL (ref 4.4–5.2)
CA-I BLD-MCNC: 4.2 MG/DL (ref 4.4–5.2)
CA-I BLD-MCNC: 4.4 MG/DL (ref 4.4–5.2)
CA-I BLD-MCNC: 4.5 MG/DL (ref 4.4–5.2)
CA-I BLD-MCNC: 4.5 MG/DL (ref 4.4–5.2)
CA-I BLD-MCNC: 4.6 MG/DL (ref 4.4–5.2)
CA-I BLD-MCNC: 5 MG/DL (ref 4.4–5.2)
CALCIUM SERPL-MCNC: 8.8 MG/DL (ref 8.8–10.4)
CALCIUM SERPL-MCNC: 8.8 MG/DL (ref 8.8–10.4)
CHLORIDE SERPL-SCNC: 101 MMOL/L (ref 98–107)
CHLORIDE SERPL-SCNC: 102 MMOL/L (ref 98–107)
CLOT INIT KAOL IND TO POST HEP NEUT TRTO: 1.1 {RATIO}
CLOT INIT KAOL IND TO POST HEP NEUT TRTO: 1.1 {RATIO}
CLOT INIT KAOLIN IND BLD US: 141 SEC (ref 113–166)
CLOT INIT KAOLIN IND BLD US: 145 SEC (ref 113–166)
CLOT INIT KAOLIN IND P HEP NEUT BLD US: 130 SEC (ref 103–153)
CLOT INIT KAOLIN IND P HEP NEUT BLD US: 138 SEC (ref 103–153)
CLOT STIFF PLT CONT BLD CALC: 13.7 HPA (ref 11.9–29.8)
CLOT STIFF PLT CONT BLD CALC: 16.4 HPA (ref 11.9–29.8)
CLOT STIFF TF IND P HEP NEUT BLD US: 15.9 HPA (ref 13–33.2)
CLOT STIFF TF IND P HEP NEUT BLD US: 19.3 HPA (ref 13–33.2)
CLOT STIFF TF IND+IIB-IIIA INH P HEP NEU: 2.2 HPA (ref 1–3.7)
CLOT STIFF TF IND+IIB-IIIA INH P HEP NEU: 2.9 HPA (ref 1–3.7)
CODING SYSTEM: NORMAL
COHGB MFR BLD: 99.2 % (ref 95–96)
CREAT SERPL-MCNC: 4.39 MG/DL (ref 0.51–1.17)
CREAT SERPL-MCNC: 4.63 MG/DL (ref 0.51–1.17)
CROSSMATCH: NORMAL
CROSSMATCH: NORMAL
EGFRCR SERPLBLD CKD-EPI 2021: 13 ML/MIN/1.73M2
EGFRCR SERPLBLD CKD-EPI 2021: 13 ML/MIN/1.73M2
ERYTHROCYTE [DISTWIDTH] IN BLOOD BY AUTOMATED COUNT: 12.7 % (ref 10–15)
ERYTHROCYTE [DISTWIDTH] IN BLOOD BY AUTOMATED COUNT: 12.7 % (ref 10–15)
FIBRINOGEN PPP-MCNC: 255 MG/DL (ref 170–510)
GLUCOSE BLD-MCNC: 100 MG/DL (ref 70–99)
GLUCOSE BLD-MCNC: 101 MG/DL (ref 70–99)
GLUCOSE BLD-MCNC: 115 MG/DL (ref 70–99)
GLUCOSE BLD-MCNC: 115 MG/DL (ref 70–99)
GLUCOSE BLD-MCNC: 117 MG/DL (ref 70–99)
GLUCOSE BLD-MCNC: 153 MG/DL (ref 70–99)
GLUCOSE BLD-MCNC: 153 MG/DL (ref 70–99)
GLUCOSE BLD-MCNC: 160 MG/DL (ref 70–99)
GLUCOSE BLDC GLUCOMTR-MCNC: 100 MG/DL (ref 70–99)
GLUCOSE BLDC GLUCOMTR-MCNC: 104 MG/DL (ref 70–99)
GLUCOSE BLDC GLUCOMTR-MCNC: 104 MG/DL (ref 70–99)
GLUCOSE BLDC GLUCOMTR-MCNC: 136 MG/DL (ref 70–99)
GLUCOSE BLDC GLUCOMTR-MCNC: 137 MG/DL (ref 70–99)
GLUCOSE BLDC GLUCOMTR-MCNC: 140 MG/DL (ref 70–99)
GLUCOSE BLDC GLUCOMTR-MCNC: 168 MG/DL (ref 70–99)
GLUCOSE BLDC GLUCOMTR-MCNC: 75 MG/DL (ref 70–99)
GLUCOSE BLDC GLUCOMTR-MCNC: 95 MG/DL (ref 70–99)
GLUCOSE SERPL-MCNC: 117 MG/DL (ref 70–99)
GLUCOSE SERPL-MCNC: 138 MG/DL (ref 70–99)
HCO3 BLD-SCNC: 22 MMOL/L (ref 21–28)
HCO3 BLD-SCNC: 23 MMOL/L (ref 21–28)
HCO3 BLD-SCNC: 24 MMOL/L (ref 21–28)
HCO3 BLDA-SCNC: 24 MMOL/L (ref 21–28)
HCO3 BLDA-SCNC: 25 MMOL/L (ref 21–28)
HCO3 BLDA-SCNC: 25 MMOL/L (ref 21–28)
HCO3 BLDA-SCNC: 26 MMOL/L (ref 21–28)
HCO3 BLDV-SCNC: 28 MMOL/L (ref 21–28)
HCO3 SERPL-SCNC: 22 MMOL/L (ref 22–29)
HCO3 SERPL-SCNC: 23 MMOL/L (ref 22–29)
HCT VFR BLD AUTO: 29.5 % (ref 35–53)
HCT VFR BLD AUTO: 30.9 % (ref 35–53)
HGB BLD-MCNC: 10.8 G/DL (ref 11.7–17.7)
HGB BLD-MCNC: 11.3 G/DL (ref 11.7–17.7)
HGB BLD-MCNC: 11.5 G/DL (ref 11.7–17.7)
HGB BLD-MCNC: 8.9 G/DL (ref 11.7–17.7)
HGB BLD-MCNC: 8.9 G/DL (ref 11.7–17.7)
HGB BLD-MCNC: 9 G/DL (ref 11.7–17.7)
HGB BLD-MCNC: 9.1 G/DL (ref 11.7–17.7)
HGB BLD-MCNC: 9.4 G/DL (ref 13.3–17.7)
HGB BLD-MCNC: 9.7 G/DL (ref 11.7–17.7)
HGB BLD-MCNC: 9.9 G/DL (ref 13.3–17.7)
INR PPP: 1.54 (ref 0.85–1.15)
INR PPP: 1.64 (ref 0.85–1.15)
ISSUE DATE AND TIME: NORMAL
LACTATE BLD-SCNC: 0.5 MMOL/L (ref 0.7–2)
LACTATE BLD-SCNC: 0.6 MMOL/L (ref 0.7–2)
LACTATE BLD-SCNC: 0.6 MMOL/L (ref 0.7–2)
LACTATE BLD-SCNC: 0.8 MMOL/L (ref 0.7–2)
LACTATE BLD-SCNC: 0.9 MMOL/L (ref 0.7–2)
LACTATE BLD-SCNC: 1.3 MMOL/L (ref 0.7–2)
LACTATE SERPL-SCNC: 1.7 MMOL/L (ref 0.7–2)
MAGNESIUM SERPL-MCNC: 2.8 MG/DL (ref 1.7–2.3)
MAGNESIUM SERPL-MCNC: 2.9 MG/DL (ref 1.7–2.3)
MCH RBC QN AUTO: 34.3 PG (ref 26.5–33)
MCH RBC QN AUTO: 34.3 PG (ref 26.5–33)
MCHC RBC AUTO-ENTMCNC: 31.9 G/DL (ref 31.5–36.5)
MCHC RBC AUTO-ENTMCNC: 32 G/DL (ref 31.5–36.5)
MCV RBC AUTO: 107 FL (ref 78–100)
MCV RBC AUTO: 108 FL (ref 78–100)
O2/TOTAL GAS SETTING VFR VENT: 100 %
O2/TOTAL GAS SETTING VFR VENT: 40 %
O2/TOTAL GAS SETTING VFR VENT: 40 %
O2/TOTAL GAS SETTING VFR VENT: 60 %
O2/TOTAL GAS SETTING VFR VENT: 80 %
OXYHGB MFR BLDA: 96 % (ref 92–100)
OXYHGB MFR BLDA: 97 % (ref 92–100)
OXYHGB MFR BLDV: 79 % (ref 70–75)
PCO2 BLD: 28 MM HG (ref 35–45)
PCO2 BLD: 43 MM HG (ref 35–45)
PCO2 BLD: 43 MM HG (ref 35–45)
PCO2 BLDA: 37 MM HG (ref 35–45)
PCO2 BLDA: 40 MM HG (ref 35–45)
PCO2 BLDA: 42 MM HG (ref 35–45)
PCO2 BLDA: 43 MM HG (ref 35–45)
PCO2 BLDA: 44 MM HG (ref 35–45)
PCO2 BLDA: 44 MM HG (ref 35–45)
PCO2 BLDA: 45 MM HG (ref 35–45)
PCO2 BLDV: 47 MM HG (ref 40–50)
PH BLD: 7.35 [PH] (ref 7.35–7.45)
PH BLD: 7.35 [PH] (ref 7.35–7.45)
PH BLD: 7.5 [PH] (ref 7.35–7.45)
PH BLDA: 7.35 [PH] (ref 7.35–7.45)
PH BLDA: 7.36 [PH] (ref 7.35–7.45)
PH BLDA: 7.38 [PH] (ref 7.35–7.45)
PH BLDA: 7.38 [PH] (ref 7.35–7.45)
PH BLDA: 7.4 [PH] (ref 7.35–7.45)
PH BLDA: 7.41 [PH] (ref 7.35–7.45)
PH BLDA: 7.44 [PH] (ref 7.35–7.45)
PH BLDV: 7.38 [PH] (ref 7.32–7.43)
PHOSPHATE SERPL-MCNC: 3.2 MG/DL (ref 2.5–4.5)
PHOSPHATE SERPL-MCNC: 3.4 MG/DL (ref 2.5–4.5)
PLATELET # BLD AUTO: 114 10E3/UL (ref 150–450)
PLATELET # BLD AUTO: 114 10E3/UL (ref 150–450)
PLATELET # BLD AUTO: 126 10E3/UL (ref 150–450)
PO2 BLD: 189 MM HG (ref 80–105)
PO2 BLD: 204 MM HG (ref 80–105)
PO2 BLD: 262 MM HG (ref 80–105)
PO2 BLDA: 303 MM HG (ref 80–105)
PO2 BLDA: 307 MM HG (ref 80–105)
PO2 BLDA: 348 MM HG (ref 80–105)
PO2 BLDA: 399 MM HG (ref 80–105)
PO2 BLDA: 403 MM HG (ref 80–105)
PO2 BLDA: 409 MM HG (ref 80–105)
PO2 BLDA: 525 MM HG (ref 80–105)
PO2 BLDV: 46 MM HG (ref 25–47)
POTASSIUM BLD-SCNC: 4.4 MMOL/L (ref 3.4–5.3)
POTASSIUM BLD-SCNC: 4.6 MMOL/L (ref 3.4–5.3)
POTASSIUM BLD-SCNC: 4.6 MMOL/L (ref 3.4–5.3)
POTASSIUM BLD-SCNC: 4.7 MMOL/L (ref 3.4–5.3)
POTASSIUM BLD-SCNC: 5.4 MMOL/L (ref 3.4–5.3)
POTASSIUM BLD-SCNC: 5.5 MMOL/L (ref 3.4–5.3)
POTASSIUM SERPL-SCNC: 4.6 MMOL/L (ref 3.4–5.3)
POTASSIUM SERPL-SCNC: 5.6 MMOL/L (ref 3.4–5.3)
PROT SERPL-MCNC: 5.5 G/DL (ref 6.4–8.3)
PROT SERPL-MCNC: 6 G/DL (ref 6.4–8.3)
RBC # BLD AUTO: 2.74 10E6/UL (ref 3.8–5.9)
RBC # BLD AUTO: 2.89 10E6/UL (ref 3.8–5.9)
SAO2 % BLDA: 96 % (ref 92–100)
SAO2 % BLDA: 97 % (ref 92–100)
SAO2 % BLDA: 97 % (ref 92–100)
SAO2 % BLDA: 99 % (ref 95–96)
SAO2 % BLDV: 81 % (ref 70–75)
SODIUM BLD-SCNC: 136 MMOL/L (ref 135–145)
SODIUM BLD-SCNC: 136 MMOL/L (ref 135–145)
SODIUM BLD-SCNC: 137 MMOL/L (ref 135–145)
SODIUM BLD-SCNC: 138 MMOL/L (ref 135–145)
SODIUM BLD-SCNC: 138 MMOL/L (ref 135–145)
SODIUM BLD-SCNC: 139 MMOL/L (ref 135–145)
SODIUM SERPL-SCNC: 137 MMOL/L (ref 135–145)
SODIUM SERPL-SCNC: 139 MMOL/L (ref 135–145)
UNIT ABO/RH: NORMAL
UNIT NUMBER: NORMAL
UNIT STATUS: NORMAL
UNIT TYPE ISBT: 7300
UNIT TYPE ISBT: 8400
WBC # BLD AUTO: 11.4 10E3/UL (ref 4–11)
WBC # BLD AUTO: 12.7 10E3/UL (ref 4–11)

## 2024-08-21 PROCEDURE — 82330 ASSAY OF CALCIUM: CPT

## 2024-08-21 PROCEDURE — 85730 THROMBOPLASTIN TIME PARTIAL: CPT | Performed by: STUDENT IN AN ORGANIZED HEALTH CARE EDUCATION/TRAINING PROGRAM

## 2024-08-21 PROCEDURE — 250N000024 HC ISOFLURANE, PER MIN: Performed by: SURGERY

## 2024-08-21 PROCEDURE — 258N000003 HC RX IP 258 OP 636: Performed by: SURGERY

## 2024-08-21 PROCEDURE — 74018 RADEX ABDOMEN 1 VIEW: CPT | Mod: 26 | Performed by: RADIOLOGY

## 2024-08-21 PROCEDURE — 5A1221Z PERFORMANCE OF CARDIAC OUTPUT, CONTINUOUS: ICD-10-PCS | Performed by: SURGERY

## 2024-08-21 PROCEDURE — 360N000079 HC SURGERY LEVEL 6, PER MIN: Performed by: SURGERY

## 2024-08-21 PROCEDURE — 410N000003 HC PER-PERFUSION 1ST 30 MIN: Performed by: SURGERY

## 2024-08-21 PROCEDURE — 85049 AUTOMATED PLATELET COUNT: CPT

## 2024-08-21 PROCEDURE — 84295 ASSAY OF SERUM SODIUM: CPT | Performed by: STUDENT IN AN ORGANIZED HEALTH CARE EDUCATION/TRAINING PROGRAM

## 2024-08-21 PROCEDURE — 250N000011 HC RX IP 250 OP 636: Performed by: STUDENT IN AN ORGANIZED HEALTH CARE EDUCATION/TRAINING PROGRAM

## 2024-08-21 PROCEDURE — 86923 COMPATIBILITY TEST ELECTRIC: CPT

## 2024-08-21 PROCEDURE — 84132 ASSAY OF SERUM POTASSIUM: CPT

## 2024-08-21 PROCEDURE — 021109W BYPASS CORONARY ARTERY, TWO ARTERIES FROM AORTA WITH AUTOLOGOUS VENOUS TISSUE, OPEN APPROACH: ICD-10-PCS | Performed by: SURGERY

## 2024-08-21 PROCEDURE — 84295 ASSAY OF SERUM SODIUM: CPT

## 2024-08-21 PROCEDURE — 250N000009 HC RX 250: Performed by: STUDENT IN AN ORGANIZED HEALTH CARE EDUCATION/TRAINING PROGRAM

## 2024-08-21 PROCEDURE — 83605 ASSAY OF LACTIC ACID: CPT | Performed by: STUDENT IN AN ORGANIZED HEALTH CARE EDUCATION/TRAINING PROGRAM

## 2024-08-21 PROCEDURE — 999N000157 HC STATISTIC RCP TIME EA 10 MIN

## 2024-08-21 PROCEDURE — 258N000003 HC RX IP 258 OP 636: Performed by: NURSE ANESTHETIST, CERTIFIED REGISTERED

## 2024-08-21 PROCEDURE — 33533 CABG ARTERIAL SINGLE: CPT | Mod: GC | Performed by: SURGERY

## 2024-08-21 PROCEDURE — 250N000011 HC RX IP 250 OP 636: Performed by: NURSE ANESTHETIST, CERTIFIED REGISTERED

## 2024-08-21 PROCEDURE — 250N000012 HC RX MED GY IP 250 OP 636 PS 637: Performed by: SURGERY

## 2024-08-21 PROCEDURE — 06BQ4ZZ EXCISION OF LEFT SAPHENOUS VEIN, PERCUTANEOUS ENDOSCOPIC APPROACH: ICD-10-PCS | Performed by: SURGERY

## 2024-08-21 PROCEDURE — 33518 CABG ARTERY-VEIN TWO: CPT | Mod: GC | Performed by: SURGERY

## 2024-08-21 PROCEDURE — 82805 BLOOD GASES W/O2 SATURATION: CPT | Performed by: STUDENT IN AN ORGANIZED HEALTH CARE EDUCATION/TRAINING PROGRAM

## 2024-08-21 PROCEDURE — 85730 THROMBOPLASTIN TIME PARTIAL: CPT | Performed by: SURGERY

## 2024-08-21 PROCEDURE — 999N000253 HC STATISTIC WEANING TRIALS

## 2024-08-21 PROCEDURE — P9047 ALBUMIN (HUMAN), 25%, 50ML: HCPCS

## 2024-08-21 PROCEDURE — 33510 CABG VEIN SINGLE: CPT | Performed by: ANESTHESIOLOGY

## 2024-08-21 PROCEDURE — 999N000156 HC STATISTIC RCP CONSULT EA 30 MIN

## 2024-08-21 PROCEDURE — 85384 FIBRINOGEN ACTIVITY: CPT | Performed by: SURGERY

## 2024-08-21 PROCEDURE — 258N000001 HC RX 258

## 2024-08-21 PROCEDURE — 33510 CABG VEIN SINGLE: CPT | Performed by: NURSE ANESTHETIST, CERTIFIED REGISTERED

## 2024-08-21 PROCEDURE — 999N000259 HC STATISTIC EXTUBATION

## 2024-08-21 PROCEDURE — 85049 AUTOMATED PLATELET COUNT: CPT | Performed by: STUDENT IN AN ORGANIZED HEALTH CARE EDUCATION/TRAINING PROGRAM

## 2024-08-21 PROCEDURE — 250N000011 HC RX IP 250 OP 636

## 2024-08-21 PROCEDURE — 33508 ENDOSCOPIC VEIN HARVEST: CPT | Mod: XU | Performed by: SURGERY

## 2024-08-21 PROCEDURE — P9016 RBC LEUKOCYTES REDUCED: HCPCS

## 2024-08-21 PROCEDURE — 999N000065 XR CHEST PORT 1 VIEW

## 2024-08-21 PROCEDURE — 250N000011 HC RX IP 250 OP 636: Mod: JZ | Performed by: STUDENT IN AN ORGANIZED HEALTH CARE EDUCATION/TRAINING PROGRAM

## 2024-08-21 PROCEDURE — 85049 AUTOMATED PLATELET COUNT: CPT | Performed by: SURGERY

## 2024-08-21 PROCEDURE — 99222 1ST HOSP IP/OBS MODERATE 55: CPT | Mod: FS | Performed by: PHYSICIAN ASSISTANT

## 2024-08-21 PROCEDURE — 83735 ASSAY OF MAGNESIUM: CPT

## 2024-08-21 PROCEDURE — 271N000002 HC RX 271: Performed by: ANESTHESIOLOGY

## 2024-08-21 PROCEDURE — 94002 VENT MGMT INPAT INIT DAY: CPT

## 2024-08-21 PROCEDURE — 250N000009 HC RX 250: Performed by: ANESTHESIOLOGY

## 2024-08-21 PROCEDURE — 410N000004: Performed by: SURGERY

## 2024-08-21 PROCEDURE — 84100 ASSAY OF PHOSPHORUS: CPT | Performed by: STUDENT IN AN ORGANIZED HEALTH CARE EDUCATION/TRAINING PROGRAM

## 2024-08-21 PROCEDURE — 64461 PVB THORACIC SINGLE INJ SITE: CPT | Mod: 59 | Performed by: ANESTHESIOLOGY

## 2024-08-21 PROCEDURE — 84100 ASSAY OF PHOSPHORUS: CPT

## 2024-08-21 PROCEDURE — 85396 CLOTTING ASSAY WHOLE BLOOD: CPT

## 2024-08-21 PROCEDURE — C1898 LEAD, PMKR, OTHER THAN TRANS: HCPCS | Performed by: SURGERY

## 2024-08-21 PROCEDURE — 999N000065 XR ABDOMEN PORT 1 VIEW

## 2024-08-21 PROCEDURE — 250N000009 HC RX 250: Performed by: SURGERY

## 2024-08-21 PROCEDURE — 82330 ASSAY OF CALCIUM: CPT | Performed by: STUDENT IN AN ORGANIZED HEALTH CARE EDUCATION/TRAINING PROGRAM

## 2024-08-21 PROCEDURE — 85610 PROTHROMBIN TIME: CPT | Performed by: STUDENT IN AN ORGANIZED HEALTH CARE EDUCATION/TRAINING PROGRAM

## 2024-08-21 PROCEDURE — 272N000202 HC AEROBIKA WITH MANOMETER

## 2024-08-21 PROCEDURE — 999N000141 HC STATISTIC PRE-PROCEDURE NURSING ASSESSMENT: Performed by: SURGERY

## 2024-08-21 PROCEDURE — 93005 ELECTROCARDIOGRAM TRACING: CPT

## 2024-08-21 PROCEDURE — 250N000011 HC RX IP 250 OP 636: Performed by: ANESTHESIOLOGY

## 2024-08-21 PROCEDURE — 250N000013 HC RX MED GY IP 250 OP 250 PS 637: Performed by: SURGERY

## 2024-08-21 PROCEDURE — 02100Z9 BYPASS CORONARY ARTERY, ONE ARTERY FROM LEFT INTERNAL MAMMARY, OPEN APPROACH: ICD-10-PCS | Performed by: SURGERY

## 2024-08-21 PROCEDURE — 99100 ANES PT EXTEME AGE<1 YR&>70: CPT | Performed by: ANESTHESIOLOGY

## 2024-08-21 PROCEDURE — 272N000001 HC OR GENERAL SUPPLY STERILE: Performed by: SURGERY

## 2024-08-21 PROCEDURE — 3E033XZ INTRODUCTION OF VASOPRESSOR INTO PERIPHERAL VEIN, PERCUTANEOUS APPROACH: ICD-10-PCS | Performed by: SURGERY

## 2024-08-21 PROCEDURE — 250N000011 HC RX IP 250 OP 636: Performed by: SURGERY

## 2024-08-21 PROCEDURE — 99100 ANES PT EXTEME AGE<1 YR&>70: CPT | Performed by: NURSE ANESTHETIST, CERTIFIED REGISTERED

## 2024-08-21 PROCEDURE — 5A1D70Z PERFORMANCE OF URINARY FILTRATION, INTERMITTENT, LESS THAN 6 HOURS PER DAY: ICD-10-PCS | Performed by: INTERNAL MEDICINE

## 2024-08-21 PROCEDURE — 272N000088 HC PUMP APP ADULT PERFUSION: Performed by: SURGERY

## 2024-08-21 PROCEDURE — 85610 PROTHROMBIN TIME: CPT | Performed by: SURGERY

## 2024-08-21 PROCEDURE — 71045 X-RAY EXAM CHEST 1 VIEW: CPT | Mod: 26 | Performed by: RADIOLOGY

## 2024-08-21 PROCEDURE — 250N000013 HC RX MED GY IP 250 OP 250 PS 637

## 2024-08-21 PROCEDURE — P9037 PLATE PHERES LEUKOREDU IRRAD: HCPCS

## 2024-08-21 PROCEDURE — 84155 ASSAY OF PROTEIN SERUM: CPT | Performed by: STUDENT IN AN ORGANIZED HEALTH CARE EDUCATION/TRAINING PROGRAM

## 2024-08-21 PROCEDURE — 120N000002 HC R&B MED SURG/OB UMMC

## 2024-08-21 PROCEDURE — 258N000003 HC RX IP 258 OP 636: Performed by: STUDENT IN AN ORGANIZED HEALTH CARE EDUCATION/TRAINING PROGRAM

## 2024-08-21 PROCEDURE — 272N000085 HC PACK CELL SAVER CSP: Performed by: SURGERY

## 2024-08-21 PROCEDURE — 94799 UNLISTED PULMONARY SVC/PX: CPT

## 2024-08-21 PROCEDURE — 82805 BLOOD GASES W/O2 SATURATION: CPT | Performed by: SURGERY

## 2024-08-21 PROCEDURE — 250N000009 HC RX 250

## 2024-08-21 PROCEDURE — 83735 ASSAY OF MAGNESIUM: CPT | Performed by: STUDENT IN AN ORGANIZED HEALTH CARE EDUCATION/TRAINING PROGRAM

## 2024-08-21 PROCEDURE — 250N000013 HC RX MED GY IP 250 OP 250 PS 637: Performed by: STUDENT IN AN ORGANIZED HEALTH CARE EDUCATION/TRAINING PROGRAM

## 2024-08-21 PROCEDURE — 99291 CRITICAL CARE FIRST HOUR: CPT | Mod: GC | Performed by: ANESTHESIOLOGY

## 2024-08-21 PROCEDURE — 370N000017 HC ANESTHESIA TECHNICAL FEE, PER MIN: Performed by: SURGERY

## 2024-08-21 RX ORDER — EPHEDRINE SULFATE 50 MG/ML
INJECTION, SOLUTION INTRAMUSCULAR; INTRAVENOUS; SUBCUTANEOUS PRN
Status: DISCONTINUED | OUTPATIENT
Start: 2024-08-21 | End: 2024-08-21

## 2024-08-21 RX ORDER — PROPOFOL 10 MG/ML
5-75 INJECTION, EMULSION INTRAVENOUS CONTINUOUS
Status: DISCONTINUED | OUTPATIENT
Start: 2024-08-21 | End: 2024-08-21

## 2024-08-21 RX ORDER — DEXMEDETOMIDINE HYDROCHLORIDE 4 UG/ML
.2-1.2 INJECTION, SOLUTION INTRAVENOUS CONTINUOUS
Status: DISCONTINUED | OUTPATIENT
Start: 2024-08-21 | End: 2024-08-21 | Stop reason: HOSPADM

## 2024-08-21 RX ORDER — NALOXONE HYDROCHLORIDE 0.4 MG/ML
0.2 INJECTION, SOLUTION INTRAMUSCULAR; INTRAVENOUS; SUBCUTANEOUS
Status: DISCONTINUED | OUTPATIENT
Start: 2024-08-21 | End: 2024-08-21 | Stop reason: HOSPADM

## 2024-08-21 RX ORDER — GABAPENTIN 100 MG/1
100 CAPSULE ORAL
Status: COMPLETED | OUTPATIENT
Start: 2024-08-21 | End: 2024-08-21

## 2024-08-21 RX ORDER — NOREPINEPHRINE BITARTRATE 0.06 MG/ML
.01-.6 INJECTION, SOLUTION INTRAVENOUS CONTINUOUS
Status: DISCONTINUED | OUTPATIENT
Start: 2024-08-21 | End: 2024-08-22

## 2024-08-21 RX ORDER — CEFAZOLIN SODIUM 2 G/100ML
2 INJECTION, SOLUTION INTRAVENOUS EVERY 24 HOURS
Status: COMPLETED | OUTPATIENT
Start: 2024-08-21 | End: 2024-08-22

## 2024-08-21 RX ORDER — ATORVASTATIN CALCIUM 40 MG/1
40 TABLET, FILM COATED ORAL EVERY EVENING
Status: DISCONTINUED | OUTPATIENT
Start: 2024-08-22 | End: 2024-08-26 | Stop reason: HOSPADM

## 2024-08-21 RX ORDER — HEPARIN SODIUM 5000 [USP'U]/.5ML
5000 INJECTION, SOLUTION INTRAVENOUS; SUBCUTANEOUS EVERY 8 HOURS
Status: DISCONTINUED | OUTPATIENT
Start: 2024-08-22 | End: 2024-08-25

## 2024-08-21 RX ORDER — DEXTROSE MONOHYDRATE 25 G/50ML
25-50 INJECTION, SOLUTION INTRAVENOUS
Status: DISCONTINUED | OUTPATIENT
Start: 2024-08-21 | End: 2024-08-26 | Stop reason: HOSPADM

## 2024-08-21 RX ORDER — ASPIRIN 81 MG/1
81 TABLET, CHEWABLE ORAL
Status: COMPLETED | OUTPATIENT
Start: 2024-08-21 | End: 2024-08-21

## 2024-08-21 RX ORDER — FENTANYL CITRATE 50 UG/ML
25-50 INJECTION, SOLUTION INTRAMUSCULAR; INTRAVENOUS
Status: DISCONTINUED | OUTPATIENT
Start: 2024-08-21 | End: 2024-08-21 | Stop reason: HOSPADM

## 2024-08-21 RX ORDER — PHENYLEPHRINE HCL IN 0.9% NACL 50MG/250ML
.1-6 PLASTIC BAG, INJECTION (ML) INTRAVENOUS CONTINUOUS
Status: DISCONTINUED | OUTPATIENT
Start: 2024-08-21 | End: 2024-08-21 | Stop reason: HOSPADM

## 2024-08-21 RX ORDER — NALOXONE HYDROCHLORIDE 0.4 MG/ML
0.4 INJECTION, SOLUTION INTRAMUSCULAR; INTRAVENOUS; SUBCUTANEOUS
Status: DISCONTINUED | OUTPATIENT
Start: 2024-08-21 | End: 2024-08-26 | Stop reason: HOSPADM

## 2024-08-21 RX ORDER — PROTAMINE SULFATE 10 MG/ML
INJECTION, SOLUTION INTRAVENOUS PRN
Status: DISCONTINUED | OUTPATIENT
Start: 2024-08-21 | End: 2024-08-21

## 2024-08-21 RX ORDER — HEPARIN SODIUM 1000 [USP'U]/ML
INJECTION, SOLUTION INTRAVENOUS; SUBCUTANEOUS PRN
Status: DISCONTINUED | OUTPATIENT
Start: 2024-08-21 | End: 2024-08-21

## 2024-08-21 RX ORDER — DEXTROSE MONOHYDRATE 25 G/50ML
25 INJECTION, SOLUTION INTRAVENOUS ONCE
Status: COMPLETED | OUTPATIENT
Start: 2024-08-21 | End: 2024-08-21

## 2024-08-21 RX ORDER — NALOXONE HYDROCHLORIDE 0.4 MG/ML
0.2 INJECTION, SOLUTION INTRAMUSCULAR; INTRAVENOUS; SUBCUTANEOUS
Status: DISCONTINUED | OUTPATIENT
Start: 2024-08-21 | End: 2024-08-26 | Stop reason: HOSPADM

## 2024-08-21 RX ORDER — HYDROMORPHONE HCL IN WATER/PF 6 MG/30 ML
0.1 PATIENT CONTROLLED ANALGESIA SYRINGE INTRAVENOUS
Status: DISCONTINUED | OUTPATIENT
Start: 2024-08-21 | End: 2024-08-25

## 2024-08-21 RX ORDER — POLYETHYLENE GLYCOL 3350 17 G/17G
17 POWDER, FOR SOLUTION ORAL DAILY
Status: DISCONTINUED | OUTPATIENT
Start: 2024-08-22 | End: 2024-08-22

## 2024-08-21 RX ORDER — NALOXONE HYDROCHLORIDE 0.4 MG/ML
0.4 INJECTION, SOLUTION INTRAMUSCULAR; INTRAVENOUS; SUBCUTANEOUS
Status: DISCONTINUED | OUTPATIENT
Start: 2024-08-21 | End: 2024-08-21 | Stop reason: HOSPADM

## 2024-08-21 RX ORDER — HYDROMORPHONE HCL IN WATER/PF 6 MG/30 ML
0.2 PATIENT CONTROLLED ANALGESIA SYRINGE INTRAVENOUS
Status: DISCONTINUED | OUTPATIENT
Start: 2024-08-21 | End: 2024-08-25

## 2024-08-21 RX ORDER — HYDRALAZINE HYDROCHLORIDE 20 MG/ML
10 INJECTION INTRAMUSCULAR; INTRAVENOUS EVERY 30 MIN PRN
Status: DISCONTINUED | OUTPATIENT
Start: 2024-08-21 | End: 2024-08-22

## 2024-08-21 RX ORDER — PANTOPRAZOLE SODIUM 40 MG/1
40 TABLET, DELAYED RELEASE ORAL 2 TIMES DAILY
Status: DISCONTINUED | OUTPATIENT
Start: 2024-08-22 | End: 2024-08-26 | Stop reason: HOSPADM

## 2024-08-21 RX ORDER — PROPOFOL 10 MG/ML
INJECTION, EMULSION INTRAVENOUS PRN
Status: DISCONTINUED | OUTPATIENT
Start: 2024-08-21 | End: 2024-08-21

## 2024-08-21 RX ORDER — LIDOCAINE HYDROCHLORIDE 20 MG/ML
INJECTION, SOLUTION INFILTRATION; PERINEURAL PRN
Status: DISCONTINUED | OUTPATIENT
Start: 2024-08-21 | End: 2024-08-21

## 2024-08-21 RX ORDER — ASPIRIN 81 MG/1
162 TABLET, CHEWABLE ORAL DAILY
Status: DISCONTINUED | OUTPATIENT
Start: 2024-08-21 | End: 2024-08-26 | Stop reason: HOSPADM

## 2024-08-21 RX ORDER — NITROGLYCERIN 20 MG/100ML
10-200 INJECTION INTRAVENOUS CONTINUOUS
Status: DISCONTINUED | OUTPATIENT
Start: 2024-08-21 | End: 2024-08-22

## 2024-08-21 RX ORDER — LIDOCAINE 40 MG/G
CREAM TOPICAL
Status: DISCONTINUED | OUTPATIENT
Start: 2024-08-21 | End: 2024-08-21 | Stop reason: HOSPADM

## 2024-08-21 RX ORDER — EPINEPHRINE IN 0.9 % SOD CHLOR 5 MG/250ML
.01-.1 PLASTIC BAG, INJECTION (ML) INTRAVENOUS CONTINUOUS
Status: DISCONTINUED | OUTPATIENT
Start: 2024-08-21 | End: 2024-08-22

## 2024-08-21 RX ORDER — METHOCARBAMOL 500 MG/1
500 TABLET, FILM COATED ORAL EVERY 6 HOURS PRN
Status: DISCONTINUED | OUTPATIENT
Start: 2024-08-21 | End: 2024-08-26 | Stop reason: HOSPADM

## 2024-08-21 RX ORDER — EPINEPHRINE IN 0.9 % SOD CHLOR 5 MG/250ML
.01-.3 PLASTIC BAG, INJECTION (ML) INTRAVENOUS CONTINUOUS
Status: DISCONTINUED | OUTPATIENT
Start: 2024-08-21 | End: 2024-08-21 | Stop reason: HOSPADM

## 2024-08-21 RX ORDER — AMOXICILLIN 250 MG
1 CAPSULE ORAL 2 TIMES DAILY
Status: DISCONTINUED | OUTPATIENT
Start: 2024-08-21 | End: 2024-08-22

## 2024-08-21 RX ORDER — FLUMAZENIL 0.1 MG/ML
0.2 INJECTION, SOLUTION INTRAVENOUS
Status: DISCONTINUED | OUTPATIENT
Start: 2024-08-21 | End: 2024-08-21 | Stop reason: HOSPADM

## 2024-08-21 RX ORDER — ACETAMINOPHEN 325 MG/1
975 TABLET ORAL EVERY 8 HOURS
Status: COMPLETED | OUTPATIENT
Start: 2024-08-21 | End: 2024-08-24

## 2024-08-21 RX ORDER — NITROGLYCERIN 10 MG/100ML
INJECTION INTRAVENOUS PRN
Status: DISCONTINUED | OUTPATIENT
Start: 2024-08-21 | End: 2024-08-21

## 2024-08-21 RX ORDER — PROPOFOL 10 MG/ML
INJECTION, EMULSION INTRAVENOUS CONTINUOUS PRN
Status: DISCONTINUED | OUTPATIENT
Start: 2024-08-21 | End: 2024-08-21

## 2024-08-21 RX ORDER — ASPIRIN 81 MG/1
162 TABLET, CHEWABLE ORAL
Status: COMPLETED | OUTPATIENT
Start: 2024-08-21 | End: 2024-08-21

## 2024-08-21 RX ORDER — ONDANSETRON 2 MG/ML
4 INJECTION INTRAMUSCULAR; INTRAVENOUS EVERY 6 HOURS PRN
Status: DISCONTINUED | OUTPATIENT
Start: 2024-08-21 | End: 2024-08-26 | Stop reason: HOSPADM

## 2024-08-21 RX ORDER — NICOTINE POLACRILEX 4 MG
15-30 LOZENGE BUCCAL
Status: DISCONTINUED | OUTPATIENT
Start: 2024-08-21 | End: 2024-08-21

## 2024-08-21 RX ORDER — FAMOTIDINE 20 MG/1
20 TABLET, FILM COATED ORAL
Status: COMPLETED | OUTPATIENT
Start: 2024-08-21 | End: 2024-08-21

## 2024-08-21 RX ORDER — BISACODYL 10 MG
10 SUPPOSITORY, RECTAL RECTAL DAILY PRN
Status: DISCONTINUED | OUTPATIENT
Start: 2024-08-24 | End: 2024-08-26 | Stop reason: HOSPADM

## 2024-08-21 RX ORDER — LIDOCAINE HYDROCHLORIDE 10 MG/ML
INJECTION, SOLUTION INFILTRATION; PERINEURAL
Status: COMPLETED | OUTPATIENT
Start: 2024-08-21 | End: 2024-08-21

## 2024-08-21 RX ORDER — DEXTROSE MONOHYDRATE 25 G/50ML
25-50 INJECTION, SOLUTION INTRAVENOUS
Status: DISCONTINUED | OUTPATIENT
Start: 2024-08-21 | End: 2024-08-21

## 2024-08-21 RX ORDER — HYDRALAZINE HYDROCHLORIDE 20 MG/ML
10 INJECTION INTRAMUSCULAR; INTRAVENOUS EVERY 30 MIN PRN
Status: DISCONTINUED | OUTPATIENT
Start: 2024-08-21 | End: 2024-08-21

## 2024-08-21 RX ORDER — LIDOCAINE 40 MG/G
CREAM TOPICAL
Status: DISCONTINUED | OUTPATIENT
Start: 2024-08-21 | End: 2024-08-25

## 2024-08-21 RX ORDER — CHLORHEXIDINE GLUCONATE ORAL RINSE 1.2 MG/ML
10 SOLUTION DENTAL ONCE
Status: COMPLETED | OUTPATIENT
Start: 2024-08-21 | End: 2024-08-21

## 2024-08-21 RX ORDER — SODIUM CHLORIDE, SODIUM LACTATE, POTASSIUM CHLORIDE, CALCIUM CHLORIDE 600; 310; 30; 20 MG/100ML; MG/100ML; MG/100ML; MG/100ML
INJECTION, SOLUTION INTRAVENOUS CONTINUOUS PRN
Status: DISCONTINUED | OUTPATIENT
Start: 2024-08-21 | End: 2024-08-21

## 2024-08-21 RX ORDER — DEXMEDETOMIDINE HYDROCHLORIDE 4 UG/ML
.2-.7 INJECTION, SOLUTION INTRAVENOUS CONTINUOUS
Status: DISCONTINUED | OUTPATIENT
Start: 2024-08-21 | End: 2024-08-21

## 2024-08-21 RX ORDER — FENTANYL CITRATE 50 UG/ML
INJECTION, SOLUTION INTRAMUSCULAR; INTRAVENOUS PRN
Status: DISCONTINUED | OUTPATIENT
Start: 2024-08-21 | End: 2024-08-21

## 2024-08-21 RX ORDER — CEFAZOLIN SODIUM/WATER 2 G/20 ML
2 SYRINGE (ML) INTRAVENOUS
Status: COMPLETED | OUTPATIENT
Start: 2024-08-21 | End: 2024-08-21

## 2024-08-21 RX ORDER — ACETAMINOPHEN 325 MG/1
975 TABLET ORAL ONCE
Status: COMPLETED | OUTPATIENT
Start: 2024-08-21 | End: 2024-08-21

## 2024-08-21 RX ORDER — NOREPINEPHRINE BITARTRATE 0.06 MG/ML
.01-.1 INJECTION, SOLUTION INTRAVENOUS CONTINUOUS
Status: DISCONTINUED | OUTPATIENT
Start: 2024-08-21 | End: 2024-08-21 | Stop reason: HOSPADM

## 2024-08-21 RX ORDER — CALCIUM GLUCONATE 20 MG/ML
2 INJECTION, SOLUTION INTRAVENOUS
Status: DISCONTINUED | OUTPATIENT
Start: 2024-08-21 | End: 2024-08-23

## 2024-08-21 RX ORDER — CHLORHEXIDINE GLUCONATE ORAL RINSE 1.2 MG/ML
15 SOLUTION DENTAL EVERY 12 HOURS
Status: DISCONTINUED | OUTPATIENT
Start: 2024-08-21 | End: 2024-08-21

## 2024-08-21 RX ORDER — CALCIUM GLUCONATE 20 MG/ML
1 INJECTION, SOLUTION INTRAVENOUS
Status: DISCONTINUED | OUTPATIENT
Start: 2024-08-21 | End: 2024-08-23

## 2024-08-21 RX ORDER — ACETAMINOPHEN 325 MG/1
650 TABLET ORAL EVERY 4 HOURS PRN
Status: DISCONTINUED | OUTPATIENT
Start: 2024-08-24 | End: 2024-08-26 | Stop reason: HOSPADM

## 2024-08-21 RX ORDER — SODIUM CHLORIDE, SODIUM GLUCONATE, SODIUM ACETATE, POTASSIUM CHLORIDE AND MAGNESIUM CHLORIDE 526; 502; 368; 37; 30 MG/100ML; MG/100ML; MG/100ML; MG/100ML; MG/100ML
INJECTION, SOLUTION INTRAVENOUS CONTINUOUS PRN
Status: DISCONTINUED | OUTPATIENT
Start: 2024-08-21 | End: 2024-08-21

## 2024-08-21 RX ORDER — PANTOPRAZOLE SODIUM 40 MG/1
40 TABLET, DELAYED RELEASE ORAL DAILY
Status: DISCONTINUED | OUTPATIENT
Start: 2024-08-22 | End: 2024-08-21

## 2024-08-21 RX ORDER — CEFAZOLIN SODIUM/WATER 2 G/20 ML
2 SYRINGE (ML) INTRAVENOUS SEE ADMIN INSTRUCTIONS
Status: DISCONTINUED | OUTPATIENT
Start: 2024-08-21 | End: 2024-08-21 | Stop reason: HOSPADM

## 2024-08-21 RX ORDER — PROCHLORPERAZINE MALEATE 5 MG
5 TABLET ORAL EVERY 6 HOURS PRN
Status: DISCONTINUED | OUTPATIENT
Start: 2024-08-21 | End: 2024-08-25

## 2024-08-21 RX ORDER — OXYCODONE HYDROCHLORIDE 5 MG/1
5 TABLET ORAL EVERY 4 HOURS PRN
Status: DISCONTINUED | OUTPATIENT
Start: 2024-08-21 | End: 2024-08-26 | Stop reason: HOSPADM

## 2024-08-21 RX ORDER — NICOTINE POLACRILEX 4 MG
15-30 LOZENGE BUCCAL
Status: DISCONTINUED | OUTPATIENT
Start: 2024-08-21 | End: 2024-08-26 | Stop reason: HOSPADM

## 2024-08-21 RX ORDER — ONDANSETRON 4 MG/1
4 TABLET, ORALLY DISINTEGRATING ORAL EVERY 6 HOURS PRN
Status: DISCONTINUED | OUTPATIENT
Start: 2024-08-21 | End: 2024-08-26 | Stop reason: HOSPADM

## 2024-08-21 RX ADMIN — Medication 2 G: at 08:01

## 2024-08-21 RX ADMIN — FENTANYL CITRATE 100 MCG: 50 INJECTION INTRAMUSCULAR; INTRAVENOUS at 09:30

## 2024-08-21 RX ADMIN — SODIUM CHLORIDE, SODIUM GLUCONATE, SODIUM ACETATE, POTASSIUM CHLORIDE AND MAGNESIUM CHLORIDE: 526; 502; 368; 37; 30 INJECTION, SOLUTION INTRAVENOUS at 07:55

## 2024-08-21 RX ADMIN — ACETAMINOPHEN 975 MG: 325 TABLET, FILM COATED ORAL at 14:18

## 2024-08-21 RX ADMIN — AMINOCAPROIC ACID 1 G/HR: 250 INJECTION, SOLUTION INTRAVENOUS at 08:57

## 2024-08-21 RX ADMIN — AMINOCAPROIC ACID 5 G: 250 INJECTION, SOLUTION INTRAVENOUS at 08:15

## 2024-08-21 RX ADMIN — ASPIRIN 81 MG CHEWABLE TABLET 162 MG: 81 TABLET CHEWABLE at 06:36

## 2024-08-21 RX ADMIN — FAMOTIDINE 20 MG: 20 TABLET ORAL at 06:37

## 2024-08-21 RX ADMIN — Medication 100 MG: at 07:46

## 2024-08-21 RX ADMIN — NITROGLYCERIN 100 MCG: 10 INJECTION INTRAVENOUS at 11:42

## 2024-08-21 RX ADMIN — ASPIRIN 81 MG CHEWABLE TABLET 162 MG: 81 TABLET CHEWABLE at 20:07

## 2024-08-21 RX ADMIN — EPHEDRINE SULFATE 10 MG: 5 INJECTION INTRAVENOUS at 09:25

## 2024-08-21 RX ADMIN — LIDOCAINE HYDROCHLORIDE 100 MG: 20 INJECTION, SOLUTION INFILTRATION; PERINEURAL at 07:46

## 2024-08-21 RX ADMIN — SENNOSIDES AND DOCUSATE SODIUM 1 TABLET: 8.6; 5 TABLET ORAL at 20:07

## 2024-08-21 RX ADMIN — FENTANYL CITRATE 150 MCG: 50 INJECTION INTRAMUSCULAR; INTRAVENOUS at 11:44

## 2024-08-21 RX ADMIN — DEXMEDETOMIDINE HYDROCHLORIDE 20 MCG: 100 INJECTION, SOLUTION INTRAVENOUS at 12:51

## 2024-08-21 RX ADMIN — PROTAMINE SULFATE 70 MG: 10 INJECTION, SOLUTION INTRAVENOUS at 11:45

## 2024-08-21 RX ADMIN — DESMOPRESSIN ACETATE 25.68 MCG: 4 INJECTION, SOLUTION INTRAVENOUS; SUBCUTANEOUS at 11:58

## 2024-08-21 RX ADMIN — INSULIN HUMAN 2 UNITS/HR: 1 INJECTION, SOLUTION INTRAVENOUS at 10:48

## 2024-08-21 RX ADMIN — NOREPINEPHRINE BITARTRATE 0.03 MCG/KG/MIN: 0.06 INJECTION, SOLUTION INTRAVENOUS at 10:13

## 2024-08-21 RX ADMIN — PROPOFOL 30 MCG/KG/MIN: 10 INJECTION, EMULSION INTRAVENOUS at 12:09

## 2024-08-21 RX ADMIN — FENTANYL CITRATE 50 MCG: 50 INJECTION, SOLUTION INTRAMUSCULAR; INTRAVENOUS at 07:09

## 2024-08-21 RX ADMIN — SODIUM CHLORIDE, SODIUM LACTATE, POTASSIUM CHLORIDE, CALCIUM CHLORIDE: 600; 310; 30; 20 INJECTION, SOLUTION INTRAVENOUS at 08:00

## 2024-08-21 RX ADMIN — GABAPENTIN 100 MG: 100 CAPSULE ORAL at 06:37

## 2024-08-21 RX ADMIN — DEXMEDETOMIDINE HYDROCHLORIDE 12 MCG: 100 INJECTION, SOLUTION INTRAVENOUS at 12:39

## 2024-08-21 RX ADMIN — PROTAMINE SULFATE 10 MG: 10 INJECTION, SOLUTION INTRAVENOUS at 11:43

## 2024-08-21 RX ADMIN — NOREPINEPHRINE BITARTRATE 6.4 MCG: 1 INJECTION, SOLUTION, CONCENTRATE INTRAVENOUS at 08:21

## 2024-08-21 RX ADMIN — SODIUM CHLORIDE, POTASSIUM CHLORIDE, SODIUM LACTATE AND CALCIUM CHLORIDE: 600; 310; 30; 20 INJECTION, SOLUTION INTRAVENOUS at 07:35

## 2024-08-21 RX ADMIN — Medication 2 G: at 12:15

## 2024-08-21 RX ADMIN — NOREPINEPHRINE BITARTRATE 3.2 MCG: 1 INJECTION, SOLUTION, CONCENTRATE INTRAVENOUS at 08:45

## 2024-08-21 RX ADMIN — HEPARIN SODIUM 28000 UNITS: 1000 INJECTION INTRAVENOUS; SUBCUTANEOUS at 09:29

## 2024-08-21 RX ADMIN — METHOCARBAMOL 500 MG: 500 TABLET ORAL at 14:18

## 2024-08-21 RX ADMIN — Medication 7 ML/HR: at 10:04

## 2024-08-21 RX ADMIN — FENTANYL CITRATE 500 MCG: 50 INJECTION INTRAMUSCULAR; INTRAVENOUS at 07:46

## 2024-08-21 RX ADMIN — EPINEPHRINE 0.05 MCG/KG/MIN: 1 INJECTION INTRAMUSCULAR; INTRAVENOUS; SUBCUTANEOUS at 11:34

## 2024-08-21 RX ADMIN — HUMAN INSULIN 10 UNITS: 100 INJECTION, SOLUTION SUBCUTANEOUS at 21:32

## 2024-08-21 RX ADMIN — SUGAMMADEX 200 MG: 100 INJECTION, SOLUTION INTRAVENOUS at 13:17

## 2024-08-21 RX ADMIN — Medication 0.01 MCG/KG/MIN: at 13:44

## 2024-08-21 RX ADMIN — LIDOCAINE HYDROCHLORIDE 2 ML: 10 INJECTION, SOLUTION INFILTRATION; PERINEURAL at 09:06

## 2024-08-21 RX ADMIN — Medication 50 MG: at 11:00

## 2024-08-21 RX ADMIN — HYDROMORPHONE HYDROCHLORIDE 0.2 MG: 0.2 INJECTION, SOLUTION INTRAMUSCULAR; INTRAVENOUS; SUBCUTANEOUS at 15:29

## 2024-08-21 RX ADMIN — PROTAMINE SULFATE 80 MG: 10 INJECTION, SOLUTION INTRAVENOUS at 11:46

## 2024-08-21 RX ADMIN — ACETAMINOPHEN 975 MG: 325 TABLET, FILM COATED ORAL at 21:00

## 2024-08-21 RX ADMIN — OXYCODONE HYDROCHLORIDE 5 MG: 5 TABLET ORAL at 14:18

## 2024-08-21 RX ADMIN — HYDROMORPHONE HYDROCHLORIDE 0.5 MG: 1 INJECTION, SOLUTION INTRAMUSCULAR; INTRAVENOUS; SUBCUTANEOUS at 11:57

## 2024-08-21 RX ADMIN — PROPOFOL 20 MCG/KG/MIN: 10 INJECTION, EMULSION INTRAVENOUS at 13:46

## 2024-08-21 RX ADMIN — HYDROMORPHONE HYDROCHLORIDE 0.5 MG: 1 INJECTION, SOLUTION INTRAMUSCULAR; INTRAVENOUS; SUBCUTANEOUS at 12:34

## 2024-08-21 RX ADMIN — NOREPINEPHRINE BITARTRATE 6.4 MCG: 1 INJECTION, SOLUTION, CONCENTRATE INTRAVENOUS at 07:46

## 2024-08-21 RX ADMIN — CEFAZOLIN SODIUM 2 G: 2 INJECTION, SOLUTION INTRAVENOUS at 20:07

## 2024-08-21 RX ADMIN — DEXTROSE MONOHYDRATE 25 G: 25 INJECTION, SOLUTION INTRAVENOUS at 21:31

## 2024-08-21 RX ADMIN — NITROGLYCERIN 50 MCG: 10 INJECTION INTRAVENOUS at 09:45

## 2024-08-21 RX ADMIN — NOREPINEPHRINE BITARTRATE 6.4 MCG: 1 INJECTION, SOLUTION, CONCENTRATE INTRAVENOUS at 07:56

## 2024-08-21 RX ADMIN — NOREPINEPHRINE BITARTRATE 0.04 MCG/KG/MIN: 0.06 INJECTION, SOLUTION INTRAVENOUS at 13:49

## 2024-08-21 RX ADMIN — PROPOFOL 80 MG: 10 INJECTION, EMULSION INTRAVENOUS at 07:46

## 2024-08-21 RX ADMIN — INSULIN HUMAN 3 UNITS/HR: 1 INJECTION, SOLUTION INTRAVENOUS at 13:45

## 2024-08-21 RX ADMIN — CHLORHEXIDINE GLUCONATE 0.12% ORAL RINSE 10 ML: 1.2 LIQUID ORAL at 06:38

## 2024-08-21 ASSESSMENT — ACTIVITIES OF DAILY LIVING (ADL)
ADLS_ACUITY_SCORE: 32
ADLS_ACUITY_SCORE: 32
ADLS_ACUITY_SCORE: 23
ADLS_ACUITY_SCORE: 32
ADLS_ACUITY_SCORE: 23
ADLS_ACUITY_SCORE: 32
ADLS_ACUITY_SCORE: 23
ADLS_ACUITY_SCORE: 23
ADLS_ACUITY_SCORE: 32

## 2024-08-21 NOTE — ANESTHESIA PROCEDURE NOTES
Airway       Patient location during procedure: OR       Procedure Start/Stop Times: 8/21/2024 7:48 AM  Staff -        CRNA: Teagan Brink APRN CRNA       Performed By: CRNA  Consent for Airway        Urgency: elective  Indications and Patient Condition       Indications for airway management: marc-procedural       Induction type:intravenous       Mask difficulty assessment: 1 - vent by mask    Final Airway Details       Final airway type: endotracheal airway       Successful airway: ETT - single and Oral  Endotracheal Airway Details        ETT size (mm): 8.0       Cuffed: yes       Cuff volume (mL): 8       Successful intubation technique: direct laryngoscopy       DL Blade Type: Fajardo 2       Grade View of Cords: 1       Adjucts: stylet       Position: Right       Measured from: lips       Secured at (cm): 24       Bite block used: None    Post intubation assessment        Placement verified by: capnometry, equal breath sounds and chest rise        Number of attempts at approach: 1       Secured with: tape       Ease of procedure: easy       Dentition: Intact and Unchanged    Medication(s) Administered   Medication Administration Time: 8/21/2024 7:48 AM

## 2024-08-21 NOTE — CONSULTS
Nephrology Initial Consult  August 21, 2024      Bret Fleming MRN:[id number] YOB: 1950  Date of Admission:8/21/2024  Primary care provider: Alisha Gloria  Requesting physician: Jah Solares MD    ASSESSMENT AND RECOMMENDATIONS:   #ESRD  - runs TTS at [facility] with Dr. Dalal.  - ACCESS LUE AVF placed 4/23/24 (still has tunneled right CVC which the dialysis unit had to use yesterday due to problems with the fistula)  - TW 86 kg  Run time 3 hrs; heparin 500 units loading dose, 600 units/hr  - next HD tomorrow 8/22/24. Will assess for iHD vs CRRT need in the morning    #BP/Volume   - /63  - weight today 85.6 kg, below TW  - getting epi and norepi intermittently    #Electrolytes:  - Na 138, K 4.4, bicarb 24    #Anemia  - Hgb 9.4  - pta mircera 50 mcg every two weeks, last dose 8/20/24; venofer 100 mg once a week, last dose 8/20/24    #Bone/Mineral  - ionized calcium 4.5, 8/2024 ; July 2024 vit D 38.7  - check phos    #s/p CABG x 3 on 8/21/24  - intermittently on 2 pressors  - following with cardiology      Recommendations were communicated to primary team via this note    Discussed with Dr. Cecily MEDINA, PA-C   Division of Nephrology and Hypertension  [facility]  Vocera Web Console      REASON FOR CONSULT: ESRD management    HISTORY OF PRESENT ILLNESS:  Admitting provider and nursing notes reviewed  Bret Fleming is a 74 year old Bret Fleming is a 74 year old adult with PMH of CAD s/p MI in 1996, HTN, ESRD on HD, ICM w/ EF 30-35%, 3v CAD who underwent CABG x 3 today. He is admitted to CVICU.     He had an RICHARD event in 9/2023 in the setting of encephalopathy, recent NSAID use, sepsis, UTI, and obstructive uropathy. Most likely significant prerenal RICHARD, probable ATN. Baseline Scr 1.8-3.3, up to 14.6 on admission at that time. He started dialysis but did not have any recovery therefor is now ESRD.     Nephrology is consulted for ESRD management. Per his wife, he  last dialyzed yesterday. They had to use his CVC because the access was not working possibly due to needle position.     He is working on kidney transplant evaluation with [facility].       PAST MEDICAL HISTORY:  Reviewed with patient on 08/21/2024     Past Medical History:   Diagnosis Date    Acute myocardial infarction     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 (H)     Bilateral hydronephrosis     CAD (coronary artery disease)     Hyperlipidemia     Hypertension     Ischemic cardiomyopathy     Sepsis due to gram-negative UTI (H) 10/07/2023       Past Surgical History:   Procedure Laterality Date    CV CORONARY ANGIOGRAM N/A 7/8/2024    Procedure: Coronary Angiogram;  Surgeon: Kevin Thomas MD;  Location:  HEART CARDIAC CATH LAB    ESOPHAGOSCOPY, GASTROSCOPY, DUODENOSCOPY (EGD), COMBINED N/A 10/9/2023    Procedure: Esophagoscopy, gastroscopy, duodenoscopy (EGD), combined;  Surgeon: Karolyn Saxena MD;  Location:  GI    IR CVC TUNNEL PLACEMENT > 5 YRS OF AGE  9/27/2023    IR CVC TUNNEL PLACEMENT > 5 YRS OF AGE  10/11/2023    PICC TRIPLE LUMEN PLACEMENT  9/27/2023        MEDICATIONS:  PTA Meds  Prior to Admission medications    Medication Sig Last Dose Taking? Auth Provider Long Term End Date   acetaminophen (TYLENOL) 500 MG tablet Take 2 tablets (1,000 mg) by mouth 3 times daily 8/21/2024 at 0500 Yes Cari Galarza MD     aspirin 81 MG EC tablet Take 1 tablet (81 mg) by mouth daily  Patient taking differently: Take 81 mg by mouth every morning Past Week Yes Cari Galarza MD     atorvastatin (LIPITOR) 40 MG tablet Take 1 tablet (40 mg) by mouth every evening 8/20/2024 at 2000 Yes Alisha Gloria MD Yes    hydrALAZINE (APRESOLINE) 10 MG tablet Take 1 tablet (10 mg) by mouth 3 times daily 8/21/2024 at 0500 Yes Alisha Gloria MD Yes    metoprolol succinate ER (TOPROL XL) 25 MG 24 hr tablet Take 1 tablet (25 mg) by mouth  daily  Patient taking differently: Take 25 mg by mouth every morning 8/21/2024 at 0500 Yes Alisha Gloria MD Yes    MULTIPLE VITAMIN PO Take 1 tablet by mouth every morning Past Week Yes Reported, Patient     pantoprazole (PROTONIX) 40 MG EC tablet Take 1 tablet (40 mg) by mouth 2 times daily (before meals) 8/21/2024 at 0500 Yes Cari Galarza MD     Lidocaine (LIDOCARE) 4 % Patch Place 1 patch onto the skin every 24 hours To prevent lidocaine toxicity, patient should be patch free for 12 hrs daily.  Patient not taking: Reported on 8/9/2024   Cari Galarza MD     nitroGLYcerin (NITROSTAT) 0.4 MG sublingual tablet For chest pain place 1 tablet under the tongue every 5 minutes for 3 doses. If symptoms persist 5 minutes after 1st dose call 911.   Cari Galarza MD Yes       Current Meds  Current Facility-Administered Medications   Medication Dose Route Frequency Provider Last Rate Last Admin    acetaminophen (TYLENOL) tablet 975 mg  975 mg Oral or Feeding Tube Q8H Tina Boyd PA-C        aspirin (ASA) chewable tablet 162 mg  162 mg Oral or NG Tube Daily Tina Boyd PA-C        [START ON 8/22/2024] atorvastatin (LIPITOR) tablet 40 mg  40 mg Oral QPM Tina Boyd PA-C        ceFAZolin (ANCEF) 2 g in 100 mL D5W intermittent infusion  2 g Intravenous Q8H Tina Boyd PA-C        chlorhexidine (PERIDEX) 0.12 % solution 15 mL  15 mL Mouth/Throat Q12H Hector Gonzalez MD        [START ON 8/22/2024] heparin ANTICOAGULANT injection 5,000 Units  5,000 Units Subcutaneous Q8H Tina Boyd PA-C        [START ON 8/22/2024] pantoprazole (PROTONIX) 2 mg/mL suspension 40 mg  40 mg Oral or NG Tube Daily Tina Boyd PA-C        Or    [START ON 8/22/2024] pantoprazole (PROTONIX) EC tablet 40 mg  40 mg Oral Daily Tina Boyd PA-C        [START ON 8/22/2024] polyethylene glycol (MIRALAX) Packet 17 g  17 g Oral or Feeding Tube Daily Tina Boyd PA-C senna-sofiya  (SENOKOT-S/PERICOLACE) 8.6-50 MG per tablet 1 tablet  1 tablet Oral or Feeding Tube BID Tina Boyd PA-C        sodium chloride (PF) 0.9% PF flush 3 mL  3 mL Intracatheter Q8H Tina Boyd PA-C         Infusion Meds  Current Facility-Administered Medications   Medication Dose Route Frequency Provider Last Rate Last Admin    aminocaproic acid (AMICAR) 5 g in sodium chloride 0.9 % 100 mL infusion  1 g/hr Intravenous Continuous Tina Boyd PA-C        dexmedeTOMIDine (PRECEDEX) 4 mcg/mL in sodium chloride 0.9 % 100 mL infusion  0.2-0.7 mcg/kg/hr (Dosing Weight) Intravenous Continuous Tina Boyd PA-C        EPINEPHrine (ADRENALIN) 5 mg in  mL infusion  0.01-0.1 mcg/kg/min (Dosing Weight) Intravenous Continuous Tina Boyd PA-C        insulin regular (MYXREDLIN) 1 unit/mL infusion  0-24 Units/hr Intravenous Continuous Tina Boyd PA-C        nitroGLYcerin 50 mg in D5W 250 mL (adult std) infusion CENTRAL   mcg/min Intravenous Continuous PRN Tina Boyd PA-C        NO anticoagulation unless approved by Anesthesia Provider   Does not apply Continuous PRN Ted Gutierrez DO        propofol (DIPRIVAN) infusion  5-75 mcg/kg/min (Dosing Weight) Intravenous Continuous Hector Gonzalez MD        Reason beta blocker order not selected   Does not apply DOES NOT GO TO Tina Briceno PA-C        ROPivacaine 0.2% in sodium chloride 0.9% PERINEURAL infusion   Perineural Continuous Nerve Block Ted Gutierrez DO 7 mL/hr at 08/21/24 1004 7 mL/hr at 08/21/24 1004    ROPivacaine 0.2% in sodium chloride 0.9% PERINEURAL infusion   Perineural Continuous Nerve Block Ted Gutierrez DO 7 mL/hr at 08/21/24 1004 7 mL/hr at 08/21/24 1004       ALLERGIES:    Allergies   Allergen Reactions    Ciprofloxacin Rash    Other Drug Allergy (See Comments)      Cough Syrup Abstracted from [facility] Chart( Hydrobromide)       REVIEW OF SYSTEMS:  A comprehensive of systems was negative  except as noted above.    SOCIAL HISTORY:   Social History     Socioeconomic History    Marital status:      Spouse name: Not on file    Number of children: Not on file    Years of education: Not on file    Highest education level: Not on file   Occupational History    Not on file   Tobacco Use    Smoking status: Former     Current packs/day: 0.00     Types: Cigarettes     Quit date: 1995     Years since quittin.3     Passive exposure: Past    Smokeless tobacco: Never   Vaping Use    Vaping status: Not on file   Substance and Sexual Activity    Alcohol use: Yes     Comment: 1  drink per week    Drug use: Never    Sexual activity: Not on file   Other Topics Concern    Not on file   Social History Narrative    Not on file     Social Determinants of Health     Financial Resource Strain: Low Risk  (2023)    Financial Resource Strain     Within the past 12 months, have you or your family members you live with been unable to get utilities (heat, electricity) when it was really needed?: No   Food Insecurity: Low Risk  (2023)    Food Insecurity     Within the past 12 months, did you worry that your food would run out before you got money to buy more?: No     Within the past 12 months, did the food you bought just not last and you didn t have money to get more?: No   Transportation Needs: Low Risk  (2023)    Transportation Needs     Within the past 12 months, has lack of transportation kept you from medical appointments, getting your medicines, non-medical meetings or appointments, work, or from getting things that you need?: No   Physical Activity: Not on file   Stress: Not on file   Social Connections: Not on file   Interpersonal Safety: Unknown (2024)    Received from [facility]    Humiliation, Afraid, Rape, and Kick questionnaire     Fear of Current or Ex-Partner: Not on file     Emotionally Abused: Not on file     Physically Abused: No     Sexually Abused: No   Housing  Stability: Low Risk  (2023)    Housing Stability     Do you have housing? : Yes     Are you worried about losing your housing?: No       FAMILY MEDICAL HISTORY:   Family History   Problem Relation Age of Onset    Cancer Father     Cancer Paternal Grandfather     Diabetes Paternal Grandfather     Hypertension No family hx of     Cerebrovascular Disease No family hx of     Thyroid Disease No family hx of     Glaucoma No family hx of     Macular Degeneration No family hx of     Anesthesia Reaction No family hx of     Clotting Disorder No family hx of     Bleeding Disorder No family hx of          PHYSICAL EXAM:   Temp  Av.6  F (36.4  C)  Min: 97.2  F (36.2  C)  Max: 97.9  F (36.6  C)  Arterial Line BP  Min: 92/44  Max: 110/55  Arterial Line MAP (mmHg)  Av.5 mmHg  Min: 60 mmHg  Max: 75 mmHg      Pulse  Av.6  Min: 73  Max: 94 Resp  Avg: 15  Min: 11  Max: 17  SpO2  Av %  Min: 94 %  Max: 100 %       /63   Pulse 94   Temp 97.2  F (36.2  C) (Axillary)   Resp 17   Ht 1.829 m (6')   Wt 85.6 kg (188 lb 11.4 oz)   SpO2 100%   BMI 25.59 kg/m     Date 24 0700 - 24 0659   Shift 4680-9065 8632-2342 3022-8491 24 Hour Total   INTAKE   I.V. 1599.5   1599.5   Other 205   205   IV Piggyback 100   100   Shift Total(mL/kg) 1904.5(22.25)   1904.5(22.25)   OUTPUT   Urine 295   295   Shift Total(mL/kg) 295(3.45)   295(3.45)   Weight (kg) 85.6 85.6 85.6 85.6      Admit Weight: 85.6 kg (188 lb 11.4 oz)     GENERAL APPEARANCE: intubated   Pulmonary: mechanical breath sounds   CV: RRR   - Edema none  GI: soft, nontender  MS: no evidence of inflammation in joints, no muscle tenderness  : no guido  SKIN: no rash, warm, dry, no cyanosis  NEURO: face symmetric  ACCESS: LUE AVF + thrill, right CVC    LABS:   I have reviewed the following labs:  CMP  Recent Labs   Lab 24  1326 24  1153 24  1117 24  1047 24  1020   NA  --  138 137 136 136   POTASSIUM  --  4.4 5.4* 5.5*  5.4*   * 153* 160* 153* 115*     CBC  Recent Labs   Lab 08/21/24  1153 08/21/24  1117 08/21/24  1047 08/21/24  1020   HGB 9.7* 9.0* 9.1* 8.9*   *  --   --   --      INR  Recent Labs   Lab 08/21/24  1153   INR 1.64*   PTT 34     ABG  Recent Labs   Lab 08/21/24  1153 08/21/24  1117 08/21/24  1047 08/21/24  1020   PH 7.36 7.35 7.38 7.40   PCO2 43 45 44 42   PO2 525* 399* 403* 409*   HCO3 24 25 26 26   O2PER 100.0 80.0 80.0 80.0      URINE STUDIES  Recent Labs   Lab Test 08/09/24  1054 05/01/24  1229 09/30/23  1550 09/27/23 2012 05/27/22  1859 02/05/22  2031 06/22/20  1327   COLOR Yellow Light Yellow Straw Light Yellow Yellow   < > Yellow   APPEARANCE Slightly Cloudy* Clear Slightly Cloudy* Turbid* Cloudy*   < > Clear   URINEGLC Negative Negative Negative Negative Negative   < > Negative   URINEBILI Negative Negative Negative Negative Negative   < > Negative   URINEKETONE Negative Negative Trace* 5* Negative   < > Negative   SG 1.007 1.007 1.010 1.025 1.010   < > 1.015   UBLD Moderate* Trace* Large* 1.0 mg/dL* Moderate*   < > Moderate*   URINEPH 7.5* 7.5* 6.5 6.0 5.5   < > 5.5   PROTEIN 30* 10* 100* 100* 100*   < > Trace*   UROBILINOGEN  --   --   --   --  0.2  --  0.2 E.U./dL   NITRITE Negative Negative Negative Negative Positive*   < > Negative   LEUKEST Large* Moderate* Large* 500 Maria Alejandra/uL* Large*   < > Moderate*   RBCU 66* 2 64* 0 >100*   < > 3-5*   WBCU >182* 9* >182* >182* >100*   < > 25-50*    < > = values in this interval not displayed.     No lab results found.  PTH  Recent Labs   Lab Test 09/27/23  1446   PTHI 220*     IRON STUDIES  Recent Labs   Lab Test 09/27/23  1752 09/27/23  1446   IRON  --  120   FEB  --  226*   IRONSAT  --  53*     --        IMAGING:  Reviewed      RADHA BAZAN Mirna Georges Boumitri, saw and evaluated this patient as part of a shared visit.  I have reviewed and discussed with the advanced practice provider their history, physical and plan.  I have  personally performed the substantive portion of the medical decision making for this visit - please see the JIGNA's documentation for the full details  I personally reviewed the vital signs, medications, labs and imaging.    Key findings and management decisions made by me:  ESRD s/p CABG. Had HD 8/20. Will monitor carefully and can do CRRT if hemodynamically unstable, then transition to IHD.  Will plan for RRT in AM    Caitlyn Tony  Date of Service (when I saw the patient): 8/21

## 2024-08-21 NOTE — ANESTHESIA CARE TRANSFER NOTE
Patient: Bret Fleming    Procedure: Procedure(s):  Median sternotomy, Left internal mamary artery harvest, endoscopic Left shapenous vein harvest, on cardiopulmonary bypass, CORONARY ARTERY BYPASS GRAFT x3, transesophageal echocardiogram by anesthesia.       Diagnosis: CAD (coronary artery disease) [I25.10]  Diagnosis Additional Information: No value filed.    Anesthesia Type:   General     Note:    Oropharynx: oral airway in place, endotracheal tube in place and ventilatory support  Level of Consciousness: iatrogenic sedation    Level of Supplemental Oxygen (L/min / FiO2): 100  Independent Airway: airway patency not satisfactory and stable  Dentition: dentition unchanged  Vital Signs Stable: post-procedure vital signs reviewed and stable  Report to RN Given: handoff report given  Patient transferred to: ICU  Comments: Transported to ICU with anesthesiologist, CV anesthesia fellow, CVTS RADHA, and CRNA.  Vital signs stable, ventilation via ambu bag.  Vent settings and sedation per ICU team.  ICU Handoff: Call for PAUSE to initiate/utilize ICU HANDOFF, Identified Patient, Identified Responsible Provider, Reviewed the Pertinent Medical History, Discussed Surgical Course, Reviewed Intra-OP Anesthesia Management and Issues during Anesthesia, Set Expectations for Post Procedure Period and Allowed Opportunity for Questions and Acknowledgement of Understanding      Vitals:  Vitals Value Taken Time   BP     Temp     Pulse 92 08/21/24 1323   Resp     SpO2 100 % 08/21/24 1323   Vitals shown include unfiled device data.    Electronically Signed By: Conrad Barnett DO  August 21, 2024  1:24 PM

## 2024-08-21 NOTE — PROGRESS NOTES
Pt arrived from OR @ ~ 1330. Extubated to 4L NC @ 1615. Using IS independently. Aox4. Incisional pain managed w/ PRNs. MAP > 65 on low dose epi, norepi. Pulses dopplerable. Passed bedside swallow test. Culp w/ adequate UO. Midsternal incision dressing CDI, LLE graft sites w/ scant drainage. Chest tubes w/ air leak.    Plan: Continue POC  For vital signs and complete assessments, please see documentation flowsheets.

## 2024-08-21 NOTE — OP NOTE
PREOPERATIVE DIAGNOSIS:  1. Coronary artery disease. 2. Left ventricular dysfunction 3. Renal failure on dialysis      POSTOPERATIVE DIAGNOSIS:  1. Coronary artery disease. 2. Severe left ventricular dysfunction 3. Renal failure on dialysis      PROCEDURES:   1.  Coronary bypass grafting x 3 (left internal mammary artery to left anterior descending artery,  saphenous vein graft to 1st obtuse marginal artery,  saphenous vein graft to posterior descending artery).  2.  Endoscopic vein harvest.     SURGEON:  Jah Solares MD     ASSISTANTS: MD Tina Pena PA     NOTE: A PA was required for this operation to perform vein harvesting, assisting in performance of the anastomosis as well as the rest of the operation.      OPERATIVE INDICATIONS:  Bret Fleming is a 74 year old adult with PMH of CAD s/p MI in 1996, HTN, ESRD on HD, ICM w/ EF 30-35%  multi-vessel CAD who presented for CABG.  Decision was made to proceed with coronary artery bypass grafting.  I discussed risks, benefits of surgery with the patient's family including risk of death, stroke, bleeding, wound infection and arrhythmias.  He understood and wanted to proceed with surgery.     OPERATIVE FINDINGS:  The patient's sternum was of adequate quality.  Veins obtained were adequate for bypass grafting.  LIMA  was of adequate quality with good flow.  Vessels bypassed included the  left anterior descending artery 2 mm vessel with proximal stenosis,PDA which was 2 mm in diameter, and the obtuse marginal artery, which was 1.75 to  2 mm diameter.      DESCRIPTION OF PROCEDURE:  The patient was brought to the operating room in stable condition, put under general anesthesia. The patient's chest, abdomen, lower extremities were prepped and draped in usual manner.  A long segment of saphenous vein was harvested from the left lower extremity via endoscopic vein harvest techniques.   Simultaneously, a median sternotomy was performed.  The left  internal mammary artery was harvested from bed and dilated with topical papaverine.  Preparation for cardiopulmonary bypass included ACT-guided heparinization and the administration of Amicar.  Aorta was cannulated with a 20-Hebrew arterial cannula via 3-0 Tevdek pursestring pledgeted sutures x 2.  Dual stage venous cannula was inserted in right atrium.  Antegrade and retrograde cardioplegic cannulae were placed.  Full cardiopulmonary bypass was initiated.  Aortic pressure was temporarily reduced and the aorta was crossclamped.  One liter of cold blood cardioplegia administered with antegrade  routes.  Subsequent dose of cardioplegia given at no greater than 20-minute intervals, via the antegrade route as well as via the completed grafts. Reverse saphenous vein was anastomosed end-to-side to an arteriotomy in the mid portion of the  obtuse marginal artery using 7-0 Prolene. Reverse saphenous vein was anastomosed end-to-side to an arteriotomy in the mid portion of the PDA using 7-0 Prolene. Distal end of the left internal mammary artery was anastomosed end-to-side to an arteriotomy mid portion of the left anterior descending artery using 7-0 Prolene.  Proximal ends of saphenous vein grafts were anastomosed to two 4 mm aortotomy using 6-0 Prolene.  Warm dose of antegrade hotshot cardioplegia was given and with high suction on the aortic root vent, the cross-clamp was released.  Organized rhythm resumed without the need for defibrillations.  Rewarming and reperfusion allowed.  De-airing was confirmed by echography.  The patient gradually weaned off cardiopulmonary bypass with low dose epinephrine.  Following termination of cardiopulmonary bypass, LV function 50%,  and the patient had normal sinus electrocardiogram.  Protamine was given.  All cannulas removed.   Pacing wires were placed. Careful hemostasis was obtained.  Two anterior mediastinal and left pleural chest tube was placed.  Sternum was closed with surgical  steel wires.  Fascia, subcutaneous tissue, skin of chest were closed in layers.  The patient transferred to ICU in stable critical condition.

## 2024-08-21 NOTE — ANESTHESIA POSTPROCEDURE EVALUATION
Patient: Bret Fleming    Procedure: Procedure(s):  Median sternotomy, Left internal mamary artery harvest, endoscopic Left shapenous vein harvest, on cardiopulmonary bypass, CORONARY ARTERY BYPASS GRAFT x3, transesophageal echocardiogram by anesthesia.       Anesthesia Type:  General    Note:     Postop Pain Control:             Sign Out: Well controlled pain   PONV: No   Neuro/Psych: Uneventful            Sign Out: PLANNED postop sedation   Airway/Respiratory: Uneventful            Sign Out: AIRWAY IN SITU/Resp. Support               Airway in situ/Resp. Support: ETT                 Reason: Planned Pre-op   CV/Hemodynamics: Uneventful            Sign Out: Acceptable CV status; No obvious hypovolemia; No obvious fluid overload   Other NRE: NONE   DID A NON-ROUTINE EVENT OCCUR?            Last vitals:  Vitals:    08/21/24 1315 08/21/24 1320 08/21/24 1330   BP:      Pulse: 93  94   Resp:      Temp:  36.2  C (97.2  F)    SpO2: 100%  100%       Electronically Signed By: Conrad Barnett DO  August 21, 2024  1:36 PM

## 2024-08-21 NOTE — ANESTHESIA PROCEDURE NOTES
Perioperative BECCA Procedure Note    Staff -        Anesthesiologist:  Rsoelyn Rubi MD       Resident/Fellow: Conrad Barnett DO       Performed By: fellow  Preanesthesia Checklist:  Patient identified, IV assessed, risks and benefits discussed, monitors and equipment assessed, procedure being performed at surgeon's request and anesthesia consent obtained.    BECCA Probe Insertion    Probe Status PRE Insertion: NO obvious damage  Probe type:  Adult 3D  Bite block used:   Oral Airway  Insertion Technique: Jaw Lift  Insertion complications: None obvious  Billing Report:BECCA report by Anesthesiologist (See Separate Report note)  Probe Status POST Removal: NO obvious damage    BECCA Report  General Procedure Information  Images for this study have been archived.  Modalities: 2D, CW Doppler, PW Doppler and Color flow mapping  Echocardiographic and Doppler Measurements  Right Ventricle:  Cavity size normal.    Hypertrophy not present.   Thrombus not present.     Ejection Fraction 40%.    Left Ventricle:  Cavity size normal.    Hypertrophy not present.   Thrombus not present.   Global Function moderately impaired.   Ejection Fraction 45%.      Ventricular Regional Function:  1- Basal Anteroseptal:  hypokinetic  2- Basal Anterior:  hypokinetic  3- Basal Anterolateral:  hypokinetic  4- Basal Inferolateral:  hypokinetic  5- Basal Inferior:  hypokinetic  6- Basal Inferoseptal:  hypokinetic  7- Mid Anteroseptal:  hypokinetic  8- Mid Anterior:  hypokinetic  9- Mid Anterolateral:  hypokinetic  10- Mid Inferolateral:  hypokinetic  11- Mid Inferior:  hypokinetic  12- Mid Inferoseptal:  hypokinetic  13- Apical Anterior:  hypokinetic  14- Apical Lateral:  hypokinetic  15- Apical Inferior:  hypokinetic  16- Apical Septal:  hypokinetic  17- Industry:  hypokinetic    Valves  Aortic Valve: Annulus normal.  Stenosis not present.  Regurgitation absent.  Leaflets normal.  Leaflet motions normal.    Mitral Valve: Annulus dilated.  Stenosis not  present.  Regurgitation +1.  Leaflets normal.    Tricuspid Valve: Annulus normal.  Stenosis not present.  Regurgitation +1.  Leaflets normal.  Leaflet motions normal.    Pulmonic Valve: Annulus normal.      Aorta: Ascending Aorta: Size normal.  Dissection not present.  Plaque thickness less than 3 mm.  Mobile plaque not present.    Aortic Arch: Size normal.    Dissection not present.   Plaque thickness less than 3 mm.   Mobile plaque not present.    Descending Aorta: Size normal.    Dissection not present.   Plaque thickness less than 3 mm.   Mobile plaque not present.      Right Atrium:  Size normal.   Spontaneous echo contrast not present.   Thrombus not present.   Tumor not present.   Device not present.     Left Atrium: Size normal.  Spontaneous echo contrast not present.  Thrombus not present.  Tumor not present.  Device not present.    Left atrial appendage normal.     Atrial Septum: Intra-atrial septal morphology aneurysmal.       Ventricular Septum: Intra-ventricular septum morphology normal.        Other Findings:   Pericardium:  normal. Pleural Effusion:  none.   Cornoary sinus catheter present.    Post Intervention Findings  Procedure(s) performed:  CABG. Global function:  Improved. Regional wall motion: Improved. Surgeon(s) notified of all postintervention findings: Yes.             Post Intervention comments: EF- 50-55%. NRWA. Mild MR, Trivial TR, Aorta free of dissection or injury. .    Echocardiogram Comments  Echocardiogram comments: See Report for further details. .

## 2024-08-21 NOTE — BRIEF OP NOTE
[facility]    Brief Operative Note    Pre-operative diagnosis: CAD (coronary artery disease) [I25.10]  Post-operative diagnosis Same as pre-operative diagnosis    Procedure: Median sternotomy, Left internal mamary artery harvest, endoscopic Left shapenous vein harvest, on cardiopulmonary bypass, CORONARY ARTERY BYPASS GRAFT x3, transesophageal echocardiogram by anesthesia., N/A - Chest    Surgeon: Surgeons and Role:     * Jah Solares MD - Primary     * Tina Boyd PA-C - Assisting     * Efrain Subramanian MD - Fellow - Assisting  Anesthesia: General   Estimated Blood Loss: 800 ml    Drains: 1 left pleural, 2 mediastinal chest tubes  Specimens: * No specimens in log *  Findings:   CABG x3 with LIMA-LAD, SVG-OM, SVG-PDA. See full operative report for further details .  Complications: None.  Implants: * No implants in log *        Tina Boyd PA-C  Cardiothoracic Surgery  Pager [phone]  12:59 PM on 8/21/2024

## 2024-08-21 NOTE — ANESTHESIA PROCEDURE NOTES
Paravertebral Procedure Note    Pre-Procedure   Staff -        Anesthesiologist:  Ted Gutierrez DO       Performed By: anesthesiologist       Location: pre-op       Procedure Start/Stop Times: 8/21/2024 7:10 AM and 8/21/2024 7:25 AM       Pre-Anesthestic Checklist: patient identified, IV checked, site marked, risks and benefits discussed, informed consent, monitors and equipment checked, pre-op evaluation, at physician/surgeon's request and post-op pain management  Timeout:       Correct Patient: Yes        Correct Procedure: Yes        Correct Site: Yes        Correct Position: Yes        Correct Laterality: Yes        Site Marked: Yes  Procedure Documentation  Procedure: Paravertebral       Diagnosis: POST OPERATIVE PAIN       Laterality: bilateral       Patient Position: prone       Patient Prep/Sterile Barriers: sterile gloves, mask, patient draped       Skin prep: Chloraprep       Local skin infiltrated with 5 mL of 2% lidocaine.        Insertion site: T3/4.       Needle Type: short bevel and Touhy needle       Needle Gauge: 17.        Needle Length (millimeters): 100        Catheter: 19 G.          Catheter threaded easily.           Threaded 10 cm at skin.         Ultrasound guided       1. Ultrasound was used to identify targeted nerve, plexus, vascular marker, or fascial plane and place a needle adjacent to it in real-time.       2. Ultrasound was used to visualize the spread of anesthetic in close proximity to the above referenced structure.       3. A permanent image is entered into the patient's record.       4. The visualized anatomic structures appeared normal.       5. There were no apparent abnormal pathologic findings.    Assessment/Narrative         The placement was positive for aspirated blood (Needled moved and the catheter was placed after a negative aspiration).       The placement was negative for: painful injection and site bleeding       Paresthesias: No.       Bolus given via needle  "and catheter. no blood aspirated via catheter.        Secured via Tegaderm and Dermabond.        Complications: none       Injection made incrementally with aspirations every 5 mL.    Medication(s) Administered   20 mL BUPivacaine HCl (PF) 0.25 %  20 mL BUPivacaine liposome 1.3 %  Medication Administration Time: 8/21/2024 7:10 AM     Comments:  Informed consent obtained.  All risks and benefits of the nerve block discussed with the patient.  All questions answered and all parties agreed with the plan.   Block was placed at the surgeon's request for post operative pain control.        FOR [facility] ONLY:   Pain Team Contact information: please page the Pain Team Via [employer]. Search \"Pain\". During daytime hours, please page the attending first. At night please page the resident first.      "

## 2024-08-21 NOTE — PROGRESS NOTES
Pt admitted to 4A ICU s/p CABG X 3. Pt placed on a CMV 16 480 100% +5. Please see flowsheets for further information.

## 2024-08-21 NOTE — H&P
RCAT Assessment for chest physiotherapy and airway clearance      Assessment:     Reason for Assessment: Thoracic Surgery (08/21/24 1700)    Pulmonary History:    Pulmonary Status: No history of smoking (08/21/24 1700)    Surgical:  Surgical Status: General surgery or chest tube (08/21/24 1700)    CXR:   Chest X-ray: Bilateral pulmonary infiltrates (08/21/24 1700)    Respiratory Pattern:    Respiratory Pattern: Regular pattern 12-20 (08/21/24 1700)    Breath Sounds:    Breath Sounds: Clear to auscultation (08/21/24 1700)    Effectiveness of Cough:     Cough Effectiveness: Strong, productive (08/21/24 1700)    Mental Status:    Mental Status: Alert, oriented, cooperative (08/21/24 1700)    Level of Activity:     Acuity Level : Acuity 4 (6-10 points) (08/21/24 1700)    Current O2 Requirement:   O2 Required for SpO2>=92%: 1-3 liters (08/21/24 1700)      Secretion Amount: Scant amount, moderately thin and clear in color    Patient was started on Flutter Valve as indicated by prevent or reverse atelectasis      Halley Boyd RT on 8/21/2024 at 5:41 PM

## 2024-08-21 NOTE — DISCHARGE INSTRUCTIONS
AFTER YOU GO HOME FROM YOUR HEART SURGERY  (Three vessel coronary artery bypass surgery - 8/21/2024)    You had a sternotomy, avoid lifting anything greater than 10 pounds for 8 weeks after surgery and then less than 20 pounds for an additional 4 weeks.   Please try to sleep on your back for the first 4-6 weeks to avoid extra stress on your sternum (breastbone) while it is healing.   Do not reach backwards or use arms to push out of chair.   Do not let people pull on your arms to assist with standing.   Avoid twisting or reaching too far across your body.    Avoid strenuous activities such as bowling, vacuuming, raking, shoveling, golf or tennis for 12 weeks after your surgery.   It is okay to resume sex if you feel comfortable in doing so. You may have to try different positions with your partner.    Splint your chest incision by hugging a pillow or bringing your arms across your chest when coughing or sneezing.     No driving for 4 weeks after surgery or while on pain medication.     Shower or wash your incisions daily with soap and water (or as instructed), pat dry.   Keep wound clean and dry, showers are okay after discharge, but don't let spray hit directly on incision.   No baths or swimming for 1 month.   Cover chest tube sites with dry gauze until they stop draining, then leave open to air. It is not abnormal for chest tube sites to drain yellowish/clear fluid for up to 2-3 weeks after surgery.   Watch for signs of infection: increased redness, tenderness, warmth or any drainage that appears infected (pus like) or is persistent.  Also a temperature > 100.5 F or chills. Call your surgeon or primary care provider's office immediately.   Remove any skin glue left on incisions after 10-14 days. This will not affect your incision and can speed up healing.    Exercise is very important in your recovery. Please follow the guidelines set up for you in your cardiac rehab classes at the hospital. If outpatient  cardiac rehab was ordered for you, we highly recommend you participate. If you have problems arranging your cardiac rehab, please call [phone] for all locations, with the exception of [place], please call [phone] and [facility], please call [phone].    Avoid sitting for prolonged periods of time, try to walk every hour during the day. If you have a leg incision, elevate your leg often when you are not walking.    Check your weight when you get home from the hospital and continue to check it daily through your recovery for at least a month. If you notice a weight gain of 2-3 pounds in a week, notify your primary care physician, cardiologist or surgeon.    Bowel activity may be slow after surgery. If necessary, you may take an over the counter laxative such as milk of magnesia or Miralax. You may have stool softeners prescribed (docusate sodium, Senokot). We recommend using stool softeners while using narcotics for pain (oxycodone/percocet, hydrocodone/vicodin, hydromorphone/dilaudid).      Wean OFF of narcotics (oxycodone, dilaudid, hydrocodone) as soon as possible. You should continue taking acetaminophen as long as you have any surgical pain as the first choice for pain control and add narcotics as necessary for pain to be tolerable.      DO NOT SMOKE.  IF YOU NEED HELP QUITTING, PLEASE TALK WITH YOUR CARDIOLOGIST OR PRIMARY DOCTOR.    REGARDING PRESCRIPTION REFILLS.  If you need a refill on your pain medication contact us to discuss your pain and a possible one time refill.   All other medications will be adjusted, discontinued and re-filled by your primary care physician and/or your cardiologist as they were prior to your surgery. We have given you enough for one to three month with possibly one refill.    POST-OPERATIVE CLINIC VISITS  You will have a follow up visit in [facility] Provider Clinic at the [facility] ([facility] on [facility].  You will then return to the  care of your primary provider and your cardiologist. Future medication refills should come from your PCP or Cardiologist.   You should see your primary care provider about 1-2 weeks after discharge.   It is important to see your cardiologist about 4 weeks after discharge.    If you do not hear from a  in 7 days, please call [phone] (choose option 1) and request to be seen with a general cardiologist or someone that you have seen in the past.   If there is a need to return to see CT Surgery please call our  at [phone].    SURGICAL QUESTIONS  Please call on of the RN Coordinators listed below with surgical recovery and medication questions.  They will assist you with your needs and contact other surgery care team members as indicated.    On weekends or after hours, please call [phone] and ask the  to page the Cardiothoracic Surgery fellow on call.      Thank you,    Your Cardiothoracic Surgery Team   Henrik Friend, RN Care Coordinator -  [phone]  Hina Jha, RN Care Coordinator - [phone]  Ritu Devlin RN Care Coordinator - [phone]  Sharon Fischer, RN Care Coordinator - [phone]  Rebecca Albright, RN Care Coordinator - [phone]

## 2024-08-21 NOTE — PROGRESS NOTES
CLINICAL NUTRITION SERVICES - BRIEF NOTE    Received provider consult for nutrition education with comments post op cardiovascular surgery (automatic consult on post-op order set). S/p CABG X 3 on 8/21. Nutrition education to be completed as able/appropriate (as pt s/p CABG and/or initial VAD).    RD will follow per LOS protocol or if re-consulted.      Leeanne Palomares, MPH, RDN, LD  4A (CVICU) KEZIA Munoz [4A Clinical Dietitian]   Weekend/Holiday: Vocera - Weekend Clinical Dietitian

## 2024-08-21 NOTE — OR NURSING
Bilateral Paravertebral block performed without complications.  VSS.  Pt tolerated well.  Will continue to monitor.

## 2024-08-21 NOTE — ANESTHESIA PROCEDURE NOTES
Central Line/PA Catheter Placement    Pre-Procedure   Staff -        Anesthesiologist:  Roselyn Rubi MD       Resident/Fellow: Conrad Barnett DO       Performed By: fellow       Location: OR       Pre-Anesthestic Checklist: patient identified, IV checked, site marked, risks and benefits discussed, informed consent, monitors and equipment checked, pre-op evaluation and at physician/surgeon's request  Timeout:       Correct Patient: Yes        Correct Procedure: Yes        Correct Site: Yes        Correct Position: Yes        Correct Laterality: Yes   Line Placement:   This line was placed Post Induction    Procedure   Procedure: central line       Diagnosis: Cardiac Surgery       Laterality: right       Insertion Site: internal jugular.       Patient Position: Trendelenburg and supine  Sterile Prep        All elements of maximal sterile barrier technique followed       Patient Prep/Sterile Barriers: draped, hand hygiene, gloves , hat , mask , draped, gown, sterile gel and probe cover       Skin prep: Chloraprep  Insertion/Injection        Technique: ultrasound guided and Seldinger Technique        1. Ultrasound was used to evaluate the access site.       2. Vein evaluated via ultrasound for patency/adequacy.       3. Using real-time ultrasound the needle/catheter was observed entering the artery/vein.       4. Permanent image was captured and entered into the patient's record.       5. The visualized structures were anatomically normal.       6. There were no apparent abnormal pathologic findings.       Introducer Type: 9 Fr, 2-lumen MAC        Type: PA/CVC with Introducer       Catheter Size: 9 Fr       Catheter Length: 11.5       Number of Lumens: double lumen  Narrative         Secured by: suture and anchor securement device       Tegaderm and Biopatch dressing used.       Complications: None apparent,        blood aspirated from all lumens,        All lumens flushed: Yes       Verification method: Ultrasound and  BECCA       Tip termination: right atrium

## 2024-08-21 NOTE — ANESTHESIA PROCEDURE NOTES
Arterial Line Procedure Note    Pre-Procedure   Staff -        Anesthesiologist:  Roselyn Rubi MD       Resident/Fellow: Conrad Barnett DO       Performed By: fellow       Location: OR       Pre-Anesthestic Checklist: patient identified, IV checked, risks and benefits discussed, informed consent, monitors and equipment checked, pre-op evaluation and at physician/surgeon's request  Timeout:       Correct Patient: Yes        Correct Procedure: Yes        Correct Site: Yes        Correct Position: Yes   Line Placement:   This line was placed Pre Induction starting at 8/21/2024 7:39 AM and ending at 8/21/2024 7:43 AM  Procedure   Procedure: arterial line       Laterality: left       Insertion Site: brachial.  Sterile Prep        Standard elements of sterile barrier followed       Skin prep: Chloraprep  Insertion/Injection        Technique: ultrasound guided and Seldinger Technique        1. Ultrasound was used to evaluate the access site.       2. Artery evaluated via ultrasound for patency/adequacy.       3. Using real-time ultrasound the needle/catheter was observed entering the artery/vein.       5. The visualized structures were anatomically normal.       6. There were no apparent abnormal pathologic findings.       Catheter Type/Size: 20 G, 12 cm  Narrative         Secured by: suture       Tegaderm dressing used.       Complications: None apparent,        Arterial waveform: Yes        IBP within 10% of NIBP: Yes

## 2024-08-21 NOTE — PROGRESS NOTES
Critical Care Attending Progress Note  I, Lindsay Monson MD, PhD saw this patient with the [facility] and agree with the findings and plan of care as documented in the note. Please see separate note from today for further documentation.     I personally reviewed vital signs, medications, labs and imaging.     Assessment:   74 year old adult with PMH of CAD s/p MI in 1996, HTN, ESRD on HD, ICM w/ EF 30-35%, 3v CAD     8/20: CABG     Active problems and current treatments include:     Acute postop pain: PVB bilateral, multimodal analgesia   Respiratory insufficiency: wean mech vent  Cardiogenic and vasogenic shock: titrate vasoactives to MAPs>65mmHg  ID: periop abx  Nutrition: NPO  Lines: RIJ MAC, L Axillary A-line, PIV x1, CT x3 (1 med, 2 pleural), V-wires backup 50 bpm   PPX: SCDs, PPI  DISPO: CVICU        The patient is critically ill.  I personally managed the ventilator, hemodynamics, sedation, analgesia, metabolic abnormalities and nutritional status.    I agree with the assessment and plan. I spent 46 minutes exclusive of procedures evaluating and managing this patient, discussing with the consultants, and updating the patient and family.     Lindsay Monson MD, PhD

## 2024-08-21 NOTE — PROGRESS NOTES
[facility], Procedure Note          Extubation:       Bret Fleming  MRN# [id number]   August 21, 2024, 5:34 PM         Patient extubated at: August 21, 2024, 4:17 PM   Supplemental Oxygen: Via nasal cannula at 4 liters per minute   Cough: The cough is good / productive   Secretion Mode: PRN suction by self   Secretion Amount: Moderate amount, moderately thick and clear in color   Respiratory Exam:: Breath sounds: good aeration     Location: bilaterally   Skin Exam:: Patient color: natural   Patient Status: Currently appears comfortable   Arterial Blood Gasses: pH Arterial (no units)   Date Value   08/21/2024 7.50 (H)     pH Arterial POCT (no units)   Date Value   08/21/2024 7.36     pO2 Arterial (mm Hg)   Date Value   08/21/2024 189 (H)     pO2 Arterial POCT (mm Hg)   Date Value   08/21/2024 525 (H)     pCO2 Arterial (mm Hg)   Date Value   08/21/2024 28 (L)     pCO2 Arterial POCT (mm Hg)   Date Value   08/21/2024 43     Bicarbonate Arterial (mmol/L)   Date Value   08/21/2024 22     Bicarbonate Arterial POCT (mmol/L)   Date Value   08/21/2024 24            Recorded by Halley Boyd RT

## 2024-08-22 ENCOUNTER — APPOINTMENT (OUTPATIENT)
Dept: OCCUPATIONAL THERAPY | Facility: CLINIC | Age: 74
DRG: 235 | End: 2024-08-22
Attending: STUDENT IN AN ORGANIZED HEALTH CARE EDUCATION/TRAINING PROGRAM
Payer: COMMERCIAL

## 2024-08-22 ENCOUNTER — APPOINTMENT (OUTPATIENT)
Dept: GENERAL RADIOLOGY | Facility: CLINIC | Age: 74
DRG: 235 | End: 2024-08-22
Attending: SURGERY
Payer: COMMERCIAL

## 2024-08-22 LAB
ALBUMIN SERPL BCG-MCNC: 3.7 G/DL (ref 3.5–5.2)
ALP SERPL-CCNC: 54 U/L (ref 40–150)
ALT SERPL W P-5'-P-CCNC: 8 U/L (ref 0–70)
ANION GAP SERPL CALCULATED.3IONS-SCNC: 14 MMOL/L (ref 7–15)
AST SERPL W P-5'-P-CCNC: 34 U/L (ref 0–45)
ATRIAL RATE - MUSE: 91 BPM
BILIRUB SERPL-MCNC: 0.3 MG/DL
BUN SERPL-MCNC: 56.1 MG/DL (ref 8–23)
CA-I BLD-MCNC: 4.7 MG/DL (ref 4.4–5.2)
CALCIUM SERPL-MCNC: 8.7 MG/DL (ref 8.8–10.4)
CHLORIDE SERPL-SCNC: 97 MMOL/L (ref 98–107)
CREAT SERPL-MCNC: 4.76 MG/DL (ref 0.51–1.17)
DIASTOLIC BLOOD PRESSURE - MUSE: NORMAL MMHG
EGFRCR SERPLBLD CKD-EPI 2021: 12 ML/MIN/1.73M2
ERYTHROCYTE [DISTWIDTH] IN BLOOD BY AUTOMATED COUNT: 13 % (ref 10–15)
GLUCOSE BLDC GLUCOMTR-MCNC: 105 MG/DL (ref 70–99)
GLUCOSE BLDC GLUCOMTR-MCNC: 108 MG/DL (ref 70–99)
GLUCOSE BLDC GLUCOMTR-MCNC: 115 MG/DL (ref 70–99)
GLUCOSE BLDC GLUCOMTR-MCNC: 116 MG/DL (ref 70–99)
GLUCOSE BLDC GLUCOMTR-MCNC: 126 MG/DL (ref 70–99)
GLUCOSE BLDC GLUCOMTR-MCNC: 130 MG/DL (ref 70–99)
GLUCOSE SERPL-MCNC: 120 MG/DL (ref 70–99)
HBV SURFACE AB SERPL IA-ACNC: <3.5 M[IU]/ML
HBV SURFACE AB SERPL IA-ACNC: NONREACTIVE M[IU]/ML
HBV SURFACE AG SERPL QL IA: NONREACTIVE
HCO3 SERPL-SCNC: 21 MMOL/L (ref 22–29)
HCT VFR BLD AUTO: 29.4 % (ref 35–53)
HGB BLD-MCNC: 9.5 G/DL (ref 13.3–17.7)
INTERPRETATION ECG - MUSE: NORMAL
MAGNESIUM SERPL-MCNC: 2.6 MG/DL (ref 1.7–2.3)
MCH RBC QN AUTO: 35.1 PG (ref 26.5–33)
MCHC RBC AUTO-ENTMCNC: 32.3 G/DL (ref 31.5–36.5)
MCV RBC AUTO: 109 FL (ref 78–100)
P AXIS - MUSE: 31 DEGREES
PHOSPHATE SERPL-MCNC: 3.9 MG/DL (ref 2.5–4.5)
PLATELET # BLD AUTO: 116 10E3/UL (ref 150–450)
POTASSIUM SERPL-SCNC: 5.3 MMOL/L (ref 3.4–5.3)
POTASSIUM SERPL-SCNC: 5.5 MMOL/L (ref 3.4–5.3)
POTASSIUM SERPL-SCNC: 5.5 MMOL/L (ref 3.4–5.3)
PR INTERVAL - MUSE: 234 MS
PROT SERPL-MCNC: 5.6 G/DL (ref 6.4–8.3)
QRS DURATION - MUSE: 92 MS
QT - MUSE: 398 MS
QTC - MUSE: 489 MS
R AXIS - MUSE: 45 DEGREES
RBC # BLD AUTO: 2.71 10E6/UL (ref 3.8–5.9)
SODIUM SERPL-SCNC: 132 MMOL/L (ref 135–145)
SYSTOLIC BLOOD PRESSURE - MUSE: NORMAL MMHG
T AXIS - MUSE: 48 DEGREES
VENTRICULAR RATE- MUSE: 91 BPM
WBC # BLD AUTO: 10 10E3/UL (ref 4–11)

## 2024-08-22 PROCEDURE — 99233 SBSQ HOSP IP/OBS HIGH 50: CPT | Mod: 24 | Performed by: PHYSICIAN ASSISTANT

## 2024-08-22 PROCEDURE — 80053 COMPREHEN METABOLIC PANEL: CPT | Performed by: STUDENT IN AN ORGANIZED HEALTH CARE EDUCATION/TRAINING PROGRAM

## 2024-08-22 PROCEDURE — 82330 ASSAY OF CALCIUM: CPT | Performed by: STUDENT IN AN ORGANIZED HEALTH CARE EDUCATION/TRAINING PROGRAM

## 2024-08-22 PROCEDURE — 99232 SBSQ HOSP IP/OBS MODERATE 35: CPT | Performed by: PHYSICIAN ASSISTANT

## 2024-08-22 PROCEDURE — P9045 ALBUMIN (HUMAN), 5%, 250 ML: HCPCS | Performed by: SURGERY

## 2024-08-22 PROCEDURE — 87340 HEPATITIS B SURFACE AG IA: CPT | Performed by: SURGERY

## 2024-08-22 PROCEDURE — 271N000002 HC RX 271: Performed by: ANESTHESIOLOGY

## 2024-08-22 PROCEDURE — 250N000013 HC RX MED GY IP 250 OP 250 PS 637

## 2024-08-22 PROCEDURE — 120N000002 HC R&B MED SURG/OB UMMC

## 2024-08-22 PROCEDURE — 250N000011 HC RX IP 250 OP 636: Performed by: ANESTHESIOLOGY

## 2024-08-22 PROCEDURE — 250N000011 HC RX IP 250 OP 636: Performed by: SURGERY

## 2024-08-22 PROCEDURE — 99233 SBSQ HOSP IP/OBS HIGH 50: CPT | Mod: FS | Performed by: INTERNAL MEDICINE

## 2024-08-22 PROCEDURE — 86706 HEP B SURFACE ANTIBODY: CPT | Performed by: SURGERY

## 2024-08-22 PROCEDURE — 97530 THERAPEUTIC ACTIVITIES: CPT | Mod: GO | Performed by: OCCUPATIONAL THERAPIST

## 2024-08-22 PROCEDURE — 97110 THERAPEUTIC EXERCISES: CPT | Mod: GO | Performed by: OCCUPATIONAL THERAPIST

## 2024-08-22 PROCEDURE — 97535 SELF CARE MNGMENT TRAINING: CPT | Mod: GO | Performed by: OCCUPATIONAL THERAPIST

## 2024-08-22 PROCEDURE — 84132 ASSAY OF SERUM POTASSIUM: CPT | Performed by: SURGERY

## 2024-08-22 PROCEDURE — 84100 ASSAY OF PHOSPHORUS: CPT | Performed by: STUDENT IN AN ORGANIZED HEALTH CARE EDUCATION/TRAINING PROGRAM

## 2024-08-22 PROCEDURE — 71045 X-RAY EXAM CHEST 1 VIEW: CPT

## 2024-08-22 PROCEDURE — 83735 ASSAY OF MAGNESIUM: CPT | Performed by: STUDENT IN AN ORGANIZED HEALTH CARE EDUCATION/TRAINING PROGRAM

## 2024-08-22 PROCEDURE — 85027 COMPLETE CBC AUTOMATED: CPT | Performed by: STUDENT IN AN ORGANIZED HEALTH CARE EDUCATION/TRAINING PROGRAM

## 2024-08-22 PROCEDURE — 90937 HEMODIALYSIS REPEATED EVAL: CPT

## 2024-08-22 PROCEDURE — 258N000003 HC RX IP 258 OP 636: Performed by: SURGERY

## 2024-08-22 PROCEDURE — 71045 X-RAY EXAM CHEST 1 VIEW: CPT | Mod: 26 | Performed by: RADIOLOGY

## 2024-08-22 PROCEDURE — 97165 OT EVAL LOW COMPLEX 30 MIN: CPT | Mod: GO | Performed by: OCCUPATIONAL THERAPIST

## 2024-08-22 PROCEDURE — 250N000013 HC RX MED GY IP 250 OP 250 PS 637: Performed by: STUDENT IN AN ORGANIZED HEALTH CARE EDUCATION/TRAINING PROGRAM

## 2024-08-22 PROCEDURE — 250N000011 HC RX IP 250 OP 636: Mod: JZ | Performed by: STUDENT IN AN ORGANIZED HEALTH CARE EDUCATION/TRAINING PROGRAM

## 2024-08-22 RX ORDER — ALBUMIN (HUMAN) 12.5 G/50ML
50 SOLUTION INTRAVENOUS ONCE
Status: COMPLETED | OUTPATIENT
Start: 2024-08-22 | End: 2024-08-22

## 2024-08-22 RX ORDER — DIPHENHYDRAMINE HCL 25 MG
25 CAPSULE ORAL
COMMUNITY

## 2024-08-22 RX ORDER — POLYETHYLENE GLYCOL 3350 17 G/17G
17 POWDER, FOR SOLUTION ORAL 2 TIMES DAILY
Status: DISCONTINUED | OUTPATIENT
Start: 2024-08-22 | End: 2024-08-23

## 2024-08-22 RX ORDER — HYDRALAZINE HYDROCHLORIDE 20 MG/ML
10 INJECTION INTRAMUSCULAR; INTRAVENOUS EVERY 30 MIN PRN
Status: DISCONTINUED | OUTPATIENT
Start: 2024-08-22 | End: 2024-08-24

## 2024-08-22 RX ORDER — AMOXICILLIN 250 MG
2 CAPSULE ORAL 2 TIMES DAILY
Status: DISCONTINUED | OUTPATIENT
Start: 2024-08-22 | End: 2024-08-23

## 2024-08-22 RX ADMIN — ACETAMINOPHEN 975 MG: 325 TABLET, FILM COATED ORAL at 14:39

## 2024-08-22 RX ADMIN — Medication: at 16:59

## 2024-08-22 RX ADMIN — PANTOPRAZOLE SODIUM 40 MG: 40 TABLET, DELAYED RELEASE ORAL at 08:29

## 2024-08-22 RX ADMIN — HEPARIN SODIUM 5000 UNITS: 5000 INJECTION, SOLUTION INTRAVENOUS; SUBCUTANEOUS at 20:13

## 2024-08-22 RX ADMIN — ALBUMIN HUMAN 25 G: 0.05 INJECTION, SOLUTION INTRAVENOUS at 17:11

## 2024-08-22 RX ADMIN — OXYCODONE HYDROCHLORIDE 5 MG: 5 TABLET ORAL at 02:06

## 2024-08-22 RX ADMIN — ACETAMINOPHEN 975 MG: 325 TABLET, FILM COATED ORAL at 22:42

## 2024-08-22 RX ADMIN — SODIUM CHLORIDE 300 ML: 9 INJECTION, SOLUTION INTRAVENOUS at 16:59

## 2024-08-22 RX ADMIN — ATORVASTATIN CALCIUM 40 MG: 40 TABLET, FILM COATED ORAL at 20:13

## 2024-08-22 RX ADMIN — ASPIRIN 81 MG CHEWABLE TABLET 162 MG: 81 TABLET CHEWABLE at 08:29

## 2024-08-22 RX ADMIN — Medication: at 21:39

## 2024-08-22 RX ADMIN — SODIUM CHLORIDE 250 ML: 9 INJECTION, SOLUTION INTRAVENOUS at 16:58

## 2024-08-22 RX ADMIN — CEFAZOLIN SODIUM 2 G: 2 INJECTION, SOLUTION INTRAVENOUS at 20:27

## 2024-08-22 RX ADMIN — OXYCODONE HYDROCHLORIDE 5 MG: 5 TABLET ORAL at 16:38

## 2024-08-22 RX ADMIN — Medication 10 MG: at 20:13

## 2024-08-22 RX ADMIN — ACETAMINOPHEN 975 MG: 325 TABLET, FILM COATED ORAL at 05:58

## 2024-08-22 RX ADMIN — Medication 500 MG: at 21:39

## 2024-08-22 RX ADMIN — PANTOPRAZOLE SODIUM 40 MG: 40 TABLET, DELAYED RELEASE ORAL at 20:13

## 2024-08-22 ASSESSMENT — ACTIVITIES OF DAILY LIVING (ADL)
PREVIOUS_RESPONSIBILITIES: MEAL PREP;HOUSEKEEPING;LAUNDRY;SHOPPING;YARDWORK;MEDICATION MANAGEMENT;FINANCES;DRIVING;WORK
ADLS_ACUITY_SCORE: 32
ADLS_ACUITY_SCORE: 31
ADLS_ACUITY_SCORE: 32
ADLS_ACUITY_SCORE: 32
ADLS_ACUITY_SCORE: 31
ADLS_ACUITY_SCORE: 32

## 2024-08-22 NOTE — PHARMACY-ADMISSION MEDICATION HISTORY
Pharmacist Admission Medication History    Admission medication history is complete. The information provided in this note is only as accurate as the sources available at the time of the update.    Information Source(s): Patient via in-person    Pertinent Information: Pt reports good adherence, missing PM meds once or twice /mo    Changes made to PTA medication list:  Added: Voltaren gel daily on dialysis days, PRN Benadryl, daily cranberry supplement, daily garlic supplement, daily omega 3 fatty acids, daily saw palmetto  Deleted: Lidoderm patches (has, denies using)  Changed: Protonix BID -> daily    Allergies reviewed with patient and updates made in EHR: yes    Medication History Completed By: Ronald Petersen RPH 8/22/2024 10:37 AM    PTA Med List   Medication Sig Last Dose    acetaminophen (TYLENOL) 500 MG tablet Take 2 tablets (1,000 mg) by mouth 3 times daily (Patient taking differently: Take 1,000 mg by mouth daily.) 8/21/2024 at AM    aspirin 81 MG EC tablet Take 1 tablet (81 mg) by mouth daily (Patient taking differently: Take 81 mg by mouth every morning.) 08/14/2024 at PM    atorvastatin (LIPITOR) 40 MG tablet Take 1 tablet (40 mg) by mouth every evening 8/20/2024 at HS    CRANBERRY EXTRACT PO Take 1 capsule by mouth daily. Unknown dose 08/14/2024 at AM    diclofenac (VOLTAREN) 1 % topical gel Apply 2 g topically three times a week. Pt uses on knees once on dialysis days (Tu/Th/Sat) 8/20/2024 at AM    diphenhydrAMINE (BENADRYL) 25 MG capsule Take 25 mg by mouth once as needed for itching. Pt uses one cap ~2 days/week PRN for itching due to dry skin Past Week at AM    GARLIC PO Take 1 capsule by mouth daily. Unknown dose 08/14/2024 at AM    hydrALAZINE (APRESOLINE) 10 MG tablet Take 1 tablet (10 mg) by mouth 3 times daily 8/21/2024 at AM    metoprolol succinate ER (TOPROL XL) 25 MG 24 hr tablet Take 1 tablet (25 mg) by mouth daily (Patient taking differently: Take 25 mg by mouth every morning.) 8/21/2024  at AM    MULTIPLE VITAMIN PO Take 1 tablet by mouth every morning 08/14/2024 at AM    nitroGLYcerin (NITROSTAT) 0.4 MG sublingual tablet For chest pain place 1 tablet under the tongue every 5 minutes for 3 doses. If symptoms persist 5 minutes after 1st dose call 911. Unknown    OMEGA-3 FATTY ACIDS PO Take 1 capsule by mouth daily. Unknown dose 8/14/2024 at AM    pantoprazole (PROTONIX) 40 MG EC tablet Take 1 tablet (40 mg) by mouth 2 times daily (before meals) (Patient taking differently: Take 40 mg by mouth daily.) 8/21/2024 at AM    Saw [place], Serenoa repens, (SAW PALMETTO PO) Take 1 capsule by mouth daily. Unknown dose 8/14/2024 at AM      Ronald Petersen, TrentD

## 2024-08-22 NOTE — PROGRESS NOTES
Major Shift Events:  Patient rating pain 1-2/10. Up in in chair for most of day/ambulated short distance in hallway.  Back to bed for dialysis run/tolerating HD run well. Culp/lines discontinued. Tolerating a regular diet. Using IS up to 1500.   Plan: Transfer to  when bed available.   For vital signs and complete assessments, please see documentation flowsheets.

## 2024-08-22 NOTE — PROGRESS NOTES
Critical Care Attending Progress Note  I, Lindsay Monson MD, PhD saw this patient with the resident and agree with the findings and plan of care as documented in the note. Please see separate note from today for further documentation.     I personally reviewed vital signs, medications, labs and imaging.     Assessment:   74 year old adult with PMH of CAD s/p MI in 1996, HTN, ESRD on HD, ICM w/ EF 30-35%, 3v CAD     8/20: CABG    Did well overnight. OOB this am     Active problems and current treatments include:     Acute postop pain: PVB bilateral, multimodal analgesia   Respiratory insufficiency: wean mech vent  Cardiogenic and vasogenic shock: resolved, came off NE this am  SP CABG: aspirin, statin, start metop  ID: periop abx  Nutrition: diet, bowel regimen  Lines: RIJ MAC-discontinue, L Axillary A-line-discontinue, PIV x1, CT x3 (1 med, 2 pleural), V-wires- capped  PPX: SCDs, PPI  DISPO: TTF        The patient is critically ill.  I personally managed the ventilator, hemodynamics, sedation, analgesia, metabolic abnormalities and nutritional status.    I agree with the assessment and plan. I spent 46 minutes exclusive of procedures evaluating and managing this patient, discussing with the consultants, and updating the patient and family.     Lindsay Monson MD, PhD

## 2024-08-22 NOTE — PROGRESS NOTES
Cardiovascular Surgery Progress Note  08/22/2024         Assessment and Plan:     Bret Fleming is a 74 year old adult with a PMH of CAD with MI 1996, ICM with EF ~40%, HTN, ESRD on HD. Patient is now s/p CABG x3 on 8/21/2024 with Dr. Solares.     Cardiovascular:   Hx of CAD - s/p CABG x3  Hx of MI in 1996  HFrEF 2/2 ICM  HTN  No arrhythmias overnight, HD stable, in NSR. MAPS >65  Intra-op BECCA showed LVEF of 50-55%, mild MR, trivial TR  -  mg daily  - Atorvastatin 40 mg daily  - Metoprolol tartrate 12.5 mg BID  - PTA  hydralazine on hold    Chest tubes: 260 ml out last 24 hours  TPW: capped    Pulmonary:  Extubated POD 0 to 4 lpm via NC. Now saturating well on RA  - Supplemental O2 PRN to keep sats > 92%. Wean off as tolerated.  - Pulm hygiene, IS, activity and deep breathing    Neurology / Psych:  Acute post-operative pain   Pain well controlled with current regimen:  - Acetaminophen, oxycodone PRN, IV dilaudid PRN, methocarbamol  - Bilateral paravertebral block, regional pain service following, plan to maintain ~7 days (research study)     / Renal / Fluid / Electrolytes:  ESRD on iHD (Tu/Th/Sa)  Hyperkalemia  Making urine, 450 ml +2 unmeasured voids  FLUID STATUS: Pre-op weight 188 lbs, weight today 200 lbs; I/O past 24 hours: +272.  - Appreciate nephrology assistance, iHD today. Tu/Th/sat schedule  - Strict I/O, daily weights  - Avoid/limit nephrotoxins as able    GI / Nutrition:   - Tolerating regular diet  - Continue bowel regimen, last BM pre-op    Endocrine:  Stress induced hyperglycemia, improved  Hgb A1c 4.2%  - Initially managed on insulin drip postop, transitioned to sliding scale; goal BG <180 for optimal healing    Infectious Disease:  Stress induced leukocytosis, resolved  WBC 6.1, remains afebrile, no signs or symptoms of infection  - Completed perioperative antibiotics  - Continue to monitor fever curve, CBC    Hematology:   Acute blood loss anemia, stable  Acute blood loss/post CPB  thrombocytopenia  Hgb 8.9; Plt 94, no signs or symptoms of active bleeding  - CBC daily    Anticoagulation:   - ASA only    MSK / Skin:  Sternotomy  Surgical incision  - Sternal precautions  - Incisional cares per protocol    Prophylaxis:   - Stress ulcer prophylaxis: Pantoprazole 40 mg daily for 30 days  - DVT prophylaxis: Subcutaneous heparin, SCD    Disposition:   - Transferred to  on 8/23  - Therapies recommending discharge to home    Medically Ready for Discharge: 2 days      Clinically Significant Risk Factors        # Hyperkalemia: Highest K = 5.6 mmol/L in last 2 days, will monitor as appropriate   # Hypocalcemia: Lowest iCa = 4 mg/dL in last 2 days, will monitor and replace as appropriate      # Coagulation Defect: INR = 1.54 (Ref range: 0.85 - 1.15) and/or PTT = 36 Seconds (Ref range: 22 - 38 Seconds), will monitor for bleeding  # Thrombocytopenia: Lowest platelets = 114 in last 2 days, will monitor for bleeding   # Hypertension: Noted on problem list  # Chronic heart failure with reduced ejection fraction: last echo with EF <40%          # Overweight: Estimated body mass index is 25.59 kg/m  as calculated from the following:    Height as of this encounter: 1.829 m (6').    Weight as of this encounter: 85.6 kg (188 lb 11.4 oz)., PRESENT ON ADMISSION              Discussed with Dr. Beck Hernandez PAMAMIE   Cardiothoracic Surgery   Pager #[phone]  August 24, 2024 at 9:16 AM        Interval History:     No overnight events.  States pain is well managed on current regimen. Slept well OK  Tolerating die, is passing flatus, no BM. No nausea or vomiting.  Breathing well without complaints.   Working with therapies and ambulating in halls with assistance.   Denies chest pain, palpitations, dizziness, syncopal symptoms, fevers, chills, myalgias, or sternal popping/clicking.         Physical Exam:     /63   Pulse 74   Temp 98.2  F (36.8  C) (Oral)   Resp 19   Ht 1.829 m (6')   Wt 85.6 kg (188 lb  11.4 oz)   SpO2 100%   BMI 25.59 kg/m      I/O last 3 completed shifts:  In: 3190.99 [P.O.:960; I.V.:1925.99; Other:205; IV Piggyback:100]  Out: 1425 [Urine:1125; Chest Tube:300]    Gen: A&Ox4, NAD  Neuro: no focal deficits   CV: RRR, normal S1 S2, no murmurs, rubs or gallops  Pulm: CTA, no wheezing or rhonchi, unlabored work of breathing on RA  Abd: nondistended, normal BS, soft, nontender  Ext: mild peripheral edema,  Incision: clean, dry, intact, no erythema, sternum stable  Tubes/drain sites: dressing clean and dry, serosanguinous output, no air leak. 24 hr output 260 mL.          Data:    Imaging:  reviewed recent imaging, no acute concerns    Recent Results (from the past 24 hour(s))   XR Abdomen Port 1 View    Narrative    EXAMINATION:  XR ABDOMEN PORT 1 VIEW 8/21/2024 1:55 PM     COMPARISON: 9/28/2073 CT.    HISTORY: Check NG/OG tube placement    TECHNIQUE: Frontal view of the abdomen.    FINDINGS: Enteric tube tip projects just below the gastroesophageal  junction, side hole projects over the distal esophagus. Postoperative  changes of the chest are partially visualized including median  sternotomy wires which appear intact, mediastinal drains, right  internal jugular catheter with tip projecting over the superior  cavoatrial junction The small intestine and colon are not distended.  No abnormal soft tissue densities.  No free air, pneumatosis or portal  venous gas. Cardiac silhouette is stable, no significant pleural  effusion. No focal consolidative opacity visualized.      Impression    IMPRESSION: Enteric tube tip projects just below the gastroesophageal  junction, side hole within the distal esophagus. Recommend  advancement.    I have personally reviewed the examination and initial interpretation  and I agree with the findings.    KENN QUEEN DO         SYSTEM ID:  [id number]   XR Chest Port 1 View    Narrative    EXAM: XR CHEST PORT 1 VIEW 8/21/2024 1:55 PM      HISTORY: Endotracheal tube  positioning.    COMPARISON: Chest CT 8/9/2024, chest radiograph/1/24.     TECHNIQUE: Frontal view of the chest.    FINDINGS: Postoperative changes of the chest, median sternotomy wires  appear intact. Endotracheal tube in the mid/upper thoracic trachea  approximately 6.5 cm above the anette. Right internal jugular central  venous catheter with tip projecting near the superior cavoatrial  junction. Enteric tube courses below the field of view. Sternotomy  drains present. Cardiac silhouette is within normal limits. No  significant pleural effusion. No appreciable pneumothorax. Streaky  left greater than right perihilar opacities.      Impression    IMPRESSION:   Endotracheal tube is approximately 6.5 cm above the anette in the  mid/upper thoracic trachea. Streaky left or the right perihilar  opacities, favored to represent pulmonary edema.    I have personally reviewed the examination and initial interpretation  and I agree with the findings.    SABINA MARTÍNEZ MD         SYSTEM ID:  [id number]   XR Chest Port 1 View    Narrative    Exam: XR CHEST PORT 1 VIEW, 8/22/2024 6:22 AM    Indication: routine    Comparison: X-ray chest 8/21/2024, multiple priors.    Findings:   Frontal radiograph of the chest, 0538 hours. Endotracheal tube is not  visualized, presumed extubation. Right IJ central venous catheter tip  terminates in the low SVC, stable placement of multiple midline chest  drains, left basilar chest tube. Interval removal gastric tube.  Trachea is midline. Heart size and shape unremarkable. Mild scattered  hazy opacities, and mild medial basilar streakiness. No  consolidations. No significant pleural effusions. No discernible  pneumothorax.      Impression    Impression:   1. Interval extubation and removal of gastric tube.  2. Continued mild hazy opacities and basilar streakiness suggestive of  atelectasis.  2. No significant effusions or discernible pneumothorax.    I have personally reviewed the examination  and initial interpretation  and I agree with the findings.    DANIEL ORELLANA MD         SYSTEM ID:  [id number]        Labs:  Recent Labs   Lab 08/22/24  0844 08/22/24  0829 08/22/24  0602 08/22/24  0308 08/22/24  0100 08/21/24  2343 08/21/24  2213 08/21/24 2019 08/21/24  1459 08/21/24  1327 08/21/24  1326 08/21/24  1153   WBC  --   --   --  10.0  --   --   --  11.4*  --  12.7*  --   --    HGB  --   --   --  9.5*  --   --   --  9.9*  --  9.4*  --  9.7*   MCV  --   --   --  109*  --   --   --  107*  --  108*  --   --    PLT  --   --   --  116*  --   --   --  126*  --  114*  --  114*   INR  --   --   --   --   --   --   --   --   --  1.54*  --  1.64*   NA  --   --   --  132*  --   --   --  137  --  139  --  138   POTASSIUM  --  5.5*  --  5.5*  --  5.3  --  5.6*  --  4.6   < > 4.4   CHLORIDE  --   --   --  97*  --   --   --  101  --  102  --   --    CO2  --   --   --  21*  --   --   --  23  --  22  --   --    BUN  --   --   --  56.1*  --   --   --  55.0*  --  51.2*  --   --    CR  --   --   --  4.76*  --   --   --  4.63*  --  4.39*  --   --    ANIONGAP  --   --   --  14  --   --   --  13  --  15  --   --    CHANA  --   --   --  8.7*  --   --   --  8.8  --  8.8  --   --    *  --  115* 126*  120*   < > 104*   < > 138*   < > 117*   < > 153*   ALBUMIN  --   --   --  3.7  --   --   --  4.0  --  3.7   < >  --    PROTTOTAL  --   --   --  5.6*  --   --   --  6.0*  --  5.5*   < >  --    BILITOTAL  --   --   --  0.3  --   --   --  0.3  --  0.3   < >  --    ALKPHOS  --   --   --  54  --   --   --  55  --  53   < >  --    ALT  --   --   --  8  --   --   --  11  --  11   < >  --    AST  --   --   --  34  --   --   --  36  --  22   < >  --     < > = values in this interval not displayed.      GLUCOSE:   Recent Labs   Lab 08/22/24  0844 08/22/24  0602 08/22/24  0308 08/22/24  0100 08/21/24  2343   * 115* 126*  120* 116* 104*

## 2024-08-22 NOTE — PROGRESS NOTES
HEMODIALYSIS TREATMENT NOTE      Date: 8/22/2024  Time: 8:00 PM     Data:  Pre Wt:   85.6 kg (Bed scale)  Desired Wt:   To be established  Post Wt:  85.6 kg (Bed scale)  Weight change: - 0.0 kg  Ultrafiltration - Post Run Net Total Removed (mL):  0 ml  Vascular Access Status: CVC patent  Dialyzer Rinse:  Light  Total Blood Volume Processed: 67.8 L   Total Dialysis (Treatment) Time:   3 Hrs  Dialysate Bath: K 2, Ca 3  Heparin: Heparin: None     Lab:   Yes   Hepatitis B panel  HbsAg - 8/22/24 (Unknown)  HbsAb - 8/22/24  (Unknown)     Interventions:  Dialysis done through Right tunneled dialysis catheter. ,   UF set to 0.8 Liters of fluid removal, accommodating Albumin priming and rinse back volumes.  Medication administered: See MAR  CVC dressing changed aseptically  See Flowsheet for Crit Profile throughout the run  Patient was hemodynamically stable while on HD. SBP maintained above 90 mmHg.  Tolerated treatment and net UF pull of 0.0L.  Treatment has ended safely and blood is rinsed back completely  Catheter lumens flushed with saline and locked with Saline, catheter caps changed post HD  Post Tx assessment done.   Report given to FRANCES Martino.     Assessment:  A/O x 4, calm & cooperative, denies pain  Lung sounds  anterior and lateral BUL,  BLL: clear; diminished  CVC intact, previous dressing clean and dry                Plan:    Per Renal team

## 2024-08-22 NOTE — PROGRESS NOTES
Nephrology Progress Note  08/22/2024         Assessment & Recommendations:   #ESRD  - [facility]  - ACCESS LUE AVF placed 4/23/24 (still has tunneled right CVC which the dialysis unit has to use intermittently due to problems with the fistula)  - TW 85.5 kg  Run time 3 hrs; heparin 500 units loading dose, 600 units/hr  - HD today, likely continue TTS schedule though will assess daily  - no heparin post op    # Hyperkalemia, mild  - HD today     #BP/Volume   - now off pressors, on RA  -  ~ 2L UOP per day  - goal MAP > 60  - no fluid off, will use albumin to support pressures as needed        #Anemia  - Hgb 9.5  - pta mircera 50 mcg every two weeks, last dose 8/20/24; venofer 100 mg once a week, last dose 8/20/24     #Bone/Mineral: phos 3.9, Ca 8.7, alb 3.7, 8/2024 ; July 2024 vit D 38.7       #s/p CABG x 3 on 8/21/24    Recommendations were communicated to primary team via this note         JOEL Linder   Division of Nephrology and Hypertension  [employer]  [employer] Web Console    Interval History :   Seen bedside, now off pressors, on RA. iHD today, no fluid off, still makes about 2L urine per day. No n/v, CP, SOB, chills    Review of Systems:   4 point ROS neg other than as noted above    Physical Exam:   I/O last 3 completed shifts:  In: 3190.99 [P.O.:960; I.V.:1925.99; Other:205; IV Piggyback:100]  Out: 1425 [Urine:1125; Chest Tube:300]   /63   Pulse 74   Temp 98.2  F (36.8  C) (Oral)   Resp 19   Ht 1.829 m (6')   Wt 85.6 kg (188 lb 11.4 oz)   SpO2 100%   BMI 25.59 kg/m       GENERAL APPEARANCE: NAD, sitting up in chair  EYES:  no scleral icterus, pupils equal  PULM: CTA  CV: RRR     -edema trace   GI: soft   : + guido  INTEGUMENT: no cyanosis, no rash  NEURO:  a/o3  Access tunneled RIJ, L AVF    Labs:   All labs reviewed by me  Electrolytes/Renal -   Recent Labs   Lab Test 08/22/24  0844 08/22/24  0829 08/22/24  0602 08/22/24  0308 08/22/24  0100  08/21/24  2343 08/21/24  2213 08/21/24 2019 08/21/24  1459 08/21/24  1327   NA  --   --   --  132*  --   --   --  137  --  139   POTASSIUM  --  5.5*  --  5.5*  --  5.3  --  5.6*  --  4.6   CHLORIDE  --   --   --  97*  --   --   --  101  --  102   CO2  --   --   --  21*  --   --   --  23  --  22   BUN  --   --   --  56.1*  --   --   --  55.0*  --  51.2*   CR  --   --   --  4.76*  --   --   --  4.63*  --  4.39*   *  --  115* 126*  120*   < > 104*   < > 138*   < > 117*   CHANA  --   --   --  8.7*  --   --   --  8.8  --  8.8   MAG  --   --   --  2.6*  --   --   --  2.8*  --  2.9*   PHOS  --   --   --  3.9  --   --   --  3.4  --  3.2    < > = values in this interval not displayed.       CBC -   Recent Labs   Lab Test 08/22/24 0308 08/21/24 2019 08/21/24  1327   WBC 10.0 11.4* 12.7*   HGB 9.5* 9.9* 9.4*   * 126* 114*       LFTs -   Recent Labs   Lab Test 08/22/24 0308 08/21/24 2019 08/21/24  1327   ALKPHOS 54 55 53   BILITOTAL 0.3 0.3 0.3   ALT 8 11 11   AST 34 36 22   PROTTOTAL 5.6* 6.0* 5.5*   ALBUMIN 3.7 4.0 3.7       Iron Panel -   Recent Labs   Lab Test 09/27/23 1752 09/27/23  1446   IRON  --  120   IRONSAT  --  53*     --          Imaging:  Reviewed    Current Medications:  Current Facility-Administered Medications   Medication Dose Route Frequency Provider Last Rate Last Admin    acetaminophen (TYLENOL) tablet 975 mg  975 mg Oral or Feeding Tube Q8H Tina Boyd PA-C   975 mg at 08/22/24 0558    aspirin (ASA) chewable tablet 162 mg  162 mg Oral or NG Tube Daily Tina Boyd PA-C   162 mg at 08/22/24 0829    atorvastatin (LIPITOR) tablet 40 mg  40 mg Oral or Feeding Tube QPM Tina Boyd PA-C        ceFAZolin (ANCEF) 2 g in 100 mL D5W intermittent infusion  2 g Intravenous Q24H Tina Boyd PA-C 200 mL/hr at 08/21/24 2007 2 g at 08/21/24 2007    heparin ANTICOAGULANT injection 5,000 Units  5,000 Units Subcutaneous Q8H Tina Boyd PA-C        pantoprazole (PROTONIX)  2 mg/mL suspension 40 mg  40 mg Oral or NG Tube BID Lois Askew APRN CNP        Or    pantoprazole (PROTONIX) EC tablet 40 mg  40 mg Oral BID Lois Askew APRN CNP   40 mg at 08/22/24 0829    polyethylene glycol (MIRALAX) Packet 17 g  17 g Oral or Feeding Tube Daily Tina Boyd PA-C        senna-docusate (SENOKOT-S/PERICOLACE) 8.6-50 MG per tablet 1 tablet  1 tablet Oral or Feeding Tube BID Tina Boyd PA-C   1 tablet at 08/21/24 2007    sodium chloride (PF) 0.9% PF flush 3 mL  3 mL Intracatheter Q8H Tina Boyd PA-C   3 mL at 08/21/24 1349     Current Facility-Administered Medications   Medication Dose Route Frequency Provider Last Rate Last Admin    EPINEPHrine (ADRENALIN) 5 mg in  mL infusion  0.01-0.1 mcg/kg/min (Dosing Weight) Intravenous Continuous Tina Boyd PA-C   Stopped at 08/21/24 1847    insulin regular (MYXREDLIN) 1 unit/mL infusion  0-24 Units/hr Intravenous Continuous Tina Boyd PA-C   Stopped at 08/22/24 0000    nitroGLYcerin 50 mg in D5W 250 mL (adult std) infusion CENTRAL   mcg/min Intravenous Continuous Tina Boyd PA-C        NO anticoagulation unless approved by Anesthesia Provider   Does not apply Continuous PRN Ted Gutierrez DO        norepinephrine (LEVOPHED) 16 mg in  mL infusion MAX CONC CENTRAL LINE  0.01-0.6 mcg/kg/min (Dosing Weight) Intravenous Continuous Hector Gonzalez MD   Stopped at 08/22/24 0804    Reason beta blocker order not selected   Does not apply DOES NOT GO TO Tina Briceno PA-C        ROPivacaine 0.2% in sodium chloride 0.9% PERINEURAL infusion   Perineural Continuous Nerve Block Ted Gutierrez,  7 mL/hr at 08/21/24 1400 Rate Verify at 08/21/24 1400    ROPivacaine 0.2% in sodium chloride 0.9% PERINEURAL infusion   Perineural Continuous Nerve Block Ted Gutierrez DO 7 mL/hr at 08/21/24 1400 Rate Verify at 08/21/24 1400     JOEL Linder, Caitlyn Tony,  saw and evaluated this patient as part of a shared visit.  I have reviewed and discussed with the advanced practice provider their history, physical and plan.  I have personally performed the substantive portion of the medical decision making for this visit - please see the JIGNA's documentation for the full details  I personally reviewed the vital signs, medications, labs and imaging.    Key findings and management decisions made by me:  ESRD, s/p CABG yesterday. Doing well, extubated and will have HD for clearance, no UF as he is near/ at dry weight (did not eat yesterday).  Will monitor closely    Caitlyn Kailash Tony  Date of Service (when I saw the patient): 8/22

## 2024-08-22 NOTE — PROGRESS NOTES
Pain Service Progress Note  [facility]  Date: 08/22/2024       Patient Name: Bret Fleming  MRN: [id number]  Age: 74 year old  Sex: adult      Assessment:  Bret Fleming is a 74 year old adult who has PMH of  CAD s/p MI in 1996, HTN, ESRD on HD, ICM w/ EF 30-35%, 3vessel CAD who is s/p CABG on 8/21/23 with Dr. Solares.           Date of Catheter Placement: 8/21/24    Plan/Recommendations:  1. Regional Anesthesia/Analgesia  -Continuous Catheter Type/Site: bilateral paravertebral (PV) T3/4  Infusate: Ropivacaine 0.2%    Programmed Intermittent Bolus (PIB) at 7 mL Q60 min via each catheter, total infusion rate of 14 mL/hr    Plan to maintain catheter approximately of 7 days    2. Anticoagulation  -Please contact Inpatient Pain Service before ordering or making any anticoagulation changes     -Heparin 5k units subcutaneous Q 8 hours    3. Multimodal Analgesia  - per primary service: oxycodone 5mg    Pain Service will continue to follow.    Discussed with attending anesthesiologist    Jaye Kaye PA-C  08/22/2024     Overnight Events: no major acute event    Tubes/Drains: Yes  Chest tubes    Subjective:  states pain is manageable. Agrees that nerve block is doing its job.    Symptoms of LAST: No    Pain Location:  sternum    Pain Intensity:    Pain at Rest: 1/10     Satisfied with your level of pain control: Yes    Diet: Advance Diet as Tolerated: Regular Diet Adult    Relevant Labs:  Recent Labs   Lab Test 08/22/24  0308 08/21/24  2019 08/21/24  1327   INR  --   --  1.54*   *   < > 114*   PTT  --   --  36   BUN 56.1*   < > 51.2*    < > = values in this interval not displayed.       Physical Exam:  Vitals: /63   Pulse 76   Temp 98.5  F (36.9  C) (Axillary)   Resp 19   Ht 1.829 m (6')   Wt 85.6 kg (188 lb 11.4 oz)   SpO2 100%   BMI 25.59 kg/m      Physical Exam:   Orientation:  Alert, oriented, and in no acute distress: Yes  Sedation: No    Motor Examination:  5/5  "Strength in lower extremities: Yes      Catheter Site:   Catheter entry site is clean/dry/intact: Yes    Tender: No      Relevant Medications:  Current Pain Medications:  Medications related to Pain Management (From now, onward)      Start     Dose/Rate Route Frequency Ordered Stop    08/24/24 0000  acetaminophen (TYLENOL) tablet 650 mg         650 mg Oral or Feeding Tube EVERY 4 HOURS PRN 08/21/24 1319      08/24/24 0000  bisacodyl (DULCOLAX) suppository 10 mg         10 mg Rectal DAILY PRN 08/21/24 1319      08/23/24 0000  magnesium hydroxide (MILK OF MAGNESIA) suspension 30 mL         30 mL Oral or Feeding Tube DAILY PRN 08/21/24 1319      08/22/24 0800  polyethylene glycol (MIRALAX) Packet 17 g         17 g Oral or Feeding Tube DAILY 08/21/24 1319      08/21/24 2000  senna-docusate (SENOKOT-S/PERICOLACE) 8.6-50 MG per tablet 1 tablet         1 tablet Oral or Feeding Tube 2 TIMES DAILY 08/21/24 1319 08/21/24 1930  aspirin (ASA) chewable tablet 162 mg         162 mg Oral or NG Tube DAILY 08/21/24 1319      08/21/24 1400  acetaminophen (TYLENOL) tablet 975 mg         975 mg Oral or Feeding Tube EVERY 8 HOURS 08/21/24 1319 08/24/24 1359    08/21/24 1319  HYDROmorphone (DILAUDID) injection 0.1 mg        Placed in \"Or\" Linked Group    0.1 mg Intravenous EVERY 2 HOURS PRN 08/21/24 1319      08/21/24 1319  HYDROmorphone (DILAUDID) injection 0.2 mg        Placed in \"Or\" Linked Group    0.2 mg Intravenous EVERY 2 HOURS PRN 08/21/24 1319      08/21/24 1319  oxyCODONE IR (ROXICODONE) half-tab 2.5 mg        Placed in \"Or\" Linked Group    2.5 mg Oral or Feeding Tube EVERY 4 HOURS PRN 08/21/24 1319      08/21/24 1319  oxyCODONE (ROXICODONE) tablet 5 mg        Placed in \"Or\" Linked Group    5 mg Oral or Feeding Tube EVERY 4 HOURS PRN 08/21/24 1319      08/21/24 1319  methocarbamol (ROBAXIN) tablet 500 mg         500 mg Oral or Feeding Tube EVERY 6 HOURS PRN 08/21/24 1319      08/21/24 1319  lidocaine 1 % 0.1-1 mL         " "0.1-1 mL Other EVERY 1 HOUR PRN 08/21/24 1319      08/21/24 1319  lidocaine (LMX4) cream          Topical EVERY 1 HOUR PRN 08/21/24 1319      08/21/24 0830  ROPivacaine 0.2% in sodium chloride 0.9% PERINEURAL infusion          Perineural Continuous Nerve Block 08/21/24 0803      08/21/24 0830  ROPivacaine 0.2% in sodium chloride 0.9% PERINEURAL infusion          Perineural Continuous Nerve Block 08/21/24 0803                        Acute Inpatient Pain Service [facility]  Hours of pain coverage 24/7   Page via [employer]- Please Page the Pain Team Via [employer]: \"PAIN MANAGEMENT ACUTE INPATIENT/ [facility]\"            "

## 2024-08-22 NOTE — PROGRESS NOTES
CV ICU H&P    ASSESSMENT: Bret Fleming is a 74 year old adult with PMH of CAD s/p MI in 1996, HTN, ESRD on HD, ICM w/ EF 30-35%, 3v CAD who presents to [facility] for CABG on by Dr. Solares.    OVERNIGHT:   - K to 5.6, shifted overnight  - Pacing wires were inappropriately pacing and unplugged. Normal sinus rhythm without ectopy after unplugging    TODAY'S PROGRESS:   - HD today, goal euvolemic  - Goal to de-line and remove guido, CTs and pacing wires likely to come out tomorrow  - Transfer orders in    CO-MORBIDITIES:   Patient Active Problem List   Diagnosis    NSTEMI (non-ST elevated myocardial infarction) (H)    Coronary atherosclerosis    Hyperlipidemia    Chronic systolic congestive heart failure (H)    HTN (hypertension)    Acute on chronic anemia    ESRD (end stage renal disease) (H)    Gastrointestinal hemorrhage associated with gastric ulcer    Chronic pain of right knee    Ischemic cardiomyopathy    Status post coronary angiogram    Coronary atherosclerosis due to lipid rich plaque    Hypertensive heart failure with end stage renal disease (H)    Pre-transplant evaluation for kidney transplant     PLAN:  Neuro/ pain/ sedation:  #Sedation [Discontinued]  #Acute postoperative pain [Controlled]  - Scheduled: Tylenol  - PRN: Tylenol, oxycodone, Dilaudid, robaxin    Pulmonary care:   #Mechanical ventilation [Resolved]   - Goal SpO2 > 92%  - Extubated to 4L NC on POD1, on 2-4 since  - Encourage IS/flutter valve q15-30 minutes when awake.  - CTs (1 med, 2 pleural): air leak, 33/157/110; 300mL in 24 hrs  - CXR 8/22: mild hazy opacities suggestive of atelectasis, no evidence of pneumothorax    Cardiovascular:    #Cardiogenic shock [Resolved]  #CAD s/p MI in 1996  #HTN  #ICM w/ EF 30-35%  #S/p CABG  Pre: EF 45%, mild MR  Post: EF 50-55%, mild RV dilation  - Goal MAP >65, SBP <140, monitor hemodynamics  - Likely add BB tomorrow (PTA was metoprolol 25 mg)  -  mg and PTA atorvastatin 40 mg qd    GI care /  Nutrition:   - Passed bedside swallow, regular diet ordered  - Bowel regimen: MiraLAX, senna  - Continuing PTA PPI: protonix every day     Renal / Fluids / Electrolytes:   #ESRD on HD T/Th/S  BL creat appears to be ~ 5.0-6.0  - Strict I/O, daily weights, avoid/limit nephrotoxins  - Replete lytes PRN per protocol  - HD planned for 8/22, goal euvolemic with albumin if needed to support pressures  - K elevated overnight, multiple electrolyte abnormalities in AM on 8/22 to be corrected with HD  - Not diuresing today    Endocrine:  #Stress hyperglycemia [Resolved]  Preop A1c 4.2  - Insulin gtt discontinued  - Goal BG <180 for optimal healing    ID / Antibiotics:  #Stress induced leukocytosis [Improving]  - To complete perioperative ancef regimen 8/22  - Monitor fever curve, WBC, and inflammatory markers as appropriate    Heme:  #Acute blood loss anemia [Resolved]  #Acute blood loss thrombocytopenia [Resolved]  No s/sx active bleeding  - CBC  - Hgb goal > 7.0, currently 9.5 roughly unchanged from 9.7  - Starting SQH for DVT prophylaxis 8/22    MSK / Skin:  #Sternotomy  #Surgical Incision  - Sternal precautions, postop incision management protocol  - PT/OT/CR    Prophylaxis:  - Mechanical DVT ppx  - Chemical DVT ppx: start SQH today  - PPI    Lines / Tubes / Drains:  - RIJ CVC, removing  - Left brachial arterial Line, removing  - CTs x3 (1 med, 2 pleurals)  - Culp, removing    Disposition:  - Transfer orders placed, in CVICU pending bed assignment    ICU Checklist:  B - Beta blocker: hold while on pressors  A - Aspirin: yes, 162 mg  S - Statin: yes, PTA atorva 40  F - Feeding: yes  A - Analgesic: yes, MMPR  S - Sedation/SBT: N/A  T - Thromboembolic Prophylaxis: yes, SQH  H - Head of the Bed Elevated: yes  U - Ulcer Prophylaxis: yes, PPI  G - Glycemic Control: yes, SSI  S - Skin Concerns: none  B - Bowel Regimen: yes  I - Indwelling Catheter: to come out  D - Drug De-Escalation: complete    Patient seen, findings and plan  discussed with CVICU staff and cardiothoracic surgeons and fellow.    Clarice Marquis, MS4    Resident/Fellow Attestation   I, Hector Gonzalez MD, was present with the medical/[employer] student who participated in the service and in the documentation of the note.  I have verified the history and personally performed the physical exam and medical decision making.  I agree with the assessment and plan of care as documented in the note.      Hector Gonzalez MD  Anesthesiology Resident, CA-2, PGY-3    Date of Service (when I saw the patient): 08/22/24        ====================================    SUBJECTIVE:   Patient resting in recliner upon evaluation this morning. Reports pain 0/1. Using flutter valve and IS, getting IS up to 1750. No chest pain, mild pain at peak inspiration with IS. Meditates and does deep breathing exercises regularly at home, is using them in CVICU. Appetite is OK, was able to eat some breakfast, no nausea or vomiting. No pain at graft site. Slept intermittently overnight. No other concerns.      PAST MEDICAL HISTORY:   Past Medical History:   Diagnosis Date    Acute myocardial infarction     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 (H)     Bilateral hydronephrosis     CAD (coronary artery disease)     Hyperlipidemia     Hypertension     Ischemic cardiomyopathy     Sepsis due to gram-negative UTI (H) 10/07/2023       PAST SURGICAL HISTORY:   Past Surgical History:   Procedure Laterality Date    CV CORONARY ANGIOGRAM N/A 7/8/2024    Procedure: Coronary Angiogram;  Surgeon: Kevin Thomas MD;  Location:  HEART CARDIAC CATH LAB    ESOPHAGOSCOPY, GASTROSCOPY, DUODENOSCOPY (EGD), COMBINED N/A 10/9/2023    Procedure: Esophagoscopy, gastroscopy, duodenoscopy (EGD), combined;  Surgeon: Karolyn Saxena MD;  Location:  GI    IR CVC TUNNEL PLACEMENT > 5 YRS OF AGE  9/27/2023    IR CVC TUNNEL PLACEMENT > 5 YRS OF AGE  10/11/2023     PICC TRIPLE LUMEN PLACEMENT  2023       FAMILY HISTORY:   Family History   Problem Relation Age of Onset    Cancer Father     Cancer Paternal Grandfather     Diabetes Paternal Grandfather     Hypertension No family hx of     Cerebrovascular Disease No family hx of     Thyroid Disease No family hx of     Glaucoma No family hx of     Macular Degeneration No family hx of     Anesthesia Reaction No family hx of     Clotting Disorder No family hx of     Bleeding Disorder No family hx of        SOCIAL HISTORY:   Social History     Tobacco Use    Smoking status: Former     Current packs/day: 0.00     Types: Cigarettes     Quit date: 1995     Years since quittin.3     Passive exposure: Past    Smokeless tobacco: Never   Substance Use Topics    Alcohol use: Yes     Comment: 1  drink per week       OBJECTIVE:  1. VITAL SIGNS:   Temp:  [97  F (36.1  C)-98.5  F (36.9  C)] 98.5  F (36.9  C)  Pulse:  [] 81  Resp:  [11-27] 17  BP: (117-124)/(59-65) 123/63  MAP:  [53 mmHg-97 mmHg] 65 mmHg  Arterial Line BP: ()/(35-62) 105/43  FiO2 (%):  [40 %] 40 %  SpO2:  [91 %-100 %] 98 %      2. INTAKE/ OUTPUT:   I/O last 3 completed shifts:  In: 2840.99 [P.O.:720; I.V.:1815.99; Other:205; IV Piggyback:100]  Out: 935 [Urine:745; Chest Tube:190]      3. PHYSICAL EXAMINATION:   General: Appears comfortable, out of bed and in recliner  Neuro: Alert and oriented, appropriate affect, no focal deficits  Resp: On RA, non-labored breathing, lungs CTA bilaterally  CV: RRR, capillary refill <2 seconds  Abdomen: Soft, non-distended, non-tender  Incisions: c/d/i  Extremities: warm and well perfused, trace lower extremity edema  CT: To suction, serosang output, small airleak, no crepitus      4. INVESTIGATIONS:   Arterial Blood Gases   Recent Labs   Lab 24  2213 24  1546 24  1328 24  1153   PH 7.35 7.50* 7.35 7.36   PCO2 43 28* 43 43   PO2 204* 189* 262* 525*   HCO3 23 22 24 24     Complete Blood Count    Recent Labs   Lab 08/22/24  0308 08/21/24 2019 08/21/24  1327 08/21/24  1153   WBC 10.0 11.4* 12.7*  --    HGB 9.5* 9.9* 9.4* 9.7*   * 126* 114* 114*     Basic Metabolic Panel  Recent Labs   Lab 08/22/24  0602 08/22/24  0308 08/22/24  0100 08/21/24  2343 08/21/24  2213 08/21/24 2019 08/21/24  1459 08/21/24  1327 08/21/24  1326 08/21/24  1153   NA  --  132*  --   --   --  137  --  139  --  138   POTASSIUM  --  5.5*  --  5.3  --  5.6*  --  4.6  --  4.4   CHLORIDE  --  97*  --   --   --  101  --  102  --   --    CO2  --  21*  --   --   --  23  --  22  --   --    BUN  --  56.1*  --   --   --  55.0*  --  51.2*  --   --    CR  --  4.76*  --   --   --  4.63*  --  4.39*  --   --    * 126*  120* 116* 104*   < > 138*   < > 117*   < > 153*    < > = values in this interval not displayed.     Liver Function Tests  Recent Labs   Lab 08/22/24 0308 08/21/24 2019 08/21/24 1327 08/21/24  1153   AST 34 36 22  --    ALT 8 11 11  --    ALKPHOS 54 55 53  --    BILITOTAL 0.3 0.3 0.3  --    ALBUMIN 3.7 4.0 3.7  --    INR  --   --  1.54* 1.64*     Pancreatic Enzymes  No lab results found in last 7 days.  Coagulation Profile  Recent Labs   Lab 08/21/24  1327 08/21/24  1153   INR 1.54* 1.64*   PTT 36 34         5. RADIOLOGY:   Recent Results (from the past 24 hour(s))   POC US Guidance Needle Placement    Impression    PVCs   XR Abdomen Port 1 View    Narrative    EXAMINATION:  XR ABDOMEN PORT 1 VIEW 8/21/2024 1:55 PM     COMPARISON: 9/28/2073 CT.    HISTORY: Check NG/OG tube placement    TECHNIQUE: Frontal view of the abdomen.    FINDINGS: Enteric tube tip projects just below the gastroesophageal  junction, side hole projects over the distal esophagus. Postoperative  changes of the chest are partially visualized including median  sternotomy wires which appear intact, mediastinal drains, right  internal jugular catheter with tip projecting over the superior  cavoatrial junction The small intestine and colon are not  distended.  No abnormal soft tissue densities.  No free air, pneumatosis or portal  venous gas. Cardiac silhouette is stable, no significant pleural  effusion. No focal consolidative opacity visualized.      Impression    IMPRESSION: Enteric tube tip projects just below the gastroesophageal  junction, side hole within the distal esophagus. Recommend  advancement.    I have personally reviewed the examination and initial interpretation  and I agree with the findings.    KENN QUEEN DO         SYSTEM ID:  [id number]   XR Chest Port 1 View    Narrative    EXAM: XR CHEST PORT 1 VIEW 8/21/2024 1:55 PM      HISTORY: Endotracheal tube positioning.    COMPARISON: Chest CT 8/9/2024, chest radiograph/1/24.     TECHNIQUE: Frontal view of the chest.    FINDINGS: Postoperative changes of the chest, median sternotomy wires  appear intact. Endotracheal tube in the mid/upper thoracic trachea  approximately 6.5 cm above the anette. Right internal jugular central  venous catheter with tip projecting near the superior cavoatrial  junction. Enteric tube courses below the field of view. Sternotomy  drains present. Cardiac silhouette is within normal limits. No  significant pleural effusion. No appreciable pneumothorax. Streaky  left greater than right perihilar opacities.      Impression    IMPRESSION:   Endotracheal tube is approximately 6.5 cm above the anette in the  mid/upper thoracic trachea. Streaky left or the right perihilar  opacities, favored to represent pulmonary edema.    I have personally reviewed the examination and initial interpretation  and I agree with the findings.    SABINA MARTÍNEZ MD         SYSTEM ID:  [id number]   XR Chest Port 1 View    Impression    RESIDENT PRELIMINARY INTERPRETATION  Impression:   1. Interval extubation and removal of gastric tube.  2. Continued mild hazy opacities and basilar streakiness suggestive of  atelectasis.  2. No significant effusions or discernible pneumothorax.        =========================================

## 2024-08-22 NOTE — PROGRESS NOTES
"   08/22/24 1500   Appointment Info   Signing Clinician's Name / Credentials (OT) Dixie Suárez OTR/L   Rehab Comments (OT) sternal, 3x a week dialysis   Living Environment   People in Home spouse   Current Living Arrangements house   Home Accessibility stairs within home   Number of Stairs, Main Entrance 2   Stair Railings, Main Entrance railings safe and in good condition   Number of Stairs, Within Home, Primary greater than 10 stairs   Stair Railings, Within Home, Primary railings safe and in good condition   Transportation Anticipated car, drives self;family or friend will provide   Living Environment Comments Pt lives with his wife, he reports she is able to drive but he is the primary person for IADLS.   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  He is up and down \"a dozen\" times a day   Instrumental Activities of Daily Living (IADL)   Previous Responsibilities meal prep;housekeeping;laundry;shopping;yardwork;medication management;finances;driving;work   IADL Comments shares some with his wife   General Information   Onset of Illness/Injury or Date of Surgery 08/21/24   Referring Physician Dr Solares   Patient/Family Therapy Goal Statement (OT) be IND, get back to work   Additional Occupational Profile Info/Pertinent History of Current Problem 74 year old adult with PMH of CAD s/p MI in 1996, HTN, ESRD on HD, ICM w/ EF 30-35%, 3v CAD s/p  CABG on by Dr. Solares.  Pt listed for living donor kidney transplant, dialysis 3x a week.   Performance Patterns (Routines, Roles, Habits) works FT from home,    Existing Precautions/Restrictions cardiac;fall;sternal   General Observations and Info activity upw A, early mobility   Cognitive Status Examination "   Orientation Status orientation to person, place and time   Cognitive Status Comments intact   Visual Perception   Visual Impairment/Limitations corrective lenses full-time   Sensory   Sensory Comments intact   Pain Assessment   Patient Currently in Pain Yes, see Vital Sign flowsheet   Posture   Posture Comments hunched, protective at sternum   Range of Motion Comprehensive   Comment, General Range of Motion WFL limited by pain   Strength Comprehensive (MMT)   Comment, General Manual Muscle Testing (MMT) Assessment NT   Coordination   Upper Extremity Coordination No deficits were identified   Bed Mobility   Comment (Bed Mobility) per clinical judgement pt will be below baseline and need assist due to vitals precautions and pain   Transfers   Transfer Comments per clinical judgement pt will be below baseline and need assist due to hypotension precautions and pain   Balance   Balance Comments Has some baseline deficit, per clinical judgement pt will be below baseline and need assist due to hypotension precautions and pain   Activities of Daily Living   Comment, BADL Assessment/Training per clinical judgement pt will be below baseline and need assist due to hypotension precautions and pain   Comment, IADL Assessment/Training per clinical judgement pt will be below baseline and need assist due to hypotension precautions and pain   Additional Documentation Comment, BADL Assessment/Training (Row);Comment, IADL Assessment/Training (Row)   Clinical Impression   Criteria for Skilled Therapeutic Interventions Met (OT) Yes, treatment indicated   OT Diagnosis decreased IND w ADL and IADL   OT Problem List-Impairments impacting ADL problems related to;activity tolerance impaired;balance;mobility;strength;pain;post-surgical precautions;postural control   Assessment of Occupational Performance 5 or more Performance Deficits   Identified Performance Deficits all ADL and IADL are impacted   Planned Therapy Interventions (OT) ADL  retraining;IADL retraining;balance training;bed mobility training;risk factor education;progressive activity/exercise;home program guidelines;transfer training   Clinical Decision Making Complexity (OT) problem focused assessment/low complexity   Risk & Benefits of therapy have been explained patient;participants included;participants voiced agreement with care plan;current/potential barriers reviewed;risks/benefits reviewed;care plan/treatment goals reviewed;evaluation/treatment results reviewed   OT Total Evaluation Time   OT Eval, Low Complexity Minutes (03159) 9   OT Goals   Therapy Frequency (OT) Daily   OT Predicted Duration/Target Date for Goal Attainment 09/09/24   OT Goals Hygiene/Grooming;Lower Body Dressing;Toilet Transfer/Toileting;Cardiac Phase 1   OT: Hygiene/Grooming modified independent;within precautions;using adaptive equipment   OT: Lower Body Dressing Modified independent;within precautions   OT: Toilet Transfer/Toileting Modified independent;toilet transfer;cleaning and garment management;using adaptive equipment;within precautions   OT: Understanding of cardiac education to maximize quality of life, condition management, and health outcomes Patient;Caregiver;Verbalize;Demonstrate   OT: Perform aerobic activity with stable cardiovascular response intermittent;20 minutes;ambulation   OT: Functional/aerobic ambulation tolerance with stable cardiovascular response in order to return to home and community environment Modified independent;Greater than 300 feet;Within precautions;Straight cane   Interventions   Interventions Quick Adds Self-Care/Home Management;Therapeutic Activity;Therapeutic Procedures/Exercise;Cardiac Rehab   Self-Care/Home Management   Self-Care/Home Mgmt/ADL, Compensatory, Meal Prep Minutes (35215) 11   Treatment Detail/Skilled Intervention Issued handout and educated, see below   Therapeutic Procedures/Exercise   Therapeutic Procedure: strength, endurance, ROM, flexibillity  minutes (44189) 25   Symptoms Noted During/After Treatment fatigue;dizziness;increased pain;significant change in vital signs   Treatment Detail/Skilled Intervention Pt with known hypotension, close monitor wiht CGA x1 and RN SBA x1   Treatment Time Includes (CR Only) See specific exercise details intervention group(s);Monitoring of vital signs (see vital signs flowsheet for details);Extra time managing multiple lines/tubes;Extra time changing tele monitor   Therapeutic Activities   Therapeutic Activity Minutes (68152) 12   Symptoms noted during/after treatment increased pain   Treatment Detail/Skilled Intervention Training for bed mobility, EOB to side lying log roll.  Max A needed with training and cues.   Ambulation   Workload willis walk   Symptoms Incisional pain;Lightheadedness;Fatigue   Cardiovascular Response Hypotension at rest;Hypotensive response to exercise   Vital Signs Details increased hypotension wiht activity,  MAP 70 in chair, MAP to 57 while walking, RN closely monitoring.  O2 WNL   Cardiac Education   Education Provided Outpatient Cardiac Rehab;Precautions;Resuming home activities   Education Packet Given to Patient No   All Patient Education Handouts Reviewed with Patient and/or Family No   OT Discharge Planning   OT Plan ambulate, monitor hypotension.   LBD, bed mobility, PT to initiate   OT Discharge Recommendation (DC Rec) home with assist;home with outpatient cardiac rehab  (OP PT for balance)   OT Rationale for DC Rec Pt currently limited by pain, precautions and hypotension.  Has balance deficits at baseline and reports never seeing PT< order placed for balance PT.  Rec OP CR to increase activity tolerance.  Pending progress should be able to dc to home w A from wife PRN.   OT Brief overview of current status max A bed mobility, ambulated SBA w cane short distance, limited by hypotension   Total Session Time   Timed Code Treatment Minutes 48   Total Session Time (sum of timed and untimed  services) 57

## 2024-08-22 NOTE — PROGRESS NOTES
Overnight no acute events.   Neuro remains intact, afebrile, pain well controlled. Oxy 5mg x1    Cardiac: Sinus rhythm, pacer unplugged, BP >65 on low dose norepi, pulses palpable.     Respiratory: RA, gasses look great, lungs clear     GI/: HD patient, making good urine, retaining potassium. Passed swallow. No nausea.    Plan for possible HD today. Wean pressors as able.

## 2024-08-23 ENCOUNTER — APPOINTMENT (OUTPATIENT)
Dept: OCCUPATIONAL THERAPY | Facility: CLINIC | Age: 74
DRG: 235 | End: 2024-08-23
Attending: SURGERY
Payer: COMMERCIAL

## 2024-08-23 ENCOUNTER — APPOINTMENT (OUTPATIENT)
Dept: PHYSICAL THERAPY | Facility: CLINIC | Age: 74
DRG: 235 | End: 2024-08-23
Attending: STUDENT IN AN ORGANIZED HEALTH CARE EDUCATION/TRAINING PROGRAM
Payer: COMMERCIAL

## 2024-08-23 ENCOUNTER — APPOINTMENT (OUTPATIENT)
Dept: GENERAL RADIOLOGY | Facility: CLINIC | Age: 74
DRG: 235 | End: 2024-08-23
Attending: SURGERY
Payer: COMMERCIAL

## 2024-08-23 LAB
ABO/RH(D): NORMAL
ALBUMIN SERPL BCG-MCNC: 3.6 G/DL (ref 3.5–5.2)
ALP SERPL-CCNC: 48 U/L (ref 40–150)
ALT SERPL W P-5'-P-CCNC: <5 U/L (ref 0–70)
ANION GAP SERPL CALCULATED.3IONS-SCNC: 8 MMOL/L (ref 7–15)
ANTIBODY SCREEN: NEGATIVE
AST SERPL W P-5'-P-CCNC: 39 U/L (ref 0–45)
BILIRUB SERPL-MCNC: 0.2 MG/DL
BUN SERPL-MCNC: 30.1 MG/DL (ref 8–23)
CA-I BLD-MCNC: 4.4 MG/DL (ref 4.4–5.2)
CALCIUM SERPL-MCNC: 8.1 MG/DL (ref 8.8–10.4)
CHLORIDE SERPL-SCNC: 96 MMOL/L (ref 98–107)
CREAT SERPL-MCNC: 3.26 MG/DL (ref 0.51–1.17)
EGFRCR SERPLBLD CKD-EPI 2021: 19 ML/MIN/1.73M2
ERYTHROCYTE [DISTWIDTH] IN BLOOD BY AUTOMATED COUNT: 12.7 % (ref 10–15)
GLUCOSE BLDC GLUCOMTR-MCNC: 118 MG/DL (ref 70–99)
GLUCOSE BLDC GLUCOMTR-MCNC: 119 MG/DL (ref 70–99)
GLUCOSE BLDC GLUCOMTR-MCNC: 92 MG/DL (ref 70–99)
GLUCOSE SERPL-MCNC: 122 MG/DL (ref 70–99)
HCO3 SERPL-SCNC: 27 MMOL/L (ref 22–29)
HCT VFR BLD AUTO: 25.7 % (ref 35–53)
HGB BLD-MCNC: 8.2 G/DL (ref 13.3–17.7)
MAGNESIUM SERPL-MCNC: 1.9 MG/DL (ref 1.7–2.3)
MCH RBC QN AUTO: 34.6 PG (ref 26.5–33)
MCHC RBC AUTO-ENTMCNC: 31.9 G/DL (ref 31.5–36.5)
MCV RBC AUTO: 108 FL (ref 78–100)
PHOSPHATE SERPL-MCNC: 2.6 MG/DL (ref 2.5–4.5)
PLATELET # BLD AUTO: 103 10E3/UL (ref 150–450)
POTASSIUM SERPL-SCNC: 4.4 MMOL/L (ref 3.4–5.3)
PROT SERPL-MCNC: 5.6 G/DL (ref 6.4–8.3)
RBC # BLD AUTO: 2.37 10E6/UL (ref 3.8–5.9)
SODIUM SERPL-SCNC: 131 MMOL/L (ref 135–145)
SPECIMEN EXPIRATION DATE: NORMAL
WBC # BLD AUTO: 5.9 10E3/UL (ref 4–11)

## 2024-08-23 PROCEDURE — 250N000011 HC RX IP 250 OP 636: Performed by: ANESTHESIOLOGY

## 2024-08-23 PROCEDURE — 97530 THERAPEUTIC ACTIVITIES: CPT | Mod: GP | Performed by: PHYSICAL THERAPIST

## 2024-08-23 PROCEDURE — 250N000011 HC RX IP 250 OP 636: Performed by: STUDENT IN AN ORGANIZED HEALTH CARE EDUCATION/TRAINING PROGRAM

## 2024-08-23 PROCEDURE — 250N000013 HC RX MED GY IP 250 OP 250 PS 637: Performed by: STUDENT IN AN ORGANIZED HEALTH CARE EDUCATION/TRAINING PROGRAM

## 2024-08-23 PROCEDURE — 99233 SBSQ HOSP IP/OBS HIGH 50: CPT | Mod: 24 | Performed by: PHYSICIAN ASSISTANT

## 2024-08-23 PROCEDURE — 99232 SBSQ HOSP IP/OBS MODERATE 35: CPT | Mod: GC | Performed by: ANESTHESIOLOGY

## 2024-08-23 PROCEDURE — 99233 SBSQ HOSP IP/OBS HIGH 50: CPT | Mod: FS | Performed by: INTERNAL MEDICINE

## 2024-08-23 PROCEDURE — 71045 X-RAY EXAM CHEST 1 VIEW: CPT | Mod: 26 | Performed by: RADIOLOGY

## 2024-08-23 PROCEDURE — 250N000011 HC RX IP 250 OP 636: Performed by: SURGERY

## 2024-08-23 PROCEDURE — 82247 BILIRUBIN TOTAL: CPT | Performed by: STUDENT IN AN ORGANIZED HEALTH CARE EDUCATION/TRAINING PROGRAM

## 2024-08-23 PROCEDURE — 84100 ASSAY OF PHOSPHORUS: CPT | Performed by: STUDENT IN AN ORGANIZED HEALTH CARE EDUCATION/TRAINING PROGRAM

## 2024-08-23 PROCEDURE — 83735 ASSAY OF MAGNESIUM: CPT | Performed by: STUDENT IN AN ORGANIZED HEALTH CARE EDUCATION/TRAINING PROGRAM

## 2024-08-23 PROCEDURE — 250N000013 HC RX MED GY IP 250 OP 250 PS 637: Performed by: PHYSICIAN ASSISTANT

## 2024-08-23 PROCEDURE — 120N000005 HC R&B MS OVERFLOW UMMC

## 2024-08-23 PROCEDURE — 250N000013 HC RX MED GY IP 250 OP 250 PS 637

## 2024-08-23 PROCEDURE — 97110 THERAPEUTIC EXERCISES: CPT | Mod: GO | Performed by: OCCUPATIONAL THERAPIST

## 2024-08-23 PROCEDURE — 271N000002 HC RX 271: Performed by: ANESTHESIOLOGY

## 2024-08-23 PROCEDURE — 85027 COMPLETE CBC AUTOMATED: CPT | Performed by: STUDENT IN AN ORGANIZED HEALTH CARE EDUCATION/TRAINING PROGRAM

## 2024-08-23 PROCEDURE — 71045 X-RAY EXAM CHEST 1 VIEW: CPT

## 2024-08-23 PROCEDURE — 82330 ASSAY OF CALCIUM: CPT | Performed by: STUDENT IN AN ORGANIZED HEALTH CARE EDUCATION/TRAINING PROGRAM

## 2024-08-23 PROCEDURE — 999N000248 HC STATISTIC IV INSERT WITH US BY RN

## 2024-08-23 PROCEDURE — 999N000285 HC STATISTIC VASC ACCESS LAB DRAW WITH PIV START

## 2024-08-23 PROCEDURE — 86900 BLOOD TYPING SEROLOGIC ABO: CPT | Performed by: SURGERY

## 2024-08-23 PROCEDURE — 97161 PT EVAL LOW COMPLEX 20 MIN: CPT | Mod: GP | Performed by: PHYSICAL THERAPIST

## 2024-08-23 PROCEDURE — 97116 GAIT TRAINING THERAPY: CPT | Mod: GP | Performed by: PHYSICAL THERAPIST

## 2024-08-23 PROCEDURE — 97535 SELF CARE MNGMENT TRAINING: CPT | Mod: GO | Performed by: OCCUPATIONAL THERAPIST

## 2024-08-23 PROCEDURE — 99232 SBSQ HOSP IP/OBS MODERATE 35: CPT | Performed by: PHYSICIAN ASSISTANT

## 2024-08-23 RX ORDER — AMOXICILLIN 250 MG
3 CAPSULE ORAL 2 TIMES DAILY
Status: DISCONTINUED | OUTPATIENT
Start: 2024-08-23 | End: 2024-08-26 | Stop reason: HOSPADM

## 2024-08-23 RX ORDER — POLYETHYLENE GLYCOL 3350 17 G/17G
17 POWDER, FOR SOLUTION ORAL 3 TIMES DAILY
Status: DISCONTINUED | OUTPATIENT
Start: 2024-08-23 | End: 2024-08-26 | Stop reason: HOSPADM

## 2024-08-23 RX ORDER — MAGNESIUM SULFATE HEPTAHYDRATE 40 MG/ML
2 INJECTION, SOLUTION INTRAVENOUS ONCE
Status: COMPLETED | OUTPATIENT
Start: 2024-08-23 | End: 2024-08-23

## 2024-08-23 RX ORDER — METOPROLOL TARTRATE 25 MG/1
25 TABLET, FILM COATED ORAL 2 TIMES DAILY
Status: DISCONTINUED | OUTPATIENT
Start: 2024-08-23 | End: 2024-08-26 | Stop reason: HOSPADM

## 2024-08-23 RX ADMIN — MAGNESIUM SULFATE HEPTAHYDRATE 2 G: 2 INJECTION, SOLUTION INTRAVENOUS at 06:09

## 2024-08-23 RX ADMIN — OXYCODONE HYDROCHLORIDE 2.5 MG: 5 TABLET ORAL at 16:02

## 2024-08-23 RX ADMIN — HEPARIN SODIUM 5000 UNITS: 5000 INJECTION, SOLUTION INTRAVENOUS; SUBCUTANEOUS at 11:45

## 2024-08-23 RX ADMIN — ATORVASTATIN CALCIUM 40 MG: 40 TABLET, FILM COATED ORAL at 19:57

## 2024-08-23 RX ADMIN — METOPROLOL TARTRATE 25 MG: 25 TABLET, FILM COATED ORAL at 19:57

## 2024-08-23 RX ADMIN — PANTOPRAZOLE SODIUM 40 MG: 40 TABLET, DELAYED RELEASE ORAL at 07:50

## 2024-08-23 RX ADMIN — SENNOSIDES AND DOCUSATE SODIUM 3 TABLET: 8.6; 5 TABLET ORAL at 19:57

## 2024-08-23 RX ADMIN — SENNOSIDES AND DOCUSATE SODIUM 3 TABLET: 8.6; 5 TABLET ORAL at 07:50

## 2024-08-23 RX ADMIN — POLYETHYLENE GLYCOL 3350 17 G: 17 POWDER, FOR SOLUTION ORAL at 14:41

## 2024-08-23 RX ADMIN — Medication: at 00:19

## 2024-08-23 RX ADMIN — Medication 12.5 MG: at 11:44

## 2024-08-23 RX ADMIN — HEPARIN SODIUM 5000 UNITS: 5000 INJECTION, SOLUTION INTRAVENOUS; SUBCUTANEOUS at 04:45

## 2024-08-23 RX ADMIN — OXYCODONE HYDROCHLORIDE 2.5 MG: 5 TABLET ORAL at 05:12

## 2024-08-23 RX ADMIN — ACETAMINOPHEN 975 MG: 325 TABLET, FILM COATED ORAL at 21:27

## 2024-08-23 RX ADMIN — ASPIRIN 81 MG CHEWABLE TABLET 162 MG: 81 TABLET CHEWABLE at 07:50

## 2024-08-23 RX ADMIN — PANTOPRAZOLE SODIUM 40 MG: 40 TABLET, DELAYED RELEASE ORAL at 19:57

## 2024-08-23 RX ADMIN — ACETAMINOPHEN 975 MG: 325 TABLET, FILM COATED ORAL at 05:11

## 2024-08-23 RX ADMIN — ACETAMINOPHEN 975 MG: 325 TABLET, FILM COATED ORAL at 14:40

## 2024-08-23 RX ADMIN — HEPARIN SODIUM 5000 UNITS: 5000 INJECTION, SOLUTION INTRAVENOUS; SUBCUTANEOUS at 19:57

## 2024-08-23 ASSESSMENT — ACTIVITIES OF DAILY LIVING (ADL)
ADLS_ACUITY_SCORE: 32
ADLS_ACUITY_SCORE: 32
ADLS_ACUITY_SCORE: 28
ADLS_ACUITY_SCORE: 32
ADLS_ACUITY_SCORE: 28
ADLS_ACUITY_SCORE: 32

## 2024-08-23 NOTE — PROGRESS NOTES
Physical Therapy Initial Evaluation     08/23/24 7500   Appointment Info   Signing Clinician's Name / Credentials (PT) Blu Fried, PT   Rehab Comments (PT) sternal, CT to WS for gait   Living Environment   People in Home spouse   Current Living Arrangements house   Home Accessibility stairs within home   Number of Stairs, Main Entrance 2   Stair Railings, Main Entrance railings safe and in good condition   Number of Stairs, Within Home, Primary greater than 10 stairs   Stair Railings, Within Home, Primary railings safe and in good condition   Transportation Anticipated car, drives self;family or friend will provide   Living Environment Comments Works upstairs in his house   Self-Care   Usual Activity Tolerance good   Current Activity Tolerance moderate   Regular Exercise No   Equipment Currently Used at Home cane, straight   Fall history within last six months no   Activity/Exercise/Self-Care Comment Ind PLOF, SPC outside of home, up/down stairs throughout the day   General Information   Onset of Illness/Injury or Date of Surgery 08/21/24   Referring Physician Tina Boyd PA-C   Patient/Family Therapy Goals Statement (PT) return home   Pertinent History of Current Problem (include personal factors and/or comorbidities that impact the POC) Bret Fleming is a 74 year old adult with PMH of CAD s/p MI in 1996, HTN, ESRD on HD, ICM w/ EF 30-35%, 3v CAD who presents to [facility] for CABG on by Dr. Solares.   Existing Precautions/Restrictions fall;sternal   Cognition   Affect/Mental Status (Cognition) WFL   Orientation Status (Cognition) oriented x 3   Follows Commands (Cognition) WNL   Strength (Manual Muscle Testing)   Strength Comments demos antigravity strength in bilateral LE's   Bed Mobility   Comment, (Bed Mobility) SBA per report   Transfers   Comment, (Transfers) Sit<>stand SBA, increased time/efrfort   Gait/Stairs (Locomotion)   Comment, (Gait/Stairs) Ambulates 20ft with SPC and CGA x1, slow moving but steady,  unable to perform stairs due to fatigue/lines at this time   Balance   Balance Comments SPC for dynamic balance, no LOB but slow and guarded gait   Clinical Impression   Criteria for Skilled Therapeutic Intervention Yes, treatment indicated   PT Diagnosis (PT) impaired functional mobility   Influenced by the following impairments weakness, deconditioning   Functional limitations due to impairments ambulation, stair negotiation   Clinical Presentation (PT Evaluation Complexity) stable   Clinical Presentation Rationale clinical judgment   Clinical Decision Making (Complexity) low complexity   Planned Therapy Interventions (PT) balance training;bed mobility training;gait training;home exercise program;stair training;strengthening   Risk & Benefits of therapy have been explained evaluation/treatment results reviewed;care plan/treatment goals reviewed;risks/benefits reviewed;current/potential barriers reviewed;participants voiced agreement with care plan;participants included;patient   PT Total Evaluation Time   PT Eval, Low Complexity Minutes (89734) 6   Physical Therapy Goals   PT Frequency 6x/week   PT Predicted Duration/Target Date for Goal Attainment 08/31/24   PT Goals Bed Mobility;Transfers;Gait;Stairs   PT: Bed Mobility Independent;Supine to/from sit;Within precautions   PT: Transfers Independent;Bed to/from chair;Within precautions   PT: Gait Modified independent;Greater than 200 feet;Straight cane   PT: Stairs Independent;Greater than 10 stairs   PT Discharge Planning   PT Plan progress gait endurance/independence, stairs, flat bed mobility, assess frequency/goals   PT Discharge Recommendation (DC Rec) home with assist;home with outpatient cardiac rehab   PT Rationale for DC Rec Patient below independent baseline mobility but ambulating fairly well, slow and steady. Anticipate he will progress well and be able to DC home with family assist once medically stable and will benefit from outpatient cardiac rehab.    PT Brief overview of current status 1A SPC, encourage ambulation in the halls.

## 2024-08-23 NOTE — PLAN OF CARE
Major Shift Events:  A+Ox4, mild incisional pain treated with tylenol. SR 80s, CT output 5-35 ml/hr. RA. Refusing bowel meds, reeducated on purpose of medication. Voided post guido removal, oliguric. Ropivicaine nerve block x2 for pain. RIJ MAC pulled. Mag replaced.  Plan: Encourage activity, manage pain  For vital signs and complete assessments, please see documentation flowsheets.        Goal Outcome Evaluation:      Plan of Care Reviewed With: patient    Overall Patient Progress: improvingOverall Patient Progress: improving

## 2024-08-23 NOTE — PROGRESS NOTES
Transferred to: Unit 6C  at 1345  Belongings: Sent upstairs with patient  Culp removed? Yes  Central line removed? Yes  Chart and medications sent with patient Yes  Family notified: Yes

## 2024-08-23 NOTE — PROGRESS NOTES
CV ICU H&P    ASSESSMENT: Bret Fleming is a 74 year old adult with PMH of CAD s/p MI in 1996, HTN, ESRD on HD, ICM w/ EF 30-35%, 3v CAD who presents to [facility] for CABG on by Dr. Solares on 8/21.    OVERNIGHT:   - HD yesterday, did not pull fluid  - OT rec home with assist, outpatient cardiac rehab    TODAY'S PROGRESS:   - Consider pulling wires, likely keep CTs today  - Transfer orders in    CO-MORBIDITIES:   Patient Active Problem List   Diagnosis    NSTEMI (non-ST elevated myocardial infarction) (H)    Coronary atherosclerosis    Hyperlipidemia    Chronic systolic congestive heart failure (H)    HTN (hypertension)    Acute on chronic anemia    ESRD (end stage renal disease) (H)    Gastrointestinal hemorrhage associated with gastric ulcer    Chronic pain of right knee    Ischemic cardiomyopathy    Status post coronary angiogram    Coronary atherosclerosis due to lipid rich plaque    Hypertensive heart failure with end stage renal disease (H)    Pre-transplant evaluation for kidney transplant     PLAN:  Neuro/ pain/ sedation:  #Sedation [Discontinued]  #Acute postoperative pain [Controlled]  - Scheduled: Tylenol  - PRN: Tylenol, oxycodone, Dilaudid, robaxin    Pulmonary care:   #Mechanical ventilation [Resolved]   - Goal SpO2 > 92%, IS/flutter valve [id number] when awake  - On RA  - Consider removing CTs today  - CXR (8/23): continues mild pulmonary edema    Cardiovascular:    #Cardiogenic shock [Resolved]  #CAD s/p MI in 1996  #HTN  #ICM w/ EF 30-35%  #S/p CABG  Pre: EF 45%, mild MR  Post: EF 50-55%, mild RV dilation  - Goal MAP >65, SBP <140, monitor hemodynamics  - Start metoprolol at 12.5 mg BID, increase to PTA dose 25 mg as tolerates  -  mg and PTA atorvastatin 40 mg    GI care / Nutrition:   - Regular diet   - Bowel regimen: MiraLAX, senna  - Continuing PTA PPI    Renal / Fluids / Electrolytes:   #ESRD on HD T/Th/S  BL creat appears to be ~ 5.0-6.0  - ESRD on HD T/Th/S, LUE AVF placed 4/23/24 (still has  tunneled right CVC which the dialysis unit has to use intermittently due to problems with the fistula)   - Strict I/O, daily weights, avoid/limit nephrotoxins, replete lytes  - HD 8/22 didn't pull fluid, albumin if needed to support pressures    Endocrine:  #Stress hyperglycemia [Resolved]  Preop A1c 4.2  - Goal BG <180 for optimal healing  - Insulin gtt discontinued, no SSI given BGs well-controlled in 120s    ID / Antibiotics:  #Stress induced leukocytosis [Improving]  - To complete perioperative ancef regimen 8/22  - Monitor fever curve, WBC, and inflammatory markers as appropriate    Heme:  #Acute blood loss anemia [Resolved]  #Acute blood loss thrombocytopenia [Resolved]  No s/sx active bleeding  - CBC, Hgb goal > 7.0, currently 8.2  - SQH 8/22    MSK / Skin:  #Sternotomy  #Surgical Incision  - Sternal precautions, postop incision management protocol  - PT/OT/CR    Prophylaxis:  - Mechanical DVT ppx  - Chemical DVT ppx: SQH   - PPI    Lines / Tubes / Drains:  - PIV  - CTs x3 (1 med, 2 pleurals)      Disposition:  - Transfer orders placed, in CVICU pending bed assignment    ICU Checklist:  B - Beta blocker: hold while on pressors  A - Aspirin: yes, 162 mg  S - Statin: yes, PTA atorva 40  F - Feeding: yes  A - Analgesic: yes, MMPR  S - Sedation/SBT: N/A  T - Thromboembolic Prophylaxis: yes, SQH  H - Head of the Bed Elevated: yes  U - Ulcer Prophylaxis: yes, PPI  G - Glycemic Control: yes  S - Skin Concerns: none  B - Bowel Regimen: yes  I - Indwelling Catheter: to come out  D - Drug De-Escalation: complete    Patient seen, findings and plan discussed with CVICU staff and cardiothoracic surgeons and fellow.      Hector Gonzalez MD  Anesthesiology Resident, CA-3, PGY-4        ====================================    SUBJECTIVE:   Patient did well overnight. Pain controlled. Breathing well on RA. No active concerns.      PAST MEDICAL HISTORY:   Past Medical History:   Diagnosis Date    Acute myocardial infarction      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 (H)     Bilateral hydronephrosis     CAD (coronary artery disease)     Hyperlipidemia     Hypertension     Ischemic cardiomyopathy     Sepsis due to gram-negative UTI (H) 10/07/2023       PAST SURGICAL HISTORY:   Past Surgical History:   Procedure Laterality Date    BYPASS GRAFT ARTERY CORONARY N/A 2024    Procedure: Median sternotomy, Left internal mamary artery harvest, endoscopic Left shapenous vein harvest, on cardiopulmonary bypass, CORONARY ARTERY BYPASS GRAFT x3, transesophageal echocardiogram by anesthesia.;  Surgeon: Jah Solares MD;  Location: [facility] OR    CV CORONARY ANGIOGRAM N/A 2024    Procedure: Coronary Angiogram;  Surgeon: Kevin Thomas MD;  Location:  HEART CARDIAC CATH LAB    ESOPHAGOSCOPY, GASTROSCOPY, DUODENOSCOPY (EGD), COMBINED N/A 10/9/2023    Procedure: Esophagoscopy, gastroscopy, duodenoscopy (EGD), combined;  Surgeon: Karolyn Saxena MD;  Location:  GI    IR CVC TUNNEL PLACEMENT > 5 YRS OF AGE  2023    IR CVC TUNNEL PLACEMENT > 5 YRS OF AGE  10/11/2023    PICC TRIPLE LUMEN PLACEMENT  2023       FAMILY HISTORY:   Family History   Problem Relation Age of Onset    Cancer Father     Cancer Paternal Grandfather     Diabetes Paternal Grandfather     Hypertension No family hx of     Cerebrovascular Disease No family hx of     Thyroid Disease No family hx of     Glaucoma No family hx of     Macular Degeneration No family hx of     Anesthesia Reaction No family hx of     Clotting Disorder No family hx of     Bleeding Disorder No family hx of        SOCIAL HISTORY:   Social History     Tobacco Use    Smoking status: Former     Current packs/day: 0.00     Types: Cigarettes     Quit date: 1995     Years since quittin.3     Passive exposure: Past    Smokeless tobacco: Never   Substance Use Topics    Alcohol use: Yes     Comment: 1  drink per week        OBJECTIVE:  1. VITAL SIGNS:   Temp:  [97.6  F (36.4  C)-98.2  F (36.8  C)] 98.2  F (36.8  C)  Pulse:  [71-91] 79  Resp:  [11-22] 12  BP: (100-117)/(48-63) 111/52  MAP:  [61 mmHg-73 mmHg] 68 mmHg  Arterial Line BP: ()/(42-48) 103/46  SpO2:  [94 %-100 %] 100 %      2. INTAKE/ OUTPUT:   I/O last 3 completed shifts:  In: 1447.2 [P.O.:1240; I.V.:165.2; Other:42]  Out: 1130 [Urine:770; Chest Tube:360]      3. PHYSICAL EXAMINATION:   General: Comfortable, no distress  Neuro: A&O x3 intact, no focal deficits  Resp: On RA, non-labored breathing, lungs CTA bilaterally  CV: RRR  Abdomen: Soft, non-distended, non-tender  Incisions: c/d/i  Extremities: warm and well perfused, trace lower extremity edema  CT: To suction, serosang output, small airleak, no crepitus      4. INVESTIGATIONS:   Arterial Blood Gases   Recent Labs   Lab 08/21/24  2213 08/21/24  1546 08/21/24  1328 08/21/24  1153   PH 7.35 7.50* 7.35 7.36   PCO2 43 28* 43 43   PO2 204* 189* 262* 525*   HCO3 23 22 24 24     Complete Blood Count   Recent Labs   Lab 08/23/24  0440 08/22/24  0308 08/21/24  2019 08/21/24  1327   WBC 5.9 10.0 11.4* 12.7*   HGB 8.2* 9.5* 9.9* 9.4*   * 116* 126* 114*     Basic Metabolic Panel  Recent Labs   Lab 08/23/24  0440 08/23/24  0438 08/22/24  2353 08/22/24  2030 08/22/24  0844 08/22/24  0829 08/22/24  0602 08/22/24  0308 08/22/24  0100 08/21/24  2343 08/21/24  2213 08/21/24  2019 08/21/24  1459 08/21/24  1327   *  --   --   --   --   --   --  132*  --   --   --  137  --  139   POTASSIUM 4.4  --   --   --   --  5.5*  --  5.5*  --  5.3  --  5.6*  --  4.6   CHLORIDE 96*  --   --   --   --   --   --  97*  --   --   --  101  --  102   CO2 27  --   --   --   --   --   --  21*  --   --   --  23  --  22   BUN 30.1*  --   --   --   --   --   --  56.1*  --   --   --  55.0*  --  51.2*   CR 3.26*  --   --   --   --   --   --  4.76*  --   --   --  4.63*  --  4.39*   * 118* 119* 108*   < >  --    < > 126*  120*   < >  104*   < > 138*   < > 117*    < > = values in this interval not displayed.     Liver Function Tests  Recent Labs   Lab 08/23/24  0440 08/22/24  0308 08/21/24  2019 08/21/24  1327 08/21/24  1153   AST 39 34 36 22  --    ALT <5 8 11 11  --    ALKPHOS 48 54 55 53  --    BILITOTAL 0.2 0.3 0.3 0.3  --    ALBUMIN 3.6 3.7 4.0 3.7  --    INR  --   --   --  1.54* 1.64*     Pancreatic Enzymes  No lab results found in last 7 days.  Coagulation Profile  Recent Labs   Lab 08/21/24  1327 08/21/24  1153   INR 1.54* 1.64*   PTT 36 34         5. RADIOLOGY:   Recent Results (from the past 24 hour(s))   XR Chest Port 1 View    Impression    RESIDENT PRELIMINARY INTERPRETATION  Impression:   1. Interval removal left chest tube without discernible pneumothorax.  2. Remainder support devices stable.  3. Continued mild interstitial prominence most consistent with mild  pulmonary edema. Unchanged retrocardiac and basilar atelectasis  without new consolidation.       =========================================

## 2024-08-23 NOTE — PROGRESS NOTES
Pain Service Progress Note  [facility]  Date: 08/23/2024       Patient Name: Bret Fleming  MRN: [id number]  Age: 74 year old  Sex: adult      Assessment:  Bret Fleming is a 74 year old adult who has PMH of  CAD s/p MI in 1996, HTN, ESRD on HD, ICM w/ EF 30-35%, 3vessel CAD who is s/p CABG on 8/21/23 with Dr. Solares.              Date of Catheter Placement: 8/21/24     Plan/Recommendations:  1. Regional Anesthesia/Analgesia  -Continuous Catheter Type/Site: bilateral paravertebral (PV) T3/4  Infusate: Ropivacaine 0.2%     Programmed Intermittent Bolus (PIB) at 7 mL Q60 min via each catheter, total infusion rate of 14 mL/hr     Plan to maintain catheter approximately of 7 days     2. Anticoagulation  -Please contact Inpatient Pain Service before ordering or making any anticoagulation changes      -Heparin 5k units subcutaneous Q 8 hours     3. Multimodal Analgesia  - per primary service: oxycodone 5mg     Pain Service will continue to follow.     Discussed with attending anesthesiologist  Jaye Kaye PA-C  08/23/2024     Overnight Events: no major acute event    Tubes/Drains: Yes  Chest tubes    Subjective: Feels pain is managed    Symptoms of LAST: No    Pain Location:  chest    Pain Intensity:    Pain at Rest: 1-2/10     Satisfied with your level of pain control: Yes    Diet: Regular Diet Adult    Relevant Labs:  Recent Labs   Lab Test 08/23/24  0440 08/21/24  2019 08/21/24  1327   INR  --   --  1.54*   *   < > 114*   PTT  --   --  36   BUN 30.1*   < > 51.2*    < > = values in this interval not displayed.       Physical Exam:  Vitals: /57 (BP Location: Right arm, Cuff Size: Adult Regular)   Pulse 84   Temp 98.1  F (36.7  C) (Oral)   Resp 25   Ht 1.829 m (6')   Wt 85.8 kg (189 lb 2.5 oz)   SpO2 100%   BMI 25.65 kg/m      Physical Exam:   Orientation:  Alert, oriented, and in no acute distress: Yes  Sedation: No    Motor Examination:  5/5 Strength in lower  "extremities: Yes      Catheter Site:   Catheter entry site is clean/dry/intact: Yes    Tender: No      Relevant Medications:  Current Pain Medications:  Medications related to Pain Management (From now, onward)      Start     Dose/Rate Route Frequency Ordered Stop    08/24/24 0000  acetaminophen (TYLENOL) tablet 650 mg         650 mg Oral or Feeding Tube EVERY 4 HOURS PRN 08/21/24 1319      08/24/24 0000  bisacodyl (DULCOLAX) suppository 10 mg         10 mg Rectal DAILY PRN 08/21/24 1319      08/23/24 0800  polyethylene glycol (MIRALAX) Packet 17 g         17 g Oral or Feeding Tube 3 TIMES DAILY 08/23/24 0636      08/23/24 0800  senna-docusate (SENOKOT-S/PERICOLACE) 8.6-50 MG per tablet 3 tablet         3 tablet Oral or Feeding Tube 2 TIMES DAILY 08/23/24 0636      08/23/24 0000  magnesium hydroxide (MILK OF MAGNESIA) suspension 30 mL         30 mL Oral or Feeding Tube DAILY PRN 08/21/24 1319      08/21/24 1930  aspirin (ASA) chewable tablet 162 mg         162 mg Oral or NG Tube DAILY 08/21/24 1319      08/21/24 1400  acetaminophen (TYLENOL) tablet 975 mg         975 mg Oral or Feeding Tube EVERY 8 HOURS 08/21/24 1319 08/24/24 1359    08/21/24 1319  HYDROmorphone (DILAUDID) injection 0.1 mg        Placed in \"Or\" Linked Group    0.1 mg Intravenous EVERY 2 HOURS PRN 08/21/24 1319      08/21/24 1319  HYDROmorphone (DILAUDID) injection 0.2 mg        Placed in \"Or\" Linked Group    0.2 mg Intravenous EVERY 2 HOURS PRN 08/21/24 1319      08/21/24 1319  oxyCODONE IR (ROXICODONE) half-tab 2.5 mg        Placed in \"Or\" Linked Group    2.5 mg Oral or Feeding Tube EVERY 4 HOURS PRN 08/21/24 1319      08/21/24 1319  oxyCODONE (ROXICODONE) tablet 5 mg        Placed in \"Or\" Linked Group    5 mg Oral or Feeding Tube EVERY 4 HOURS PRN 08/21/24 1319      08/21/24 1319  methocarbamol (ROBAXIN) tablet 500 mg         500 mg Oral or Feeding Tube EVERY 6 HOURS PRN 08/21/24 1319      08/21/24 1319  lidocaine 1 % 0.1-1 mL         0.1-1 mL " "Other EVERY 1 HOUR PRN 08/21/24 1319      08/21/24 1319  lidocaine (LMX4) cream          Topical EVERY 1 HOUR PRN 08/21/24 1319      08/21/24 0830  ROPivacaine 0.2% in sodium chloride 0.9% PERINEURAL infusion          Perineural Continuous Nerve Block 08/21/24 0803      08/21/24 0830  ROPivacaine 0.2% in sodium chloride 0.9% PERINEURAL infusion          Perineural Continuous Nerve Block 08/21/24 0803                          Acute Inpatient Pain Service [facility]  Hours of pain coverage 24/7   Page via [employer]- Please Page the Pain Team [facility] [place]: \"PAIN MANAGEMENT ACUTE INPATIENT/ [facility]\"            "

## 2024-08-23 NOTE — PROGRESS NOTES
Transfer  Transferred from: 4A  Via: [place]  Reason for transfer: Pt appropriate for 6C- improved patient condition  Family: Aware of transfer  Belongings: Sent with pt  Chart: Sent with pt  Medications: Meds from bin sent with pt  Report called from: FRANCES Larsen

## 2024-08-23 NOTE — PROGRESS NOTES
Nephrology Progress Note  08/23/2024         Assessment & Recommendations:   74 year old male with PMH of CAD s/p MI in 1996, HTN, ESRD on HD, ICM w/ EF 30-35%, 3v CAD s/p CABG 8/21      #ESRD  - runs TTS at [facility] with Dr. Dalal.  - ACCESS LUE AVF placed 4/23/24 (still has tunneled right CVC which the dialysis unit has to use intermittently due to problems with the fistula)  - TW 85.5 kg  Run time 3 hrs; heparin 500 units loading dose, 600 units/hr  - HD per TTS schedule   - no heparin post op    #BP/Volume TW 85.5 kg  -  off pressors, on RA  -  pt reports ~ 2L UOP per day, though less currently post op  - chest tube with 350 ml yesterday  -  No UF ordered for tomorrow        #Anemia  - Hgb 8.2, down-drifting post op  - pta mircera 50 mcg every two weeks, last dose 8/20/24; venofer 100 mg once a week, last dose 8/20/24     #Bone/Mineral: phos 2.6, Ca 8.1, alb 3.6, 8/2024 ; July 2024 vit D 38.7       #s/p CABG x 3 on 8/21/24    Recommendations were communicated to primary team via this note         JOEL Linder   Division of Nephrology and Hypertension  [facility]  [employer] Web Console    Interval History :   Seen bedside, has transfer orders. Up in chair, chest tube with significant output. Ongoing UOP as well. Plan no UF tomorrow. No n/v, CP, SOB, chills    Review of Systems:   4 point ROS neg other than as noted above    Physical Exam:   I/O last 3 completed shifts:  In: 1209.2 [P.O.:1000; I.V.:55.2; Other:154]  Out: 945 [Urine:565; Chest Tube:380]   /63 (BP Location: Right arm, Cuff Size: Adult Regular)   Pulse 80   Temp 98.5  F (36.9  C) (Oral)   Resp 22   Ht 1.829 m (6')   Wt 85.8 kg (189 lb 2.5 oz)   SpO2 100%   BMI 25.65 kg/m       GENERAL APPEARANCE: NAD, sitting up in chair  EYES:  no scleral icterus, pupils equal  PULM: CTA  CV: RRR     -edema trace   GI: soft   : no guido  INTEGUMENT: no cyanosis, no rash  NEURO:  a/o3  Access tunneled RIJ, L AVF    Labs:   All labs  reviewed by me  Electrolytes/Renal -   Recent Labs   Lab Test 08/23/24  0745 08/23/24  0440 08/23/24  0438 08/22/24  0844 08/22/24  0829 08/22/24  0602 08/22/24  0308 08/21/24 2213 08/21/24 2019   NA  --  131*  --   --   --   --  132*  --  137   POTASSIUM  --  4.4  --   --  5.5*  --  5.5*   < > 5.6*   CHLORIDE  --  96*  --   --   --   --  97*  --  101   CO2  --  27  --   --   --   --  21*  --  23   BUN  --  30.1*  --   --   --   --  56.1*  --  55.0*   CR  --  3.26*  --   --   --   --  4.76*  --  4.63*   GLC 92 122* 118*   < >  --    < > 126*  120*   < > 138*   CHANA  --  8.1*  --   --   --   --  8.7*  --  8.8   MAG  --  1.9  --   --   --   --  2.6*  --  2.8*   PHOS  --  2.6  --   --   --   --  3.9  --  3.4    < > = values in this interval not displayed.       CBC -   Recent Labs   Lab Test 08/23/24 0440 08/22/24 0308 08/21/24 2019   WBC 5.9 10.0 11.4*   HGB 8.2* 9.5* 9.9*   * 116* 126*       LFTs -   Recent Labs   Lab Test 08/23/24 0440 08/22/24 0308 08/21/24 2019   ALKPHOS 48 54 55   BILITOTAL 0.2 0.3 0.3   ALT <5 8 11   AST 39 34 36   PROTTOTAL 5.6* 5.6* 6.0*   ALBUMIN 3.6 3.7 4.0       Iron Panel -   Recent Labs   Lab Test 09/27/23  1752 09/27/23  1446   IRON  --  120   IRONSAT  --  53*     --          Imaging:  Reviewed    Current Medications:  Current Facility-Administered Medications   Medication Dose Route Frequency Provider Last Rate Last Admin    acetaminophen (TYLENOL) tablet 975 mg  975 mg Oral or Feeding Tube Q8H Tina Boyd PA-C   975 mg at 08/23/24 0511    aspirin (ASA) chewable tablet 162 mg  162 mg Oral or NG Tube Daily Tina Boyd PA-C   162 mg at 08/23/24 0750    atorvastatin (LIPITOR) tablet 40 mg  40 mg Oral or Feeding Tube QPM Tina Boyd PA-C   40 mg at 08/22/24 2013    heparin ANTICOAGULANT injection 5,000 Units  5,000 Units Subcutaneous Q8H Tina Boyd PA-C   5,000 Units at 08/23/24 1145    metoprolol tartrate (LOPRESSOR) half-tab 12.5 mg  12.5 mg  Oral BID Merrill Pagan PA-C   12.5 mg at 08/23/24 1144    pantoprazole (PROTONIX) 2 mg/mL suspension 40 mg  40 mg Oral or NG Tube BID Lois Askew APRN CNP        Or    pantoprazole (PROTONIX) EC tablet 40 mg  40 mg Oral BID Lois Askew APRN CNP   40 mg at 08/23/24 0750    polyethylene glycol (MIRALAX) Packet 17 g  17 g Oral or Feeding Tube TID Hector Gonzalez MD        senna-docusate (SENOKOT-S/PERICOLACE) 8.6-50 MG per tablet 3 tablet  3 tablet Oral or Feeding Tube BID Hector Gonzalez MD   3 tablet at 08/23/24 0750    sodium chloride (PF) 0.9% PF flush 3 mL  3 mL Intracatheter Q8H Tina Boyd PA-C   3 mL at 08/23/24 0752     Current Facility-Administered Medications   Medication Dose Route Frequency Provider Last Rate Last Admin    NO anticoagulation unless approved by Anesthesia Provider   Does not apply Continuous PRN Ted Gutierrez,         Reason beta blocker order not selected   Does not apply DOES NOT GO TO Tina Briceno PA-C        ROPivacaine 0.2% in sodium chloride 0.9% PERINEURAL infusion   Perineural Continuous Nerve Block Ted Gutierrez, DO 7 mL/hr at 08/23/24 0800 Rate Verify at 08/23/24 0800    ROPivacaine 0.2% in sodium chloride 0.9% PERINEURAL infusion   Perineural Continuous Nerve Block Ted Gutierrez, DO 7 mL/hr at 08/23/24 0800 Rate Verify at 08/23/24 0800     JOEL Linder, Caitlyn Tony, saw and evaluated this patient as part of a shared visit.  I have reviewed and discussed with the advanced practice provider their history, physical and plan.  I have personally performed the substantive portion of the medical decision making for this visit - please see the JIGNA's documentation for the full details  I personally reviewed the vital signs, medications, labs and imaging.    Key findings and management decisions made by me:  ESRD now s/p CABG, doing well, extubated, HD tomorrow per usual day.  He is near/ at target weight  of 85.5kg, will not plan to UF tomorrow.  Has good UOP    Caitlyn Kailash Tony  Date of Service (when I saw the patient): 8/23

## 2024-08-23 NOTE — PROGRESS NOTES
Critical Care Attending Progress Note  I, Lindsay Monson MD, PhD saw this patient with the resident and agree with the findings and plan of care as documented in the note. Please see separate note from today for further documentation.     I personally reviewed vital signs, medications, labs and imaging.     Assessment:   74 year old adult with PMH of CAD s/p MI in 1996, HTN, ESRD on HD, ICM w/ EF 30-35%, 3v CAD     8/20: CABG    Did well overnight. OOB this am     Active problems and current treatments include:     Acute postop pain: PVB bilateral, multimodal analgesia   Respiratory insufficiency: wean mech vent  Cardiogenic and vasogenic shock: resolved, came off NE this am  SP CABG: aspirin, statin, metop  ESRD: HD yesterday  ID: periop abx  Nutrition: diet, bowel regimen  Lines:  PIV x1, CT x3 (1 med, 2 pleural), V-wires- capped  PPX: SCDs, PPI, subcutaneous heparin   DISPO: TTF        I personally managed the ventilator, hemodynamics, sedation, analgesia, metabolic abnormalities and nutritional status.    I agree with the assessment and plan. I spent 46 minutes exclusive of procedures evaluating and managing this patient, discussing with the consultants, and updating the patient and family.     Lindsay Monson MD, PhD

## 2024-08-24 ENCOUNTER — APPOINTMENT (OUTPATIENT)
Dept: GENERAL RADIOLOGY | Facility: CLINIC | Age: 74
DRG: 235 | End: 2024-08-24
Attending: SURGERY
Payer: COMMERCIAL

## 2024-08-24 ENCOUNTER — APPOINTMENT (OUTPATIENT)
Dept: OCCUPATIONAL THERAPY | Facility: CLINIC | Age: 74
DRG: 235 | End: 2024-08-24
Attending: SURGERY
Payer: COMMERCIAL

## 2024-08-24 LAB
ANION GAP SERPL CALCULATED.3IONS-SCNC: 11 MMOL/L (ref 7–15)
BUN SERPL-MCNC: 44.4 MG/DL (ref 8–23)
CALCIUM SERPL-MCNC: 8.2 MG/DL (ref 8.8–10.4)
CHLORIDE SERPL-SCNC: 96 MMOL/L (ref 98–107)
CREAT SERPL-MCNC: 4.07 MG/DL (ref 0.51–1.17)
EGFRCR SERPLBLD CKD-EPI 2021: 15 ML/MIN/1.73M2
ERYTHROCYTE [DISTWIDTH] IN BLOOD BY AUTOMATED COUNT: 12.5 % (ref 10–15)
GLUCOSE SERPL-MCNC: 90 MG/DL (ref 70–99)
HCO3 SERPL-SCNC: 24 MMOL/L (ref 22–29)
HCT VFR BLD AUTO: 27.9 % (ref 35–53)
HGB BLD-MCNC: 8.9 G/DL (ref 13.3–17.7)
MAGNESIUM SERPL-MCNC: 2.2 MG/DL (ref 1.7–2.3)
MCH RBC QN AUTO: 34.9 PG (ref 26.5–33)
MCHC RBC AUTO-ENTMCNC: 31.9 G/DL (ref 31.5–36.5)
MCV RBC AUTO: 109 FL (ref 78–100)
PLATELET # BLD AUTO: 94 10E3/UL (ref 150–450)
POTASSIUM SERPL-SCNC: 4.3 MMOL/L (ref 3.4–5.3)
RBC # BLD AUTO: 2.55 10E6/UL (ref 3.8–5.9)
SODIUM SERPL-SCNC: 131 MMOL/L (ref 135–145)
WBC # BLD AUTO: 6.1 10E3/UL (ref 4–11)

## 2024-08-24 PROCEDURE — 71045 X-RAY EXAM CHEST 1 VIEW: CPT

## 2024-08-24 PROCEDURE — 250N000013 HC RX MED GY IP 250 OP 250 PS 637

## 2024-08-24 PROCEDURE — 97530 THERAPEUTIC ACTIVITIES: CPT | Mod: GO

## 2024-08-24 PROCEDURE — 97535 SELF CARE MNGMENT TRAINING: CPT | Mod: GO

## 2024-08-24 PROCEDURE — 99232 SBSQ HOSP IP/OBS MODERATE 35: CPT | Performed by: INTERNAL MEDICINE

## 2024-08-24 PROCEDURE — 258N000003 HC RX IP 258 OP 636: Performed by: SURGERY

## 2024-08-24 PROCEDURE — 71045 X-RAY EXAM CHEST 1 VIEW: CPT | Mod: 26 | Performed by: STUDENT IN AN ORGANIZED HEALTH CARE EDUCATION/TRAINING PROGRAM

## 2024-08-24 PROCEDURE — 250N000011 HC RX IP 250 OP 636: Performed by: ANESTHESIOLOGY

## 2024-08-24 PROCEDURE — 90935 HEMODIALYSIS ONE EVALUATION: CPT

## 2024-08-24 PROCEDURE — 85027 COMPLETE CBC AUTOMATED: CPT | Performed by: STUDENT IN AN ORGANIZED HEALTH CARE EDUCATION/TRAINING PROGRAM

## 2024-08-24 PROCEDURE — 83735 ASSAY OF MAGNESIUM: CPT | Performed by: SURGERY

## 2024-08-24 PROCEDURE — 250N000013 HC RX MED GY IP 250 OP 250 PS 637: Performed by: PHYSICIAN ASSISTANT

## 2024-08-24 PROCEDURE — 250N000013 HC RX MED GY IP 250 OP 250 PS 637: Performed by: STUDENT IN AN ORGANIZED HEALTH CARE EDUCATION/TRAINING PROGRAM

## 2024-08-24 PROCEDURE — 271N000002 HC RX 271: Performed by: ANESTHESIOLOGY

## 2024-08-24 PROCEDURE — 80048 BASIC METABOLIC PNL TOTAL CA: CPT | Performed by: PHYSICIAN ASSISTANT

## 2024-08-24 PROCEDURE — 36415 COLL VENOUS BLD VENIPUNCTURE: CPT | Performed by: PHYSICIAN ASSISTANT

## 2024-08-24 PROCEDURE — 99232 SBSQ HOSP IP/OBS MODERATE 35: CPT | Mod: GC | Performed by: ANESTHESIOLOGY

## 2024-08-24 PROCEDURE — 120N000005 HC R&B MS OVERFLOW UMMC

## 2024-08-24 PROCEDURE — 250N000011 HC RX IP 250 OP 636: Performed by: STUDENT IN AN ORGANIZED HEALTH CARE EDUCATION/TRAINING PROGRAM

## 2024-08-24 RX ORDER — ALBUMIN (HUMAN) 12.5 G/50ML
50 SOLUTION INTRAVENOUS
Status: DISCONTINUED | OUTPATIENT
Start: 2024-08-24 | End: 2024-08-24

## 2024-08-24 RX ADMIN — ASPIRIN 81 MG CHEWABLE TABLET 162 MG: 81 TABLET CHEWABLE at 11:43

## 2024-08-24 RX ADMIN — PANTOPRAZOLE SODIUM 40 MG: 40 TABLET, DELAYED RELEASE ORAL at 19:56

## 2024-08-24 RX ADMIN — SENNOSIDES AND DOCUSATE SODIUM 3 TABLET: 8.6; 5 TABLET ORAL at 11:43

## 2024-08-24 RX ADMIN — HEPARIN SODIUM 5000 UNITS: 5000 INJECTION, SOLUTION INTRAVENOUS; SUBCUTANEOUS at 11:43

## 2024-08-24 RX ADMIN — ACETAMINOPHEN 650 MG: 325 TABLET ORAL at 19:49

## 2024-08-24 RX ADMIN — METOPROLOL TARTRATE 25 MG: 25 TABLET, FILM COATED ORAL at 19:50

## 2024-08-24 RX ADMIN — SENNOSIDES AND DOCUSATE SODIUM 3 TABLET: 8.6; 5 TABLET ORAL at 19:52

## 2024-08-24 RX ADMIN — PANTOPRAZOLE SODIUM 40 MG: 40 TABLET, DELAYED RELEASE ORAL at 11:43

## 2024-08-24 RX ADMIN — HEPARIN SODIUM 5000 UNITS: 5000 INJECTION, SOLUTION INTRAVENOUS; SUBCUTANEOUS at 19:49

## 2024-08-24 RX ADMIN — HEPARIN SODIUM 5000 UNITS: 5000 INJECTION, SOLUTION INTRAVENOUS; SUBCUTANEOUS at 05:23

## 2024-08-24 RX ADMIN — METOPROLOL TARTRATE 25 MG: 25 TABLET, FILM COATED ORAL at 11:43

## 2024-08-24 RX ADMIN — POLYETHYLENE GLYCOL 3350 17 G: 17 POWDER, FOR SOLUTION ORAL at 19:48

## 2024-08-24 RX ADMIN — OXYCODONE HYDROCHLORIDE 2.5 MG: 5 TABLET ORAL at 05:23

## 2024-08-24 RX ADMIN — SODIUM CHLORIDE 300 ML: 9 INJECTION, SOLUTION INTRAVENOUS at 07:43

## 2024-08-24 RX ADMIN — Medication: at 10:02

## 2024-08-24 RX ADMIN — ATORVASTATIN CALCIUM 40 MG: 40 TABLET, FILM COATED ORAL at 19:49

## 2024-08-24 RX ADMIN — Medication: at 12:56

## 2024-08-24 RX ADMIN — ACETAMINOPHEN 975 MG: 325 TABLET, FILM COATED ORAL at 07:23

## 2024-08-24 RX ADMIN — SODIUM CHLORIDE 250 ML: 9 INJECTION, SOLUTION INTRAVENOUS at 07:42

## 2024-08-24 RX ADMIN — POLYETHYLENE GLYCOL 3350 17 G: 17 POWDER, FOR SOLUTION ORAL at 11:43

## 2024-08-24 ASSESSMENT — ACTIVITIES OF DAILY LIVING (ADL)
ADLS_ACUITY_SCORE: 28

## 2024-08-24 NOTE — PLAN OF CARE
D: Bret Fleming is a 74 year old adult with PMH of CAD s/p MI in 1996, HTN, ESRD on HD, ICM w/ EF 30-35%, 3v CAD who presents to [facility] for CABG on by Dr. Solares on 8/21.     /63 (BP Location: Right arm, Cuff Size: Adult Regular)   Pulse 78   Temp 97.7  F (36.5  C) (Oral)   Resp 20   Ht 1.829 m (6')   Wt 90.8 kg (200 lb 1.6 oz)   SpO2 100%   BMI 27.14 kg/m       Neuro: A&Ox4.   Cardiac: SR - 70s. Afebrile, VSS.   Respiratory: RA - SATs above 95%. Denies SOB, mild RODRIGUEZ.   GI/: Voiding spontaneously - up to bathroom SBA -  urinal at bedside overnight,  Scant BM x2 per pt.   Diet/appetite: Tolerating regular diet. Denies nausea.  Activity: Up with standby assist    Pain: 1-4/10 incisional pain chest incision, reduced to a tolerable level with scheduled tylenol and x1 PRN oxycodone.   Skin: No new deficits noted.  Lines: 2x R PIV (hand/lower forearm), R subclavian CVC for dialysis do not use.   Drains: 3x CT to 1 atrium to -20 cm suction per order  - see flowsheets. X2 perineural continuous nerve block both @ 7mL/hr intermittent bolus.         Shift: 2425-6950      Robinson TRACY RN.

## 2024-08-24 NOTE — PLAN OF CARE
D: S/p CABG x3 on 8/21/24.     I: Monitored vitals and assessed pt status.   Running: Ropivacaine x2 @7 ml/hr  Tele: Sinus rhythm, HR 70-80's  Mobility: Ax1    A: AOx4. VSS. Afebrile. Oliguric, on HD. LBM (small) today 8/24. Pain well controlled at this time. 3 CT remain to -20 suction.     P: Continue to monitor Pt status and report changes to CVTS.

## 2024-08-24 NOTE — PROGRESS NOTES
Pain Service Progress Note  [facility]  Date: 08/24/2024       Patient Name: Bret Fleming  MRN: [id number]  Age: 74 year old  Sex: adult      Assessment:  Bret Fleming is a 74 year old adult who has PMH of  CAD s/p MI in 1996, HTN, ESRD on HD, ICM w/ EF 30-35%, 3vessel CAD who is s/p CABG on 8/21/23 with Dr. Solares.              Date of Catheter Placement: 8/21/24     Plan/Recommendations:  1. Regional Anesthesia/Analgesia  -Continuous Catheter Type/Site: bilateral paravertebral (PV) T3/4  Infusate: Ropivacaine 0.2%     Programmed Intermittent Bolus (PIB) at 7 mL Q60 min via each catheter, total infusion rate of 14 mL/hr     Plan to maintain catheter approximately of 7 days     2. Anticoagulation  -Please contact Inpatient Pain Service before ordering or making any anticoagulation changes      -Heparin 5k units subcutaneous Q 8 hours     3. Multimodal Analgesia  - per primary service: oxycodone 5mg     Pain Service will continue to follow.     Discussed with attending anesthesiologist  Hebert Garcia MD  08/24/2024     Overnight Events: no major acute event    Tubes/Drains: Yes  Chest tubes    Subjective: Feels pain is managed    Symptoms of LAST: No    Pain Location:  chest    Pain Intensity:    Pain at Rest: 1-2/10     Satisfied with your level of pain control: Yes    Diet: Regular Diet Adult    Relevant Labs:  Recent Labs   Lab Test 08/23/24  0440 08/21/24  2019 08/21/24  1327   INR  --   --  1.54*   *   < > 114*   PTT  --   --  36   BUN 30.1*   < > 51.2*    < > = values in this interval not displayed.       Physical Exam:  Vitals: /63 (BP Location: Right arm, Cuff Size: Adult Regular)   Pulse 78   Temp 97.7  F (36.5  C) (Oral)   Resp 20   Ht 1.829 m (6')   Wt 90.8 kg (200 lb 1.6 oz)   SpO2 100%   BMI 27.14 kg/m      Physical Exam:   Orientation:  Alert, oriented, and in no acute distress: Yes  Sedation: No    Motor Examination:  5/5 Strength in lower  "extremities: Yes      Catheter Site:   Catheter entry site is clean/dry/intact: Yes    Tender: No      Relevant Medications:  Current Pain Medications:  Medications related to Pain Management (From now, onward)      Start     Dose/Rate Route Frequency Ordered Stop    08/24/24 0000  acetaminophen (TYLENOL) tablet 650 mg         650 mg Oral or Feeding Tube EVERY 4 HOURS PRN 08/21/24 1319      08/24/24 0000  bisacodyl (DULCOLAX) suppository 10 mg         10 mg Rectal DAILY PRN 08/21/24 1319      08/23/24 0800  polyethylene glycol (MIRALAX) Packet 17 g         17 g Oral or Feeding Tube 3 TIMES DAILY 08/23/24 0636      08/23/24 0800  senna-docusate (SENOKOT-S/PERICOLACE) 8.6-50 MG per tablet 3 tablet         3 tablet Oral or Feeding Tube 2 TIMES DAILY 08/23/24 0636      08/23/24 0000  magnesium hydroxide (MILK OF MAGNESIA) suspension 30 mL         30 mL Oral or Feeding Tube DAILY PRN 08/21/24 1319      08/21/24 1930  aspirin (ASA) chewable tablet 162 mg         162 mg Oral or NG Tube DAILY 08/21/24 1319      08/21/24 1400  acetaminophen (TYLENOL) tablet 975 mg         975 mg Oral or Feeding Tube EVERY 8 HOURS 08/21/24 1319 08/24/24 1359    08/21/24 1319  HYDROmorphone (DILAUDID) injection 0.1 mg        Placed in \"Or\" Linked Group    0.1 mg Intravenous EVERY 2 HOURS PRN 08/21/24 1319      08/21/24 1319  HYDROmorphone (DILAUDID) injection 0.2 mg        Placed in \"Or\" Linked Group    0.2 mg Intravenous EVERY 2 HOURS PRN 08/21/24 1319      08/21/24 1319  oxyCODONE IR (ROXICODONE) half-tab 2.5 mg        Placed in \"Or\" Linked Group    2.5 mg Oral or Feeding Tube EVERY 4 HOURS PRN 08/21/24 1319      08/21/24 1319  oxyCODONE (ROXICODONE) tablet 5 mg        Placed in \"Or\" Linked Group    5 mg Oral or Feeding Tube EVERY 4 HOURS PRN 08/21/24 1319      08/21/24 1319  methocarbamol (ROBAXIN) tablet 500 mg         500 mg Oral or Feeding Tube EVERY 6 HOURS PRN 08/21/24 1319      08/21/24 1319  lidocaine 1 % 0.1-1 mL         0.1-1 mL " "Other EVERY 1 HOUR PRN 08/21/24 1319      08/21/24 1319  lidocaine (LMX4) cream          Topical EVERY 1 HOUR PRN 08/21/24 1319      08/21/24 0830  ROPivacaine 0.2% in sodium chloride 0.9% PERINEURAL infusion          Perineural Continuous Nerve Block 08/21/24 0803      08/21/24 0830  ROPivacaine 0.2% in sodium chloride 0.9% PERINEURAL infusion          Perineural Continuous Nerve Block 08/21/24 0803                Acute Inpatient Pain Service [facility]  Hours of pain coverage 24/7   Page via Amcom- Please Page the Pain Team Via [facility] [place]: \"PAIN MANAGEMENT ACUTE INPATIENT/ [facility]\"            "

## 2024-08-24 NOTE — PROGRESS NOTES
HEMODIALYSIS TREATMENT NOTE      Date: 8/24/2024  Time: 11:04 AM     Data:  Pre Wt:   90.8 kg (bed scale)  Desired Wt:   To be established  Post Wt:  90.7 kg (bed scale)  Weight change: - 0.1 kg  Ultrafiltration - Post Run Net Total Removed (mL):  0 ml  Vascular Access Status: CVC patent  Dialyzer Rinse:  Light  Total Blood Volume Processed: 69.2 L   Total Dialysis (Treatment) Time:   3 Hrs  Dialysate Bath: K 3, Ca 3  Heparin: Heparin: None     Lab:   No  HbsAg - 8/22/2024 (Non Reactive)  HbsAb - 8/22/2024 <3.50 (Susceptible)     Interventions:  Dialysis done through Right subclavian tunneled dialysis catheter. ,   UF set to 0.3 Liters of fluid removal, just to accommodate priming and rinse back volumes  UFF OFF due to leg cramps. Dr. Monge is aware and saw patient while on dialysis, labs reviewed. Remained OFF the rest of the remaining time.   See Flowsheet for Crit Profile throughout the run  Treatment has ended safely and blood is rinsed back completely  Catheter lumens flushed and locked with Saline, catheter caps changed post HD  Post Tx assessment done. Patient's sent back to his room in stable condition  Report given to Aparna SUMMERS RN.     Assessment:  A/O x 4, calm & cooperative  Lung sounds clear anterior and lateral BUL, diminished BLL  CVC intact, previous dressing clean and dry                Plan:    Per Renal team

## 2024-08-25 ENCOUNTER — APPOINTMENT (OUTPATIENT)
Dept: GENERAL RADIOLOGY | Facility: CLINIC | Age: 74
DRG: 235 | End: 2024-08-25
Attending: PHYSICIAN ASSISTANT
Payer: COMMERCIAL

## 2024-08-25 ENCOUNTER — APPOINTMENT (OUTPATIENT)
Dept: GENERAL RADIOLOGY | Facility: CLINIC | Age: 74
DRG: 235 | End: 2024-08-25
Attending: SURGERY
Payer: COMMERCIAL

## 2024-08-25 ENCOUNTER — APPOINTMENT (OUTPATIENT)
Dept: PHYSICAL THERAPY | Facility: CLINIC | Age: 74
DRG: 235 | End: 2024-08-25
Attending: SURGERY
Payer: COMMERCIAL

## 2024-08-25 LAB
ANION GAP SERPL CALCULATED.3IONS-SCNC: 10 MMOL/L (ref 7–15)
BUN SERPL-MCNC: 30 MG/DL (ref 8–23)
CALCIUM SERPL-MCNC: 8.4 MG/DL (ref 8.8–10.4)
CHLORIDE SERPL-SCNC: 97 MMOL/L (ref 98–107)
CREAT SERPL-MCNC: 3.36 MG/DL (ref 0.51–1.17)
EGFRCR SERPLBLD CKD-EPI 2021: 18 ML/MIN/1.73M2
ERYTHROCYTE [DISTWIDTH] IN BLOOD BY AUTOMATED COUNT: 12.5 % (ref 10–15)
GLUCOSE SERPL-MCNC: 93 MG/DL (ref 70–99)
HCO3 SERPL-SCNC: 24 MMOL/L (ref 22–29)
HCT VFR BLD AUTO: 25.9 % (ref 35–53)
HGB BLD-MCNC: 8.3 G/DL (ref 13.3–17.7)
MAGNESIUM SERPL-MCNC: 1.8 MG/DL (ref 1.7–2.3)
MCH RBC QN AUTO: 35 PG (ref 26.5–33)
MCHC RBC AUTO-ENTMCNC: 32 G/DL (ref 31.5–36.5)
MCV RBC AUTO: 109 FL (ref 78–100)
PLATELET # BLD AUTO: 118 10E3/UL (ref 150–450)
POTASSIUM SERPL-SCNC: 4.1 MMOL/L (ref 3.4–5.3)
RBC # BLD AUTO: 2.37 10E6/UL (ref 3.8–5.9)
SODIUM SERPL-SCNC: 131 MMOL/L (ref 135–145)
WBC # BLD AUTO: 5.4 10E3/UL (ref 4–11)

## 2024-08-25 PROCEDURE — 250N000013 HC RX MED GY IP 250 OP 250 PS 637: Performed by: STUDENT IN AN ORGANIZED HEALTH CARE EDUCATION/TRAINING PROGRAM

## 2024-08-25 PROCEDURE — 80048 BASIC METABOLIC PNL TOTAL CA: CPT | Performed by: PHYSICIAN ASSISTANT

## 2024-08-25 PROCEDURE — 250N000013 HC RX MED GY IP 250 OP 250 PS 637: Performed by: SURGERY

## 2024-08-25 PROCEDURE — 250N000013 HC RX MED GY IP 250 OP 250 PS 637: Performed by: PHYSICIAN ASSISTANT

## 2024-08-25 PROCEDURE — 71045 X-RAY EXAM CHEST 1 VIEW: CPT | Mod: 26 | Performed by: RADIOLOGY

## 2024-08-25 PROCEDURE — 71046 X-RAY EXAM CHEST 2 VIEWS: CPT | Mod: 26 | Performed by: RADIOLOGY

## 2024-08-25 PROCEDURE — 71045 X-RAY EXAM CHEST 1 VIEW: CPT

## 2024-08-25 PROCEDURE — 99231 SBSQ HOSP IP/OBS SF/LOW 25: CPT | Performed by: INTERNAL MEDICINE

## 2024-08-25 PROCEDURE — 85027 COMPLETE CBC AUTOMATED: CPT | Performed by: STUDENT IN AN ORGANIZED HEALTH CARE EDUCATION/TRAINING PROGRAM

## 2024-08-25 PROCEDURE — 120N000005 HC R&B MS OVERFLOW UMMC

## 2024-08-25 PROCEDURE — 36415 COLL VENOUS BLD VENIPUNCTURE: CPT | Performed by: PHYSICIAN ASSISTANT

## 2024-08-25 PROCEDURE — 97116 GAIT TRAINING THERAPY: CPT | Mod: GP | Performed by: PHYSICAL THERAPIST

## 2024-08-25 PROCEDURE — 71046 X-RAY EXAM CHEST 2 VIEWS: CPT

## 2024-08-25 PROCEDURE — 250N000013 HC RX MED GY IP 250 OP 250 PS 637

## 2024-08-25 PROCEDURE — 250N000011 HC RX IP 250 OP 636: Performed by: STUDENT IN AN ORGANIZED HEALTH CARE EDUCATION/TRAINING PROGRAM

## 2024-08-25 PROCEDURE — 83735 ASSAY OF MAGNESIUM: CPT | Performed by: SURGERY

## 2024-08-25 RX ORDER — MAGNESIUM OXIDE 400 MG/1
400 TABLET ORAL EVERY 4 HOURS
Status: COMPLETED | OUTPATIENT
Start: 2024-08-25 | End: 2024-08-25

## 2024-08-25 RX ADMIN — HEPARIN SODIUM 5000 UNITS: 5000 INJECTION, SOLUTION INTRAVENOUS; SUBCUTANEOUS at 03:24

## 2024-08-25 RX ADMIN — POLYETHYLENE GLYCOL 3350 17 G: 17 POWDER, FOR SOLUTION ORAL at 08:24

## 2024-08-25 RX ADMIN — METHOCARBAMOL 500 MG: 500 TABLET ORAL at 23:36

## 2024-08-25 RX ADMIN — ASPIRIN 81 MG CHEWABLE TABLET 162 MG: 81 TABLET CHEWABLE at 08:23

## 2024-08-25 RX ADMIN — MAGNESIUM OXIDE TAB 400 MG (241.3 MG ELEMENTAL MG) 400 MG: 400 (241.3 MG) TAB at 12:14

## 2024-08-25 RX ADMIN — METOPROLOL TARTRATE 25 MG: 25 TABLET, FILM COATED ORAL at 08:23

## 2024-08-25 RX ADMIN — ACETAMINOPHEN 650 MG: 325 TABLET ORAL at 23:36

## 2024-08-25 RX ADMIN — SENNOSIDES AND DOCUSATE SODIUM 3 TABLET: 8.6; 5 TABLET ORAL at 08:23

## 2024-08-25 RX ADMIN — ATORVASTATIN CALCIUM 40 MG: 40 TABLET, FILM COATED ORAL at 21:06

## 2024-08-25 RX ADMIN — PANTOPRAZOLE SODIUM 40 MG: 40 TABLET, DELAYED RELEASE ORAL at 21:06

## 2024-08-25 RX ADMIN — METOPROLOL TARTRATE 25 MG: 25 TABLET, FILM COATED ORAL at 21:06

## 2024-08-25 RX ADMIN — SENNOSIDES AND DOCUSATE SODIUM 1 TABLET: 8.6; 5 TABLET ORAL at 21:06

## 2024-08-25 RX ADMIN — PANTOPRAZOLE SODIUM 40 MG: 40 TABLET, DELAYED RELEASE ORAL at 08:23

## 2024-08-25 RX ADMIN — MAGNESIUM OXIDE TAB 400 MG (241.3 MG ELEMENTAL MG) 400 MG: 400 (241.3 MG) TAB at 16:12

## 2024-08-25 RX ADMIN — ACETAMINOPHEN 650 MG: 325 TABLET ORAL at 08:28

## 2024-08-25 RX ADMIN — ACETAMINOPHEN 650 MG: 325 TABLET ORAL at 03:24

## 2024-08-25 ASSESSMENT — ACTIVITIES OF DAILY LIVING (ADL)
ADLS_ACUITY_SCORE: 32
ADLS_ACUITY_SCORE: 28
ADLS_ACUITY_SCORE: 32
ADLS_ACUITY_SCORE: 28
ADLS_ACUITY_SCORE: 28
ADLS_ACUITY_SCORE: 32
ADLS_ACUITY_SCORE: 28
ADLS_ACUITY_SCORE: 32
ADLS_ACUITY_SCORE: 28
ADLS_ACUITY_SCORE: 32
ADLS_ACUITY_SCORE: 28

## 2024-08-25 NOTE — PLAN OF CARE
"D: Bret Fleming is a 74 year old adult with PMH of CAD s/p MI in 1996, HTN, ESRD on HD, ICM w/ EF 30-35%, 3v CAD who presents to [facility] for CABG on by Dr. Solares on 8/21.     /64 (BP Location: Right arm)   Pulse 83   Temp 97.6  F (36.4  C) (Oral)   Resp 18   Ht 1.829 m (6')   Wt 90.9 kg (200 lb 4.8 oz)   SpO2 98%   BMI 27.17 kg/m        Neuro: A&Ox4.   Cardiac: SR - 70s. Afebrile, VSS.        Respiratory: RA - SATs above 95%. Denies SOB, mild RODRIGUEZ.   GI/: Voiding spontaneously - up to bathroom SBA x3 this shift-  urinal at bedside overnight though prefers ambulating to bathroom.  loose BM x1 otherwise \"mainly urine and gas\" per pt.   Diet/appetite: Tolerating regular diet. Denies nausea.  Activity: Up with standby assist    Pain: 1-2/10 incisional pain chest incision, reduced to a tolerable level with PRN tylenol. Increases with activity, precautions maintained.   Skin: No new deficits noted.  Lines: 2x R PIV (hand/lower forearm), R subclavian CVC for dialysis do not use.   Drains: 3x CT to 1 atrium to -20 cm suction per order  - see flowsheets. X2 perineural continuous nerve block both @ 7mL/hr intermittent bolus.            Shift: [id number]        Robinson TRACY RN.  "

## 2024-08-25 NOTE — PROGRESS NOTES
Pain Service Progress Note  [facility]  Date: 08/25/2024       Patient Name: Bret Fleming  MRN: [id number]  Age: 74 year old  Sex: adult      Assessment:  Bret Fleming is a 74 year old adult who has PMH of  CAD s/p MI in 1996, HTN, ESRD on HD, ICM w/ EF 30-35%, 3vessel CAD who is s/p CABG on 8/21/23 with Dr. Solares.              Date of Catheter Placement: 8/21/24     Plan/Recommendations:  1. Regional Anesthesia/Analgesia  -Continuous Catheter Type/Site: bilateral paravertebral (PV) T3/4  Infusate: Ropivacaine 0.2%     Programmed Intermittent Bolus (PIB) at 7 mL Q60 min via each catheter, total infusion rate of 14 mL/hr     Plan to maintain catheter approximately of 7 days     2. Anticoagulation  -Please contact Inpatient Pain Service before ordering or making any anticoagulation changes      -Heparin 5k units subcutaneous Q 8 hours     3. Multimodal Analgesia  - per primary service: oxycodone 5mg     Pain Service will continue to follow.     Discussed with attending anesthesiologist    Beck Pennington MD on 8/25/2024 at 1:51 PM       Overnight Events: no major acute event    Tubes/Drains: Yes  Chest tubes    Subjective: Feels pain is managed    Symptoms of LAST: No    Pain Location:  chest    Pain Intensity:    Pain at Rest: 1-2/10     Satisfied with your level of pain control: Yes    Diet: Regular Diet Adult    Relevant Labs:  Recent Labs   Lab Test 08/23/24  0440 08/21/24  2019 08/21/24  1327   INR  --   --  1.54*   *   < > 114*   PTT  --   --  36   BUN 30.1*   < > 51.2*    < > = values in this interval not displayed.       Physical Exam:  Vitals: /57 (BP Location: Right arm)   Pulse 76   Temp 36.4  C (97.5  F) (Oral)   Resp 19   Ht 1.829 m (6')   Wt 90.9 kg (200 lb 4.8 oz)   SpO2 100%   BMI 27.17 kg/m      Physical Exam:   Orientation:  Alert, oriented, and in no acute distress: Yes  Sedation: No    Motor Examination:  5/5 Strength in lower extremities:  "Yes      Catheter Site:   Catheter entry site is clean/dry/intact: Yes    Tender: No      Relevant Medications:  Current Pain Medications:  Medications related to Pain Management (From now, onward)      Start     Dose/Rate Route Frequency Ordered Stop    08/24/24 0000  acetaminophen (TYLENOL) tablet 650 mg         650 mg Oral or Feeding Tube EVERY 4 HOURS PRN 08/21/24 1319      08/24/24 0000  bisacodyl (DULCOLAX) suppository 10 mg         10 mg Rectal DAILY PRN 08/21/24 1319      08/23/24 0800  polyethylene glycol (MIRALAX) Packet 17 g         17 g Oral or Feeding Tube 3 TIMES DAILY 08/23/24 0636      08/23/24 0800  senna-docusate (SENOKOT-S/PERICOLACE) 8.6-50 MG per tablet 3 tablet         3 tablet Oral or Feeding Tube 2 TIMES DAILY 08/23/24 0636      08/23/24 0000  magnesium hydroxide (MILK OF MAGNESIA) suspension 30 mL         30 mL Oral or Feeding Tube DAILY PRN 08/21/24 1319      08/21/24 1930  aspirin (ASA) chewable tablet 162 mg         162 mg Oral or NG Tube DAILY 08/21/24 1319      08/21/24 1400  acetaminophen (TYLENOL) tablet 975 mg         975 mg Oral or Feeding Tube EVERY 8 HOURS 08/21/24 1319 08/24/24 1359    08/21/24 1319  HYDROmorphone (DILAUDID) injection 0.1 mg        Placed in \"Or\" Linked Group    0.1 mg Intravenous EVERY 2 HOURS PRN 08/21/24 1319      08/21/24 1319  HYDROmorphone (DILAUDID) injection 0.2 mg        Placed in \"Or\" Linked Group    0.2 mg Intravenous EVERY 2 HOURS PRN 08/21/24 1319      08/21/24 1319  oxyCODONE IR (ROXICODONE) half-tab 2.5 mg        Placed in \"Or\" Linked Group    2.5 mg Oral or Feeding Tube EVERY 4 HOURS PRN 08/21/24 1319      08/21/24 1319  oxyCODONE (ROXICODONE) tablet 5 mg        Placed in \"Or\" Linked Group    5 mg Oral or Feeding Tube EVERY 4 HOURS PRN 08/21/24 1319      08/21/24 1319  methocarbamol (ROBAXIN) tablet 500 mg         500 mg Oral or Feeding Tube EVERY 6 HOURS PRN 08/21/24 1319      08/21/24 1319  lidocaine 1 % 0.1-1 mL         0.1-1 mL Other EVERY 1 " "HOUR PRN 08/21/24 1319      08/21/24 1319  lidocaine (LMX4) cream          Topical EVERY 1 HOUR PRN 08/21/24 1319      08/21/24 0830  ROPivacaine 0.2% in sodium chloride 0.9% PERINEURAL infusion          Perineural Continuous Nerve Block 08/21/24 0803      08/21/24 0830  ROPivacaine 0.2% in sodium chloride 0.9% PERINEURAL infusion          Perineural Continuous Nerve Block 08/21/24 0803                Acute Inpatient Pain Service [facility]  Hours of pain coverage 24/7   Page via Amcom- Please Page the Pain Team Via [facility]: \"PAIN MANAGEMENT ACUTE INPATIENT/ [facility]\"            "

## 2024-08-25 NOTE — PROGRESS NOTES
Nephrology Progress Note  08/24/2024         Assessment & Recommendations:   74 year old male with PMH of CAD s/p MI in 1996, HTN, ESRD on HD, ICM w/ EF 30-35%, 3v CAD s/p CABG 8/21      #ESRD  - runs TTS at [facility] with Dr. Dalal.  - ACCESS LUE AVF placed 4/23/24 (still has tunneled right CVC which the dialysis unit has to use intermittently due to problems with the fistula)  - TW 85.5 kg  Run time 3 hrs; heparin 500 units loading dose, 600 units/hr  - HD Tolerated HD well today.  No UF  - no heparin post op    #BP/Volume TW 85.5 kg  -  off pressors, on RA  -   m\l yesterday  none recorded today  - chest tube 170 ml yesterday, 210 so far today     #Anemia  - pta mircera 50 mcg every two weeks, last dose 8/20/24; venofer 100 mg once a week, last dose 8/20/24     #s/p CABG x 3 on 8/21/24    Recommendations were communicated to primary team via this note    I spent a total of 45 minutes on the date of this encounter in reviewing the medical records, face-to-face evaluation and physical exam, review of labs, counseling, orders, care coordination and documentation      Celso Monge MD   Division of Nephrology and Hypertension  [facility]  Vocera Web Console    Interval History : August 24, 2024  Patient seen on dialysis.  He denied CP, SOB.  Reports cramping with dialysis.   Review of Systems:   4 point ROS neg other than as noted above    Physical Exam:   I/O last 3 completed shifts:  In: 1003 [P.O.:1000; I.V.:3]  Out: 525 [Urine:400; Chest Tube:125]   /59 (BP Location: Right arm)   Pulse 83   Temp 97.8  F (36.6  C) (Oral)   Resp 18   Ht 1.829 m (6')   Wt 90.7 kg (199 lb 15.3 oz)   SpO2 100%   BMI 27.12 kg/m       GENERAL APPEARANCE: NAD,   EYES:  no scleral icterus,   PULM: CTA  CV: RRR     -edema none   GI: soft   INTEGUMENT: no cyanosis, no rash  NEURO:  a/o3  Access tunneled RIJ, L AVF    Labs:   All labs reviewed by me  Electrolytes/Renal -   Recent Labs   Lab Test 08/24/24  2354  08/23/24  0745 08/23/24  0440 08/22/24  0844 08/22/24  0829 08/22/24  0602 08/22/24  0308 08/21/24 2213 08/21/24 2019   *  --  131*  --   --   --  132*  --  137   POTASSIUM 4.3  --  4.4  --  5.5*  --  5.5*   < > 5.6*   CHLORIDE 96*  --  96*  --   --   --  97*  --  101   CO2 24  --  27  --   --   --  21*  --  23   BUN 44.4*  --  30.1*  --   --   --  56.1*  --  55.0*   CR 4.07*  --  3.26*  --   --   --  4.76*  --  4.63*   GLC 90 92 122*   < >  --    < > 126*  120*   < > 138*   CHANA 8.2*  --  8.1*  --   --   --  8.7*  --  8.8   MAG 2.2  --  1.9  --   --   --  2.6*  --  2.8*   PHOS  --   --  2.6  --   --   --  3.9  --  3.4    < > = values in this interval not displayed.       CBC -   Recent Labs   Lab Test 08/24/24  0639 08/23/24 0440 08/22/24 0308   WBC 6.1 5.9 10.0   HGB 8.9* 8.2* 9.5*   PLT 94* 103* 116*       LFTs -   Recent Labs   Lab Test 08/23/24 0440 08/22/24  0308 08/21/24 2019   ALKPHOS 48 54 55   BILITOTAL 0.2 0.3 0.3   ALT <5 8 11   AST 39 34 36   PROTTOTAL 5.6* 5.6* 6.0*   ALBUMIN 3.6 3.7 4.0       Iron Panel -   Recent Labs   Lab Test 09/27/23  1752 09/27/23  1446   IRON  --  120   IRONSAT  --  53*     --          Imaging:  Reviewed    Current Medications:  Current Facility-Administered Medications   Medication Dose Route Frequency Provider Last Rate Last Admin    aspirin (ASA) chewable tablet 162 mg  162 mg Oral or NG Tube Daily Tina Boyd PA-C   162 mg at 08/24/24 1143    atorvastatin (LIPITOR) tablet 40 mg  40 mg Oral or Feeding Tube QPM Tina Boyd PA-C   40 mg at 08/23/24 1957    heparin ANTICOAGULANT injection 5,000 Units  5,000 Units Subcutaneous Q8H Tina Boyd PA-C   5,000 Units at 08/24/24 1143    metoprolol tartrate (LOPRESSOR) tablet 25 mg  25 mg Oral BID Jen Hernandez PA-C   25 mg at 08/24/24 1143    pantoprazole (PROTONIX) EC tablet 40 mg  40 mg Oral BID Lois Askew APRN CNP   40 mg at 08/24/24 1143    polyethylene glycol (MIRALAX) Packet 17 g   17 g Oral or Feeding Tube TID Hector Gonzalez MD   17 g at 08/24/24 1143    senna-docusate (SENOKOT-S/PERICOLACE) 8.6-50 MG per tablet 3 tablet  3 tablet Oral or Feeding Tube BID Hector Gonzalez MD   3 tablet at 08/24/24 1143    sodium chloride (PF) 0.9% PF flush 3 mL  3 mL Intracatheter Q8H Tina Boyd PA-C   3 mL at 08/24/24 1144     Current Facility-Administered Medications   Medication Dose Route Frequency Provider Last Rate Last Admin    NO anticoagulation unless approved by Anesthesia Provider   Does not apply Continuous PRN Ted Gutierrez,         ROPivacaine 0.2% in sodium chloride 0.9% PERINEURAL infusion   Perineural Continuous Nerve Block Ted Gutierrez, DO 7 mL/hr at 08/24/24 0400 $New Syringe/Cartridge at 08/24/24 1256    ROPivacaine 0.2% in sodium chloride 0.9% PERINEURAL infusion   Perineural Continuous Nerve Block Ted Gutierrez, DO 7 mL/hr at 08/24/24 1002 $New Syringe/Cartridge at 08/24/24 1002     Celso Monge MD

## 2024-08-25 NOTE — PLAN OF CARE
D: S/p CABG x3 on 8/21/24.    I: Monitored vitals and assessed pt status.   PRN: Tylenol x1  Tele: Sinus rhythm, HR 70-80's  Mobility: SBA    A: AOx4. VSS. Afebrile. Oliguric, on HD. LBM today 8/25. Pain well controlled at this time. CT's and nerve blocks removed today.    P: Continue to monitor Pt status and report changes to CVTS. Possible discharge home tomorrow.

## 2024-08-25 NOTE — PROGRESS NOTES
Nephrology Progress Note  08/25/2024         Assessment & Recommendations:   74 year old male with PMH of CAD s/p MI in 1996, HTN, ESRD on HD, ICM w/ EF 30-35%, 3v CAD s/p CABG 8/21      #ESRD  - runs TTS at [facility] with Dr. Dalal.  - ACCESS LUE AVF placed 4/23/24 (still has tunneled right CVC which the dialysis unit has to use intermittently due to problems with the fistula)  - TW 85.5 kg  Run time 3 hrs; heparin 500 units loading dose, 600 units/hr  - HD Tolerated HD well today.  No UF  - no heparin post op  - no new recommendations from nephrology      #s/p CABG x 3 on 8/21/24    Recommendations were communicated to primary team via this note    Celso Monge MD   Division of Nephrology and Hypertension  [facility]  Vocera Web Console    Interval History : August 25, 2024  Patient seen in room this morning.  He denied CP, SOB.  Sitting comfortably in chair.    Provider notes reviewed.    Review of Systems:   4 point ROS neg other than as noted above    Physical Exam:   I/O last 3 completed shifts:  In: 1223 [P.O.:1220; I.V.:3]  Out: 675 [Urine:325; Chest Tube:350]   /57 (BP Location: Right arm)   Pulse 76   Temp 97.5  F (36.4  C) (Oral)   Resp 19   Ht 1.829 m (6')   Wt 90.9 kg (200 lb 4.8 oz)   SpO2 100%   BMI 27.17 kg/m       GENERAL APPEARANCE: NAD,   EYES:  no scleral icterus,   PULM: CTA  CV: RRR     -edema none   GI: soft   INTEGUMENT: no cyanosis, no rash  NEURO:  a/o3  Access tunneled RIJ, L AVF    Labs:   All labs reviewed by me  Electrolytes/Renal -   Recent Labs   Lab Test 08/25/24  0753 08/24/24  0639 08/23/24  0745 08/23/24  0440 08/22/24  0602 08/22/24  0308 08/21/24  2213 08/21/24 2019   * 131*  --  131*  --  132*  --  137   POTASSIUM 4.1 4.3  --  4.4   < > 5.5*   < > 5.6*   CHLORIDE 97* 96*  --  96*  --  97*  --  101   CO2 24 24  --  27  --  21*  --  23   BUN 30.0* 44.4*  --  30.1*  --  56.1*  --  55.0*   CR 3.36* 4.07*  --  3.26*  --  4.76*  --  4.63*   GLC 93 90 92 122*    < > 126*  120*   < > 138*   CHANA 8.4* 8.2*  --  8.1*  --  8.7*  --  8.8   MAG 1.8 2.2  --  1.9  --  2.6*  --  2.8*   PHOS  --   --   --  2.6  --  3.9  --  3.4    < > = values in this interval not displayed.       CBC -   Recent Labs   Lab Test 08/25/24  0753 08/24/24  0639 08/23/24  0440   WBC 5.4 6.1 5.9   HGB 8.3* 8.9* 8.2*   * 94* 103*       LFTs -   Recent Labs   Lab Test 08/23/24  0440 08/22/24  0308 08/21/24 2019   ALKPHOS 48 54 55   BILITOTAL 0.2 0.3 0.3   ALT <5 8 11   AST 39 34 36   PROTTOTAL 5.6* 5.6* 6.0*   ALBUMIN 3.6 3.7 4.0       Iron Panel -   Recent Labs   Lab Test 09/27/23  1752 09/27/23  1446   IRON  --  120   IRONSAT  --  53*     --          Imaging:  Reviewed    Current Medications:  Current Facility-Administered Medications   Medication Dose Route Frequency Provider Last Rate Last Admin    aspirin (ASA) chewable tablet 162 mg  162 mg Oral or NG Tube Daily Tina Boyd PA-C   162 mg at 08/25/24 0823    atorvastatin (LIPITOR) tablet 40 mg  40 mg Oral or Feeding Tube QPM Tina Boyd PA-C   40 mg at 08/24/24 1949    magnesium oxide (MAG-OX) tablet 400 mg  400 mg Oral Q4H Jah Solares MD   400 mg at 08/25/24 1214    metoprolol tartrate (LOPRESSOR) tablet 25 mg  25 mg Oral BID Jen Hernandez PA-C   25 mg at 08/25/24 0823    pantoprazole (PROTONIX) EC tablet 40 mg  40 mg Oral BID Lois Askew APRN CNP   40 mg at 08/25/24 0823    polyethylene glycol (MIRALAX) Packet 17 g  17 g Oral or Feeding Tube TID Hector Gonzalez MD   17 g at 08/25/24 0824    senna-docusate (SENOKOT-S/PERICOLACE) 8.6-50 MG per tablet 3 tablet  3 tablet Oral or Feeding Tube BID Hector Goznalez MD   3 tablet at 08/25/24 0823    sodium chloride (PF) 0.9% PF flush 3 mL  3 mL Intracatheter Q8H Tina Boyd PA-C   3 mL at 08/25/24 0824     Current Facility-Administered Medications   Medication Dose Route Frequency Provider Last Rate Last Admin    NO anticoagulation unless approved by Anesthesia  Provider   Does not apply Continuous PRN Ted Gutierrez, DO        ROPivacaine 0.2% in sodium chloride 0.9% PERINEURAL infusion   Perineural Continuous Nerve Block Ted Gutierrez, DO   Paused at 08/25/24 1431    ROPivacaine 0.2% in sodium chloride 0.9% PERINEURAL infusion   Perineural Continuous Nerve Block Ted Gutierrez, DO   Paused at 08/25/24 1431     Celso Monge MD

## 2024-08-25 NOTE — PROGRESS NOTES
Cardiovascular Surgery Progress Note  08/25/2024         Assessment and Plan:     Bret Fleming is a 74 year old adult with a PMH of CAD with MI 1996, ICM with EF ~40%, HTN, ESRD on HD. Patient is now s/p CABG x3 on 8/21/2024 with Dr. Solares.     Cardiovascular:   Hx of CAD - s/p CABG x3  Hx of MI in 1996  HFrEF 2/2 ICM  HTN  No arrhythmias overnight, HD stable, in NSR. MAPS >65  Intra-op BECCA showed LVEF of 50-55%, mild MR, trivial TR  -  mg daily  - Atorvastatin 40 mg daily  - Metoprolol tartrate 25 mg BID  - PTA hydralazine held    Chest tubes: 255 ml out last 24 hours. Plan to remove today  TPW: capped, plan to remove today    Pulmonary:  Extubated POD 0 to 4 lpm via NC. Now saturating well on RA  - Supplemental O2 PRN to keep sats > 92%. Wean off as tolerated.  - Pulm hygiene, IS, activity and deep breathing    Neurology / Psych:  Acute post-operative pain   Pain well controlled with current regimen:  - Acetaminophen, oxycodone PRN, IV dilaudid PRN, methocarbamol  - Bilateral paravertebral block, regional pain service following, plan to maintain ~7 days (research study)     / Renal / Fluid / Electrolytes:  ESRD on iHD (Tu/Th/Sa)  Hyperkalemia  Making urine, 450 ml +2 unmeasured voids  FLUID STATUS: Pre-op weight 188 lbs, weight today 200 lbs; I/O past 24 hours: +272.  - Appreciate nephrology assistance, iHD today. Tu/Th/sat schedule  - Strict I/O, daily weights  - Avoid/limit nephrotoxins as able    GI / Nutrition:   - Tolerating regular diet  - Continue bowel regimen, last BM pre-op    Endocrine:  Stress induced hyperglycemia, improved  Hgb A1c 4.2%  - Initially managed on insulin drip postop, transitioned to sliding scale; goal BG <180 for optimal healing    Infectious Disease:  Stress induced leukocytosis, resolved  WBC 6.1, remains afebrile, no signs or symptoms of infection  - Completed perioperative antibiotics  - Continue to monitor fever curve, CBC    Hematology:   Acute blood loss anemia,  stable  Acute blood loss/post CPB thrombocytopenia  Hgb 8.9; Plt 94, no signs or symptoms of active bleeding  - CBC daily    Anticoagulation:   - ASA only    MSK / Skin:  Sternotomy  Surgical incision  - Sternal precautions  - Incisional cares per protocol    Prophylaxis:   - Stress ulcer prophylaxis: Pantoprazole 40 mg daily for 30 days  - DVT prophylaxis: Subcutaneous heparin, SCD    Disposition:   - Transferred to  on 8/23  - Therapies recommending discharge to home    Medically Ready for Discharge: 1 days      Clinically Significant Risk Factors                  # Thrombocytopenia: Lowest platelets = 94 in last 2 days, will monitor for bleeding   # Hypertension: Noted on problem list  # Chronic heart failure with reduced ejection fraction: last echo with EF <40%          # Overweight: Estimated body mass index is 27.17 kg/m  as calculated from the following:    Height as of this encounter: 1.829 m (6').    Weight as of this encounter: 90.9 kg (200 lb 4.8 oz).       # History of CABG: noted on surgical history         Jen Hernandez PA-C   Cardiothoracic Surgery   Pager #[phone]  August 25, 2024 at 9:31 AM          Interval History:     No overnight events.  States pain is well managed on current regimen. Slept well OK  Tolerating die, is passing flatus, no BM. No nausea or vomiting.  Breathing well without complaints.   Working with therapies and ambulating in halls with assistance.   Denies chest pain, palpitations, dizziness, syncopal symptoms, fevers, chills, myalgias, or sternal popping/clicking.         Physical Exam:     /67 (BP Location: Right arm, Cuff Size: Adult Regular)   Pulse 82   Temp 97.8  F (36.6  C) (Oral)   Resp 18   Ht 1.829 m (6')   Wt 90.9 kg (200 lb 4.8 oz)   SpO2 100%   BMI 27.17 kg/m      I/O last 3 completed shifts:  In: 1183 [P.O.:1180; I.V.:3]  Out: 515 [Urine:175; Chest Tube:340]    Gen: A&Ox4, NAD  Neuro: no focal deficits   CV: RRR, normal S1 S2, no murmurs, rubs  or gallops  Pulm: CTA, no wheezing or rhonchi, unlabored work of breathing on RA  Abd: nondistended, normal BS, soft, nontender  Ext: mild peripheral edema,  Incision: clean, dry, intact, no erythema, sternum stable  Tubes/drain sites: dressing clean and dry, serosanguinous output, no air leak. 24 hr output 260 mL.          Data:    Imaging:  reviewed recent imaging, no acute concerns    Recent Results (from the past 24 hour(s))   XR Chest Port 1 View    Narrative    Exam: XR CHEST PORT 1 VIEW, 8/24/2024 12:37 PM    Indication: S/p CABG    Comparison: Chest x-ray 8/23/2024 and x-ray 8/22/2024    Findings:     Frontal projection radiograph of the chest. Stable sternotomy wires,  mediastinal drains and left chest tube, right IJ catheter, presumed  epidural analgesia catheter. Stable cardiomediastinal silhouette.  Small left effusion. Similar cardiomediastinal silhouette. No  discernible pneumothorax. Slightly increased left lower zone including  retrocardiac opacities.      Impression    Impression: Stable postsurgical changes and support devices. Small  left pleural effusion. Slightly increased left lower zone opacities  including retrocardiac opacities may represent atelectasis versus  consolidation. Consider attention on follow-up    SARWAT WASHINGTON MD         SYSTEM ID:  [id number]        Labs:  Recent Labs   Lab 08/25/24  0753 08/24/24  0639 08/23/24  0745 08/23/24  0440 08/22/24  0844 08/22/24  0829 08/22/24  0602 08/22/24  0308 08/21/24  1459 08/21/24  1327 08/21/24  1326 08/21/24  1153   WBC 5.4 6.1  --  5.9  --   --   --  10.0   < > 12.7*  --   --    HGB 8.3* 8.9*  --  8.2*  --   --   --  9.5*   < > 9.4*  --  9.7*   * 109*  --  108*  --   --   --  109*   < > 108*  --   --    * 94*  --  103*  --   --   --  116*   < > 114*  --  114*   INR  --   --   --   --   --   --   --   --   --  1.54*  --  1.64*   NA  --  131*  --  131*  --   --   --  132*   < > 139  --  138   POTASSIUM  --  4.3  --  4.4  --   5.5*  --  5.5*   < > 4.6  --  4.4   CHLORIDE  --  96*  --  96*  --   --   --  97*   < > 102  --   --    CO2  --  24  --  27  --   --   --  21*   < > 22  --   --    BUN  --  44.4*  --  30.1*  --   --   --  56.1*   < > 51.2*  --   --    CR  --  4.07*  --  3.26*  --   --   --  4.76*   < > 4.39*  --   --    ANIONGAP  --  11  --  8  --   --   --  14   < > 15  --   --    CHANA  --  8.2*  --  8.1*  --   --   --  8.7*   < > 8.8  --   --    GLC  --  90 92 122*   < >  --    < > 126*  120*   < > 117*   < > 153*   ALBUMIN  --   --   --  3.6  --   --   --  3.7   < > 3.7  --   --    PROTTOTAL  --   --   --  5.6*  --   --   --  5.6*   < > 5.5*  --   --    BILITOTAL  --   --   --  0.2  --   --   --  0.3   < > 0.3  --   --    ALKPHOS  --   --   --  48  --   --   --  54   < > 53  --   --    ALT  --   --   --  <5  --   --   --  8   < > 11  --   --    AST  --   --   --  39  --   --   --  34   < > 22  --   --     < > = values in this interval not displayed.      GLUCOSE:   Recent Labs   Lab 08/24/24  0639 08/23/24  0745 08/23/24  0440 08/23/24  0438 08/22/24  2353 08/22/24  2030   GLC 90 92 122* 118* 119* 108*

## 2024-08-25 NOTE — DISCHARGE SUMMARY
[facility]   Cardiothoracic Surgery Hospital Discharge Summary     Bret Fleming MRN# [id number]   Age: 74 year old YOB: 1950     Admitting Physician:  Jah Solares MD  Discharge Physician:  Jen Hernandez PA-C  Primary Care Physician:        Alisha Gloria     DATE OF ADMISSION: 8/21/2024      DATE OF DISCHARGE: August 26, 2024     Admit Wt: 188 lbs  Discharge Wt: 200 lbs          Primary Diagnoses:   Coronary artery disease, s/p CABG 8/21  Left ventricular dysfunction   Renal failure on dialysis           Secondary Diagnoses:   Acute postoperative pain, improving  Stress induced hyperglycemia, resolved  Stress induced leukocytosis, resolved  Acute blood loss anemia, stable  Acute blood loss thrombocytopenia, resolved  Hx of CAD - s/p CABG x3  Hx of MI in 1996  HFrEF 2/2 ICM  HTN  ESRD on iHD (Tu/Th/Sa)  Hyperkalemia, resolved    PROCEDURES PERFORMED:   Date: 8/21/24.  Surgeon: Dr. Jah Solares  PROCEDURES:   1.  Coronary bypass grafting x 3 (left internal mammary artery to left anterior descending artery,  saphenous vein graft to 1st obtuse marginal artery,  saphenous vein graft to posterior descending artery).  2.  Endoscopic vein harvest.    INTRAOPERATIVE FINDINGS:    The patient's sternum was of adequate quality.  Veins obtained were adequate for bypass grafting.  LIMA  was of adequate quality with good flow.  Vessels bypassed included the  left anterior descending artery 2 mm vessel with proximal stenosis,PDA which was 2 mm in diameter, and the obtuse marginal artery, which was 1.75 to  2 mm diameter.     CONSULTS:    PT/OT  Intensivist    BRIEF HISTORY OF ILLNESS:  Bret Fleming is a 74 year old adult with PMH of CAD s/p MI in 1996, HTN, ESRD on HD, ICM w/ EF 30-35%, 3v CAD who presents to [facility] for CABG on by Dr. Solares.     HOSPITAL COURSE: Bret Fleming is a 74 year old adult who on 8/21/2024 underwent the above-named procedures and tolerated the  operation well.     Postoperatively was admitted to the CVICU.  Patient was extubated within protocol on POD #0.  Blood pressure and cardiac index were managed with vasopressors and inotropic agents which were continuously weaned until no longer needed.  Patient was subsequently  transferred to the surgical telemetry floor.    While on the surgical unit, the patient continued to progress well. Chest tubes and temporary pacemaker wires were removed when deemed appropriate.     Patient has ESRD and resumed home HD schedule post-op and tolerated it well.     Patient was transiently hyperglycemic and treated with insulin infusion then transitioned to sliding scale insulin per protocol. Blood sugars remained stable. No further glycemic control agents needed at this time.     Prior to discharge, Shilpi's pain was controlled well, Shilpi was working well with therapies, able to perform most ADLs, ambulate with difficulty, and had full return of bowel and bladder function.  On August 26, 2024, Shilpi was discharged to home in stable condition. Follow up with cardiology and cardiac surgery have been arranged. Pt encouraged to follow up with PCP and cardiac rehab upon discharge.    Patient discharged on aspirin:  Yes 162 mg  Patient discharged on beta blocker: yes Metoprolol tartrate 25 mg BID   Patient discharged on ACE Inhibitor/ARB: no  not indicated    Patient discharged on statin: yes Lipitor 40 mg daily         Discharge Disposition:     Discharged to home            Condition on Discharge:     Discharge condition: Stable   Discharge vitals: Blood pressure 127/69, pulse 82, temperature 97.8  F (36.6  C), temperature source Oral, resp. rate 18, height 1.829 m (6'), weight 90.9 kg (200 lb 6.4 oz), SpO2 97%.   Code status on discharge: Full Code     Vitals:    08/24/24 1100 08/25/24 0324 08/26/24 0300   Weight: 90.7 kg (199 lb 15.3 oz) 90.9 kg (200 lb 4.8 oz) 90.9 kg (200 lb 6.4 oz)       DAY OF DISCHARGE PHYSICAL  EXAM:    Gen: A&Ox4, NAD  Neuro: no focal deficits   CV: RRR, normal S1 S2, no murmurs, rubs or gallops  Pulm: CTA, no wheezing or rhonchi, normal breathing on RA  Abd: nondistended, normal BS, soft, nontender  Ext: mild peripheral edema  Incision: clean, dry, intact, no erythema, sternum stable  Tubes/drain sites: dressing clean and dry    Most Recent 3 CBC's:  Recent Labs   Lab Test 08/26/24  0741 08/25/24  0753 08/24/24  0639   WBC 6.0 5.4 6.1   HGB 9.1* 8.3* 8.9*   * 109* 109*   * 118* 94*      Most Recent 3 BMP's:  Recent Labs   Lab Test 08/26/24  0741 08/25/24  0753 08/24/24  0639   * 131* 131*   POTASSIUM 4.6 4.1 4.3   CHLORIDE 96* 97* 96*   CO2 23 24 24   BUN 44.2* 30.0* 44.4*   CR 4.22* 3.36* 4.07*   ANIONGAP 11 10 11   CHANA 8.4* 8.4* 8.2*   GLC 94 93 90     Most Recent 2 LFT's:  Recent Labs   Lab Test 08/23/24  0440 08/22/24  0308   AST 39 34   ALT <5 8   ALKPHOS 48 54   BILITOTAL 0.2 0.3     Most Recent INR's and Anticoagulation Dosing History:  Anticoagulation Dose History  More data may exist         Latest Ref Rng & Units 9/27/2023 9/28/2023 10/5/2023 5/1/2024 7/8/2024 8/9/2024 8/21/2024   Recent Dosing and Labs   INR 0.85 - 1.15 1.56  1.81  1.58  1.18  1.27  1.19  1.17  1.54  1.64       Details          Multiple values from one day are sorted in reverse-chronological order             Most Recent 3 Troponin's:No lab results found.  Most Recent Cholesterol Panel:  Recent Labs   Lab Test 11/17/23  1523   CHOL 105   LDL 51   HDL 39*   TRIG 74     Most Recent 6 Bacteria Isolates From Any Culture (See EPIC Reports for Culture Details):No lab results found.  Most Recent TSH, T4 and A1c Labs:  Recent Labs   Lab Test 08/09/24  1043 09/27/23  1446   TSH  --  2.70   A1C <4.2  --       Recent Labs   Lab 08/26/24  0741 08/25/24  0753 08/24/24  0639 08/23/24  0745 08/23/24  0440 08/23/24  0438   GLC 94 93 90 92 122* 118*       Imaging:  Examination: XR CHEST 2 VIEWS 8/26/2024 10:18 AM      Indication: Chest tube removal     Comparison: Radiograph 8/25/2024     Findings:  PA and lateral views of the chest. Right IJ CVC terminates in the  SVC-atrial junction. Grossly intact median sternotomy wires.  Mediastinal surgical clips. Stable mild bilateral costophrenic  blunting. No discernible pneumothorax. Slightly improved aeration of  the right lower lobe. No new opacities. Unchanged osseous structures.                                                                      Impression:   1. Stable right IJ CVC.  2. Slightly improved aeration of right lower lobe.   3. Continued small pleural effusions.  4. No discernible pneumothorax.      PRE-ADMISSION MEDICATIONS:  Medications Prior to Admission   Medication Sig Dispense Refill Last Dose    atorvastatin (LIPITOR) 40 MG tablet Take 1 tablet (40 mg) by mouth every evening 90 tablet 2 8/20/2024 at HS    CRANBERRY EXTRACT PO Take 1 capsule by mouth daily. Unknown dose   08/14/2024 at AM    diclofenac (VOLTAREN) 1 % topical gel Apply 2 g topically three times a week. Pt uses on knees once on dialysis days (Tu/Th/Sat)   8/20/2024 at AM    diphenhydrAMINE (BENADRYL) 25 MG capsule Take 25 mg by mouth once as needed for itching. Pt uses one cap ~2 days/week PRN for itching due to dry skin   Past Week at AM    GARLIC PO Take 1 capsule by mouth daily. Unknown dose   08/14/2024 at AM    MULTIPLE VITAMIN PO Take 1 tablet by mouth every morning   08/14/2024 at AM    OMEGA-3 FATTY ACIDS PO Take 1 capsule by mouth daily. Unknown dose   8/14/2024 at AM    pantoprazole (PROTONIX) 40 MG EC tablet Take 1 tablet (40 mg) by mouth 2 times daily (before meals) 60 tablet 0 8/21/2024 at AM    Saw [place], Serenoa repens, (SAW PALMETTO PO) Take 1 capsule by mouth daily. Unknown dose   8/14/2024 at AM    [DISCONTINUED] acetaminophen (TYLENOL) 500 MG tablet Take 2 tablets (1,000 mg) by mouth 3 times daily (Patient taking differently: Take 1,000 mg by mouth daily.) 100 tablet 0  8/21/2024 at AM    [DISCONTINUED] aspirin 81 MG EC tablet Take 1 tablet (81 mg) by mouth daily (Patient taking differently: Take 81 mg by mouth every morning.) 30 tablet 0 08/14/2024 at PM    [DISCONTINUED] hydrALAZINE (APRESOLINE) 10 MG tablet Take 1 tablet (10 mg) by mouth 3 times daily 270 tablet 2 8/21/2024 at AM    [DISCONTINUED] metoprolol succinate ER (TOPROL XL) 25 MG 24 hr tablet Take 1 tablet (25 mg) by mouth daily (Patient taking differently: Take 25 mg by mouth every morning.) 90 tablet 0 8/21/2024 at AM    [DISCONTINUED] nitroGLYcerin (NITROSTAT) 0.4 MG sublingual tablet For chest pain place 1 tablet under the tongue every 5 minutes for 3 doses. If symptoms persist 5 minutes after 1st dose call 911. 25 tablet 0 Unknown       DISCHARGE MEDICATIONS:   Current Discharge Medication List        START taking these medications    Details   aspirin (ASA) 81 MG chewable tablet Take 2 tablets (162 mg) by mouth or NG Tube daily.    Associated Diagnoses: Coronary atherosclerosis due to lipid rich plaque; S/P CABG (coronary artery bypass graft)      methocarbamol (ROBAXIN) 500 MG tablet Take 1 tablet (500 mg) by mouth or Feeding Tube every 6 hours as needed for muscle spasms.  Qty: 20 tablet, Refills: 0    Associated Diagnoses: Coronary atherosclerosis due to lipid rich plaque; S/P CABG (coronary artery bypass graft)      oxyCODONE (ROXICODONE) 5 MG tablet Take 0.5 tablets (2.5 mg) by mouth or Feeding Tube every 6 hours as needed for severe pain, moderate to severe pain or breakthrough pain.  Qty: 10 tablet, Refills: 0    Associated Diagnoses: Coronary atherosclerosis due to lipid rich plaque; S/P CABG (coronary artery bypass graft)      senna-docusate (SENOKOT-S/PERICOLACE) 8.6-50 MG tablet Take 3 tablets by mouth or Feeding Tube 2 times daily as needed for constipation.  Qty: 10 tablet, Refills: 0    Associated Diagnoses: Coronary atherosclerosis due to lipid rich plaque; S/P CABG (coronary artery bypass graft)            CONTINUE these medications which have CHANGED    Details   acetaminophen (TYLENOL) 325 MG tablet Take 2 tablets (650 mg) by mouth or Feeding Tube every 4 hours as needed for other, headaches, pain or fever (For optimal non-opioid multimodal pain management to improve pain control.).    Associated Diagnoses: Coronary atherosclerosis due to lipid rich plaque; S/P CABG (coronary artery bypass graft)      metoprolol succinate ER (TOPROL XL) 25 MG 24 hr tablet Take 2 tablets (50 mg) by mouth daily.  Qty: 90 tablet, Refills: 0    Associated Diagnoses: Ischemic cardiomyopathy           CONTINUE these medications which have NOT CHANGED    Details   atorvastatin (LIPITOR) 40 MG tablet Take 1 tablet (40 mg) by mouth every evening  Qty: 90 tablet, Refills: 2    Associated Diagnoses: Ischemic cardiomyopathy; NSTEMI (non-ST elevated myocardial infarction) (H)      CRANBERRY EXTRACT PO Take 1 capsule by mouth daily. Unknown dose      diclofenac (VOLTAREN) 1 % topical gel Apply 2 g topically three times a week. Pt uses on knees once on dialysis days (Tu/Th/Sat)      diphenhydrAMINE (BENADRYL) 25 MG capsule Take 25 mg by mouth once as needed for itching. Pt uses one cap ~2 days/week PRN for itching due to dry skin      GARLIC PO Take 1 capsule by mouth daily. Unknown dose      MULTIPLE VITAMIN PO Take 1 tablet by mouth every morning      OMEGA-3 FATTY ACIDS PO Take 1 capsule by mouth daily. Unknown dose      pantoprazole (PROTONIX) 40 MG EC tablet Take 1 tablet (40 mg) by mouth 2 times daily (before meals)  Qty: 60 tablet, Refills: 0    Associated Diagnoses: Acute kidney failure with tubular necrosis (H24)      Saw [place], Serenoa repens, (SAW PALMETTO PO) Take 1 capsule by mouth daily. Unknown dose           STOP taking these medications       aspirin 81 MG EC tablet Comments:   Reason for Stopping:         hydrALAZINE (APRESOLINE) 10 MG tablet Comments:   Reason for Stopping:         nitroGLYcerin (NITROSTAT) 0.4 MG  sublingual tablet Comments:   Reason for Stopping:                CC:Alisha Rae      [facility]   Cardiothoracic Surgery  Office phone: [phone]  Office fax: [phone]

## 2024-08-26 ENCOUNTER — APPOINTMENT (OUTPATIENT)
Dept: GENERAL RADIOLOGY | Facility: CLINIC | Age: 74
DRG: 235 | End: 2024-08-26
Attending: STUDENT IN AN ORGANIZED HEALTH CARE EDUCATION/TRAINING PROGRAM
Payer: COMMERCIAL

## 2024-08-26 VITALS
WEIGHT: 200.4 LBS | HEIGHT: 72 IN | TEMPERATURE: 98.1 F | SYSTOLIC BLOOD PRESSURE: 102 MMHG | HEART RATE: 74 BPM | OXYGEN SATURATION: 98 % | RESPIRATION RATE: 16 BRPM | BODY MASS INDEX: 27.14 KG/M2 | DIASTOLIC BLOOD PRESSURE: 60 MMHG

## 2024-08-26 LAB
ANION GAP SERPL CALCULATED.3IONS-SCNC: 11 MMOL/L (ref 7–15)
BUN SERPL-MCNC: 44.2 MG/DL (ref 8–23)
CALCIUM SERPL-MCNC: 8.4 MG/DL (ref 8.8–10.4)
CHLORIDE SERPL-SCNC: 96 MMOL/L (ref 98–107)
CREAT SERPL-MCNC: 4.22 MG/DL (ref 0.51–1.17)
EGFRCR SERPLBLD CKD-EPI 2021: 14 ML/MIN/1.73M2
ERYTHROCYTE [DISTWIDTH] IN BLOOD BY AUTOMATED COUNT: 12.1 % (ref 10–15)
GLUCOSE SERPL-MCNC: 94 MG/DL (ref 70–99)
HCO3 SERPL-SCNC: 23 MMOL/L (ref 22–29)
HCT VFR BLD AUTO: 28 % (ref 35–53)
HGB BLD-MCNC: 9.1 G/DL (ref 13.3–17.7)
MCH RBC QN AUTO: 35.1 PG (ref 26.5–33)
MCHC RBC AUTO-ENTMCNC: 32.5 G/DL (ref 31.5–36.5)
MCV RBC AUTO: 108 FL (ref 78–100)
PLATELET # BLD AUTO: 138 10E3/UL (ref 150–450)
POTASSIUM SERPL-SCNC: 4.6 MMOL/L (ref 3.4–5.3)
RBC # BLD AUTO: 2.59 10E6/UL (ref 3.8–5.9)
SODIUM SERPL-SCNC: 130 MMOL/L (ref 135–145)
WBC # BLD AUTO: 6 10E3/UL (ref 4–11)

## 2024-08-26 PROCEDURE — 71046 X-RAY EXAM CHEST 2 VIEWS: CPT | Mod: 26 | Performed by: RADIOLOGY

## 2024-08-26 PROCEDURE — 82374 ASSAY BLOOD CARBON DIOXIDE: CPT | Performed by: PHYSICIAN ASSISTANT

## 2024-08-26 PROCEDURE — 85027 COMPLETE CBC AUTOMATED: CPT | Performed by: STUDENT IN AN ORGANIZED HEALTH CARE EDUCATION/TRAINING PROGRAM

## 2024-08-26 PROCEDURE — 250N000013 HC RX MED GY IP 250 OP 250 PS 637: Performed by: PHYSICIAN ASSISTANT

## 2024-08-26 PROCEDURE — 36415 COLL VENOUS BLD VENIPUNCTURE: CPT | Performed by: PHYSICIAN ASSISTANT

## 2024-08-26 PROCEDURE — 71046 X-RAY EXAM CHEST 2 VIEWS: CPT

## 2024-08-26 PROCEDURE — 250N000013 HC RX MED GY IP 250 OP 250 PS 637

## 2024-08-26 PROCEDURE — 250N000013 HC RX MED GY IP 250 OP 250 PS 637: Performed by: STUDENT IN AN ORGANIZED HEALTH CARE EDUCATION/TRAINING PROGRAM

## 2024-08-26 RX ORDER — ASPIRIN 81 MG/1
162 TABLET, CHEWABLE ORAL DAILY
COMMUNITY
Start: 2024-08-27 | End: 2024-09-04

## 2024-08-26 RX ORDER — OXYCODONE HYDROCHLORIDE 5 MG/1
2.5 TABLET ORAL EVERY 6 HOURS PRN
Qty: 10 TABLET | Refills: 0 | Status: SHIPPED | OUTPATIENT
Start: 2024-08-26 | End: 2024-09-02

## 2024-08-26 RX ORDER — METHOCARBAMOL 500 MG/1
500 TABLET, FILM COATED ORAL EVERY 6 HOURS PRN
Qty: 20 TABLET | Refills: 0 | Status: SHIPPED | OUTPATIENT
Start: 2024-08-26 | End: 2024-09-04

## 2024-08-26 RX ORDER — AMOXICILLIN 250 MG
3 CAPSULE ORAL 2 TIMES DAILY PRN
Qty: 10 TABLET | Refills: 0 | Status: SHIPPED | OUTPATIENT
Start: 2024-08-26

## 2024-08-26 RX ORDER — ACETAMINOPHEN 325 MG/1
650 TABLET ORAL EVERY 4 HOURS PRN
COMMUNITY
Start: 2024-08-26

## 2024-08-26 RX ORDER — METOPROLOL SUCCINATE 25 MG/1
50 TABLET, EXTENDED RELEASE ORAL DAILY
Qty: 90 TABLET | Refills: 0 | Status: SHIPPED | OUTPATIENT
Start: 2024-08-26 | End: 2024-09-04

## 2024-08-26 RX ADMIN — POLYETHYLENE GLYCOL 3350 17 G: 17 POWDER, FOR SOLUTION ORAL at 08:18

## 2024-08-26 RX ADMIN — SENNOSIDES AND DOCUSATE SODIUM 3 TABLET: 8.6; 5 TABLET ORAL at 08:18

## 2024-08-26 RX ADMIN — PANTOPRAZOLE SODIUM 40 MG: 40 TABLET, DELAYED RELEASE ORAL at 08:18

## 2024-08-26 RX ADMIN — METOPROLOL TARTRATE 25 MG: 25 TABLET, FILM COATED ORAL at 08:17

## 2024-08-26 RX ADMIN — METHOCARBAMOL 500 MG: 500 TABLET ORAL at 05:22

## 2024-08-26 RX ADMIN — ACETAMINOPHEN 650 MG: 325 TABLET ORAL at 05:18

## 2024-08-26 RX ADMIN — ASPIRIN 81 MG CHEWABLE TABLET 162 MG: 81 TABLET CHEWABLE at 08:17

## 2024-08-26 ASSESSMENT — ACTIVITIES OF DAILY LIVING (ADL)
ADLS_ACUITY_SCORE: 28
ADLS_ACUITY_SCORE: 28
ADLS_ACUITY_SCORE: 31
ADLS_ACUITY_SCORE: 28
ADLS_ACUITY_SCORE: 31
ADLS_ACUITY_SCORE: 28
ADLS_ACUITY_SCORE: 28
ADLS_ACUITY_SCORE: 31
ADLS_ACUITY_SCORE: 28

## 2024-08-26 NOTE — PROGRESS NOTES
"CLINICAL NUTRITION SERVICES    Reason for Assessment:  Heart-healthy nutrition education, received consult.    Diet History:  Pt was seen by outpatient RD on 5/1/2024 for kidney transplant evaluation. At that time, \"Pt follows a low salt/potassium/phosphorus diet and reports it is going OK. Renal diet change ~9/2023.\"     Per discussion with pt, states he is following a renal diet. Aware of sodium and tries to limit his sodium intake. States he tries to eat some vegetarian options. Per pt, works in Patient Education. Agreeable to nutrition education.     Nutrition Diagnosis:  Food- and nutrition-related knowledge deficit r/t no recent education on heart-healthy diet AEB no previous formal heart-healthy nutrition education recently per EHR.     Nutrition Prescription/Recs:  Continue heart-healthy diet.      Interventions:  Nutrition Education: Provided verbal instruction on a heart-healthy diet. Rec pt continue to follow his diet restrictions as rec PTA. Discussed K+, phos. Encouraged adequate protein due to dialysis. Provided handouts: Making Sense of Food Labels and Your Heart-Healthy Diet.     Goals:    Pt will list at least two interventions to make current meal plan more heart-healthy.     Follow-up:   Patient to ask any further nutrition-related questions before discharge. In addition, pt may request outpatient RD appointment or ask questions when at dialysis (RD at dialysis center).     Yesika Amador, MS, RD, LD, CNSC      No longer available via pager  6C (beds [phone] and [phone]): Vocera \"6C Clinical Dietitian\"   Weekend/Holiday: Vocera \"Weekend Clinical Dietitian\"   "

## 2024-08-26 NOTE — PLAN OF CARE
Neuro: A&Ox4.   Cardiac: SR. VSS.   Respiratory: Sating 98 on RA.  GI/: pt on HD - oliguric . No Bm this shift   Diet/appetite: Tolerating regular diet. Eating well.  Activity:  SBA up to chair and in halls.  Pain: denies  Skin: No new deficits noted.  LDA's:  PIV - removed  DL CVC - HD line   Plan: Continue with POC. Notify primary team with changes.      DISCHARGE                           ----------------------------------------------------------------------------  Discharged to: Home  Via: private transportation  Accompanied by: Family - Wife   Discharge Instructions: *diet, *activity, medications, follow up appointments, when to call the MD, aftercare instructions.  Prescriptions: To be filled by pharmacy; medication list reviewed & sent with pt  Follow Up Appointments: arranged; information given  Belongings: All sent with pt  IV: d/c'd  Telemetry: d/c'd  Pt exhibits understanding of above discharge instructions; all questions answered.    Discharge Paperwork: Signed, copied, and sent home with patient.

## 2024-08-26 NOTE — PROGRESS NOTES
Care Management Follow Up    CHW updated OP dialysis unit that pt is discharging today.    [facility]  [address] [place] [address]  Ph [phone] ext4  F [phone]  Faxed discharge orders, discharge summary, and 8/25 nephrology note. Sent.       Jennifer Goldsmith   6A/B/C Community Health Worker   Phone: [phone]

## 2024-08-26 NOTE — PROGRESS NOTES
Catheters removed from patients back with Tip intact.  Okay to resume anti coagulation in the morning.  Pain service will sign off at this time.    Beck Pennington MD on 8/26/2024 at 4:57 AM     No

## 2024-08-26 NOTE — PROGRESS NOTES
DISCHARGE                         8/26/2024 12:46 PM  ----------------------------------------------------------------------------  Discharged to: Home  Via: Automobile  Accompanied by: Family  Discharge Instructions: diet, activity, medications, follow up appointments, when to call the MD, aftercare instructions, and what to watchout for (i.e. s/s of infection, increasing SOB, palpitations, chest pain,)  Prescriptions: To be filled by [facility] per pt's request; medication list reviewed & sent with pt  Follow Up Appointments: arranged; information given  Belongings: All sent with pt  IV: out  Telemetry: off  Pt exhibits understanding of above discharge instructions; all questions answered.    Discharge Paperwork: Signed, copied, and sent home with patient.

## 2024-08-26 NOTE — PLAN OF CARE
"darrell Fleming is a 74 year old adult with PMH of CAD s/p MI in 1996, HTN, ESRD on HD, ICM w/ EF 30-35%, 3v CAD who presents to [facility] for CABG on by Dr. Solares on 8/21.      /65 (BP Location: Right arm, Cuff Size: Adult Regular)   Pulse 88   Temp 98.7  F (37.1  C) (Oral)   Resp 18   Ht 1.829 m (6')   Wt 90.9 kg (200 lb 6.4 oz)   SpO2 99%   BMI 27.18 kg/m         Neuro: A&Ox4.   Cardiac: SR - 70s - 90s with activity. Afebrile, VSS.        Respiratory: RA - SATs above 95%. Denies SOB, mild RODRIGUEZ.   GI/: Voiding spontaneously, up to bathroom SBA, small loose BM otherwise \"mainly urine and gas\" per pt. Adequate output.   Diet/appetite: Tolerating regular diet. Denies nausea.  Activity: Up with standby assist    Pain: 1-2/10 incisional pain chest incision, reduced to a tolerable level with PRN tylenol. Increases with activity, precautions maintained.   Skin: Old CT sites UTV - bandages CDI. L tibial WDL, L groin WDL.   Lines: R PIV - SL, R subclavian CVC for dialysis do not use.           Shift: [id number]        Robinson TRACY RN.  "

## 2024-08-26 NOTE — PLAN OF CARE
Occupational Therapy Discharge Summary    Reason for therapy discharge:    Discharged to home with outpatient therapy.    Progress towards therapy goal(s). See goals on Care Plan in [facility] electronic health record for goal details.  Goals partially met.  Barriers to achieving goals:   discharge from facility.    Therapy recommendation(s):    Pt currently limited by pain, precautions. Has balance deficits at baseline. Rec OP CR to increase activity tolerance. Pending continued progress should be able to dc to home w A from wife PRN.

## 2024-08-27 NOTE — PLAN OF CARE
Physical Therapy Discharge Summary    Reason for therapy discharge:    Discharged to home with outpatient therapy.    Progress towards therapy goal(s). See goals on Care Plan in [facility] electronic health record for goal details.  Goals partially met.  Barriers to achieving goals:   discharge from facility.    Therapy recommendation(s):    Continued therapy is recommended.  Rationale/Recommendations:  Recommend pt continue improving functional endurance and maximizing IND with OP CR.

## 2024-08-29 ENCOUNTER — TELEPHONE (OUTPATIENT)
Dept: CARDIOLOGY | Facility: CLINIC | Age: 74
End: 2024-08-29
Payer: COMMERCIAL

## 2024-08-29 NOTE — TELEPHONE ENCOUNTER
ACTIVITY  How is your activity tolerance? Patient walking in the home and outside, tolerating well, no complications at this time.    POST OP MONITORING  How is your pain on a 0-10 scale, how are you managing your pain? 1/10 pain managed with Tylenol well, states he has taken on Tylenol per day.    Are following your sternal precautions? Yes    Do you hear any clicking when you are moving or taking a deep breath?  No    Are you weighing yourself daily?  Yes staying the same    Are you using the inspirometer? Yes       SIGNS AND SYMPTOMS OF INFECTION  1. INCREASE IN PAIN No  2. FEVER No  3. DRAINAGE No    If Yes, color:                 5. REDNESS No    6. SWELLING No  Sternal incision is healing well, wound edges approximated well. Call your surgeon or primary care provider's office immediately if experiencing any of the symptoms mentioned above. Chest tube incision sites are healing well; no s/s of infection present. Drainage No     ASSISTANCE  Do you have someone at home to assist you with your daily activities?  Yes      MEDICATIONS  Is someone helping you to set up your medications?  Yes  Do you have any questions about your medications?  No        FOLLOW UP  Are you scheduled for cardiac rehab? 9/3      You are scheduled to see our surgery advanced practice provider for post operative follow up on 9/3   You are scheduled to see your cardiologist on 9/30  You are scheduled to see your primary care physician on 9/4       CONTACT INFORMATION  Please feel free to call us with any other questions or symptoms that are concerning for you at [phone], if it is after 4:30 in the afternoon, or a weekend please call [phone] and ask for the on call specialist.  We want to do everything we can to help prevent you needing to return to the ED, so please do not hesitate to call us.

## 2024-09-03 ENCOUNTER — OFFICE VISIT (OUTPATIENT)
Dept: CARDIOLOGY | Facility: CLINIC | Age: 74
End: 2024-09-03
Attending: SURGERY
Payer: COMMERCIAL

## 2024-09-03 ENCOUNTER — HOSPITAL ENCOUNTER (OUTPATIENT)
Dept: CARDIAC REHAB | Facility: HOSPITAL | Age: 74
Discharge: HOME | End: 2024-09-03
Attending: STUDENT IN AN ORGANIZED HEALTH CARE EDUCATION/TRAINING PROGRAM
Payer: COMMERCIAL

## 2024-09-03 VITALS
DIASTOLIC BLOOD PRESSURE: 67 MMHG | OXYGEN SATURATION: 96 % | WEIGHT: 192.5 LBS | BODY MASS INDEX: 26.11 KG/M2 | SYSTOLIC BLOOD PRESSURE: 112 MMHG | HEART RATE: 90 BPM

## 2024-09-03 DIAGNOSIS — I25.83 CORONARY ATHEROSCLEROSIS DUE TO LIPID RICH PLAQUE: ICD-10-CM

## 2024-09-03 DIAGNOSIS — Z95.1 S/P CABG (CORONARY ARTERY BYPASS GRAFT): Primary | ICD-10-CM

## 2024-09-03 PROCEDURE — 99213 OFFICE O/P EST LOW 20 MIN: CPT

## 2024-09-03 PROCEDURE — 93798 PHYS/QHP OP CAR RHAB W/ECG: CPT

## 2024-09-03 PROCEDURE — 93797 PHYS/QHP OP CAR RHAB WO ECG: CPT

## 2024-09-03 PROCEDURE — 99024 POSTOP FOLLOW-UP VISIT: CPT | Performed by: PHYSICIAN ASSISTANT

## 2024-09-03 ASSESSMENT — PAIN SCALES - GENERAL: PAINLEVEL: NO PAIN (0)

## 2024-09-03 NOTE — PROGRESS NOTES
CARDIOTHORACIC SURGERY FOLLOW-UP VISIT     Bret Fleming   1950   [id number]      Date: 8/21/24.  Surgeon: Dr. Jah Solares  1.  Coronary bypass grafting x 3 (left internal mammary artery to left anterior descending artery,  saphenous vein graft to 1st obtuse marginal artery,  saphenous vein graft to posterior descending artery).  2.  Endoscopic vein harvest.    ASSESSMENT/PLAN:   Bret Fleming is a 74 year old year old adult status post 3 vessel CABG who returns to clinic for postop visit.     Surgically doing well. Pain is overall controlled. Midline sternal incision healing well without signs of infection. Increasing activity and strength.  Patient is in a regular rhythm per auscultation with HR <100 bpm.    Appears close euvolemic on examinination with no appreciable JVD, lower extremity edema, or abdominal bloating.  Discharge weight 200 lbs; weight today 192 lbs.     Hemodynamics are stable. Medications reviewed and no changes were needed today.  Follow up with your PCP (Alisha Gloria, 9/4/24).   Follow up with cardiology team (Lilly Corea, 9/30/24) as scheduled.  Chronic Kidney Disease; dialysis and medical management per Nephrology team.   Continue Outpatient Cardiac Rehab until completed.   Continue sternal precautions for 12 weeks from surgery date.   No driving for 4 weeks from surgery date.     Postoperative restrictions have been reviewed and all of the patient's questions were answered. Our contact information was given to the patient if he should have any further questions/concerns. No further follow up is needed with us.  The total time spent with the patient was 25 minutes, > 50% of which was spent in counseling and coordination of care.    Uri Charles PA-C  Cardiothoracic Surgery  __________________________________________________________________________________  HPI:   Bret Fleming is a 74 year old year old adult with a PMH of CAD s/p MI in 1996, HTN, HLD, ESRD on HD, ICM w/ EF  Inpatient History and Physical     Chief Complaint   Patient presents with   • Dizziness       History Of Present Illness  German is a 42 year old male with a history of hypertension who presents to the emergency room with complaints of dizziness patient states he noted dizziness for the past 2 to 3 days prior to admission he states it started Friday he noted persistent symptoms and as the symptoms persisted he came to the emergency room he denied any visual complaints he denied any focal neurologic complaints.  Patient denies any chest pain cough or shortness of breath.  It appears the patient is now holistically compliant with his medication regimen he states he works at times he begins to take his medication.   Patient states his last visit with his primary care physician was over a year and a half states he gone to urgent care at some point to get refills on his medication  In the ER patient was noted to have these complaints he was noted to have some concerns for abnormal EKG and troponin was 0.07 and patient was admitted for further evaluation management      Past Medical History  Past Medical History:   Diagnosis Date   • Essential (primary) hypertension         Surgical History  History reviewed. No pertinent surgical history.     Social History  Social History     Tobacco Use   • Smoking status: Never Smoker   • Smokeless tobacco: Never Used   Substance Use Topics   • Alcohol use: Yes     Comment: social   • Drug use: Never       Family History  History reviewed. No pertinent family history.   Patient denied any family history of coronary artery disease or strokes     Allergies  ALLERGIES:  Patient has no known allergies.    Medications  Medications Prior to Admission   Medication Sig Dispense Refill   • amLODIPine (NORVASC) 5 MG tablet Take 1 tablet by mouth daily. 30 tablet 5   • carvedilol (COREG) 12.5 MG tablet Take 1 tablet by mouth 2 times daily (with meals). 60 tablet 5   • hydrALAZINE (APRESOLINE)  25 MG tablet Take 1 tablet by mouth 3 times daily. 90 tablet 5   • hydrochlorothiazide (HYDRODIURIL) 25 MG tablet Take 1 tablet by mouth daily. 30 tablet 5   • amLODIPine (NORVASC) 5 MG tablet TAKE 1 TABLET BY MOUTH EVERY DAY     • carvedilol (COREG) 12.5 MG tablet TAKE 1 TABLET BY MOUTH TWICE DAILY     • hydrALAZINE (APRESOLINE) 25 MG tablet TAKE 1 TABLET BY MOUTH 3 TIMES DAILY         Review of Systems   Constitutional: Negative for activity change, chills and fever.   HENT: Negative for trouble swallowing.    Eyes: Negative for visual disturbance.   Respiratory: Negative for apnea, chest tightness, shortness of breath and wheezing.    Cardiovascular: Negative for chest pain and palpitations.   Gastrointestinal: Negative for abdominal pain, nausea and vomiting.   Genitourinary: Negative for difficulty urinating and dysuria.   Skin: Negative for wound.   Neurological: Positive for dizziness. Negative for weakness.         Last Recorded Vitals  Visit Vitals  BP (!) 182/133   Pulse 82   Temp 97.7 °F (36.5 °C) (Oral)   Resp 12   Ht 5' 9\" (1.753 m)   Wt 98.8 kg (217 lb 13 oz)   SpO2 98%   BMI 32.17 kg/m²       Physical Exam  Vitals signs and nursing note reviewed.   Constitutional:       Appearance: Normal appearance.   HENT:      Head: Normocephalic and atraumatic.      Nose: Nose normal. No congestion.      Mouth/Throat:      Mouth: Mucous membranes are moist.      Pharynx: Oropharynx is clear.   Eyes:      Extraocular Movements: Extraocular movements intact.      Conjunctiva/sclera: Conjunctivae normal.      Pupils: Pupils are equal, round, and reactive to light.   Neck:      Musculoskeletal: Neck supple. No muscular tenderness.   Cardiovascular:      Rate and Rhythm: Normal rate and regular rhythm.      Pulses: Normal pulses.      Heart sounds: Normal heart sounds.   Abdominal:      General: Abdomen is flat. Bowel sounds are normal. There is no distension.      Palpations: Abdomen is soft. There is no mass.       30-35% with workup for pre-op AV Fistula formation found severe calcified 3v CAD. He was asymptomatic from cardiac perspective, but walks with a cane due to knee pain. He presented for surgery on 8/21/24 and is s/p CABG.  Hospital course was overall unremarkable.   Patient was discharged home on 8/26/24.    Patient has been doing well since discharge and now returns to clinic for postop visit.  Patient endorses incisional chest pain is tolerable with tylenol.     Patient reports incision is healing well.  Denies sternal popping or clicking or incisional drainage/erythema.  Patient denies any fever, chest pain, palpitations, edema, SOB, and lightheadedness.    Patient is having normal bowel movements and voiding without problems.    Has been attending cardiac rehabilitation and that is going well, just started today.    Weight has been consistent but down from discharge (home scale says 200 lb).      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 (H)     Bilateral hydronephrosis     CAD (coronary artery disease)     Hyperlipidemia     Hypertension     Ischemic cardiomyopathy     Sepsis due to gram-negative UTI (H) 10/07/2023       PAST SURGICAL HISTORY:  Past Surgical History:   Procedure Laterality Date    BYPASS GRAFT ARTERY CORONARY N/A 8/21/2024    Procedure: Median sternotomy, Left internal mamary artery harvest, endoscopic Left shapenous vein harvest, on cardiopulmonary bypass, CORONARY ARTERY BYPASS GRAFT x3, transesophageal echocardiogram by anesthesia.;  Surgeon: Jah Solares MD;  Location: [facility] OR    CV CORONARY ANGIOGRAM N/A 7/8/2024    Procedure: Coronary Angiogram;  Surgeon: Kevin Thomas MD;  Location:  [facility]    ESOPHAGOSCOPY, GASTROSCOPY, DUODENOSCOPY (EGD), COMBINED N/A 10/9/2023    Procedure: Esophagoscopy, gastroscopy,  Tenderness: There is no abdominal tenderness. There is no guarding.   Musculoskeletal:         General: No swelling or tenderness.   Skin:     General: Skin is warm and dry.      Findings: No rash.   Neurological:      General: No focal deficit present.      Mental Status: He is alert and oriented to person, place, and time. Mental status is at baseline.   Psychiatric:         Mood and Affect: Mood normal.         Behavior: Behavior normal.         Labs   Recent Results (from the past 24 hour(s))   Electrocardiogram 12-Lead    Collection Time: 03/08/21  7:37 AM   Result Value Ref Range    Ventricular Rate EKG/Min (BPM) 85     Atrial Rate (BPM) 85     NE-Interval (MSEC) 216     QRS-Interval (MSEC) 96     QT-Interval (MSEC) 378     QTc 449     P Axis (Degrees) 5     R Axis (Degrees) 108     T Axis (Degrees) -53     REPORT TEXT       Sinus rhythm  with 1st degree AV block  Possible  Left atrial enlargement  Rightward axis  T wave abnormality, consider inferior ischemia  Abnormal ECG  No previous ECGs available     Comprehensive Metabolic Panel    Collection Time: 03/08/21  7:53 AM   Result Value Ref Range    Fasting Status      Sodium 144 135 - 145 mmol/L    Potassium 3.9 3.4 - 5.1 mmol/L    Chloride 107 98 - 107 mmol/L    Carbon Dioxide 27 21 - 32 mmol/L    Anion Gap 14 10 - 20 mmol/L    Glucose 118 (H) 65 - 99 mg/dL    BUN 11 6 - 20 mg/dL    Creatinine 1.28 (H) 0.67 - 1.17 mg/dL    Glomerular Filtration Rate 79 (L) >90 mL/min/1.73m2    BUN/ Creatinine Ratio 9 7 - 25    Calcium 9.2 8.4 - 10.2 mg/dL    Bilirubin, Total 0.5 0.2 - 1.0 mg/dL    GOT/AST 8 <=37 Units/L    GPT/ALT 24 <64 Units/L    Alkaline Phosphatase 82 45 - 117 Units/L    Albumin 3.8 3.6 - 5.1 g/dL    Protein, Total 7.6 6.4 - 8.2 g/dL    Globulin 3.8 2.0 - 4.0 g/dL    A/G Ratio 1.0 1.0 - 2.4   CBC with Automated Differential (performable only)    Collection Time: 03/08/21  7:53 AM   Result Value Ref Range    WBC 5.2 4.2 - 11.0 K/mcL    RBC 5.43 4.50 -  duodenoscopy (EGD), combined;  Surgeon: Karolyn Saxena MD;  Location: [facility]    IR CVC TUNNEL PLACEMENT > 5 YRS OF AGE  9/27/2023    IR CVC TUNNEL PLACEMENT > 5 YRS OF AGE  10/11/2023    PICC TRIPLE LUMEN PLACEMENT  9/27/2023       CURRENT MEDICATIONS:   Current Outpatient Medications   Medication Sig Dispense Refill    acetaminophen (TYLENOL) 325 MG tablet Take 2 tablets (650 mg) by mouth or Feeding Tube every 4 hours as needed for other, headaches, pain or fever (For optimal non-opioid multimodal pain management to improve pain control.).      aspirin (ASA) 81 MG chewable tablet Take 2 tablets (162 mg) by mouth or NG Tube daily.      atorvastatin (LIPITOR) 40 MG tablet Take 1 tablet (40 mg) by mouth every evening 90 tablet 2    CRANBERRY EXTRACT PO Take 1 capsule by mouth daily. Unknown dose      diclofenac (VOLTAREN) 1 % topical gel Apply 2 g topically three times a week. Pt uses on knees once on dialysis days (Tu/Th/Sat)      diphenhydrAMINE (BENADRYL) 25 MG capsule Take 25 mg by mouth once as needed for itching. Pt uses one cap ~2 days/week PRN for itching due to dry skin      GARLIC PO Take 1 capsule by mouth daily. Unknown dose      methocarbamol (ROBAXIN) 500 MG tablet Take 1 tablet (500 mg) by mouth or Feeding Tube every 6 hours as needed for muscle spasms. 20 tablet 0    metoprolol succinate ER (TOPROL XL) 25 MG 24 hr tablet Take 2 tablets (50 mg) by mouth daily. 90 tablet 0    MULTIPLE VITAMIN PO Take 1 tablet by mouth every morning      OMEGA-3 FATTY ACIDS PO Take 1 capsule by mouth daily. Unknown dose      pantoprazole (PROTONIX) 40 MG EC tablet Take 1 tablet (40 mg) by mouth 2 times daily (before meals) 60 tablet 0    Saw Palmetto, Serenoa repens, (SAW PALMETTO PO) Take 1 capsule by mouth daily. Unknown dose      senna-docusate (SENOKOT-S/PERICOLACE) 8.6-50 MG tablet Take 3 tablets by mouth or Feeding Tube 2 times daily as needed for constipation. (Patient not taking: Reported on 9/3/2024) 10  "tablet 0     No current facility-administered medications for this visit.       ALLERGIES:      Allergies   Allergen Reactions    Ciprofloxacin Rash    Other Drug Allergy (See Comments)      Cough Syrup Abstracted from [facility] Chart( Hydrobromide)       ROS:  Review of symptoms otherwise negative unless commented about in HPI.     LABS:  Last Basic Metabolic Panel:  Lab Results   Component Value Date     08/26/2024      Lab Results   Component Value Date    POTASSIUM 4.6 08/26/2024    POTASSIUM 4.4 08/21/2024    POTASSIUM 4.4 05/27/2022     Lab Results   Component Value Date    CHLORIDE 96 08/26/2024    CHLORIDE 107 05/27/2022     Lab Results   Component Value Date    CHANA 8.4 08/26/2024     Lab Results   Component Value Date    CO2 23 08/26/2024    CO2 21 05/27/2022     Lab Results   Component Value Date    BUN 44.2 08/26/2024    BUN 49 05/27/2022     Lab Results   Component Value Date    CR 4.22 08/26/2024     Lab Results   Component Value Date    GLC 94 08/26/2024    GLC 92 08/23/2024     05/27/2022       Last CBC:   Lab Results   Component Value Date    WBC 6.0 08/26/2024     Lab Results   Component Value Date    RBC 2.59 08/26/2024     Lab Results   Component Value Date    HGB 9.1 08/26/2024     Lab Results   Component Value Date    HCT 28.0 08/26/2024     No components found for: \"MCT\"  Lab Results   Component Value Date     08/26/2024     Lab Results   Component Value Date    MCH 35.1 08/26/2024     Lab Results   Component Value Date    MCHC 32.5 08/26/2024     Lab Results   Component Value Date    RDW 12.1 08/26/2024     Lab Results   Component Value Date     08/26/2024       INR:  Lab Results   Component Value Date    INR 1.54 08/21/2024    INR 1.64 08/21/2024       IMAGING:  None    PHYSICAL EXAM:   /67 (BP Location: Right arm, Patient Position: Chair, Cuff Size: Adult Regular)   Pulse 90   Wt 87.3 kg (192 lb 8 oz)   SpO2 96%   BMI 26.11 kg/m    General: alert and " 5.90 mil/mcL    HGB 16.3 13.0 - 17.0 g/dL    HCT 47.6 39.0 - 51.0 %    MCV 87.7 78.0 - 100.0 fl    MCH 30.0 26.0 - 34.0 pg    MCHC 34.2 32.0 - 36.5 g/dL    RDW-CV 13.0 11.0 - 15.0 %    RDW-SD 41.5 39.0 - 50.0 fL     140 - 450 K/mcL    NRBC 0 <=0 /100 WBC    Neutrophil, Percent 51 %    Lymphocytes, Percent 36 %    Mono, Percent 8 %    Eosinophils, Percent 4 %    Basophils, Percent 1 %    Immature Granulocytes 0 %    Absolute Neutrophils 2.6 1.8 - 7.7 K/mcL    Absolute Lymphocytes 1.9 1.0 - 4.8 K/mcL    Absolute Monocytes 0.4 0.3 - 0.9 K/mcL    Absolute Eosinophils  0.2 0.0 - 0.5 K/mcL    Absolute Basophils 0.0 0.0 - 0.3 K/mcL    Absolute Immmature Granulocytes 0.0 0.0 - 0.2 K/mcL   Troponin I Ultra Sensitive    Collection Time: 03/08/21  7:53 AM   Result Value Ref Range    Troponin I, Ultra Sensitive 0.06 (HH) <=0.04 ng/mL   Rapid SARS-CoV-2 by PCR    Collection Time: 03/08/21  9:10 AM    Specimen: Nasopharyngeal; Swab   Result Value Ref Range    Rapid SARS-COV-2 by PCR Not Detected Not Detected / Detected / Presumptive Positive / Inhibitors present    Isolation Guidelines      Procedural Comment           Imaging  No orders to display        Assessment and Plan   42-year-old with a history of hypertension who presents to the emergency room with dizziness noted to have elevated troponin    Dizziness  Etiology likely related to uncontrolled hypertension discussed with patient at length need for ongoing strict compliant with his medication given his young age and already with and his renal function    Elevated troponin  Patient denies any chest pain likely related to blood pressure issues we will check a echocardiogram cardiology has been consulted likely further risk stratification with blood pressure better control    Hypertension with chronic kidney disease stage III  Uncontrolled  Continue to urged compliance  Resume home medications likely will benefit from the addition of a diuretic we will change  amlodipine to Procardia further recommendations to follow    DVT Prophylaxis   subcu Lovenox    Code Status  Full    Primary Care Physician  DO Cinthia Salinas MD   3/8/2021  1:38 PM   oriented x 3, pleasant, no acute distress, normal mood and affect  Neuro: no focal deficits   Extremities: left forearm AF fistula with bandages in place.  CV: S1 S2, no murmurs, rubs or gallops, regular rate and rhythm, no peripheral edema  Pulm: left base has diminished breath sounds, otherwise clear to auscultation, easy work of breathing  Incision:  clean dry and intact without erythema, swelling or drainage    PROCEDURES:  None         KAMINI Gloria

## 2024-09-03 NOTE — NURSING NOTE
Chief Complaint   Patient presents with    Follow Up     Post-OP       Vitals were taken, medications reconciled.    Matti Cardoza, Clinic Assistant   3:41 PM

## 2024-09-03 NOTE — LETTER
9/3/2024      RE: Bret Fleming  [address]  [place] [address]       Dear Colleague,    Thank you for the opportunity to participate in the care of your patient, Bret Fleming, at the [facility]. Please see a copy of my visit note below.    CARDIOTHORACIC SURGERY FOLLOW-UP VISIT     Bret Fleming   1950   [id number]      Date: 8/21/24.  Surgeon: Dr. Jah Solares  1.  Coronary bypass grafting x 3 (left internal mammary artery to left anterior descending artery,  saphenous vein graft to 1st obtuse marginal artery,  saphenous vein graft to posterior descending artery).  2.  Endoscopic vein harvest.    ASSESSMENT/PLAN:   Bret Fleming is a 74 year old year old adult status post 3 vessel CABG who returns to clinic for postop visit.     Surgically doing well. Pain is overall controlled. Midline sternal incision healing well without signs of infection. Increasing activity and strength.  Patient is in a regular rhythm per auscultation with HR <100 bpm.    Appears close euvolemic on examinination with no appreciable JVD, lower extremity edema, or abdominal bloating.  Discharge weight 200 lbs; weight today 192 lbs.     Hemodynamics are stable. Medications reviewed and no changes were needed today.  Follow up with your PCP (Alisha Gloria, 9/4/24).   Follow up with cardiology team (Lilly Corea, 9/30/24) as scheduled.  Chronic Kidney Disease; dialysis and medical management per Nephrology team.   Continue Outpatient Cardiac Rehab until completed.   Continue sternal precautions for 12 weeks from surgery date.   No driving for 4 weeks from surgery date.     Postoperative restrictions have been reviewed and all of the patient's questions were answered. Our contact information was given to the patient if he should have any further questions/concerns. No further follow up is needed with us.  The total time spent with the  patient was 25 minutes, > 50% of which was spent in counseling and coordination of care.    Uri Charles PA-C  Cardiothoracic Surgery  __________________________________________________________________________________  HPI:   Bret Fleming is a 74 year old year old adult with a PMH of CAD s/p MI in 1996, HTN, HLD, ESRD on HD, ICM w/ EF 30-35% with workup for pre-op AV Fistula formation found severe calcified 3v CAD. He was asymptomatic from cardiac perspective, but walks with a cane due to knee pain. He presented for surgery on 8/21/24 and is s/p CABG.  Hospital course was overall unremarkable.   Patient was discharged home on 8/26/24.    Patient has been doing well since discharge and now returns to clinic for postop visit.  Patient endorses incisional chest pain is tolerable with tylenol.     Patient reports incision is healing well.  Denies sternal popping or clicking or incisional drainage/erythema.  Patient denies any fever, chest pain, palpitations, edema, SOB, and lightheadedness.    Patient is having normal bowel movements and voiding without problems.    Has been attending cardiac rehabilitation and that is going well, just started today.    Weight has been consistent but down from discharge (home scale says 200 lb).      PAST MEDICAL HISTORY:  Past Medical History:   Diagnosis Date     Acute myocardial infarction      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 (H)      Bilateral hydronephrosis      CAD (coronary artery disease)      Hyperlipidemia      Hypertension      Ischemic cardiomyopathy      Sepsis due to gram-negative UTI (H) 10/07/2023       PAST SURGICAL HISTORY:  Past Surgical History:   Procedure Laterality Date     BYPASS GRAFT ARTERY CORONARY N/A 8/21/2024    Procedure: Median sternotomy, Left internal mamary artery harvest, endoscopic Left shapenous vein harvest, on cardiopulmonary bypass, CORONARY ARTERY BYPASS GRAFT x3,  transesophageal echocardiogram by anesthesia.;  Surgeon: Jah Solares MD;  Location: [facility] OR     CV CORONARY ANGIOGRAM N/A 7/8/2024    Procedure: Coronary Angiogram;  Surgeon: Kevin Thomas MD;  Location:  HEART CARDIAC CATH LAB     ESOPHAGOSCOPY, GASTROSCOPY, DUODENOSCOPY (EGD), COMBINED N/A 10/9/2023    Procedure: Esophagoscopy, gastroscopy, duodenoscopy (EGD), combined;  Surgeon: Karolyn Saxena MD;  Location:  GI     IR CVC TUNNEL PLACEMENT > 5 YRS OF AGE  9/27/2023     IR CVC TUNNEL PLACEMENT > 5 YRS OF AGE  10/11/2023     PICC TRIPLE LUMEN PLACEMENT  9/27/2023       CURRENT MEDICATIONS:   Current Outpatient Medications   Medication Sig Dispense Refill     acetaminophen (TYLENOL) 325 MG tablet Take 2 tablets (650 mg) by mouth or Feeding Tube every 4 hours as needed for other, headaches, pain or fever (For optimal non-opioid multimodal pain management to improve pain control.).       aspirin (ASA) 81 MG chewable tablet Take 2 tablets (162 mg) by mouth or NG Tube daily.       atorvastatin (LIPITOR) 40 MG tablet Take 1 tablet (40 mg) by mouth every evening 90 tablet 2     CRANBERRY EXTRACT PO Take 1 capsule by mouth daily. Unknown dose       diclofenac (VOLTAREN) 1 % topical gel Apply 2 g topically three times a week. Pt uses on knees once on dialysis days (Tu/Th/Sat)       diphenhydrAMINE (BENADRYL) 25 MG capsule Take 25 mg by mouth once as needed for itching. Pt uses one cap ~2 days/week PRN for itching due to dry skin       GARLIC PO Take 1 capsule by mouth daily. Unknown dose       methocarbamol (ROBAXIN) 500 MG tablet Take 1 tablet (500 mg) by mouth or Feeding Tube every 6 hours as needed for muscle spasms. 20 tablet 0     metoprolol succinate ER (TOPROL XL) 25 MG 24 hr tablet Take 2 tablets (50 mg) by mouth daily. 90 tablet 0     MULTIPLE VITAMIN PO Take 1 tablet by mouth every morning       OMEGA-3 FATTY ACIDS PO Take 1 capsule by mouth daily. Unknown dose       pantoprazole (PROTONIX)  "40 MG EC tablet Take 1 tablet (40 mg) by mouth 2 times daily (before meals) 60 tablet 0     Saw Palmetto, Serenoa repens, (SAW PALMETTO PO) Take 1 capsule by mouth daily. Unknown dose       senna-docusate (SENOKOT-S/PERICOLACE) 8.6-50 MG tablet Take 3 tablets by mouth or Feeding Tube 2 times daily as needed for constipation. (Patient not taking: Reported on 9/3/2024) 10 tablet 0     No current facility-administered medications for this visit.       ALLERGIES:      Allergies   Allergen Reactions     Ciprofloxacin Rash     Other Drug Allergy (See Comments)      Cough Syrup Abstracted from [facility] Chart( Hydrobromide)       ROS:  Review of symptoms otherwise negative unless commented about in HPI.     LABS:  Last Basic Metabolic Panel:  Lab Results   Component Value Date     08/26/2024      Lab Results   Component Value Date    POTASSIUM 4.6 08/26/2024    POTASSIUM 4.4 08/21/2024    POTASSIUM 4.4 05/27/2022     Lab Results   Component Value Date    CHLORIDE 96 08/26/2024    CHLORIDE 107 05/27/2022     Lab Results   Component Value Date    CHANA 8.4 08/26/2024     Lab Results   Component Value Date    CO2 23 08/26/2024    CO2 21 05/27/2022     Lab Results   Component Value Date    BUN 44.2 08/26/2024    BUN 49 05/27/2022     Lab Results   Component Value Date    CR 4.22 08/26/2024     Lab Results   Component Value Date    GLC 94 08/26/2024    GLC 92 08/23/2024     05/27/2022       Last CBC:   Lab Results   Component Value Date    WBC 6.0 08/26/2024     Lab Results   Component Value Date    RBC 2.59 08/26/2024     Lab Results   Component Value Date    HGB 9.1 08/26/2024     Lab Results   Component Value Date    HCT 28.0 08/26/2024     No components found for: \"MCT\"  Lab Results   Component Value Date     08/26/2024     Lab Results   Component Value Date    MCH 35.1 08/26/2024     Lab Results   Component Value Date    MCHC 32.5 08/26/2024     Lab Results   Component Value Date    RDW 12.1 08/26/2024 "     Lab Results   Component Value Date     08/26/2024       INR:  Lab Results   Component Value Date    INR 1.54 08/21/2024    INR 1.64 08/21/2024       IMAGING:  None    PHYSICAL EXAM:   /67 (BP Location: Right arm, Patient Position: Chair, Cuff Size: Adult Regular)   Pulse 90   Wt 87.3 kg (192 lb 8 oz)   SpO2 96%   BMI 26.11 kg/m    General: alert and oriented x 3, pleasant, no acute distress, normal mood and affect  Neuro: no focal deficits   Extremities: left forearm AF fistula with bandages in place.  CV: S1 S2, no murmurs, rubs or gallops, regular rate and rhythm, no peripheral edema  Pulm: left base has diminished breath sounds, otherwise clear to auscultation, easy work of breathing  Incision:  clean dry and intact without erythema, swelling or drainage    PROCEDURES:  None         CC  Alisha Gloria               Please do not hesitate to contact me if you have any questions/concerns.     Sincerely,     Cardiovascular Thoracic Surgery

## 2024-09-04 ENCOUNTER — OFFICE VISIT (OUTPATIENT)
Dept: FAMILY MEDICINE | Facility: CLINIC | Age: 74
End: 2024-09-04
Payer: COMMERCIAL

## 2024-09-04 VITALS
OXYGEN SATURATION: 99 % | BODY MASS INDEX: 27.41 KG/M2 | TEMPERATURE: 98.8 F | RESPIRATION RATE: 24 BRPM | HEART RATE: 89 BPM | SYSTOLIC BLOOD PRESSURE: 114 MMHG | DIASTOLIC BLOOD PRESSURE: 68 MMHG | HEIGHT: 72 IN | WEIGHT: 202.4 LBS

## 2024-09-04 DIAGNOSIS — I25.83 CORONARY ATHEROSCLEROSIS DUE TO LIPID RICH PLAQUE: ICD-10-CM

## 2024-09-04 DIAGNOSIS — Z23 NEED FOR VACCINATION: ICD-10-CM

## 2024-09-04 DIAGNOSIS — N18.6 ESRD (END STAGE RENAL DISEASE) (H): ICD-10-CM

## 2024-09-04 DIAGNOSIS — I10 PRIMARY HYPERTENSION: ICD-10-CM

## 2024-09-04 DIAGNOSIS — Z95.1 S/P CABG (CORONARY ARTERY BYPASS GRAFT): Primary | ICD-10-CM

## 2024-09-04 PROCEDURE — 99214 OFFICE O/P EST MOD 30 MIN: CPT | Mod: 25 | Performed by: FAMILY MEDICINE

## 2024-09-04 PROCEDURE — 90662 IIV NO PRSV INCREASED AG IM: CPT | Performed by: FAMILY MEDICINE

## 2024-09-04 PROCEDURE — 90471 IMMUNIZATION ADMIN: CPT | Performed by: FAMILY MEDICINE

## 2024-09-04 RX ORDER — METHOCARBAMOL 500 MG/1
500 TABLET, FILM COATED ORAL 3 TIMES DAILY PRN
Qty: 90 TABLET | Refills: 2 | Status: SHIPPED | OUTPATIENT
Start: 2024-09-04

## 2024-09-04 RX ORDER — METOPROLOL SUCCINATE 25 MG/1
50 TABLET, EXTENDED RELEASE ORAL DAILY
Status: SHIPPED
Start: 2024-09-04

## 2024-09-04 RX ORDER — ASPIRIN 81 MG/1
162 TABLET, CHEWABLE ORAL DAILY
Status: SHIPPED
Start: 2024-09-04

## 2024-09-04 ASSESSMENT — PAIN SCALES - GENERAL: PAINLEVEL: MILD PAIN (2)

## 2024-09-04 NOTE — PROGRESS NOTES
Assessment & Plan     Coronary atherosclerosis due to lipid rich plaque  S/P CABG (coronary artery bypass graft)  S/p hospitalization   Management per cardiology , cardiac rehab  Doing well,   Workability form reviewed and signed.  Return to work with no restrictions on 9/23/2024      Primary hypertension  Normotensive    ESRD (end stage renal disease) (H)  Per nephrology team    Need for vaccination  - INFLUENZA HIGH DOSE, TRIVALENT, PF (FLUZONE)        MED REC REQUIRED  Post Medication Reconciliation Status: discharge medications reconciled and changed, per note/orders  BMI  Estimated body mass index is 27.45 kg/m  as calculated from the following:    Height as of this encounter: 1.829 m (6').    Weight as of this encounter: 91.8 kg (202 lb 6.4 oz).   Weight management plan: Discussed healthy diet and exercise guidelines          Jesus Dobbins is a 74 year old, presenting for Hospital F/U         9/4/2024     2:47 PM   Additional Questions   Roomed by TORO Lin   Accompanied by AYANA CORRALES   Shilpi is a 74 year old, with PMH of CAD s/p MI in 1996, HTN, HLD, ESRD on HD, ICM w/ EF 30-35% with workup for pre-op AV Fistula formation found to have severe calcified 3v CAD, and underwent  three-vessel CABG on 8/21/2024.    He overall voices satisfaction and is doing well with no additional concerns.  He has his workability form to be filled out to return to work a month following the surgery.     He is scheduled for outpatient cardiac rehab      He is on Toprol XL 25mg taking 2 tablets daily, daily aspirin and continuing on atorvastatin 40 mg    Hospital Follow-up Visit:    Hospital/Nursing Home/IP Rehab Facility: [facility]  Date of Admission: 8/21/2024  Date of Discharge: 8/26/2024  Reason(s) for Admission: Coronary artery disease, s/p CABG 8/21, Left ventricular dysfunction, Renal failure on dialysis   Was the patient in the ICU or did the patient experience  delirium during hospitalization?  No  Do you have any other stressors you would like to discuss with your provider? OTHER: Workability form    Problems taking medications regularly:  None  Medication changes since discharge: START: aspirin (ASA) 81 MG chewable tablet, methocarbamol (ROBAXIN) 500 MG tablet, oxyCODONE (ROXICODONE) 5 MG tablet, senna-docusate (SENOKOT-S/PERICOLACE) 8.6-50 MG tablet   CHANGE: acetaminophen (TYLENOL) 325 MG tablet, metoprolol succinate ER (TOPROL XL) 25 MG 24 hr tablet    STOP:   aspirin 81 MG EC tablet, hydrALAZINE (APRESOLINE) 10 MG tablet, nitroGLYcerin (NITROSTAT) 0.4 MG sublingual tablet    Problems adhering to non-medication therapy:  None    Summary of hospitalization:  [facility] discharge summary reviewed  Diagnostic Tests/Treatments reviewed.  Follow up needed: none  Other Healthcare Providers Involved in Patient s Care:          Cardiac rehab  Update since discharge: improved.         Plan of care communicated with patient                       Objective    /68 (BP Location: Right arm, Patient Position: Sitting, Cuff Size: Adult Regular)   Pulse 89   Temp 98.8  F (37.1  C) (Oral)   Resp 24   Ht 1.829 m (6')   Wt 91.8 kg (202 lb 6.4 oz)   SpO2 99%   BMI 27.45 kg/m    Body mass index is 27.45 kg/m .    Physical Exam   GENERAL: alert and no distress  NECK: no adenopathy, no asymmetry, masses, or scars  RESP: lungs clear to auscultation - no rales, rhonchi or wheezes  CV: regular rate and rhythm, normal S1 S2, no S3 or S4, no murmur, click or rub, no peripheral edema  MS: no gross musculoskeletal defects noted, +1 edema left leg            Signed Electronically by: Alisha Gloria MD

## 2024-09-05 ENCOUNTER — HOSPITAL ENCOUNTER (OUTPATIENT)
Dept: CARDIAC REHAB | Facility: HOSPITAL | Age: 74
Discharge: HOME | End: 2024-09-05
Attending: INTERNAL MEDICINE
Payer: COMMERCIAL

## 2024-09-05 PROCEDURE — 93798 PHYS/QHP OP CAR RHAB W/ECG: CPT

## 2024-09-10 ENCOUNTER — HOSPITAL ENCOUNTER (OUTPATIENT)
Dept: CARDIAC REHAB | Facility: HOSPITAL | Age: 74
Discharge: HOME | End: 2024-09-10
Attending: INTERNAL MEDICINE
Payer: COMMERCIAL

## 2024-09-10 PROCEDURE — 93798 PHYS/QHP OP CAR RHAB W/ECG: CPT

## 2024-09-12 ENCOUNTER — HOSPITAL ENCOUNTER (OUTPATIENT)
Dept: CARDIAC REHAB | Facility: HOSPITAL | Age: 74
Discharge: HOME | End: 2024-09-12
Attending: INTERNAL MEDICINE
Payer: COMMERCIAL

## 2024-09-12 PROCEDURE — 93798 PHYS/QHP OP CAR RHAB W/ECG: CPT

## 2024-09-17 ENCOUNTER — HOSPITAL ENCOUNTER (OUTPATIENT)
Dept: CARDIAC REHAB | Facility: HOSPITAL | Age: 74
Discharge: HOME | End: 2024-09-17
Attending: INTERNAL MEDICINE
Payer: COMMERCIAL

## 2024-09-17 PROCEDURE — 93798 PHYS/QHP OP CAR RHAB W/ECG: CPT

## 2024-09-19 ENCOUNTER — HOSPITAL ENCOUNTER (OUTPATIENT)
Dept: CARDIAC REHAB | Facility: HOSPITAL | Age: 74
Discharge: HOME | End: 2024-09-19
Attending: INTERNAL MEDICINE
Payer: COMMERCIAL

## 2024-09-19 PROCEDURE — 93798 PHYS/QHP OP CAR RHAB W/ECG: CPT

## 2024-09-23 ENCOUNTER — TELEPHONE (OUTPATIENT)
Dept: FAMILY MEDICINE | Facility: CLINIC | Age: 74
End: 2024-09-23

## 2024-09-23 NOTE — TELEPHONE ENCOUNTER
September 23, 2024    Patient dropped off Workability Form for Dr. Gloria to complete and sign.  Patient was advised that paperwork takes 5-7 business days to complete.  Patient label was attached to paperwork and placed in Dr. GUTIER[address]Z's mailbox to be processed. Once completed please fax over to [phone] and call patient to inform him it's been faxed over.     Rose Soni

## 2024-09-24 ENCOUNTER — HOSPITAL ENCOUNTER (OUTPATIENT)
Dept: CARDIAC REHAB | Facility: HOSPITAL | Age: 74
Discharge: HOME | End: 2024-09-24
Attending: INTERNAL MEDICINE
Payer: COMMERCIAL

## 2024-09-24 PROCEDURE — 93798 PHYS/QHP OP CAR RHAB W/ECG: CPT

## 2024-09-25 NOTE — TELEPHONE ENCOUNTER
Spoke with patient he states that he has a 10 lbs weight restriction.    Message from PCP relayed to him.    Writer will get form and this encounter over to cardiology team.    Form faxed over to [facility] at fax # [phone].    Form placed in PCP's monthly hold bin just in case it is needed again

## 2024-09-25 NOTE — TELEPHONE ENCOUNTER
Form reviewed.  Pls see last OV.  If restrictions are to be added, will need to follow up with Cardiology

## 2024-09-26 ENCOUNTER — HOSPITAL ENCOUNTER (OUTPATIENT)
Dept: CARDIAC REHAB | Facility: HOSPITAL | Age: 74
Discharge: HOME | End: 2024-09-26
Attending: INTERNAL MEDICINE
Payer: COMMERCIAL

## 2024-09-26 PROCEDURE — 93798 PHYS/QHP OP CAR RHAB W/ECG: CPT

## 2024-09-26 NOTE — PROGRESS NOTES
"  [facility] [place] [facility] [place]      HPI:   Bret \"Shilpi\" Bernadette is a pleasant 74 year old adult with medical history pertinent for CAD s/p CABG x3 (LIMA-LAD, SVG-OM1, SVG-PDA, 24), HFrEF 2/2 ICM (EF 35-40%) HTN, ESRD on iHD, and HLD whopresents to cardiology clinic to establish care.    Shilpi underwent coronary bypass surgery on 24 with Dr. Solares, post-op course was uncomplicated and he was discharged on POD 5.    Today in clinic, Shilpi denies loren chest pain, palpitations, dizziness, syncope, or lower extremity edema. Continues to work with cardiac rehab, walking 15 minutes per day.        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 (H)     Bilateral hydronephrosis     CAD (coronary artery disease)     Hyperlipidemia     Hypertension     Ischemic cardiomyopathy     Sepsis due to gram-negative UTI (H) 10/07/2023       FAMILY HISTORY:  Family History   Problem Relation Age of Onset    Cancer Father     Cancer Paternal Grandfather     Diabetes Paternal Grandfather     Hypertension No family hx of     Cerebrovascular Disease No family hx of     Thyroid Disease No family hx of     Glaucoma No family hx of     Macular Degeneration No family hx of     Anesthesia Reaction No family hx of     Clotting Disorder No family hx of     Bleeding Disorder No family hx of        SOCIAL HISTORY:  Social History     Socioeconomic History    Marital status:    Tobacco Use    Smoking status: Former     Current packs/day: 0.00     Types: Cigarettes     Quit date: 1995     Years since quittin.4     Passive exposure: Past    Smokeless tobacco: Never   Substance and Sexual Activity    Alcohol use: Yes     Comment: 1  drink per week    Drug use: Never     Social Determinants of Health     Financial Resource Strain: Low Risk  (2024) "    Financial Resource Strain     Within the past 12 months, have you or your family members you live with been unable to get utilities (heat, electricity) when it was really needed?: No   Food Insecurity: Low Risk  (8/23/2024)    Food Insecurity     Within the past 12 months, did you worry that your food would run out before you got money to buy more?: No     Within the past 12 months, did the food you bought just not last and you didn t have money to get more?: No   Transportation Needs: Low Risk  (8/23/2024)    Transportation Needs     Within the past 12 months, has lack of transportation kept you from medical appointments, getting your medicines, non-medical meetings or appointments, work, or from getting things that you need?: No   Interpersonal Safety: Low Risk  (8/23/2024)    Interpersonal Safety     Do you feel physically and emotionally safe where you currently live?: Yes     Within the past 12 months, have you been hit, slapped, kicked or otherwise physically hurt by someone?: No     Within the past 12 months, have you been humiliated or emotionally abused in other ways by your partner or ex-partner?: No   Housing Stability: Low Risk  (8/23/2024)    Housing Stability     Do you have housing? : Yes     Are you worried about losing your housing?: No       CURRENT MEDICATIONS:  Current Outpatient Medications   Medication Sig Dispense Refill    acetaminophen (TYLENOL) 325 MG tablet Take 2 tablets (650 mg) by mouth or Feeding Tube every 4 hours as needed for other, headaches, pain or fever (For optimal non-opioid multimodal pain management to improve pain control.).      aspirin (ASA) 81 MG chewable tablet Take 2 tablets (162 mg) by mouth or NG Tube daily.      atorvastatin (LIPITOR) 40 MG tablet Take 1 tablet (40 mg) by mouth every evening 90 tablet 2    CRANBERRY EXTRACT PO Take 1 capsule by mouth daily. Unknown dose      diclofenac (VOLTAREN) 1 % topical gel Apply 2 g topically three times a week. Pt uses on  knees once on dialysis days (Tu/Th/Sat)      diphenhydrAMINE (BENADRYL) 25 MG capsule Take 25 mg by mouth once as needed for itching. Pt uses one cap ~2 days/week PRN for itching due to dry skin      GARLIC PO Take 1 capsule by mouth daily. Unknown dose      methocarbamol (ROBAXIN) 500 MG tablet Take 1 tablet (500 mg) by mouth or Feeding Tube 3 times daily as needed for muscle spasms. 90 tablet 2    metoprolol succinate ER (TOPROL XL) 25 MG 24 hr tablet Take 2 tablets (50 mg) by mouth daily.      MULTIPLE VITAMIN PO Take 1 tablet by mouth every morning      OMEGA-3 FATTY ACIDS PO Take 1 capsule by mouth daily. Unknown dose      pantoprazole (PROTONIX) 40 MG EC tablet Take 1 tablet (40 mg) by mouth 2 times daily (before meals) 60 tablet 0    Saw Palmetto, Serenoa repens, (SAW PALMETTO PO) Take 1 capsule by mouth daily. Unknown dose      senna-docusate (SENOKOT-S/PERICOLACE) 8.6-50 MG tablet Take 3 tablets by mouth or Feeding Tube 2 times daily as needed for constipation. 10 tablet 0    tamsulosin (FLOMAX) 0.4 MG capsule Take 0.4 mg by mouth daily.       No current facility-administered medications for this visit.       ROS:   Refer to HPI    EXAM:  /66 (BP Location: Right arm, Patient Position: Chair, Cuff Size: Adult Regular)   Pulse 79   Wt 89.3 kg (196 lb 12.8 oz)   SpO2 99%   BMI 26.69 kg/m    GENERAL: Appears comfortable, in no acute distress.   HEENT: Eye symmetrical, no discharge or icterus bilaterally. Mucous membranes moist and without lesions.  CV: RRR, +S1S2, no murmur, rub, or gallop.  RESPIRATORY: Respirations regular, even, and unlabored. Lungs CTA throughout.   GI: Soft and non distended with normoactive bowel sounds present in all quadrants. No tenderness, rebound, guarding.   EXTREMITIES: no peripheral edema. 2+ bilateral pedal pulses.   NEUROLOGIC: Alert and oriented x 3. No focal deficits.   MUSCULOSKELETAL: No joint swelling or tenderness.   SKIN: No jaundice. No rashes or lesions.  "    Labs, reviewed with patient in clinic today:  CBC RESULTS:  Lab Results   Component Value Date    WBC 6.0 2024    RBC 2.59 (L) 2024    HGB 9.1 (L) 2024    HCT 28.0 (L) 2024     (H) 2024    MCH 35.1 (H) 2024    MCHC 32.5 2024    RDW 12.1 2024     (L) 2024       CMP RESULTS:  Lab Results   Component Value Date     (L) 2024    POTASSIUM 4.6 2024    POTASSIUM 4.4 2024    POTASSIUM 4.4 2022    CHLORIDE 96 (L) 2024    CHLORIDE 107 2022    CO2 23 2024    CO2 21 (L) 2022    ANIONGAP 11 2024    ANIONGAP 12 2022    GLC 94 2024    GLC 92 2024     (H) 2022    BUN 44.2 (H) 2024    BUN 49 (H) 2022    CR 4.22 (H) 2024    GFRESTIMATED 14 (L) 2024    GFRESTIMATED 55 (L) 2020    GFRESTBLACK >60 2020    CHANA 8.4 (L) 2024    BILITOTAL 0.2 2024    ALBUMIN 3.6 2024    ALBUMIN 3.0 (L) 2022    ALKPHOS 48 2024    ALT <5 2024    AST 39 2024        INR RESULTS:  Lab Results   Component Value Date    INR 1.54 (H) 2024       Lab Results   Component Value Date    MAG 1.8 2024     Lab Results   Component Value Date    NTBNPI 54,743 (H) 10/06/2023     No results found for: \"NTBNP\"    LIPIDS:  Recent Labs   Lab Test 23  1523 21  1719   CHOL 105 210*   HDL 39* 32*   LDL 51 135*   TRIG 74 217*       Echocardiogram:  Recent Results (from the past 4320 hour(s))   Echocardiogram Limited   Result Value    Biplane LVEF 42%    Narrative    [id number]  [facility]  [id number]  [id number]^BENJIE^DHARMESH^MATT     [facility]  Diagnostic and Treatment-3rd Floor  [address] [place] [address]     Name: GUMARO CORONADO  MRN: [id number]  : 1950  Study Date: 2024 01:02 PM  Age: 73 yrs  Gender: Male  Patient Location: [facility]  Reason For Study: " ESRD (end stage renal disease) (H), Pre-transplant  evaluation fo  Ordering Physician: DHARMESH WALDROP  Referring Physician: DHARMESH WALDROP  Performed By: Maddie Franco     BSA: 2.1 m2  Height: 72 in  Weight: 191 lb  HR: 63  BP: 100/60 mmHg  ______________________________________________________________________________  Procedure  Limited Echocardiogram with portions of two-dimensional, color and spectral  Doppler performed.  ______________________________________________________________________________  Interpretation Summary  Biplane LVEF is 42%.  Right ventricular function, chamber size, wall motion, and thickness are  normal.  Pulmonary artery systolic pressure is normal.  The inferior vena cava is normal.  No pericardial effusion is present.  ______________________________________________________________________________  Left Ventricle  Biplane LVEF is 42%. No regional wall motion abnormalities are seen.     Right Ventricle  Right ventricular function, chamber size, wall motion, and thickness are  normal.     Mitral Valve  Mild mitral insufficiency is present.     Tricuspid Valve  Trace tricuspid insufficiency is present. The right ventricular systolic  pressure is approximated at 22.6 mmHg plus the right atrial pressure.  Pulmonary artery systolic pressure is normal.     Vessels  The inferior vena cava is normal.     Pericardium  No pericardial effusion is present.  ______________________________________________________________________________  MMode/2D Measurements & Calculations  IVSd: 1.2 cm  LVIDd: 5.5 cm  LVIDs: 4.1 cm  LVPWd: 1.00 cm  FS: 25.9 %  LV mass(C)d: 249.6 grams  LV mass(C)dI: 119.5 grams/m2  asc Aorta Diam: 3.7 cm  Asc Ao diam index BSA (cm/m2): 1.8  Asc Ao diam index Ht(cm/m): 2.0  EF Biplane: 41.7 %  RV Base: 4.7 cm  RWT: 0.36  TAPSE: 1.8 cm     Doppler Measurements & Calculations  MR PISA: 2.2 cm2  MR ERO: 0.13 cm2  MR volume: 26.2 ml  TR max gwyn: 237.5 cm/sec  TR max P.6  mmHg  RV S Sae: 12.1 cm/sec     ______________________________________________________________________________  Report approved by: Ariel Pillai 2024 02:24 PM         Echocardiogram Complete   Result Value    Biplane LVEF 40%    Narrative    [id number]  [id number]  [id number]  [id number]^ROMEL^LINDA     [facility], Echocardiography Laboratory  [address] [place] [id number]     Name: GUMARO CORONADO  MRN: [id number]  : 1950  Study Date: 2024 02:29 PM  Age: 73 yrs  Gender: Male  Patient Location: [facility]  Reason For Study: LV dysfunction  Ordering Physician: LINDA URENA  Referring Physician: LINDA URENA  Performed By: Liz Miller RDCS     BSA: 2.1 m2  Height: 72 in  Weight: 190 lb  BP: 113/62 mmHg  ______________________________________________________________________________  Procedure  Echocardiogram with two-dimensional, color and spectral Doppler performed.  ______________________________________________________________________________  Interpretation Summary     Left ventricular size is normal.  Biplane LVEF is 40%.  Right ventricular function, chamber size, wall motion, and thickness are  normal.  Pulmonary artery systolic pressure is normal.  The inferior vena cava is normal.  No pericardial effusion is present.  Previously noted wallmotion abnormality not seen in this study.  ______________________________________________________________________________  Left Ventricle  Left ventricular size is normal. Left ventricular wall thickness is normal.  Biplane LVEF is 40%. Left ventricular diastolic function is normal. No  regional wall motion abnormalities are seen.     Right Ventricle  Right ventricular function, chamber size, wall motion, and thickness are  normal.     Atria  Both atria appear normal. The atrial septum is intact as assessed by color  Doppler .     Mitral Valve  The mitral valve is normal. Mild mitral insufficiency is present.     Aortic  Valve  Aortic valve is normal in structure and function.     Tricuspid Valve  The tricuspid valve is normal. Trace tricuspid insufficiency is present. The  right ventricular systolic pressure is approximated at 23.5 mmHg plus the  right atrial pressure. Pulmonary artery systolic pressure is normal.     Pulmonic Valve  The valve leaflets are not well visualized. On Doppler interrogation, there is  no significant stenosis or regurgitation.     Vessels  The aorta root is normal. The thoracic aorta is normal. The pulmonary artery  cannot be assessed. The inferior vena cava is normal.     Pericardium  No pericardial effusion is present.     Compared to Previous Study  Previously noted wallmotion abnormality not seen in this study.  ______________________________________________________________________________  MMode/2D Measurements & Calculations     IVSd: 1.3 cm  LVIDd: 5.4 cm  LVIDs: 4.1 cm  LVPWd: 0.91 cm  FS: 25.2 %  LV mass(C)d: 237.7 grams  LV mass(C)dI: 114.0 grams/m2  MV Diam: 3.2 cm  Ao root diam: 3.6 cm  asc Aorta Diam: 3.7 cm  LVOT diam: 2.3 cm  LVOT area: 4.1 cm2  Ao root diam index Ht(cm/m): 2.0  Ao root diam index BSA (cm/m2): 1.7  Asc Ao diam index BSA (cm/m2): 1.8  Asc Ao diam index Ht(cm/m): 2.0  EF Biplane: 39.2 %  LA Volume (BP): 49.8 ml     LA Volume Index (BP): 23.9 ml/m2  RV Base: 3.2 cm  RWT: 0.33  TAPSE: 1.6 cm     Doppler Measurements & Calculations  MV E max gwyn: 75.0 cm/sec  MV A max gwyn: 71.1 cm/sec  MV E/A: 1.1  MV max P.5 mmHg  MV mean P.55 mmHg  MV V2 VTI: 13.4 cm  MV Flow area(1diam): 7.8 cm2  MV dec time: 0.24 sec  SV(MV 1 diam): 105.1 ml  SI(MV 1 diam): 50.4 ml/m2  PA acc time: 0.11 sec  TR max gwyn: 242.2 cm/sec  TR max P.5 mmHg     E/E' av.0  Lateral E/e': 6.4  Medial E/e': 9.6     ______________________________________________________________________________  Report approved by: Ariel Pillai 2024 03:43 PM             Assessment and Plan:   Shilpi carlos a  74 year old adult with a PMH of CAD s/p CABG x3 (LIMA-LAD, SVG-OM1, SVG-PDA, 8/21/24), inferolateral MI (1996), HFrEF 2/2 ICM (EF 35-40%) HTN, ESRD on iHD, and HLD.    # Coronary artery disease s/p CABG x3 (LIMA-LAD, SVG-OM1, SVG-PDA, 8/21/24)  - aspirin 162mg daily  - continue atorvastatin 40mg daily  - continue cardiac rehab  - Continue sternal precautions for 12 weeks from surgery date.     # HFrEF 2/2 ICM (EF 40%)  2023 echo with reduced EF WMAs likely 2/2 stress cardiomyopathy, WMAs resolved on repeat echo, EF remains stable ~40%. Echo post- MI in 1996 showed LVEF 35-40% range.   - metoprolol succinate 50mg daily   - consider repeat echo in 6 month after cardiac rehab is completed    # HTN  - metoprolol succinate 50mg daily     # HLD  Most recent LDL 51 (2023)  - repeat fasting lipid panel  - continue atorvastatin 40mg daily    # ESRD on iHD   T/R/S. Undergoing renal transplant workup. No contraindications to moving forward with surgery, is not on DAPT.       Follow up:  6 months  Chart review time today: 10 minutes  Visit time today: 16 minutes  Total time spent today: 25 minutes        Lilly Corea CNP  General Cardiology   09/30/24

## 2024-09-30 ENCOUNTER — OFFICE VISIT (OUTPATIENT)
Dept: CARDIOLOGY | Facility: CLINIC | Age: 74
End: 2024-09-30
Attending: CASE MANAGER/CARE COORDINATOR
Payer: COMMERCIAL

## 2024-09-30 VITALS
HEART RATE: 79 BPM | SYSTOLIC BLOOD PRESSURE: 112 MMHG | WEIGHT: 196.8 LBS | BODY MASS INDEX: 26.69 KG/M2 | OXYGEN SATURATION: 99 % | DIASTOLIC BLOOD PRESSURE: 66 MMHG

## 2024-09-30 DIAGNOSIS — I10 ESSENTIAL HYPERTENSION: ICD-10-CM

## 2024-09-30 DIAGNOSIS — Z95.1 S/P CABG (CORONARY ARTERY BYPASS GRAFT): Primary | ICD-10-CM

## 2024-09-30 DIAGNOSIS — I25.5 ISCHEMIC CARDIOMYOPATHY: ICD-10-CM

## 2024-09-30 DIAGNOSIS — E78.5 HYPERLIPIDEMIA, UNSPECIFIED HYPERLIPIDEMIA TYPE: ICD-10-CM

## 2024-09-30 PROCEDURE — 99214 OFFICE O/P EST MOD 30 MIN: CPT | Performed by: CASE MANAGER/CARE COORDINATOR

## 2024-09-30 PROCEDURE — 99213 OFFICE O/P EST LOW 20 MIN: CPT | Performed by: CASE MANAGER/CARE COORDINATOR

## 2024-09-30 RX ORDER — TAMSULOSIN HYDROCHLORIDE 0.4 MG/1
0.4 CAPSULE ORAL DAILY
COMMUNITY
Start: 2024-09-14

## 2024-09-30 ASSESSMENT — PAIN SCALES - GENERAL: PAINLEVEL: NO PAIN (0)

## 2024-09-30 NOTE — NURSING NOTE
Chief Complaint   Patient presents with    Follow Up     RETURN CARDIOLOGY     Vitals were taken and medications reconciled.    Jassi Baum, EMT  7:29 AM

## 2024-09-30 NOTE — PATIENT INSTRUCTIONS
You were seen today in the Cardiovascular Clinic at the [facility] by:       BELKIS OLVERA CNP    Your visit summary and instructions are as follows:    Fasting cholesterol labs ordered   Continue increasing physical activity       Return to cardiology clinic in 6 months      Thank you for your visit today!     Please MyChart message me or call my nurse if you have any questions or concerns.      During Business Hours:  [phone], option # 1 (University) then option # 4 (medical questions) and ask to speak with my nurse.     After hours, weekends or holidays:   [phone], Option #4  Ask to speak to the On-Call Cardiologist. Inform them you are a cardiology patient at the [facility].

## 2024-09-30 NOTE — LETTER
"9/30/2024      RE: Bret Fleming  [address]  [place] [address]       Dear Colleague,    Thank you for the opportunity to participate in the care of your patient, Bret Fleming, at the [facility] [place] [facility]. Please see a copy of my visit note below.      [facility] Cardiology - [facility] [place]   Cardiology Clinic Note      HPI:   Bret \"Shilpi\"Bernadette is a pleasant 74 year old adult with medical history pertinent for CAD s/p CABG x3 (LIMA-LAD, SVG-OM1, SVG-PDA, 8/21/24), HFrEF 2/2 ICM (EF 35-40%) HTN, ESRD on iHD, and HLD whopresents to cardiology clinic to establish care.    Shilpi underwent coronary bypass surgery on 8/21/24 with Dr. Solares, post-op course was uncomplicated and he was discharged on POD 5.    Today in clinic, Shilpi denies loren chest pain, palpitations, dizziness, syncope, or lower extremity edema. Continues to work with cardiac rehab, walking 15 minutes per day.        PAST MEDICAL HISTORY:  Past Medical History:   Diagnosis Date     Acute myocardial infarction      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 (H)      Bilateral hydronephrosis      CAD (coronary artery disease)      Hyperlipidemia      Hypertension      Ischemic cardiomyopathy      Sepsis due to gram-negative UTI (H) 10/07/2023       FAMILY HISTORY:  Family History   Problem Relation Age of Onset     Cancer Father      Cancer Paternal Grandfather      Diabetes Paternal Grandfather      Hypertension No family hx of      Cerebrovascular Disease No family hx of      Thyroid Disease No family hx of      Glaucoma No family hx of      Macular Degeneration No family hx of      Anesthesia Reaction No family hx of      Clotting Disorder No family hx of      Bleeding Disorder No family hx of        SOCIAL HISTORY:  Social History     Socioeconomic History     Marital status:    Tobacco Use     " Smoking status: Former     Current packs/day: 0.00     Types: Cigarettes     Quit date: 1995     Years since quittin.4     Passive exposure: Past     Smokeless tobacco: Never   Substance and Sexual Activity     Alcohol use: Yes     Comment: 1  drink per week     Drug use: Never     Social Determinants of Health     Financial Resource Strain: Low Risk  (2024)    Financial Resource Strain      Within the past 12 months, have you or your family members you live with been unable to get utilities (heat, electricity) when it was really needed?: No   Food Insecurity: Low Risk  (2024)    Food Insecurity      Within the past 12 months, did you worry that your food would run out before you got money to buy more?: No      Within the past 12 months, did the food you bought just not last and you didn t have money to get more?: No   Transportation Needs: Low Risk  (2024)    Transportation Needs      Within the past 12 months, has lack of transportation kept you from medical appointments, getting your medicines, non-medical meetings or appointments, work, or from getting things that you need?: No   Interpersonal Safety: Low Risk  (2024)    Interpersonal Safety      Do you feel physically and emotionally safe where you currently live?: Yes      Within the past 12 months, have you been hit, slapped, kicked or otherwise physically hurt by someone?: No      Within the past 12 months, have you been humiliated or emotionally abused in other ways by your partner or ex-partner?: No   Housing Stability: Low Risk  (2024)    Housing Stability      Do you have housing? : Yes      Are you worried about losing your housing?: No       CURRENT MEDICATIONS:  Current Outpatient Medications   Medication Sig Dispense Refill     acetaminophen (TYLENOL) 325 MG tablet Take 2 tablets (650 mg) by mouth or Feeding Tube every 4 hours as needed for other, headaches, pain or fever (For optimal non-opioid multimodal pain  management to improve pain control.).       aspirin (ASA) 81 MG chewable tablet Take 2 tablets (162 mg) by mouth or NG Tube daily.       atorvastatin (LIPITOR) 40 MG tablet Take 1 tablet (40 mg) by mouth every evening 90 tablet 2     CRANBERRY EXTRACT PO Take 1 capsule by mouth daily. Unknown dose       diclofenac (VOLTAREN) 1 % topical gel Apply 2 g topically three times a week. Pt uses on knees once on dialysis days (Tu/Th/Sat)       diphenhydrAMINE (BENADRYL) 25 MG capsule Take 25 mg by mouth once as needed for itching. Pt uses one cap ~2 days/week PRN for itching due to dry skin       GARLIC PO Take 1 capsule by mouth daily. Unknown dose       methocarbamol (ROBAXIN) 500 MG tablet Take 1 tablet (500 mg) by mouth or Feeding Tube 3 times daily as needed for muscle spasms. 90 tablet 2     metoprolol succinate ER (TOPROL XL) 25 MG 24 hr tablet Take 2 tablets (50 mg) by mouth daily.       MULTIPLE VITAMIN PO Take 1 tablet by mouth every morning       OMEGA-3 FATTY ACIDS PO Take 1 capsule by mouth daily. Unknown dose       pantoprazole (PROTONIX) 40 MG EC tablet Take 1 tablet (40 mg) by mouth 2 times daily (before meals) 60 tablet 0     Saw Palmetto, Serenoa repens, (SAW PALMETTO PO) Take 1 capsule by mouth daily. Unknown dose       senna-docusate (SENOKOT-S/PERICOLACE) 8.6-50 MG tablet Take 3 tablets by mouth or Feeding Tube 2 times daily as needed for constipation. 10 tablet 0     tamsulosin (FLOMAX) 0.4 MG capsule Take 0.4 mg by mouth daily.       No current facility-administered medications for this visit.       ROS:   Refer to HPI    EXAM:  /66 (BP Location: Right arm, Patient Position: Chair, Cuff Size: Adult Regular)   Pulse 79   Wt 89.3 kg (196 lb 12.8 oz)   SpO2 99%   BMI 26.69 kg/m    GENERAL: Appears comfortable, in no acute distress.   HEENT: Eye symmetrical, no discharge or icterus bilaterally. Mucous membranes moist and without lesions.  CV: RRR, +S1S2, no murmur, rub, or gallop.  RESPIRATORY:  "Respirations regular, even, and unlabored. Lungs CTA throughout.   GI: Soft and non distended with normoactive bowel sounds present in all quadrants. No tenderness, rebound, guarding.   EXTREMITIES: no peripheral edema. 2+ bilateral pedal pulses.   NEUROLOGIC: Alert and oriented x 3. No focal deficits.   MUSCULOSKELETAL: No joint swelling or tenderness.   SKIN: No jaundice. No rashes or lesions.     Labs, reviewed with patient in clinic today:  CBC RESULTS:  Lab Results   Component Value Date    WBC 6.0 08/26/2024    RBC 2.59 (L) 08/26/2024    HGB 9.1 (L) 08/26/2024    HCT 28.0 (L) 08/26/2024     (H) 08/26/2024    MCH 35.1 (H) 08/26/2024    MCHC 32.5 08/26/2024    RDW 12.1 08/26/2024     (L) 08/26/2024       CMP RESULTS:  Lab Results   Component Value Date     (L) 08/26/2024    POTASSIUM 4.6 08/26/2024    POTASSIUM 4.4 08/21/2024    POTASSIUM 4.4 05/27/2022    CHLORIDE 96 (L) 08/26/2024    CHLORIDE 107 05/27/2022    CO2 23 08/26/2024    CO2 21 (L) 05/27/2022    ANIONGAP 11 08/26/2024    ANIONGAP 12 05/27/2022    GLC 94 08/26/2024    GLC 92 08/23/2024     (H) 05/27/2022    BUN 44.2 (H) 08/26/2024    BUN 49 (H) 05/27/2022    CR 4.22 (H) 08/26/2024    GFRESTIMATED 14 (L) 08/26/2024    GFRESTIMATED 55 (L) 06/22/2020    GFRESTBLACK >60 06/22/2020    CHANA 8.4 (L) 08/26/2024    BILITOTAL 0.2 08/23/2024    ALBUMIN 3.6 08/23/2024    ALBUMIN 3.0 (L) 02/05/2022    ALKPHOS 48 08/23/2024    ALT <5 08/23/2024    AST 39 08/23/2024        INR RESULTS:  Lab Results   Component Value Date    INR 1.54 (H) 08/21/2024       Lab Results   Component Value Date    MAG 1.8 08/25/2024     Lab Results   Component Value Date    NTBNPI 54,743 (H) 10/06/2023     No results found for: \"NTBNP\"    LIPIDS:  Recent Labs   Lab Test 11/17/23  1523 08/18/21  1719   CHOL 105 210*   HDL 39* 32*   LDL 51 135*   TRIG 74 217*       Echocardiogram:  Recent Results (from the past 4320 hour(s))   Echocardiogram Limited   Result Value "    Biplane LVEF 42%    [facility]    [id number]  [id number]  [id number]  [id number]^BENJIE^DHARMESH^MATT     [facility]  Diagnostic and Treatment-3rd Floor  [address] [place] [address]     Name: GUMARO CORONADO  MRN: [id number]  : 1950  Study Date: 2024 01:02 PM  Age: 73 yrs  Gender: Male  Patient Location: [facility]  Reason For Study: ESRD (end stage renal disease) (H), Pre-transplant  evaluation fo  Ordering Physician: DHARMESH WALDROP  Referring Physician: DHARMESH WALDROP  Performed By: Maddie Franco     BSA: 2.1 m2  Height: 72 in  Weight: 191 lb  HR: 63  BP: 100/60 mmHg  ______________________________________________________________________________  Procedure  Limited Echocardiogram with portions of two-dimensional, color and spectral  Doppler performed.  ______________________________________________________________________________  Interpretation Summary  Biplane LVEF is 42%.  Right ventricular function, chamber size, wall motion, and thickness are  normal.  Pulmonary artery systolic pressure is normal.  The inferior vena cava is normal.  No pericardial effusion is present.  ______________________________________________________________________________  Left Ventricle  Biplane LVEF is 42%. No regional wall motion abnormalities are seen.     Right Ventricle  Right ventricular function, chamber size, wall motion, and thickness are  normal.     Mitral Valve  Mild mitral insufficiency is present.     Tricuspid Valve  Trace tricuspid insufficiency is present. The right ventricular systolic  pressure is approximated at 22.6 mmHg plus the right atrial pressure.  Pulmonary artery systolic pressure is normal.     Vessels  The inferior vena cava is normal.     Pericardium  No pericardial effusion is present.  ______________________________________________________________________________  MMode/2D Measurements & Calculations  IVSd: 1.2 cm  LVIDd: 5.5  cm  LVIDs: 4.1 cm  LVPWd: 1.00 cm  FS: 25.9 %  LV mass(C)d: 249.6 grams  LV mass(C)dI: 119.5 grams/m2  asc Aorta Diam: 3.7 cm  Asc Ao diam index BSA (cm/m2): 1.8  Asc Ao diam index Ht(cm/m): 2.0  EF Biplane: 41.7 %  RV Base: 4.7 cm  RWT: 0.36  TAPSE: 1.8 cm     Doppler Measurements & Calculations  MR PISA: 2.2 cm2  MR ERO: 0.13 cm2  MR volume: 26.2 ml  TR max sae: 237.5 cm/sec  TR max P.6 mmHg  RV S Sae: 12.1 cm/sec     ______________________________________________________________________________  Report approved by: Ariel Pillai 2024 02:24 PM         Echocardiogram Complete   Result Value    Biplane LVEF 40%    Narrative    [id number]  [id number]  [id number]  [id number]^ROMEL^LINDA     [facility], Echocardiography Laboratory  [address] [place] [address]     Name: GUMARO CORONADO  MRN: [id number]  : 1950  Study Date: 2024 02:29 PM  Age: 73 yrs  Gender: Male  Patient Location: [facility]  Reason For Study: LV dysfunction  Ordering Physician: LINDA URENA  Referring Physician: LINDA URENA  Performed By: Liz Miller ZOE     BSA: 2.1 m2  Height: 72 in  Weight: 190 lb  BP: 113/62 mmHg  ______________________________________________________________________________  Procedure  Echocardiogram with two-dimensional, color and spectral Doppler performed.  ______________________________________________________________________________  Interpretation Summary     Left ventricular size is normal.  Biplane LVEF is 40%.  Right ventricular function, chamber size, wall motion, and thickness are  normal.  Pulmonary artery systolic pressure is normal.  The inferior vena cava is normal.  No pericardial effusion is present.  Previously noted wallmotion abnormality not seen in this study.  ______________________________________________________________________________  Left Ventricle  Left ventricular size is normal. Left ventricular wall thickness is normal.  Biplane LVEF is 40%.  Left ventricular diastolic function is normal. No  regional wall motion abnormalities are seen.     Right Ventricle  Right ventricular function, chamber size, wall motion, and thickness are  normal.     Atria  Both atria appear normal. The atrial septum is intact as assessed by color  Doppler .     Mitral Valve  The mitral valve is normal. Mild mitral insufficiency is present.     Aortic Valve  Aortic valve is normal in structure and function.     Tricuspid Valve  The tricuspid valve is normal. Trace tricuspid insufficiency is present. The  right ventricular systolic pressure is approximated at 23.5 mmHg plus the  right atrial pressure. Pulmonary artery systolic pressure is normal.     Pulmonic Valve  The valve leaflets are not well visualized. On Doppler interrogation, there is  no significant stenosis or regurgitation.     Vessels  The aorta root is normal. The thoracic aorta is normal. The pulmonary artery  cannot be assessed. The inferior vena cava is normal.     Pericardium  No pericardial effusion is present.     Compared to Previous Study  Previously noted wallmotion abnormality not seen in this study.  ______________________________________________________________________________  MMode/2D Measurements & Calculations     IVSd: 1.3 cm  LVIDd: 5.4 cm  LVIDs: 4.1 cm  LVPWd: 0.91 cm  FS: 25.2 %  LV mass(C)d: 237.7 grams  LV mass(C)dI: 114.0 grams/m2  MV Diam: 3.2 cm  Ao root diam: 3.6 cm  asc Aorta Diam: 3.7 cm  LVOT diam: 2.3 cm  LVOT area: 4.1 cm2  Ao root diam index Ht(cm/m): 2.0  Ao root diam index BSA (cm/m2): 1.7  Asc Ao diam index BSA (cm/m2): 1.8  Asc Ao diam index Ht(cm/m): 2.0  EF Biplane: 39.2 %  LA Volume (BP): 49.8 ml     LA Volume Index (BP): 23.9 ml/m2  RV Base: 3.2 cm  RWT: 0.33  TAPSE: 1.6 cm     Doppler Measurements & Calculations  MV E max gwyn: 75.0 cm/sec  MV A max gwyn: 71.1 cm/sec  MV E/A: 1.1  MV max P.5 mmHg  MV mean P.55 mmHg  MV V2 VTI: 13.4 cm  MV Flow area(1diam): 7.8 cm2  MV  dec time: 0.24 sec  SV(MV 1 diam): 105.1 ml  SI(MV 1 diam): 50.4 ml/m2  PA acc time: 0.11 sec  TR max gwyn: 242.2 cm/sec  TR max P.5 mmHg     E/E' av.0  Lateral E/e': 6.4  Medial E/e': 9.6     ______________________________________________________________________________  Report approved by: Ariel Pillai 2024 03:43 PM             Assessment and Plan:   Shilpi Fleming is a 74 year old adult with a PMH of CAD s/p CABG x3 (LIMA-LAD, SVG-OM1, SVG-PDA, 24), inferolateral MI (), HFrEF 2/2 ICM (EF 35-40%) HTN, ESRD on iHD, and HLD.    # Coronary artery disease s/p CABG x3 (LIMA-LAD, SVG-OM1, SVG-PDA, 24)  - aspirin 162mg daily  - continue atorvastatin 40mg daily  - continue cardiac rehab  - Continue sternal precautions for 12 weeks from surgery date.     # HFrEF 2/2 ICM (EF 40%)   echo with reduced EF WMAs likely 2/2 stress cardiomyopathy, WMAs resolved on repeat echo, EF remains stable ~40%. Echo post- MI in  showed LVEF 35-40% range.   - metoprolol succinate 50mg daily   - consider repeat echo in 6 month after cardiac rehab is completed    # HTN  - metoprolol succinate 50mg daily     # HLD  Most recent LDL 51 ()  - repeat fasting lipid panel  - continue atorvastatin 40mg daily    # ESRD on iHD   T/R/S. Undergoing renal transplant workup. No contraindications to moving forward with surgery, is not on DAPT.       Follow up:  6 months  Chart review time today: 10 minutes  Visit time today: 16 minutes  Total time spent today: 25 minutes        Lilly Corea CNP  General Cardiology   24            Please do not hesitate to contact me if you have any questions/concerns.     Sincerely,     RODDY SANDS CNP

## 2024-10-01 ENCOUNTER — HOSPITAL ENCOUNTER (OUTPATIENT)
Dept: CARDIAC REHAB | Facility: HOSPITAL | Age: 74
Discharge: HOME | End: 2024-10-01
Attending: INTERNAL MEDICINE
Payer: COMMERCIAL

## 2024-10-01 PROCEDURE — 93798 PHYS/QHP OP CAR RHAB W/ECG: CPT

## 2024-10-02 ENCOUNTER — LAB (OUTPATIENT)
Dept: LAB | Facility: CLINIC | Age: 74
End: 2024-10-02
Payer: COMMERCIAL

## 2024-10-02 DIAGNOSIS — Z95.1 S/P CABG (CORONARY ARTERY BYPASS GRAFT): ICD-10-CM

## 2024-10-02 LAB
CHOLEST SERPL-MCNC: 115 MG/DL
FASTING STATUS PATIENT QL REPORTED: NO
HDLC SERPL-MCNC: 36 MG/DL
LDLC SERPL CALC-MCNC: 58 MG/DL
NONHDLC SERPL-MCNC: 79 MG/DL
TRIGL SERPL-MCNC: 106 MG/DL

## 2024-10-02 PROCEDURE — 36415 COLL VENOUS BLD VENIPUNCTURE: CPT

## 2024-10-02 PROCEDURE — 80061 LIPID PANEL: CPT

## 2024-10-03 ENCOUNTER — HOSPITAL ENCOUNTER (OUTPATIENT)
Dept: CARDIAC REHAB | Facility: HOSPITAL | Age: 74
Discharge: HOME | End: 2024-10-03
Attending: INTERNAL MEDICINE
Payer: COMMERCIAL

## 2024-10-11 ENCOUNTER — DOCUMENTATION ONLY (OUTPATIENT)
Dept: CARDIOLOGY | Facility: CLINIC | Age: 74
End: 2024-10-11
Payer: COMMERCIAL

## 2024-10-21 DIAGNOSIS — Z95.1 S/P CABG (CORONARY ARTERY BYPASS GRAFT): Primary | ICD-10-CM

## 2024-10-21 NOTE — TELEPHONE ENCOUNTER
Pharmacy calling to get a medication refill on medications attached     Disp Refills Start End LY   metoprolol succinate ER (TOPROL XL) 25 MG 24 hr tablet -- -- 9/4/2024 -- No   Sig - Route: Take 2 tablets (50 mg) by mouth daily. - Oral   Class: No Print Out   Order: [id number]

## 2024-10-23 RX ORDER — METOPROLOL SUCCINATE 25 MG/1
50 TABLET, EXTENDED RELEASE ORAL DAILY
Qty: 60 TABLET | Refills: 4 | Status: SHIPPED | OUTPATIENT
Start: 2024-10-23

## 2024-11-05 ENCOUNTER — TRANSFERRED RECORDS (OUTPATIENT)
Dept: HEALTH INFORMATION MANAGEMENT | Facility: CLINIC | Age: 74
End: 2024-11-05
Payer: COMMERCIAL

## 2024-11-24 ENCOUNTER — HEALTH MAINTENANCE LETTER (OUTPATIENT)
Age: 74
End: 2024-11-24

## 2024-12-02 ENCOUNTER — TELEPHONE (OUTPATIENT)
Dept: CARDIOLOGY | Facility: CLINIC | Age: 74
End: 2024-12-02
Payer: COMMERCIAL

## 2024-12-02 NOTE — TELEPHONE ENCOUNTER
Patient Contacted for the patient to call back and schedule the following:    Appointment type: RTN CARDIO  Provider: WADE  Return date: 03/26/2025  Specialty phone number: [phone] OPT 1  Additional appointment(s) needed: N/A  Additonal Notes: N/A

## 2025-01-30 ENCOUNTER — TELEPHONE (OUTPATIENT)
Dept: CARDIOLOGY | Facility: CLINIC | Age: 75
End: 2025-01-30
Payer: COMMERCIAL

## 2025-01-30 NOTE — TELEPHONE ENCOUNTER
Patient Contacted for the patient to call back and schedule the following:    Appointment type: RTN CARDIO  Provider: CECILIA  Return date: 5/23/2025  Specialty phone number: [phone] OPT 1  Additional appointment(s) needed: N/A  Additonal Notes: N/A

## 2025-04-22 DIAGNOSIS — Z95.1 S/P CABG (CORONARY ARTERY BYPASS GRAFT): ICD-10-CM

## 2025-04-23 RX ORDER — METOPROLOL SUCCINATE 25 MG/1
50 TABLET, EXTENDED RELEASE ORAL DAILY
Qty: 180 TABLET | Refills: 0 | Status: SHIPPED | OUTPATIENT
Start: 2025-04-23

## 2025-04-26 ENCOUNTER — LAB REQUISITION (OUTPATIENT)
Dept: LAB | Facility: CLINIC | Age: 75
End: 2025-04-26

## 2025-04-26 LAB
BUN SERPL-MCNC: 19.9 MG/DL (ref 8–23)
BUN SERPL-MCNC: 60.8 MG/DL (ref 8–23)

## 2025-04-26 PROCEDURE — 84520 ASSAY OF UREA NITROGEN: CPT | Performed by: INTERNAL MEDICINE

## 2025-06-02 ENCOUNTER — LAB (OUTPATIENT)
Dept: LAB | Facility: CLINIC | Age: 75
End: 2025-06-02
Payer: COMMERCIAL

## 2025-06-02 DIAGNOSIS — Z11.1 SCREENING EXAMINATION FOR PULMONARY TUBERCULOSIS: ICD-10-CM

## 2025-06-02 PROCEDURE — 86481 TB AG RESPONSE T-CELL SUSP: CPT

## 2025-06-02 PROCEDURE — 36415 COLL VENOUS BLD VENIPUNCTURE: CPT

## 2025-06-04 LAB
GAMMA INTERFERON BACKGROUND BLD IA-ACNC: 0.06 IU/ML
M TB IFN-G BLD-IMP: NEGATIVE
M TB IFN-G CD4+ BCKGRND COR BLD-ACNC: 9.94 IU/ML
MITOGEN IGNF BCKGRD COR BLD-ACNC: 0 IU/ML
MITOGEN IGNF BCKGRD COR BLD-ACNC: 0.01 IU/ML
QUANTIFERON MITOGEN: 10 IU/ML
QUANTIFERON NIL TUBE: 0.06 IU/ML
QUANTIFERON TB1 TUBE: 0.06 IU/ML
QUANTIFERON TB2 TUBE: 0.07

## 2025-08-27 DIAGNOSIS — I25.5 ISCHEMIC CARDIOMYOPATHY: ICD-10-CM

## 2025-08-27 DIAGNOSIS — I21.4 NSTEMI (NON-ST ELEVATED MYOCARDIAL INFARCTION) (H): ICD-10-CM

## 2025-08-28 RX ORDER — ATORVASTATIN CALCIUM 40 MG/1
40 TABLET, FILM COATED ORAL EVERY EVENING
Qty: 30 TABLET | Refills: 0 | Status: SHIPPED | OUTPATIENT
Start: 2025-08-28

## 2027-09-10 ENCOUNTER — TRANSFERRED RECORDS (OUTPATIENT)
Dept: HEALTH INFORMATION MANAGEMENT | Facility: CLINIC | Age: 77
End: 2027-09-10
Payer: COMMERCIAL

## (undated) DEVICE — CLIP HORIZON LG ORANGE 004200

## (undated) DEVICE — SU RETRACT-O-TAPE 1041

## (undated) DEVICE — BLADE CLIPPER SGL USE 9680

## (undated) DEVICE — DECANTER BAG 2002S

## (undated) DEVICE — TIES BANDING T50R

## (undated) DEVICE — SU PROLENE 3-0 SHDA 36" 8522H

## (undated) DEVICE — GW VASC .035IN DIA 260CML 7CML 3 MM RADIUS J CURVE 502455

## (undated) DEVICE — WAND SUCTION LP SOFT 15.2CM SU-22702

## (undated) DEVICE — SOL NACL 0.9% 10ML VIAL 0409-4888-02

## (undated) DEVICE — BNDG ELASTIC 4"X5YDS STERILE 6611-4S

## (undated) DEVICE — INTRO GLIDESHEATH SLENDER 6FR 10X45CM 60-1060

## (undated) DEVICE — LEAD PACER MYOCARDIAL BIPOLAR TEMPORARY 53CM 6495F

## (undated) DEVICE — SOL NACL 0.9% IRRIG 3000ML BAG 2B7477

## (undated) DEVICE — SOL NACL 0.9% INJ 1000ML BAG 2B1324X

## (undated) DEVICE — SPONGE RAY-TEC 4X8" 7318

## (undated) DEVICE — ESU ELEC BLADE E-SEP INSULATED NEPTUNE 70MM 0703-070-002

## (undated) DEVICE — SU PROLENE 4-0 RB-1DA 36" 8557H

## (undated) DEVICE — CLIP HORIZON MED BLUE 002200

## (undated) DEVICE — SOL NACL 0.9% IRRIG 1000ML BOTTLE 2F7124

## (undated) DEVICE — SURGICEL HEMOSTAT 4X8" 1952

## (undated) DEVICE — SUCTION MANIFOLD NEPTUNE 2 SYS 4 PORT 0702-020-000

## (undated) DEVICE — SYR 01ML 27GA 0.5" NDL TBC 309623

## (undated) DEVICE — LINEN TOWEL PACK X30 5481

## (undated) DEVICE — ANTIFOG SOLUTION W/FOAM PAD 31142527

## (undated) DEVICE — CLIP HORIZON SM RED WIDE SLOT 001201

## (undated) DEVICE — SU ETHIBOND 0 CT-1 CR 8X18" CX21D

## (undated) DEVICE — SU PROLENE 7-0 BV-1DA 4X24" M8702

## (undated) DEVICE — TAPE MEDIPORE 4"X2YD 2864

## (undated) DEVICE — WIPES FOLEY CARE SURESTEP PROVON DFC100

## (undated) DEVICE — BLADE KNIFE BEAVER MICROSHARP GREEN 377515

## (undated) DEVICE — SU PROLENE 6-0 C-1DA 30" 8706H

## (undated) DEVICE — DRSG DRAIN 4X4" 7086

## (undated) DEVICE — BLADE SAW STRK STERNAL 6207-97-101

## (undated) DEVICE — DRAPE FLUID WARMING 52 X 60" ORS-321

## (undated) DEVICE — DRAIN CHEST TUBE RIGHT ANGLED 28FR 8128

## (undated) DEVICE — CONNECTOR SIMS TUBING FOR CHEST TUBES 361

## (undated) DEVICE — TUBING SUCTION DRAINAGE PLEURAL DUAL 8884714200

## (undated) DEVICE — DRSG ABDOMINAL 07 1/2X8" 7197D

## (undated) DEVICE — SU PROLENE 4-0 SHDA 36" 8521H

## (undated) DEVICE — SOL WATER IRRIG 1000ML BOTTLE 2F7114

## (undated) DEVICE — DRSG TELFA 3X8" 1238

## (undated) DEVICE — BNDG ELASTIC 6"X5YDS STERILE 6611-6S

## (undated) DEVICE — TUBING PRESSURE 30"

## (undated) DEVICE — SU VICRYL 0 CTX 36" J370H

## (undated) DEVICE — CLIP SPRING FOGARTY SOFTJAW CSOFT6

## (undated) DEVICE — DRAIN CHEST TUBE 36FR STR 8036

## (undated) DEVICE — DEFIB PRO-PADZ LVP LQD GEL ADULT 8900-2105-01

## (undated) DEVICE — SLEEVE TR BAND RADIAL COMPRESSION DEVICE 24CM TRB24-REG

## (undated) DEVICE — GLOVE BIOGEL PI SZ 7.0 40870

## (undated) DEVICE — CATH TRAY FOLEY 16FR BARDEX W/TEMP PRB URINE METER 319416AM

## (undated) DEVICE — DRAPE IOBAN INCISE 23X17" 6650EZ

## (undated) DEVICE — SUCTION DRY CHEST DRAIN OASIS 3600-100

## (undated) DEVICE — BLADE KNIFE BEAVER MINI STR BEAVER6900

## (undated) DEVICE — BLADE KNIFE SURG 15C 371716

## (undated) DEVICE — SPECIMEN CONTAINER 5OZ STERILE 2600SA

## (undated) DEVICE — SU PROLENE 7-0 BV-1DA 24" 8702H

## (undated) DEVICE — SU SILK 0 TIE 6X30" A306H

## (undated) DEVICE — PROTECTOR ARM ONE-STEP TRENDELENBURG 40418 (COI)

## (undated) DEVICE — SU ETHIBOND 3-0 BBDA 36" X588H

## (undated) DEVICE — DEVICE TISSUE STABILIZATION OCTOBASE 28707

## (undated) DEVICE — SU PROLENE 6-0 C-1DA 4X24" M8726

## (undated) DEVICE — PACK HEART LEFT CUSTOM

## (undated) DEVICE — TUBING INSUFFLATION PNEUMOCLEAR 0620050100

## (undated) DEVICE — KIT ENDO VASOVIEW HEMOPRO 2 VH-4000

## (undated) DEVICE — SU PLEDGET SOFT TFE 3/8"X3/26"X1/16" PCP40

## (undated) DEVICE — MANIFOLD KIT ANGIO AUTOMATED 014613

## (undated) DEVICE — SHTH INTRO 0.021IN ID 6FR DIA

## (undated) DEVICE — SPONGE LAP 12X12" X8425

## (undated) DEVICE — PREP CHLORAPREP 26ML TINTED HI-LITE ORANGE 930815

## (undated) DEVICE — SU ETHIBOND 2-0 SHDA 30" X563H

## (undated) DEVICE — SU VICRYL 2-0 CT-1 27" UND J259H

## (undated) DEVICE — NDL COUNTER 20CT 31142493

## (undated) DEVICE — PUNCH AORTIC 4.0MMX8" RCB40

## (undated) DEVICE — RX SURGIFLO HEMOSTATIC MATRIX W/THROMBIN 8ML 2994

## (undated) DEVICE — BONE WAX 2.5GM W31G

## (undated) DEVICE — SUCTION CATH AIRLIFE TRI-FLO W/CONTROL PORT 14FR  T60C

## (undated) DEVICE — PACK ADULT HEART UMMC PV15CG92D

## (undated) DEVICE — CANNULA VESSEL 3ML BEVELED DPL 30000

## (undated) DEVICE — SU DERMABOND ADVANCED .7ML DNX12

## (undated) DEVICE — KIT HAND CONTROL ACIST 016795

## (undated) DEVICE — SU VICRYL+ 3-0 FS1 27IN UND VCP442H

## (undated) DEVICE — LINEN TOWEL PACK X6 WHITE 5487

## (undated) DEVICE — CATH JACKY 5FR 3.5 CURVE 40-5023

## (undated) RX ORDER — HEPARIN SODIUM 1000 [USP'U]/ML
INJECTION, SOLUTION INTRAVENOUS; SUBCUTANEOUS
Status: DISPENSED
Start: 2023-10-11

## (undated) RX ORDER — FENTANYL CITRATE 50 UG/ML
INJECTION, SOLUTION INTRAMUSCULAR; INTRAVENOUS
Status: DISPENSED
Start: 2024-08-21

## (undated) RX ORDER — HYDROMORPHONE HYDROCHLORIDE 1 MG/ML
INJECTION, SOLUTION INTRAMUSCULAR; INTRAVENOUS; SUBCUTANEOUS
Status: DISPENSED
Start: 2024-08-21

## (undated) RX ORDER — GABAPENTIN 100 MG/1
CAPSULE ORAL
Status: DISPENSED
Start: 2024-08-21

## (undated) RX ORDER — HEPARIN SODIUM 1000 [USP'U]/ML
INJECTION, SOLUTION INTRAVENOUS; SUBCUTANEOUS
Status: DISPENSED
Start: 2024-08-21

## (undated) RX ORDER — ASPIRIN 81 MG/1
TABLET, CHEWABLE ORAL
Status: DISPENSED
Start: 2024-08-21

## (undated) RX ORDER — PAPAVERINE HYDROCHLORIDE 30 MG/ML
INJECTION INTRAMUSCULAR; INTRAVENOUS
Status: DISPENSED
Start: 2024-08-21

## (undated) RX ORDER — FAMOTIDINE 20 MG/1
TABLET, FILM COATED ORAL
Status: DISPENSED
Start: 2024-08-21

## (undated) RX ORDER — EPHEDRINE SULFATE 50 MG/ML
INJECTION, SOLUTION INTRAMUSCULAR; INTRAVENOUS; SUBCUTANEOUS
Status: DISPENSED
Start: 2024-08-21

## (undated) RX ORDER — CEFAZOLIN SODIUM 1 G/3ML
INJECTION, POWDER, FOR SOLUTION INTRAMUSCULAR; INTRAVENOUS
Status: DISPENSED
Start: 2024-08-21

## (undated) RX ORDER — ASPIRIN 325 MG
TABLET ORAL
Status: DISPENSED
Start: 2024-07-08

## (undated) RX ORDER — HEPARIN SODIUM 1000 [USP'U]/ML
INJECTION, SOLUTION INTRAVENOUS; SUBCUTANEOUS
Status: DISPENSED
Start: 2024-07-08

## (undated) RX ORDER — CHLORHEXIDINE GLUCONATE ORAL RINSE 1.2 MG/ML
SOLUTION DENTAL
Status: DISPENSED
Start: 2024-08-21

## (undated) RX ORDER — CEFAZOLIN SODIUM/WATER 2 G/20 ML
SYRINGE (ML) INTRAVENOUS
Status: DISPENSED
Start: 2024-08-21

## (undated) RX ORDER — PROPOFOL 10 MG/ML
INJECTION, EMULSION INTRAVENOUS
Status: DISPENSED
Start: 2024-08-21

## (undated) RX ORDER — LIDOCAINE HYDROCHLORIDE 10 MG/ML
INJECTION, SOLUTION INFILTRATION; PERINEURAL
Status: DISPENSED
Start: 2023-09-27

## (undated) RX ORDER — SODIUM CHLORIDE 9 MG/ML
INJECTION, SOLUTION INTRAVENOUS
Status: DISPENSED
Start: 2024-07-08

## (undated) RX ORDER — FENTANYL CITRATE 50 UG/ML
INJECTION, SOLUTION INTRAMUSCULAR; INTRAVENOUS
Status: DISPENSED
Start: 2023-10-11

## (undated) RX ORDER — ASPIRIN 81 MG/1
TABLET, CHEWABLE ORAL
Status: DISPENSED
Start: 2024-07-08

## (undated) RX ORDER — NITROGLYCERIN 5 MG/ML
VIAL (ML) INTRAVENOUS
Status: DISPENSED
Start: 2024-07-08

## (undated) RX ORDER — LIDOCAINE HYDROCHLORIDE 10 MG/ML
INJECTION, SOLUTION INFILTRATION; PERINEURAL
Status: DISPENSED
Start: 2023-10-11

## (undated) RX ORDER — HEPARIN SODIUM 1000 [USP'U]/ML
INJECTION, SOLUTION INTRAVENOUS; SUBCUTANEOUS
Status: DISPENSED
Start: 2023-09-27

## (undated) RX ORDER — FENTANYL CITRATE-0.9 % NACL/PF 10 MCG/ML
PLASTIC BAG, INJECTION (ML) INTRAVENOUS
Status: DISPENSED
Start: 2024-08-21

## (undated) RX ORDER — FENTANYL CITRATE 50 UG/ML
INJECTION, SOLUTION INTRAMUSCULAR; INTRAVENOUS
Status: DISPENSED
Start: 2024-07-08

## (undated) RX ORDER — NICARDIPINE HCL-0.9% SOD CHLOR 1 MG/10 ML
SYRINGE (ML) INTRAVENOUS
Status: DISPENSED
Start: 2024-07-08